# Patient Record
Sex: MALE | Race: WHITE | URBAN - METROPOLITAN AREA
[De-identification: names, ages, dates, MRNs, and addresses within clinical notes are randomized per-mention and may not be internally consistent; named-entity substitution may affect disease eponyms.]

---

## 2018-03-19 ENCOUNTER — TRANSFERRED RECORDS (OUTPATIENT)
Dept: HEALTH INFORMATION MANAGEMENT | Facility: CLINIC | Age: 73
End: 2018-03-19

## 2018-03-21 ENCOUNTER — HOSPITAL ENCOUNTER (INPATIENT)
Facility: CLINIC | Age: 73
LOS: 14 days | Discharge: ANOTHER HOSPITAL | DRG: 228 | End: 2018-04-04
Attending: THORACIC SURGERY (CARDIOTHORACIC VASCULAR SURGERY) | Admitting: THORACIC SURGERY (CARDIOTHORACIC VASCULAR SURGERY)
Payer: COMMERCIAL

## 2018-03-21 DIAGNOSIS — I48.0 PAROXYSMAL ATRIAL FIBRILLATION (H): ICD-10-CM

## 2018-03-21 DIAGNOSIS — Z79.4 TYPE 2 DIABETES MELLITUS WITH HYPEROSMOLARITY WITHOUT COMA, WITH LONG-TERM CURRENT USE OF INSULIN (H): Primary | ICD-10-CM

## 2018-03-21 DIAGNOSIS — K64.4 EXTERNAL HEMORRHOIDS: ICD-10-CM

## 2018-03-21 DIAGNOSIS — Z95.1 S/P CABG (CORONARY ARTERY BYPASS GRAFT): ICD-10-CM

## 2018-03-21 DIAGNOSIS — Z98.890 H/O FASCIOTOMY: ICD-10-CM

## 2018-03-21 DIAGNOSIS — E11.00 TYPE 2 DIABETES MELLITUS WITH HYPEROSMOLARITY WITHOUT COMA, WITH LONG-TERM CURRENT USE OF INSULIN (H): Primary | ICD-10-CM

## 2018-03-21 PROBLEM — I50.9 HEART FAILURE (H): Status: ACTIVE | Noted: 2018-03-21

## 2018-03-21 LAB — GLUCOSE BLDC GLUCOMTR-MCNC: 211 MG/DL (ref 70–99)

## 2018-03-21 PROCEDURE — 87640 STAPH A DNA AMP PROBE: CPT | Performed by: THORACIC SURGERY (CARDIOTHORACIC VASCULAR SURGERY)

## 2018-03-21 PROCEDURE — 87641 MR-STAPH DNA AMP PROBE: CPT | Performed by: THORACIC SURGERY (CARDIOTHORACIC VASCULAR SURGERY)

## 2018-03-21 PROCEDURE — 93010 ELECTROCARDIOGRAM REPORT: CPT | Performed by: INTERNAL MEDICINE

## 2018-03-21 PROCEDURE — 20000004 ZZH R&B ICU UMMC

## 2018-03-21 PROCEDURE — 25000128 H RX IP 250 OP 636: Performed by: SURGERY

## 2018-03-21 PROCEDURE — 93005 ELECTROCARDIOGRAM TRACING: CPT

## 2018-03-21 PROCEDURE — 83605 ASSAY OF LACTIC ACID: CPT | Performed by: INTERNAL MEDICINE

## 2018-03-21 PROCEDURE — 00000146 ZZHCL STATISTIC GLUCOSE BY METER IP

## 2018-03-21 RX ORDER — ASPIRIN 81 MG/1
81 TABLET, CHEWABLE ORAL DAILY
Status: DISCONTINUED | OUTPATIENT
Start: 2018-03-22 | End: 2018-03-24

## 2018-03-21 RX ORDER — POTASSIUM CHLORIDE 750 MG/1
20-40 TABLET, EXTENDED RELEASE ORAL
Status: DISCONTINUED | OUTPATIENT
Start: 2018-03-21 | End: 2018-03-24

## 2018-03-21 RX ORDER — POTASSIUM CHLORIDE 1.5 G/1.58G
20-40 POWDER, FOR SOLUTION ORAL
Status: DISCONTINUED | OUTPATIENT
Start: 2018-03-21 | End: 2018-03-24

## 2018-03-21 RX ORDER — ALBUTEROL SULFATE 90 UG/1
2 AEROSOL, METERED RESPIRATORY (INHALATION) EVERY 6 HOURS PRN
COMMUNITY

## 2018-03-21 RX ORDER — LISINOPRIL 10 MG/1
30 TABLET ORAL DAILY
Status: DISCONTINUED | OUTPATIENT
Start: 2018-03-22 | End: 2018-03-22

## 2018-03-21 RX ORDER — NICOTINE POLACRILEX 4 MG
15-30 LOZENGE BUCCAL
Status: DISCONTINUED | OUTPATIENT
Start: 2018-03-21 | End: 2018-03-24

## 2018-03-21 RX ORDER — CHLORAL HYDRATE 500 MG
1000 CAPSULE ORAL DAILY
Status: DISCONTINUED | OUTPATIENT
Start: 2018-03-22 | End: 2018-03-28

## 2018-03-21 RX ORDER — HEPARIN SODIUM 10000 [USP'U]/100ML
0-3500 INJECTION, SOLUTION INTRAVENOUS CONTINUOUS
Status: DISPENSED | OUTPATIENT
Start: 2018-03-21 | End: 2018-03-23

## 2018-03-21 RX ORDER — GLIPIZIDE 10 MG/1
20 TABLET ORAL
Status: DISCONTINUED | OUTPATIENT
Start: 2018-03-22 | End: 2018-03-22

## 2018-03-21 RX ORDER — ACETAMINOPHEN 325 MG/1
650 TABLET ORAL EVERY 6 HOURS PRN
Status: DISCONTINUED | OUTPATIENT
Start: 2018-03-21 | End: 2018-03-24

## 2018-03-21 RX ORDER — MULTIPLE VITAMINS W/ MINERALS TAB 9MG-400MCG
1 TAB ORAL DAILY
Status: DISCONTINUED | OUTPATIENT
Start: 2018-03-22 | End: 2018-04-04 | Stop reason: HOSPADM

## 2018-03-21 RX ORDER — NITROGLYCERIN 20 MG/100ML
.07-1.75 INJECTION INTRAVENOUS CONTINUOUS
Status: DISCONTINUED | OUTPATIENT
Start: 2018-03-21 | End: 2018-03-22

## 2018-03-21 RX ORDER — MAGNESIUM SULFATE HEPTAHYDRATE 40 MG/ML
4 INJECTION, SOLUTION INTRAVENOUS EVERY 4 HOURS PRN
Status: DISCONTINUED | OUTPATIENT
Start: 2018-03-21 | End: 2018-03-24

## 2018-03-21 RX ORDER — DEXTROSE MONOHYDRATE 25 G/50ML
25-50 INJECTION, SOLUTION INTRAVENOUS
Status: DISCONTINUED | OUTPATIENT
Start: 2018-03-21 | End: 2018-03-24

## 2018-03-21 RX ORDER — POTASSIUM CL/LIDO/0.9 % NACL 10MEQ/0.1L
10 INTRAVENOUS SOLUTION, PIGGYBACK (ML) INTRAVENOUS
Status: DISCONTINUED | OUTPATIENT
Start: 2018-03-21 | End: 2018-03-24

## 2018-03-21 RX ORDER — ALBUTEROL SULFATE 90 UG/1
2 AEROSOL, METERED RESPIRATORY (INHALATION) EVERY 6 HOURS PRN
Status: DISCONTINUED | OUTPATIENT
Start: 2018-03-21 | End: 2018-04-04 | Stop reason: HOSPADM

## 2018-03-21 RX ORDER — POTASSIUM CHLORIDE 7.45 MG/ML
10 INJECTION INTRAVENOUS
Status: DISCONTINUED | OUTPATIENT
Start: 2018-03-21 | End: 2018-03-24

## 2018-03-21 RX ORDER — FINASTERIDE 5 MG/1
5 TABLET, FILM COATED ORAL DAILY
Status: DISCONTINUED | OUTPATIENT
Start: 2018-03-22 | End: 2018-04-04 | Stop reason: HOSPADM

## 2018-03-21 RX ORDER — INSULIN GLARGINE 100 [IU]/ML
60 INJECTION, SOLUTION SUBCUTANEOUS AT BEDTIME
Status: ON HOLD | COMMUNITY
End: 2018-04-04

## 2018-03-21 RX ORDER — OMEGA-3/DHA/EPA/FISH OIL 60 MG-90MG
500 CAPSULE ORAL DAILY
COMMUNITY

## 2018-03-21 RX ORDER — METOPROLOL TARTRATE 50 MG
50 TABLET ORAL 2 TIMES DAILY
Status: DISCONTINUED | OUTPATIENT
Start: 2018-03-21 | End: 2018-03-22

## 2018-03-21 RX ORDER — POTASSIUM CHLORIDE 29.8 MG/ML
20 INJECTION INTRAVENOUS
Status: DISCONTINUED | OUTPATIENT
Start: 2018-03-21 | End: 2018-03-24

## 2018-03-21 RX ADMIN — FUROSEMIDE 20 MG/HR: 10 INJECTION, SOLUTION INTRAVENOUS at 22:30

## 2018-03-21 RX ADMIN — HEPARIN SODIUM 1900 UNITS/HR: 10000 INJECTION, SOLUTION INTRAVENOUS at 23:00

## 2018-03-21 NOTE — LETTER
[facility]               Recipient:  Acute Rehab Admissions team      Sender:  Bobbi MONROE Donovan, EDWINW  [address]   Phone: [phone]  Pager: [phone]  Unit: [phone]          Date: March 29, 2018  Patient Name:  Izaiah Alvarez  Routing Message:  Please review for acute rehab placement.  Pt has both Medicare and VA insurance coverage for services.  Thank you, Bobbi          The documents accompanying this notice contain confidential information belonging to the sender.  This information is intended only for the use of the individual or entity named above.  The authorized recipient of this information is prohibited from disclosing this information to any other party and is required to destroy the information after its stated need has been fulfilled, unless otherwise required by state law.      If you are not the intended recipient, you are hereby notified that any disclosure, copying, distribution or action taken in reliance on the contents of these documents is strictly prohibited. If you have received this document in error, please notify [facility] immediately at [phone].  You may return the document via fax ([phone]) or return mail  ([facility], , [address] [place] [address]).

## 2018-03-21 NOTE — IP AVS SNAPSHOT
MRN:[id number]                      After Visit Summary   3/21/2018    Izaiah Alvarez    MRN: [id number]           Thank you!     Thank you for choosing [facility] for your care. Our goal is always to provide you with excellent care. Hearing back from our patients is one way we can continue to improve our services. Please take a few minutes to complete the written survey that you may receive in the mail after you visit with us. Thank you!        Patient Information     Date Of Birth          1945        Designated Caregiver       Most Recent Value    Caregiver    Will someone help with your care after discharge? yes    Name of designated caregiver Ely    Phone number of caregiver [phone]    Caregiver address [address] [place] [address] [address] [place] [address]      About your hospital stay     You were admitted on:  March 21, 2018 You last received care in the:  [address] [facility]    You were discharged on:  April 4, 2018        Reason for your hospital stay       You had a coronary artery bypass on 3/23/18 with Dr Lopez.                  Who to Call     For medical emergencies, please call 911.  For non-urgent questions about your medical care, please call your primary care provider or clinic, None  For questions related to your surgery, please call your surgery clinic        Attending Provider     Provider Specialty    William Blank MD Thoracic Surgery (Cardiothoracic Vascular Surgery)    Eric Marks MD Cardiology       Primary Care Provider Fax #    Provider Not In System [phone]      After Care Instructions     Activity - Up with nursing assistance           Advance Diet as Tolerated       Follow this diet upon discharge: Orders Placed This Encounter      Fluid restriction 2000 ML FLUID      Snacks/Supplements Adult: With Meals      Moderate Consistent CHO Diet            Cardiac sternal precautions           Daily weights       Call Provider for weight gain of more than 2  pounds per day or 5 pounds per week.            General info for SNF       Length of Stay Estimate: Short Term Care: Estimated # of Days <30  Condition at Discharge: Improving  Level of care:skilled   Rehabilitation Potential: Good  Admission H&P remains valid and up-to-date: Yes  Recent Chemotherapy: N/A  Use Nursing Home Standing Orders: Yes            Glucose monitor nursing POCT       Before meals and at bedtime            Intake and output       Every shift            Mantoux instructions       Give two-step Mantoux (PPD) Per Facility Policy Yes            Oxygen - Nasal cannula       Titrate Lpm PRN by nasal cannula to keep O2 sats 92% or greater.            Wound care       Site:   Right Leg groin and lower leg  Instructions:    Staples in groin should be removed 4/10/18  Sutures in R leg should be removed around 5/5/18            Wound care (specify)       Site:   Sternal incision and chest tubes  Instructions:    You have dissolvable sutures under your skin that do not need to be removed.  Pat wound dressing dry after showers.  Skin glue will eventually fall off in 1-2 weeks.     Keep wound clean and dry, showers are okay after discharge, but don't let spray hit directly on incision. No baths or swimming for 1 month.  Clean wounds twice a day for 2 weeks with microklenz spray if available. Cover chest tube sites with gauze until they stop draining, then leave open.            Wound care and dressings       Please remove right groin staples in one week.  Remove right leg sutures in 3 weeks.                  Follow-up Appointments     Follow Up and recommended labs and tests       You will now return to the care of your primary physician and your cardiologist.   - Restart metformin at 500 mg BID and follow up with Diabetic Provider at home  - Follow up with Vascular surgery team in 3-4 weeks in [place] (remove right groin staples on 4/10 and fasciotomy site sutures on 3-4 weeks)  - Follow up opthalmology as  outpatient PRN                  Additional Services     Occupational Therapy Adult Consult       Evaluate and treat as clinically indicated.    Reason:  Sternotomy, right leg fasciotomy            Physical Therapy Adult Consult       Evaluate and treat as clinically indicated.    Reason:  Sternotomy, right leg fasciotomy                  Further instructions from your care team       [facility]      AFTER YOU GO HOME FROM YOUR HEART SURGERY    You had a full sternotomy, so avoid lifting anything greater than ten pounds for 6 weeks after surgery and then less than 20 pounds for an additional 6 weeks.  No driving for 4 weeks after surgery or while on pain medication.     Avoid strenuous activities such as bowling, vacuuming, raking, shoveling, golf or tennis for 12 weeks after your surgery. It is okay to resume sex if you feel comfortable in doing so. You may have to try different positions with your partner.     Splint your chest incision by hugging a pillow or bringing your arms across your chest when coughing or sneezing. Avoid pushing off with your arms when getting up for the first month if you have had your sternum opened.    Shower or wash your incisions daily with soap and water (or as instructed), pat dry. Keep wound clean and dry, showers are okay after discharge, but don't let spray hit directly on incision. No baths or swimming for 1 month.  Clean wounds twice a day for 2 weeks with microklenz spray if available. Cover chest tube sites with gauze until they stop draining, then leave open. It is not abnormal for chest tube sites to drain yellowish/clear fluid for up to 2-3 weeks after surgery.   Watch for signs of infection: increased redness, tenderness, warmth or any drainage that appears infected (pus like) or is persistent.  Also a temperature > 100.5 F or chills. Call your surgeon or primary care provider's office immediately. Remove any skin glue left on incisions after  10-14 days. This will not affect your incision and can speed up healing.    Exercise is very important in your recovery. Please follow the guidelines set up for you in your cardiac rehab classes at the hospital. If outpatient cardiac rehab was ordered for you, we highly recommend you participate. If you have problems arranging your cardiac rehab, please call [phone].     Avoid sitting for prolonged periods of time, try to walk every hour during the day. If you have a leg incision, elevate your leg often when you are not walking.    Check your weight when you get home from the hospital and continue to check it daily through your recovery for at least a month. If you notice a weight gain of 2-3 pounds in a week, notify your primary care physician, cardiologist or surgeon.    Bowel activity may be slow after surgery. If necessary, you may take an over the counter laxative such as Milk of Magnesia or Miralax. You may have stool softeners prescribed (docusate sodium, Senokot). We recommend using stool softeners while using narcotics for pain (oxycodone/percocet, hydrocodone/vicodin).      DO NOT SMOKE.  IF YOU NEED HELP QUITTING, PLEASE TALK WITH YOUR CARDIOLOGIST OR PRIMARY DOCTOR.    You are on a blood thinner, follow the instructions you were given in the hospital and DO NOT SKIP this medication. Try and take it the same time everyday. Your primary care physician or coumadin clinic will manage the dosing.     REGARDING PRESCRIPTION REFILLS.  If you need a refill on your pain medication contact us.  All other medications will be adjusted, discontinued and re-filled by your primary care physician and/or your cardiologist as they were prior to your surgery. We have given you enough for one to three month with possibly one refill.    POST-OPERATIVE CLINIC VISITS  You will now return to the care of your primary physician and your cardiologist.   - Restart metformin at 500 mg BID and follow up with Diabetic Provider at  home  - Follow up with Vascular surgery team in 3-4 weeks in [place] (remove right groin staples on 4/10 and fasciotomy site sutures on 3-4 weeks)  - Follow up opthalmology as outpatient PRN   If there is a need to return to see CT Surgery please call our  at [phone].    SURGICAL QUESTIONS  Please call Sherry Sky with any surgical recovery and medication questions, her phone number is listed below.  She can assist you with your needs and contact other surgery care team members as indicated.    On weekends or after hours, please call [phone] and ask the  to   page the Cardiothoracic Surgery fellow on call.      Thank you,    Your Cardiothoracic Surgery Team  Sherry Sky RN Care Coordinator-  [phone]   Julee Vail PA-C    Warfarin Instruction     You have started taking a medicine called warfarin. This is a blood-thinning medicine (anticoagulant). It helps prevent and treat blood clots.      Before leaving the hospital, make sure you know how much to take and how long to take it.      You will need regular blood tests to make sure your blood is clotting safely. It is very important to see your doctor for regular blood tests.    Talk to your doctor before taking any new medicine (this includes over-the-counter drugs and herbal products). Many medicines can interact with warfarin. This may cause more bleeding or too much clotting.     Eating a lot of vitamin K--found in green, leafy vegetables--can change the way warfarin works in your body. Do NOT avoid these foods. Instead, try to eat the same amount each day.     Bleeding is the most common side-effect of warfarin. You may notice bleeding gums, a bloody nose, bruises and bleeding longer when you cut yourself. See a doctor at once if:   o You cough up blood  o You find blood in your stool (poop)  o You have a deep cut, or a cut that bleeds longer than 10 minutes  "  o You have a bad cut, hard fall, accident or hit your head (go to urgent care or the emergency room).    For women who can get pregnant: This medicine can harm an unborn baby. Be very careful not to get pregnant while taking this medicine. If you think you might be pregnant, call your doctor right away.    For more information, read \"Guide to Warfarin Therapy,  the booklet you received in the hospital.        Pending Results     Date and Time Order Name Status Description    3/27/2018 0601 Heparin 10a Level In process     3/23/2018 1426 Plasma prepare order unit In process             Statement of Approval     Ordered          18 0815  I have reviewed and agree with all the recommendations and orders detailed in this document.  EFFECTIVE NOW     Approved and electronically signed by:  Alfonzo Mckeon PA             Admission Information     Date & Time Provider Department Dept. Phone    3/21/2018 Eric Marks MD [address] [facility] [phone]      Your Vitals Were     Blood Pressure Pulse Temperature Respirations Height Weight    113/63 (BP Location: Left arm) 84 97.5  F (36.4  C) (Oral) 20 1.803 m (5' 11\") 113.1 kg (249 lb 6.4 oz)    Pulse Oximetry BMI (Body Mass Index)                97% 34.78 kg/m2          [employer] Information     [employer] lets you send messages to your doctor, view your test results, renew your prescriptions, schedule appointments and more. To sign up, go to [email or web address] . Click on \"Log in\" on the left side of the screen, which will take you to the Welcome page. Then click on \"Sign up Now\" on the right side of the page.     You will be asked to enter the access code listed below, as well as some personal information. Please follow the directions to create your username and password.     Your access code is: [email or web address]  Expires: 2018  6:31 AM     Your access code will  in 90 days. If you need help or a new code, please call your [facility] " clinic or [phone].        Care EveryWhere ID     This is your Care EveryWhere ID. This could be used by other organizations to access your [facility] medical records  [id number]        Equal Access to Services     ROBERTA WOLF : Hadii aad ku hadedouardbutch Luis, waaxda luqadaha, qaybta kaalmada nico, gray doesada martingeno granadoalon salvador. So [facility] [phone].    ATENCIÓN: Si habla español, tiene a vasquez disposición servicios gratuitos de asistencia lingüística. Llame al [phone].    We comply with applicable federal civil rights laws and [place] laws. We do not discriminate on the basis of race, color, national origin, age, disability, sex, sexual orientation, or gender identity.               Review of your medicines      START taking        Dose / Directions    cephALEXin 500 MG capsule   Commonly known as:  KEFLEX   Indication:  Preventative Medication Therapy Used Around Surgery   Used for:  H/O fasciotomy        Dose:  500 mg   Take 1 capsule (500 mg) by mouth every 8 hours for 10 days   Refills:  0       furosemide 20 MG tablet   Commonly known as:  LASIX   Used for:  S/P CABG (coronary artery bypass graft)        Dose:  20 mg   Take 1 tablet (20 mg) by mouth 2 times daily   Quantity:  60 tablet   Refills:  0       * insulin aspart 100 UNIT/ML injection   Commonly known as:  NovoLOG PEN   Used for:  S/P CABG (coronary artery bypass graft)        Dose:  1-7 Units   Inject 1-7 Units Subcutaneous 3 times daily (before meals) Correction Scale - MEDIUM RESISTANCE DOSING    No Correction Insulin if Pre-Meal BG <140.  For Pre-Meal  - 189 give 1 unit.   - 239 give 2 units.   - 289 give 3 units.   - 339 give 4 units.  - 399 give 5 units.  -449 give 6 units For Pre-Meal BG > 449 give 7 units.  To be given based on pre-meal blood glucose   Refills:  0       * insulin aspart 100 UNIT/ML injection   Commonly known as:  NovoLOG PEN   Used for:  S/P CABG (coronary artery  bypass graft)        Dose:  1-5 Units   Inject 1-5 Units Subcutaneous At Bedtime MEDIUM INSULIN RESISTANCE DOSING   Do Not give Bedtime Correction Insulin if BG less than  200.  For  - 249 give 1 units.  For  - 299 give 2 units.  For  - 349 give 3 units.  For  -399 give 4 units.  For BG greater than or equal to 400 give 5 units. Notify provider if glucose greater than or equal to 350 mg/dL after administration of correction dose.   Refills:  0       insulin glargine 100 UNIT/ML injection   Commonly known as:  LANTUS   Used for:  Type 2 diabetes mellitus with hyperosmolarity without coma, with long-term current use of insulin (H)   Replaces:  insulin glargine 100 UNIT/ML injection        Dose:  45 Units   Inject 45 Units Subcutaneous every morning   Refills:  0       pantoprazole 40 MG EC tablet   Commonly known as:  PROTONIX   Used for:  S/P CABG (coronary artery bypass graft)        Dose:  40 mg   Take 1 tablet (40 mg) by mouth every morning for 30 days   Quantity:  30 tablet   Refills:  0       phenylephrine-shark liver oil-mineral oil-petrolatum 0.25-14-74.9 % rectal ointment   Commonly known as:  PREPARATION H   Used for:  External hemorrhoids        Place rectally 3 times daily as needed for hemorrhoids   Refills:  0       polyethylene glycol Packet   Commonly known as:  MIRALAX/GLYCOLAX   Used for:  S/P CABG (coronary artery bypass graft)        Dose:  17 g   17 g by Oral or Feeding Tube route daily as needed for constipation   Quantity:  7 packet   Refills:  0       senna-docusate 8.6-50 MG per tablet   Commonly known as:  SENOKOT-S;PERICOLACE   Used for:  S/P CABG (coronary artery bypass graft)        Dose:  2 tablet   2 tablets by Oral or Feeding Tube route 2 times daily as needed for constipation   Quantity:  100 tablet   Refills:  0       * Notice:  This list has 2 medication(s) that are the same as other medications prescribed for you. Read the directions carefully, and ask your  doctor or other care provider to review them with you.      CONTINUE these medicines which may have CHANGED, or have new prescriptions. If we are uncertain of the size of tablets/capsules you have at home, strength may be listed as something that might have changed.        Dose / Directions    atorvastatin 20 MG tablet   Commonly known as:  LIPITOR   This may have changed:    - medication strength  - how much to take   Used for:  S/P CABG (coronary artery bypass graft)        Dose:  20 mg   Take 1 tablet (20 mg) by mouth daily   Quantity:  30 tablet   Refills:  0       glipiZIDE 10 MG tablet   Commonly known as:  GLUCOTROL   This may have changed:    - medication strength  - how much to take  - when to take this   Used for:  Type 2 diabetes mellitus with hyperosmolarity without coma, with long-term current use of insulin (H)        Dose:  10 mg   Take 1 tablet (10 mg) by mouth every morning (before breakfast)   Quantity:  60 tablet   Refills:  0       metFORMIN 500 MG tablet   Commonly known as:  GLUCOPHAGE   This may have changed:  how much to take   Used for:  Type 2 diabetes mellitus with hyperosmolarity without coma, with long-term current use of insulin (H)        Dose:  500 mg   Take 1 tablet (500 mg) by mouth 2 times daily (with meals)   Quantity:  60 tablet   Refills:  0       warfarin 2 MG tablet   Commonly known as:  COUMADIN   This may have changed:    - medication strength  - how much to take  - how to take this  - additional instructions   Used for:  Paroxysmal atrial fibrillation (H)        Take 5 mg PO daily until next INR check, then dose per INR goal 2-3 for 6 months.   Refills:  0         CONTINUE these medicines which have NOT CHANGED        Dose / Directions    albuterol 108 (90 BASE) MCG/ACT Inhaler   Commonly known as:  PROAIR HFA/PROVENTIL HFA/VENTOLIN HFA        Dose:  2 puff   Inhale 2 puffs into the lungs every 6 hours as needed for shortness of breath / dyspnea or wheezing   Refills:  0        ASPIRIN 81 PO        Dose:  1 tablet   Take 1 tablet by mouth daily   Refills:  0       FINASTERIDE PO        Dose:  5 mg   Take 5 mg by mouth daily   Refills:  0       Fish Oil 500 MG Caps        Dose:  500 mg   Take 500 mg by mouth daily   Refills:  0       fluticasone 50 MCG/BLIST Aepb   Commonly known as:  FLOVENT DISKUS        Dose:  2 puff   Inhale 2 puffs into the lungs daily as needed Both nostrils for rhinitis   Refills:  0       METOPROLOL TARTRATE PO        Dose:  50 mg   Take 50 mg by mouth 2 times daily   Refills:  0       MULTIVITAMIN & MINERAL PO        Dose:  1 tablet   Take 1 tablet by mouth daily   Refills:  0       TYLENOL PO        Dose:  650 mg   Take 650 mg by mouth every 6 hours as needed for mild pain   Refills:  0         STOP taking     CHLORTHALIDONE PO           insulin glargine 100 UNIT/ML injection   Commonly known as:  LANTUS   Replaced by:  insulin glargine 100 UNIT/ML injection           LISINOPRIL PO                Where to get your medicines      Some of these will need a paper prescription and others can be bought over the counter. Ask your nurse if you have questions.     You don't need a prescription for these medications     atorvastatin 20 MG tablet    cephALEXin 500 MG capsule    furosemide 20 MG tablet    glipiZIDE 10 MG tablet    insulin aspart 100 UNIT/ML injection    insulin aspart 100 UNIT/ML injection    insulin glargine 100 UNIT/ML injection    metFORMIN 500 MG tablet    pantoprazole 40 MG EC tablet    phenylephrine-shark liver oil-mineral oil-petrolatum 0.25-14-74.9 % rectal ointment    polyethylene glycol Packet    senna-docusate 8.6-50 MG per tablet    warfarin 2 MG tablet                Protect others around you: Learn how to safely use, store and throw away your medicines at www.disposemymeds.org.        ANTIBIOTIC INSTRUCTION     You've Been Prescribed an Antibiotic - Now What?  Your healthcare team thinks that you or your loved one might have an infection.  Some infections can be treated with antibiotics, which are powerful, life-saving drugs. Like all medications, antibiotics have side effects and should only be used when necessary. There are some important things you should know about your antibiotic treatment.      Your healthcare team may run tests before you start taking an antibiotic.    Your team may take samples (e.g., from your blood, urine or other areas) to run tests to look for bacteria. These test can be important to determine if you need an antibiotic at all and, if you do, which antibiotic will work best.      Within a few days, your healthcare team might change or even stop your antibiotic.    Your team may start you on an antibiotic while they are working to find out what is making you sick.    Your team might change your antibiotic because test results show that a different antibiotic would be better to treat your infection.    In some cases, once your team has more information, they learn that you do not need an antibiotic at all. They may find out that you don't have an infection, or that the antibiotic you're taking won't work against your infection. For example, an infection caused by a virus can't be treated with antibiotics. Staying on an antibiotic when you don't need it is more likely to be harmful than helpful.      You may experience side effects from your antibiotic.    Like all medications, antibiotics have side effects. Some of these can be serious.    Let you healthcare team know if you have any known allergies when you are admitted to the hospital.    One significant side effect of nearly all antibiotics is the risk of severe and sometimes deadly diarrhea caused by Clostridium difficile (C. Difficile). This occurs when a person takes antibiotics because some good germs are destroyed. Antibiotic use allows C. diificile to take over, putting patients at high risk for this serious infection.    As a patient or caregiver, it is important to  understand your or your loved one's antibiotic treatment. It is especially important for caregivers to speak up when patients can't speak for themselves. Here are some important questions to ask your healthcare team.    What infection is this antibiotic treating and how do you know I have that infection?    What side effects might occur from this antibiotic?    How long will I need to take this antibiotic?    Is it safe to take this antibiotic with other medications or supplements (e.g., vitamins) that I am taking?     Are there any special directions I need to know about taking this antibiotic? For example, should I take it with food?    How will I be monitored to know whether my infection is responding to the antibiotic?    What tests may help to make sure the right antibiotic is prescribed for me?      Information provided by:  www.cdc.gov/getsmart  U.S. Department of Health and Human Services  Centers for disease Control and Prevention  National Center for Emerging and Zoonotic Infectious Diseases  Division of Healthcare Quality Promotion             Medication List: This is a list of all your medications and when to take them. Check marks below indicate your daily home schedule. Keep this list as a reference.      Medications           Morning Afternoon Evening Bedtime As Needed    albuterol 108 (90 BASE) MCG/ACT Inhaler   Commonly known as:  PROAIR HFA/PROVENTIL HFA/VENTOLIN HFA   Inhale 2 puffs into the lungs every 6 hours as needed for shortness of breath / dyspnea or wheezing                                   ASPIRIN 81 PO   Take 1 tablet by mouth daily   Last time this was given:  81 mg on 3/22/2018  7:45 AM                                   atorvastatin 20 MG tablet   Commonly known as:  LIPITOR   Take 1 tablet (20 mg) by mouth daily   Last time this was given:  20 mg on 4/3/2018  8:47 PM                                   cephALEXin 500 MG capsule   Commonly known as:  KEFLEX   Take 1 capsule (500 mg) by  mouth every 8 hours for 10 days   Last time this was given:  500 mg on 4/4/2018  6:12 AM                                         FINASTERIDE PO   Take 5 mg by mouth daily   Last time this was given:  5 mg on 4/4/2018  6:37 AM                                   Fish Oil 500 MG Caps   Take 500 mg by mouth daily   Last time this was given:  1,000 mg on 3/28/2018  8:00 AM                                   fluticasone 50 MCG/BLIST Aepb   Commonly known as:  FLOVENT DISKUS   Inhale 2 puffs into the lungs daily as needed Both nostrils for rhinitis                                   furosemide 20 MG tablet   Commonly known as:  LASIX   Take 1 tablet (20 mg) by mouth 2 times daily   Last time this was given:  20 mg on 4/4/2018  6:37 AM                                      glipiZIDE 10 MG tablet   Commonly known as:  GLUCOTROL   Take 1 tablet (10 mg) by mouth every morning (before breakfast)   Last time this was given:  10 mg on 4/4/2018  6:36 AM                                   * insulin aspart 100 UNIT/ML injection   Commonly known as:  NovoLOG PEN   Inject 1-7 Units Subcutaneous 3 times daily (before meals) Correction Scale - MEDIUM RESISTANCE DOSING    No Correction Insulin if Pre-Meal BG <140.  For Pre-Meal  - 189 give 1 unit.   - 239 give 2 units.   - 289 give 3 units.   - 339 give 4 units.  - 399 give 5 units.  -449 give 6 units For Pre-Meal BG > 449 give 7 units.  To be given based on pre-meal blood glucose   Last time this was given:  1 Units on 4/3/2018 12:10 PM                                         * insulin aspart 100 UNIT/ML injection   Commonly known as:  NovoLOG PEN   Inject 1-5 Units Subcutaneous At Bedtime MEDIUM INSULIN RESISTANCE DOSING   Do Not give Bedtime Correction Insulin if BG less than  200.  For  - 249 give 1 units.  For  - 299 give 2 units.  For  - 349 give 3 units.  For  -399 give 4 units.  For BG greater than or equal to 400 give 5  units. Notify provider if glucose greater than or equal to 350 mg/dL after administration of correction dose.   Last time this was given:  1 Units on 4/3/2018 12:10 PM                                   insulin glargine 100 UNIT/ML injection   Commonly known as:  LANTUS   Inject 45 Units Subcutaneous every morning   Last time this was given:  45 Units on 4/3/2018  1:01 PM                                   metFORMIN 500 MG tablet   Commonly known as:  GLUCOPHAGE   Take 1 tablet (500 mg) by mouth 2 times daily (with meals)   Last time this was given:  500 mg on 3/31/2018  9:45 AM                                      METOPROLOL TARTRATE PO   Take 50 mg by mouth 2 times daily   Last time this was given:  50 mg on 4/4/2018  6:37 AM                                      MULTIVITAMIN & MINERAL PO   Take 1 tablet by mouth daily                                   pantoprazole 40 MG EC tablet   Commonly known as:  PROTONIX   Take 1 tablet (40 mg) by mouth every morning for 30 days   Last time this was given:  40 mg on 4/4/2018  6:37 AM                                   phenylephrine-shark liver oil-mineral oil-petrolatum 0.25-14-74.9 % rectal ointment   Commonly known as:  PREPARATION H   Place rectally 3 times daily as needed for hemorrhoids                                   polyethylene glycol Packet   Commonly known as:  MIRALAX/GLYCOLAX   17 g by Oral or Feeding Tube route daily as needed for constipation   Last time this was given:  17 g on 4/4/2018  6:37 AM                                   senna-docusate 8.6-50 MG per tablet   Commonly known as:  SENOKOT-S;PERICOLACE   2 tablets by Oral or Feeding Tube route 2 times daily as needed for constipation   Last time this was given:  2 tablets on 4/2/2018 10:20 PM                                   TYLENOL PO   Take 650 mg by mouth every 6 hours as needed for mild pain   Last time this was given:  650 mg on 4/1/2018 11:59 PM                                   warfarin 2 MG tablet    Commonly known as:  COUMADIN   Take 5 mg PO daily until next INR check, then dose per INR goal 2-3 for 6 months.   Last time this was given:  4 mg on 4/3/2018  5:09 PM                                   * Notice:  This list has 2 medication(s) that are the same as other medications prescribed for you. Read the directions carefully, and ask your doctor or other care provider to review them with you.

## 2018-03-21 NOTE — IP AVS SNAPSHOT
` `     UNIT [facility] [facility]: [phone]                 INTERAGENCY TRANSFER FORM - NOTES (H&P, Discharge Summary, Consults, Procedures, Therapies)   3/21/2018                    Hospital Admission Date: 3/21/2018  IZAIAH FAROOQ   : 1945  Sex: Male        Patient PCP Information     Provider PCP Type    Provider Not In System General         History & Physicals      H&P by Olayinka Orellana MD at 3/21/2018 11:14 PM     Author:  Olayinka Orellana MD Service:  Cardiothoracic Surgery Author Type:  Resident    Filed:  3/21/2018 11:46 PM Date of Service:  3/21/2018 11:14 PM Creation Time:  3/21/2018 11:12 PM    Status:  Attested :  Olayinka Orellana MD (Resident)    Cosigner:  William Blank MD at 3/22/2018  1:48 PM        Attestation signed by William Blank MD at 3/22/2018  1:48 PM        Agree                               CVTS[KG1.1] H&P[KG1.2]    HPI:   Izaiah Farooq is a 72 year old male with PMHx of obesity, Afib on coumadin, DM2, HTN, and TIA who was transferred from [facility] following MI with new onset left HF (20-25% compared to 46% previously). Patient started having chest pain about 6 months ago. Acutely worsened on 3/19 with increased SOB patient presented to hospital. He was found to have chronic LAD disease with 95% stenosis and collaterals with multi-vessel disease. Patient was then found to have acute hypoxic respiratory after developing pulmonary edema and had impella placed on 3/20 with EF of 20-25% with global L hypokinesis reduced RV systolic function. He was also started on lasix gtt for fluid, nitroglycerin gtt to decrease afterload and diltiazem gtt for afib with RVR . States chest pain has resolved and shortness of breath has improved with decreased oxygen requirements. Denies fevers, chills, nausea, vomiting, weight loss, change in appetite, numbness, tingling, nor weakness.    ROS:  10 point ROS negative except if noted above    PMH:  Obesity, afib on coumadin,  DM, hypercholesterolemia, HTN, asthma, TIA    PSHx:  Orchiopexy    Medications:  Current Facility-Administered Medications   Medication     furosemide (LASIX) 500 mg/50mL infusion ADULT MAX CONC     diltiazem (CARDIZEM) 125 mg in sodium chloride 0.9 % 125 mL infusion     nitroPRUsside (NIPRIDE) 50 mg, sodium thiosulfate 500 mg in D5W 125 mL IV infusion (conc: 0.4mg/mL)     HOLD nitroGLYcerin IF     Patient is already receiving anticoagulation with heparin, enoxaparin (LOVENOX), warfarin (COUMADIN)  or other anticoagulant medication     Reason ACE/ARB/ARNI order not selected     heparin  drip 25,000 units in 0.45% NaCl 250 mL (see additional administration details for dose)     heparin bolus from infusion pump     potassium chloride SA (K-DUR/KLOR-CON M) CR tablet 20-40 mEq     potassium chloride (KLOR-CON) Packet 20-40 mEq     potassium chloride 10 mEq in 100 mL sterile water intermittent infusion (premix)     potassium chloride 10 mEq in 100 mL intermittent infusion with 10 mg lidocaine     potassium chloride 20 mEq in 50 mL intermittent infusion     magnesium sulfate 2 g in NS intermittent infusion (PharMEDium or FV Cmpd)     magnesium sulfate 4 g in 100 mL sterile water (premade)     Continuing beta blocker from home medication list OR beta blocker order already placed during this visit     acetaminophen (TYLENOL) tablet 650 mg     albuterol (PROAIR HFA/PROVENTIL HFA/VENTOLIN HFA) Inhaler 2 puff     [START ON 3/22/2018] aspirin chewable tablet 81 mg     [START ON 3/22/2018] atorvastatin (LIPITOR) half-tab 5 mg     [START ON 3/22/2018] chlorthalidone (HYGROTON) half-tab 12.5 mg     [START ON 3/22/2018] finasteride (PROSCAR) tablet 5 mg     [START ON 3/22/2018] glipiZIDE (GLUCOTROL) tablet 20 mg     insulin glargine (LANTUS) injection 60 Units     [START ON 3/22/2018] lisinopril (PRINIVIL/ZESTRIL) tablet 30 mg     [START ON 3/22/2018] metFORMIN (GLUCOPHAGE) tablet 1,000 mg     metoprolol tartrate (LOPRESSOR) tablet  50 mg     [START ON 3/22/2018] MULTIVITAMIN & MINERAL LIQD 1 tablet     [START ON 3/22/2018] Fish Oil CAPS 500 mg     Allergies:   Review of patient's allergies indicates not on file.   NKDA    FamHx:  No family history on file.  Denie history of bleeding disorders    SocHx:      Quit smoking 40 years ago. Denies alcohol. Denies illicit drug use.    Review of Systems:  ROS: 10 point ROS neg other than the symptoms noted above in the HPI.    Objective:  Vitals:   Temp:  [99.1  F (37.3  C)] 99.1  F (37.3  C)  Heart Rate:  [96] 96  Resp:  [20] 20  BP: (138)/(101) 138/101  SpO2:  [95 %] 95 %    Resp: 20    Intake/Output:        Physical exam:  General: Laying comfortably  Neuro: A&Ox3, NAD  Resp: Breathing non-labored oximask  CV: RRR  Abdomen: Soft, Non-distended, Non-tender  Extremities: warm and well perfused. Dopplerable DP bilateral. Sensation intact. Impella R groin.    Labs:  No lab results found in last 7 days.    Imaging / Intervention results:  3/19 Cardiac Cath:  50% left ostial. Distal left main 95% stenosis - affecting all 3 vessesl of the coronaary. Diffuse disease in the branch vessels. Moderate LV impairment. Advanced stenoses in distal aorta and proximal iliacs    Echo: unable to locate results. Reported LVEF 20-25%    Assessment and Plan: Izaiah Alvarez is a 72 year old male with PMHx of obesity, Afib on coumadin, DM2, HTN, and TIA who was transferred from [facility] following MI with new onset left HF (reported 20-25% compared to 46% previously).    Neuro:  Tylenol PRN for pain  Psych: None  CV:  MI with diffuse vessel disease and new onset LVHF (20-25%). Consult HF service. Cut dilt in half then dc in 4 hours. Start amio at 1 straight rate. Continue nitro gtt maintain SBP < 110   PTA: ASA 81mg qday, atrovastatin 5mg qday, metoprolol 50mg BID, linopril 30mg qday  Resp:  Wean O2 as tolerated. Albuterol neb PRN  GI: None  FEN:  Cardiac diet. Limit 2L intake. Electrolyte replacement   :  Felton strict I/O.  Lasix gtt   PTA: chlorthalidone 12.5mg qday   Heme:  Heparin gtt  ID:  None  Endo:  PTA: insulin lantus 60 units qhs, glipizide 20mg BID, metformin 1000mg BID  PPx:  None  Lines: Peripheral lines  Dispo:  Stable on CVICU    Olayinka Orellana MD  [facility][KG1.1]       Revision History        User Key Date/Time User Provider Type Action    > KG1.2 3/21/2018 11:46 PM Olayinka Orellana MD Resident Sign     KG1.1 3/21/2018 11:45 PM Olayinka Orellana MD Resident Sign            H&P by Olayinka Orellana MD at 3/21/2018 11:46 PM     Author:  Olayinka Orellana MD Service:  Cardiac ICU Author Type:  Resident    Filed:  3/22/2018  2:10 AM Date of Service:  3/21/2018 11:46 PM Creation Time:  3/21/2018 11:46 PM    Status:  Attested :  Olayinka Orellana MD (Resident)    Cosigner:  William Blank MD at 3/22/2018  9:01 AM        Attestation signed by William Blank MD at 3/22/2018  9:01 AM        I discussed the patient's care with Dr Pearl, CVTS Fellow.    Stable on arrival. Will review outside studies and determine appropriate treatment options. Appreciate input from heart failure cardiology.      William Blank MD                               Cardiac ICU Consult    HPI:   Izaiah Alvarez is a 72 year old male with PMHx of obesity, Afib on coumadin, DM2, HTN, and TIA who was transferred from [facility] following MI with new onset left HF (20-25% compared to 46% previously). Patient started having chest pain about 6 months ago. Acutely worsened on 3/19 with increased SOB patient presented to hospital. He was found to have chronic LAD disease with 95% stenosis and collaterals with multi-vessel disease. Patient was then found to have acute hypoxic respiratory after developing pulmonary edema and had impella placed on 3/20 with EF of 20-25% with global L hypokinesis reduced RV systolic function. He was also started on lasix gtt for fluid, nitroglycerin gtt to decrease afterload and diltiazem gtt for afib with RVR .  States chest pain has resolved and shortness of breath has improved with decreased oxygen requirements. Denies fevers, chills, nausea, vomiting, weight loss, change in appetite, numbness, tingling, nor weakness.    ROS:  10 point ROS negative except if noted above    PMH:  Obesity, afib on coumadin, DM, hypercholesterolemia, HTN, asthma, TIA    PSHx:  Orchiopexy    Medications:[KG1.1]  Current Facility-Administered Medications   Medication     furosemide (LASIX) 500 mg/50mL infusion ADULT MAX CONC     HOLD nitroGLYcerin IF     Patient is already receiving anticoagulation with heparin, enoxaparin (LOVENOX), warfarin (COUMADIN)  or other anticoagulant medication     heparin  drip 25,000 units in 0.45% NaCl 250 mL (see additional administration details for dose)     heparin bolus from infusion pump     potassium chloride SA (K-DUR/KLOR-CON M) CR tablet 20-40 mEq     potassium chloride (KLOR-CON) Packet 20-40 mEq     potassium chloride 10 mEq in 100 mL sterile water intermittent infusion (premix)     potassium chloride 10 mEq in 100 mL intermittent infusion with 10 mg lidocaine     potassium chloride 20 mEq in 50 mL intermittent infusion     magnesium sulfate 2 g in NS intermittent infusion (PharMEDium or FV Cmpd)     magnesium sulfate 4 g in 100 mL sterile water (premade)     Continuing beta blocker from home medication list OR beta blocker order already placed during this visit     acetaminophen (TYLENOL) tablet 650 mg     albuterol (PROAIR HFA/PROVENTIL HFA/VENTOLIN HFA) Inhaler 2 puff     [START ON 3/22/2018] aspirin chewable tablet 81 mg     [START ON 3/22/2018] atorvastatin (LIPITOR) half-tab 5 mg     [START ON 3/22/2018] chlorthalidone (HYGROTON) half-tab 12.5 mg     [START ON 3/22/2018] finasteride (PROSCAR) tablet 5 mg     [START ON 3/22/2018] glipiZIDE (GLUCOTROL) tablet 20 mg     insulin glargine (LANTUS) injection 60 Units     [START ON 3/22/2018] lisinopril (PRINIVIL/ZESTRIL) tablet 30 mg     [START ON  3/22/2018] metFORMIN (GLUCOPHAGE) tablet 1,000 mg     metoprolol tartrate (LOPRESSOR) tablet 50 mg     [START ON 3/22/2018] MULTIVITAMIN & MINERAL LIQD 1 tablet     [START ON 3/22/2018] Fish Oil CAPS 500 mg     nitroGLYcerin 50 mg in D5W 250 mL (adult std) infusion     amiodarone (NEXTERONE) 250 mg in D5W 250 mL infusion     diltiazem (CARDIZEM) 125 mg in sodium chloride 0.9 % 125 mL infusion[KG1.2]     Allergies:[KG1.1]   Review of patient's allergies indicates not on file.[KG1.2]   NKDA    FamHx:[KG1.1]  No family history on file.[KG1.2]  Denie history of bleeding disorders    SocHx:      Quit smoking 40 years ago. Denies alcohol. Denies illicit drug use.    Review of Systems:  ROS: 10 point ROS neg other than the symptoms noted above in the HPI.    Objective:  Vitals:[KG1.1]   Temp:  [99.1  F (37.3  C)] 99.1  F (37.3  C)  Heart Rate:  [96] 96  Resp:  [20] 20  BP: (138)/(101) 138/101  SpO2:  [95 %] 95 %    Resp: 20[KG1.2]    Intake/Output:        Physical exam:  General: Laying comfortably  Neuro: A&Ox3, NAD  Resp: Breathing non-labored oximask  CV: RRR  Abdomen: Soft, Non-distended, Non-tender  Extremities: warm and well perfused. Dopplerable DP bilateral. Sensation intact. Impella R groin.    Labs:[KG1.1]  No lab results found in last 7 days.[KG1.2]    Imaging / Intervention results:  3/19 Cardiac Cath:  50% left ostial. Distal left main 95% stenosis - affecting all 3 vessesl of the coronaary. Diffuse disease in the branch vessels. Moderate LV impairment. Advanced stenoses in distal aorta and proximal iliacs    Echo: unable to locate results. Reported LVEF 20-25%    Assessment and Plan: Izaiah Alvarez is a 72 year old male with PMHx of obesity, Afib on coumadin, DM2, HTN, and TIA who was transferred from [facility] following MI with new onset left HF (reported 20-25% compared to 46% previously).    Neuro:  Tylenol PRN for pain  Psych: None  CV:  MI with diffuse vessel disease and new onset LVHF (20-25%). Consult HF  service. Cut dilt in half then dc in 4 hours. Start amio at 1 straight rate. Continue nitro gtt maintain SBP < 110   PTA: ASA 81mg qday, atrovastatin 5mg qday, metoprolol 50mg BID, linopril 30mg qday  Resp:  Wean O2 as tolerated. Albuterol neb PRN  GI: None  FEN:  Cardiac diet. Limit 2L intake. Electrolyte replacement   :  Felton strict I/O. Lasix gtt   PTA: chlorthalidone 12.5mg qday   Heme:  Heparin gtt  ID:  None  Endo:  PTA: insulin lantus 60 units qhs, glipizide 20mg BID, metformin 1000mg BID  PPx:  None  Lines: Peripheral lines  Dispo:  Stable on CVICU    Olayinka Orellana MD  [facility][KG1.1]       Revision History        User Key Date/Time User Provider Type Action    > [N/A] 3/22/2018  2:10 AM Olayinka Orellana MD Resident Sign     KG1.2 3/21/2018 11:46 PM Olayinka Orellana MD Resident Sign     KG1.1 3/21/2018 11:45 PM Olayinka Orellana MD Resident                      Discharge Summaries      Discharge Summaries by Alfonzo Mckeon PA at 4/3/2018  1:47 PM     Author:  Alfonzo Mckeon PA Service:  Cardiothoracic Surgery Author Type:  Physician Assistant    Filed:  4/4/2018  8:15 AM Date of Service:  4/3/2018  1:47 PM Creation Time:  4/3/2018  1:47 PM    Status:  Signed :  Alfonzo Mckeon PA (Physician Assistant)         [facility], [place]   Cardiothoracic Surgery [facility] Discharge Summary     Izaiah Alvarez MRN# [id number]   Age: 72 year old YOB: 1945     Admitting Physician:  Eric Marks MD  Discharge Physician:  REGINO Odonnell  Primary Care Physician:        System, Provider Not In     DATE OF ADMISSION: 3/21/2018     DATE OF DISCHARGE: April 4, 2018          Admission Diagnoses:   1) Coronary artery disease  2) Congestive heart failure  3) Paroxysmal atrial fibrillation  4) Diabetes mellitus  5) Hypertension  6) History of TIA  7) Obesity  8) Cardiogenic shock           Discharge Diagnoses:   1) Coronary  artery disease, s/p CAB x 3 vessels 3/23/18   2) Congestive heart failure  3) Paroxysmal atrial fibrillation  4) Diabetes mellitus  5) Hypertension  6) History of TIA  7) Obesity  8) s/p R common femoral and iliac artery thrombectomy   9) s/p R lower leg 4 compartment fasciotomies   10) s/p right common iliac artery stent placement   11) Occluded distal left SFA[CE1.1]   12) Anticoagulation for A-fib and Stent: warfarin for at least 6 months, if stopping should start Plavix for additional 6 months (R common iliac stent)[CE1.2]     PROCEDURES PERFORMED:   Date: 3/23/2018.  Surgeon: Dr. Eric Marks and Dr Jose Lopez   1) Coronary artery bypass grafting x 3.              - Left internal mammary artery to left anterior descending artery              - Reversed saphenous vein graft to right posterior descending artery              - Reversed saphenous vein graft to obtuse marginal artery  2) Endoscopic vein harvest left lower extremity  3) Transesophageal echocardiogram  4) Bilateral pulmonary vein isolation MAZE procedure  5) Left atrial appendage ligation with 45mm AtriClip  6) Right femoral arterial cutdown  7) Removal of Impella 5.0 temporary left ventricular assist device (explant of extracorporeal left ventricular device)  8) Repair of femoral vessels    Date: 3/24/2018.  Surgeon: Margarette Tang   1. Right groin re-exploration, right common femoral and iliac artery thrombectomy  2. Right leg angiogram with stent placement right common iliac artery   3. 4 compartment right lower leg fasciotomies.     Date: 3/27/2018.  Surgeon: Margarette Tang   Closure right lower leg 4 compartment fasciotomies.    OPERATIVE FINDINGS:  1) Ejection fraction: 25% pre-op; 45% post-bypass  2) Left internal mammary artery 3mm and excellent flow.  3) Left greater saphenous vein 5-6mm and suitable for bypass.  4) Left anterior descending artery 2.5mm and heavy posterior disease at anastomosis.  5) Right posterior descending artery 2mm and  "moderate disease at anastomosis.  6) Obtuse marginal artery 2mm and free of disease at anastomosis.    INTRAOPERATIVE COMPLICATIONS:  none    PATHOLOGY RESULTS:    1. Surgical Pathology 3/24/18:  THROMBUS, RIGHT COMMON ILIAC ARTERY AND RIGHT COMMON FEMORAL ARTERY, THROMBECTOMY: Organized thrombus     CULTURE RESULTS:  none    DRAINS/TUBES PRESENT AT DISCHARGE:  None    CONSULTS:    1.  PT/OT  2.  Vascular Surgery   3.  Opthalmology  4.  Endocrinology   5.  Urology   6.  Cardiology    7.  Neurology     Patient discharged on aspirin:  Yes 81 mg  Patient discharged on beta blocker: yes    Patient discharged on ACE Inhibitor/ARB: no  Softer BPs            Discharge Disposition:   Discharged to short-term care facility            Condition on Discharge:   Discharge condition: Stable   Discharge vitals:[CE1.1] Blood pressure 92/45, pulse 84, temperature 98.2  F (36.8  C), temperature source Oral, resp. rate 20, height 1.803 m (5' 11\"), weight 113.4 kg (250 lb 1.6 oz), SpO2 95 %.[CE1.3]     Code status on discharge: Full Code       DAY OF DISCHARGE PHYSICAL EXAM:  Vitals:    04/03/18 0600 04/03/18 0733 04/03/18 0800 04/03/18 1142   BP: 108/73 95/63  92/45   BP Location: Left arm Left arm  Right arm   Pulse:   84    Resp: 20 20  20   Temp: 97.3  F (36.3  C) 98  F (36.7  C)  98.2  F (36.8  C)   TempSrc: Oral Oral  Oral   SpO2: 97% 95%     Weight:       Height:         Vitals:    03/31/18 0230 04/02/18 0408 04/03/18 0424   Weight: 116.3 kg (256 lb 6.4 oz) 115.6 kg (254 lb 13.6 oz) 113.4 kg (250 lb 1.6 oz)     MAPs:[CE1.1] 73 - 89[CE1.2]  Gen:  NAD, conversational  CV: RRR, S1S2 normal, no murmurs, rubs, or gallops  Pulm:  CTA, no rhonchi or wheezes  Abd:[CE1.1] obese, s[CE1.2]oft, nondistended, NTTP  Ext:[CE1.1] +[CE1.2] dependent edema, +[CE1.1] 1[CE1.2] pitting  Incision: Clean, dry, intact, no erythema  Chest Tube sites: Dressings clean and dry    BMP[CE1.1]    Recent Labs  Lab 04/03/18  1006 04/02/18  0755 04/01/18  0639 " 03/31/18  0635    136 137 137   POTASSIUM 4.1 4.1 3.8 3.8   CHLORIDE 103 105 105 105   STEFFANY 8.6 8.4* 8.3* 8.2*   CO2 23 24 25 25   BUN 14 16 17 20   CR 1.03 1.00 0.98 1.04   * 133* 93 121*[CE1.3]     CBC[CE1.1]    Recent Labs  Lab 04/03/18  1006 04/02/18  0755 04/01/18  0639 03/31/18  0635   WBC 6.8 7.9 8.9 10.0   RBC 3.30* 3.02* 2.90* 3.06*   HGB 9.8* 8.9* 8.7* 9.1*   HCT 32.1* 29.6* 27.7* 30.5*   MCV 97 98 96 100   MCH 29.7 29.5 30.0 29.7   MCHC 30.5* 30.1* 31.4* 29.8*   RDW 16.5* 16.4* 16.3* 16.5*   * 430 420 402[CE1.3]     INR[CE1.1]    Recent Labs  Lab 04/03/18  1006 04/02/18  0755 04/01/18  0639 03/31/18  0635   INR 2.17* 2.27* 2.30* 2.29*[CE1.3]      Hepatic Panel   Lab Results   Component Value Date     03/22/2018     Lab Results   Component Value Date    ALT 69 03/22/2018     Lab Results   Component Value Date    ALBUMIN 2.8 03/22/2018         Recent Labs  Lab 04/03/18  1006 04/03/18  0819 04/03/18  0238 04/02/18  2140 04/02/18  1512 04/02/18  1157 04/02/18  0755 04/02/18  0143  04/01/18  0639  03/31/18  0635  03/30/18  0656  03/29/18  0744   *  --   --   --   --   --  133*  --   --  93  --  121*  --  159*  --  160*   BGM  --  125* 118* 135* 98 147*  --  130*  < >  --   < >  --   < >  --   < >  --    < > = values in this interval not displayed.    BRIEF HISTORY OF ILLNESS:  Izaiah Alvarez is a 72 year old male with PMHx of obesity, Afib on coumadin, DM2, HTN, and TIA who was transferred from [facility] on 3/22/18 following MI with new onset left HF (20-25% compared to 46% previously). Acutely worsened on 3/19 with increased SOB patient presented to hospital. Now s/p Impella, started on lasix gtt for fluid, nitroglycerin gtt to decrease afterload and diltiazem gtt for afib with RVR. Arrived on dilt gtt, lasix gtt, heparin gtt, nitro gtt. BPs, vitals stable.     HOSPITAL COURSE: Izaiah Alvarez is a 72 year old male who on 3/23/2018 underwent the above-named procedures.  He tolerated the  operation well and postoperatively was admitted to the CVICU.  He was extubated on POD # 2 with neuro status intact.  His ICU stay was complicated by[CE1.1] discovery of[CE1.2] R common femoral and iliac throm[CE1.1]bus on POD #1[CE1.2], treatment included thrombectomy, stent placement, and fasciotomies[CE1.1] and closure on POD #3[CE1.2].        He was[CE1.1] stable and[CE1.2] transferred to the post-surgical telemetry unit on POD # 3. Coumadin started for atrial fibrillation. Hep gtt was used for bridging to therapeutic INR.  He was diuresed with IV lasix and transitioned to PO lasix before discharge. Chest tubes and pacing wires were removed without incident as indicated. Endocrine team followed for good SG control.   On 3/31 a stroke code was called for blurred vision and left sided weakness, a head CT showed no intracranial hemorrhage, Neuro team felt this was likely a TIA and that a[CE1.1] follow up[CE1.2] head MRI was not necessary.    - Vascular surgery followed throughout his stay with recs to discharge on Keflex, warfarin (6 months) and ASA 81 mg, right groin staple removal on 4/10/18, and removal of fasciotomy sutures around 5/5/18.      Prior to discharge, his pain was controlled well, he was able to perform most ADLs and ambulate without difficulty, and had full return of bowel and bladder function.  On April[CE1.1] 4[CE1.2], 2018, he was discharged to a [facility] in stable condition.    ECHOCARDIOGRAM,[CE1.1] 4/3[CE1.2]/2018-[CE1.1]   Borderline reduced left ventricular function is present with traced LVEF 51%.  Hypokinesis of the mid-distal anteroseptal, apical segments including true  apex present. There is no thrombus.  Global right ventricular function and size are normal.  No significant valvular abnormalities noted.  Dilation of the inferior vena cava is present with abnormal respiratory  variation in diameter. Estimated mean right atrial pressure is 15 mmHg.  No pericardial effusion is  present.    Compared to study dated 3/22/18, LVEF is improved. Previously noted Impella device is not present.[CE1.2]    US LOWER EXTREMITY ARTERIAL DUPLEX LEFT, 3/31/2018-   Occluded distal left SFA extending through the popliteal artery with reconstitution of flow in the  PTA and ALVARO at the ankle secondary to collateral vessel flow arising from the distal left SFA  proximal to the occlusion.    Head CT scan 3/31/18-   1. Head CTA demonstrates no aneurysm or stenosis of the major intracranial arteries.   2. Neck CTA demonstrates atherosclerotic calcifications of the carotid bulbs with mild stenosis of   the right internal carotid artery origin.   3. No intracranial hemorrhage on the noncontrast head CT.      CXR[CE1.1] 3/28[CE1.2]/18-[CE1.1]   Left base chest tube has been removed. No pneumothorax or  large pleural effusion. Increasing ill-defined opacities about the  lung bases with new nonvisualization of left hemidiaphragm and  increased hazy opacity right base. The cardiac silhouette and  pulmonary vasculature are unchanged and within normal limits. No acute  bony abnormality is about the thorax in this single frontal image.   IMPRESSION:  1. No left-sided pneumothorax following left-sided chest tube removal.  2. Increasing bibasilar opacities compatible with atelectasis and/or  aspiration/pneumonia. Small underlying effusions cannot be excluded.[CE1.2]    DISCHARGE INSTRUCTIONS:[CE1.1]  You had a full sternotomy, so avoid lifting anything greater than ten pounds for 6 weeks after surgery and then less than 20 pounds for an additional 6 weeks.  No driving for 4 weeks after surgery or while on pain medication.     Avoid strenuous activities such as bowling, vacuuming, raking, shoveling, golf or tennis for 12 weeks after your surgery. It is okay to resume sex if you feel comfortable in doing so. You may have to try different positions with your partner.     Splint your chest incision by hugging a pillow or  bringing your arms across your chest when coughing or sneezing. Avoid pushing off with your arms when getting up for the first month if you have had your sternum opened.    Shower or wash your incisions daily with soap and water (or as instructed), pat dry. Keep wound clean and dry, showers are okay after discharge, but don't let spray hit directly on incision. No baths or swimming for 1 month.  Clean wounds twice a day for 2 weeks with microklenz spray if available. Cover chest tube sites with gauze until they stop draining, then leave open. It is not abnormal for chest tube sites to drain yellowish/clear fluid for up to 2-3 weeks after surgery.   Watch for signs of infection: increased redness, tenderness, warmth or any drainage that appears infected (pus like) or is persistent.  Also a temperature > 100.5 F or chills. Call your surgeon or primary care provider's office immediately. Remove any skin glue left on incisions after 10-14 days. This will not affect your incision and can speed up healing.    Exercise is very important in your recovery. Please follow the guidelines set up for you in your cardiac rehab classes at the hospital. If outpatient cardiac rehab was ordered for you, we highly recommend you participate. If you have problems arranging your cardiac rehab, please call [phone].     Avoid sitting for prolonged periods of time, try to walk every hour during the day. If you have a leg incision, elevate your leg often when you are not walking.    Check your weight when you get home from the hospital and continue to check it daily through your recovery for at least a month. If you notice a weight gain of 2-3 pounds in a week, notify your primary care physician, cardiologist or surgeon.    Bowel activity may be slow after surgery. If necessary, you may take an over the counter laxative such as Milk of Magnesia or Miralax. You may have stool softeners prescribed (docusate sodium, Senokot). We recommend  using stool softeners while using narcotics for pain (oxycodone/percocet, hydrocodone/vicodin).      DO NOT SMOKE.  IF YOU NEED HELP QUITTING, PLEASE TALK WITH YOUR CARDIOLOGIST OR PRIMARY DOCTOR.    You are on a blood thinner, follow the instructions you were given in the hospital and DO NOT SKIP this medication. Try and take it the same time everyday. Your primary care physician or coumadin clinic will manage the dosing.     REGARDING PRESCRIPTION REFILLS.  If you need a refill on your pain medication contact us.  All other medications will be adjusted, discontinued and re-filled by your primary care physician and/or your cardiologist as they were prior to your surgery. We have given you enough for one to three month with possibly one refill.    POST-OPERATIVE CLINIC VISITS  You will now return to the care of your primary physician and your cardiologist.   - Restart metformin at 500 mg BID and follow up with Diabetic Provider at home  - Follow up with Vascular surgery team in 3-4 weeks in [place] (remove right groin staples on 4/10 and fasciotomy site sutures on 3-4 weeks)  - Follow up opthalmology as outpatient PRN[CE1.2]     PRE-ADMISSION MEDICATIONS:    No current facility-administered medications on file prior to encounter.   No current outpatient prescriptions on file prior to encounter.     DISCHARGE MEDICATIONS:[CE1.1]    Izaiah Alvarez   Home Medication Instructions MARIS:[id number]    Printed on:04/04/18 0813   Medication Information                      Acetaminophen (TYLENOL PO)  Take 650 mg by mouth every 6 hours as needed for mild pain             albuterol (PROAIR HFA/PROVENTIL HFA/VENTOLIN HFA) 108 (90 BASE) MCG/ACT Inhaler  Inhale 2 puffs into the lungs every 6 hours as needed for shortness of breath / dyspnea or wheezing             ASPIRIN 81 PO  Take 1 tablet by mouth daily             atorvastatin (LIPITOR) 20 MG tablet  Take 1 tablet (20 mg) by mouth daily             cephalexin (KEFLEX) 500 MG  capsule  Take 1 capsule (500 mg) by mouth every 8 hours for 10 days             FINASTERIDE PO  Take 5 mg by mouth daily             fluticasone (FLOVENT DISKUS) 50 MCG/BLIST AEPB  Inhale 2 puffs into the lungs daily as needed Both nostrils for rhinitis             furosemide (LASIX) 20 MG tablet  Take 1 tablet (20 mg) by mouth 2 times daily             glipiZIDE (GLUCOTROL) 10 MG tablet  Take 1 tablet (10 mg) by mouth every morning (before breakfast)             insulin aspart (NOVOLOG PEN) 100 UNIT/ML injection  Inject 1-7 Units Subcutaneous 3 times daily (before meals) Correction Scale - MEDIUM RESISTANCE DOSING     No Correction Insulin if Pre-Meal BG <140.   For Pre-Meal  - 189 give 1 unit.    - 239 give 2 units.    - 289 give 3 units.    - 339 give 4 units.   - 399 give 5 units.   -449 give 6 units  For Pre-Meal BG > 449 give 7 units.   To be given based on pre-meal blood glucose             insulin aspart (NOVOLOG PEN) 100 UNIT/ML injection  Inject 1-5 Units Subcutaneous At Bedtime MEDIUM INSULIN RESISTANCE DOSING    Do Not give Bedtime Correction Insulin if BG less than  200.   For  - 249 give 1 units.   For  - 299 give 2 units.   For  - 349 give 3 units.   For  -399 give 4 units.   For BG greater than or equal to 400 give 5 units.  Notify provider if glucose greater than or equal to 350 mg/dL after administration of correction dose.             insulin glargine (LANTUS) 100 UNIT/ML injection  Inject 45 Units Subcutaneous every morning             metFORMIN (GLUCOPHAGE) 500 MG tablet  Take 1 tablet (500 mg) by mouth 2 times daily (with meals)             METOPROLOL TARTRATE PO  Take 50 mg by mouth 2 times daily             Multiple Vitamins-Minerals (MULTIVITAMIN & MINERAL PO)  Take 1 tablet by mouth daily             Omega-3 Fatty Acids (FISH OIL) 500 MG CAPS  Take 500 mg by mouth daily             pantoprazole (PROTONIX) 40 MG EC tablet  Take 1  tablet (40 mg) by mouth every morning for 30 days             phenylephrine-shark liver oil-mineral oil-petrolatum (PREPARATION H) 0.25-14-74.9 % rectal ointment  Place rectally 3 times daily as needed for hemorrhoids             polyethylene glycol (MIRALAX/GLYCOLAX) Packet  17 g by Oral or Feeding Tube route daily as needed for constipation             senna-docusate (SENOKOT-S;PERICOLACE) 8.6-50 MG per tablet  2 tablets by Oral or Feeding Tube route 2 times daily as needed for constipation             warfarin (COUMADIN) 2 MG tablet  Take 5 mg PO daily until next INR check, then dose per INR goal 2-3 for 6 months.[CE1.4]               CC:s  System, Provider Not In      [facility]   Cardiothoracic Surgery  Office phone: [phone][CE1.1]       Revision History        User Key Date/Time User Provider Type Action    > CE1.4 4/4/2018  8:15 AM Alfonzo Mckeon PA Physician Assistant Sign     CE1.2 4/4/2018  7:31 AM Alfonzo Mckeon PA Physician Assistant      CE1.3 4/3/2018  2:23 PM Alfonzo Mckeon PA Physician Assistant Share     CE1.1 4/3/2018  1:47 PM Alfonzo Mckeon PA Physician Assistant                      Consult Notes      Consults by Maggi Farley MD at 3/31/2018  8:56 AM     Author:  Maggi Farley MD Service:  Neuro ICU Author Type:  Fellow    Filed:  4/1/2018  4:35 PM Date of Service:  3/31/2018  8:56 AM Creation Time:  3/31/2018  8:56 AM    Status:  Attested :  Maggi Farley MD (Fellow)    Cosigner:  Mahnaz Brand MD at 4/3/2018  6:56 AM        Attestation signed by Mahnaz Brand MD at 4/3/2018  6:56 AM        Attending Attestation:                                                   Date Patient seen: 3/31/2018    Total time spent in direct patient care at the bedside was 45. Over 50% of the time was spend in coordination of the care.     Patient was discussed during our bedside rounds.   I reviewed patients labs, Xrays, Scans.   I  examined the patient with the team and plan of care, as documented above, was developed on our Bedside rounds.  Patients family was updated.   Collaborating teams were updated.    Celso Brand MD  Neuro Critical Care  [phone]                                  [facility], [place]      Neurology Stroke Code    Patient Name: Izaiah Alvarez  : 1945 MRN#: [id number]    STROKE DATA     Stroke Code:[MH1.1]  Time called:[MH1.2]  2018[MH1.3] 0814  Time patient seen:[MH1.2]  2018[MH1.3] 0817  Onset of symptoms:[MH1.2]  2018[MH1.4] 0754  Last known normal (pt's baseline):[MH1.2]  2018[MH1.5] 0754  Head CT read by Dr. Farley at:[MH1.2]  2018[MH1.5] 0855  Stroke Code de-escalated at[MH1.2] 2018[MH1.6] 0857 after discussion with Dr. Farley due to presence of contraindications for both intravenous and intra-arterial stroke treatments.[MH1.2]      TPA treatment:  Not given due to[MH1.1] minor / isolated / quickly resolving stroke symptoms[MH1.2].    National Institutes of Health Stroke Scale (at presentation)  NIHSS done at:  time patient seen      Score    Level of consciousness:[MH1.1]  (0)   Alert, keenly responsive[MH1.2]    LOC questions:[MH1.1]  (0)   Answers both questions correctly[MH1.2]    LOC commands:[MH1.1]  (0)   Performs both tasks correctly[MH1.2]    Best gaze:[MH1.1]  (0)   Normal[MH1.2]    Visual:[MH1.1]  (0)   No visual loss[MH1.2]    Facial palsy:[MH1.1]  (0)   Normal symmetrical movements[MH1.2]    Motor arm (left):[MH1.1]  (0)   No drift[MH1.2]    Motor arm (right):[MH1.1]  (0)   No drift[MH1.2]    Motor leg (left):[MH1.1]  (0)   No drift[MH1.2]    Motor leg (right):[MH1.1]  (0)   No drift[MH1.2]    Limb ataxia:[MH1.1]  (0)   Absent[MH1.2]    Sensory:[MH1.1]  (0)   Normal- no sensory loss[MH1.2]    Best language:[MH1.1]  (0)   Normal- no aphasia[MH1.2]    Dysarthria:[MH1.1]  (0)   Normal[MH1.2]    Extinction and inattention:[MH1.1]  (0)   No  abnormality[MH1.2]        NIHSS Total Score:[MH1.1]  0[MH1.2]           ASSESSMENT & RECOMMENDATONS     Stroke code activated due to[MH1.1] acute onset blurry vision and weakness[MH1.2].[MH1.1]  He has had weakness for some time but felt that his left sided weakness was worse.  His blurry vision was diffuse and he had no visual field cut.  His symptoms were resolving during stroke code.  While left sided weakness could represent stroke, blurry vision is difficult to localize a vascular distribution.[MH1.2]  He has many risk factors for stroke, including a-fib, DMII, CAD, and HTN.[MH1.7]  Being that he is on heparin and warfarin[MH1.2] tPA is contraindicated.  MRI should be obtained when possible to assess for stroke and LDL and A1C to assess stroke risk.  Echo not necessary given recent echo.[MH1.7]    Recommendations:[MH1.1]   -LDL  -A1C  -MRI(non urgent)  -Continue anticoagulation[MH1.7]      The patient was discussed with the Fellow, [MH1.1] Boss[1.7].  The staff is [MH1.1] Sunday[1.7].    Lamont Billingsley MD   Pager:[MH1.1] [phone][1.7]      Addendum:  4/1 the patient is completely stable back to his base line  NIHSS 0, with intact brain stem reflexes, Pupils Re 2-3 mm Round regular reactive, Corneal reflex is intact, Intact cough & gag reflex, Strength in both UL & LL is 5/5   Lipid profile were not collected  At this point the recommendations will be :  1- No need for MRI   2- Most likely a TIA episode  3- Continue ANticoagulation   4- continue Atorvastatin 20 mg daily   Will sign off    Plan discussed with Dr. Brand    Thanks for involving the Stroke team in the care of the patient, please if you have any questions feel free to contact the Stroke team.     The patient, The primary team & the nursing staff are updated with the plan.    Maggi Farley  Neurocritical care Fellow  Pager [phone][SB1.1]         Revision History        User Key Date/Time User Provider Type Action    > SB1.1 4/1/2018  4:35 PM  Maggi Farley MD Fellow Sign     MH1.7 3/31/2018  5:34 PM Lamont Billingsley MD Resident Sign     MH1.6 3/31/2018  3:12 PM Lamont Billingsley MD Resident      MH1.5 3/31/2018  3:11 PM Lamont Billingsley MD Resident      MH1.4 3/31/2018  3:10 PM Lamont Billingsley MD Resident      MH1.3 3/31/2018  3:09 PM Lamont Billingsley MD Resident      MH1.2 3/31/2018  3:08 PM Lamont Billingsley MD Resident      MH1.1 3/31/2018  8:56 AM Lamont Billingsley MD Resident             Consults by Ankur Coleman Chi, OD at 4/2/2018 12:08 PM     Author:  Ankur Coleman Chi, OD Service:  Ophthalmology Author Type:  Optometrist    Filed:  4/2/2018  3:23 PM Date of Service:  4/2/2018 12:08 PM Creation Time:  4/2/2018 11:54 AM    Status:  Addendum :  Ankur Coleman Chi, OD (Optometrist)     Consult Orders:    1. Ophthalmology IP Consult: floaters; Consultant may enter orders: Yes; Patient to be seen: Routine - within 24 hours; Call back #: Scott [[phone]] ordered by Scott Sanon PA-C at 04/01/18 1537                  OPHTHALMOLOGY CONSULT NOTE  04/02/18    Patient: Izaiah Alvarez      HISTORY OF PRESENTING ILLNESS:[DT1.1]     Patient complains of central blur since two days ago, OU. There are two episodes of blur lasting 2-4 minutes per episode (stroke code at that juncture). Patient noticed neon lights along with blur lasting several minutes. There are no headaches after.[DT1.2]       Pertinent History:    Type 2 diabetes - insulin dependent.     Transferred from [facility] due to new onset of left hear failure. HA1C: 7.8    TIA and left sided weakness (neuro consulted)[DT1.1]    Bypass graft artery coronary (03/23/18)[DT1.3]        Review of systems were otherwise negative except for that which has been stated above.      OCULAR/MEDICAL/SURGICAL HISTORIES:     Past Ocular History:[DT1.1]  Cataract, OU[DT1.2]    History reviewed. No pertinent past medical history.    Past Surgical History:   Procedure  Laterality Date     BYPASS GRAFT ARTERY CORONARY N/A 3/23/2018    Procedure: BYPASS GRAFT ARTERY CORONARY;  Coronary Artery Bypass Graft x 3, MAZE Procedure and Left Atrial Appendage Ligation, Median Sternotomy, on Pump Oxygenation, Left Leg Endoscopic Saphaneous Vein [place], and Right Femoral Artery Cut Down Removal of Impella Device;  Surgeon: Eric Marks MD;  Location: [facility] OR     BYPASS GRAFT FEMOROPOPLITEAL Right 3/24/2018    Procedure: BYPASS GRAFT FEMOROPOPLITEAL;;  Surgeon: Margarette Tang MD;  Location: [facility] OR     FASCIOTOMY LOWER EXTREMITY Right 3/27/2018    Procedure: FASCIOTOMY LOWER EXTREMITY;  Right Leg Fasciotomy Closure x2;  Surgeon: Margarette Tang MD;  Location: [facility] OR     MAZE PROCEDURE N/A 3/23/2018    Procedure: MAZE PROCEDURE;;  Surgeon: Eric Marks MD;  Location: [facility] OR     THROMBECTOMY LOWER EXTREMITY Right 3/24/2018    Procedure: THROMBECTOMY LOWER EXTREMITY;  Right Groin RE-Exploration, Right Common Iliac and Right Common Femoral Artery  Thromboembolectomy, Right Common Iliac Stent placement, Prevena application, Right lower right extremity fasciotomy, and right leg angiogram.;  Surgeon: Margarette Tang MD;  Location: [facility] OR[DT1.4]       EXAMINATION:     Visual Acuity (assessed with near card): Right Eye 20/[DT1.1]25[DT1.2] ; Left Eye 20/[DT1.1]20[DT1.2]   Pupils: Equal and reactive to light and accomodation with no afferent pupillary defect.    Intraocular Pressure: RE[DT1.1]  11[DT1.2] ; LE[DT1.1] 12[DT1.2]  mmHg by tonopen (<5% error).   Motility: RE Full; LE Full  Confrontational Visual Field: RE Full; LE Full     External/Slit Lamp Exam   RIGHT EYE   Lids/Lashes: No Abnormality   Conj/Sclera: White and Quiet   Cornea:  Clear   Ant Chamber:  Deep and Quiet   Iris: Round and Reactive   Lens: Clear  LEFT   Lids/lashes: No Abnormality   Conj/Sclera: White and Quiet   Cornea:  Clear   Ant Chamber:  Deep and Quiet   Iris: Round and Reactive   Lens:Clear    Dilated Fundus  "Exam  Eyes Dilated?[DT1.1] yes[DT1.2]   Time:[DT1.1] 12:40 PM[DT1.2] With Phenylephrine 2.5% and Mydriacyl 1%    (Normally, dilation with the above lasts about 4-6 hours)  RIGHT:   Anterior Vitreous: Clear   Media: Clear   Optic Nerve:[DT1.1] Retinal elevation inferiorly attached to disc.[DT1.5]    Cup to Disc Ratio:[DT1.1] 0.2/0.2[DT1.5]   Macula: Flat and No Pigment Abnormality   Vessels: Normal Caliber and Distribution   Retinal Periphery: No Holes or Tears Identified  LEFT:   Anterior Vitreous: Clear   Media: Clear   Optic Nerve: Normal Contours and Size   Cup to Disc Ratio:[DT1.1] 0.2/0.2[DT1.5]   Macula: Flat and No Pigment Abnormality   Vessels: Normal Caliber and Distribution   Retinal Periphery: No Holes or Tears Identified         ASSESSMENT/PLAN:[DT1.1]     1.[DT1.6] Retinal elevation inferiorly attached to optic disc, right eye.[DT1.5]     Patient complains of central blur along with \"neon lights\"[DT1.6] lasting between 2-4 minutes; three episodes since two days ago.[DT1.7]     Perhaps a pigmentary epithelial detachment?[DT1.5]     Though the lesion looks white which is unusual for PED.     Recommends follow-up tomorrow in [place] with Dr. Booth at 2:00PM - spoke with nurse to arrange transport.[DT1.8]     Appointment with Dr. Booth at 2:00PM[DT1.9]   [facility], 9th Floor, [address] [place] [id number] [phone][DT1.6]        It is our pleasure to participate in this patient's care and treatment. Please contact us with any further questions or concerns.    Discussed with[DT1.1] Dr. Childers[DT1.9]    Ankur Coleman OD  Ophthalmology[DT1.1]     Revision History        User Key Date/Time User Provider Type Action    > [N/A] 4/2/2018  3:23 PM Ankur Coleman Chi, OD Optometrist Addend     DT1.3 4/2/2018  3:22 PM Ankur Coleman Chi, OD Optometrist Sign     DT1.9 4/2/2018  3:18 PM Ankur Coleman Chi, OD Optometrist      DT1.8 4/2/2018  3:16 PM Ankur Coleman Chi, OD Optometrist      DT1.5 " 4/2/2018  1:40 PM [place], Denh Chi, OD Optometrist      DT1.7 4/2/2018  1:12 PM [place], Denh Chi, OD Optometrist      DT1.6 4/2/2018 12:52 PM [place], Denh Chi, OD Optometrist      DT1.2 4/2/2018 12:27 PM [place], Denh Chi, OD Optometrist      DT1.4 4/2/2018 11:55 AM [place], Denh Chi, OD Optometrist      DT1.1 4/2/2018 11:54 AM [place], Denh Chi, OD Optometrist             Consults by Varinder Villarreal DO at 3/30/2018  1:56 PM     Author:  Varinder Villarreal DO Service:  Physical Medicine and Rehabilitation Author Type:  Osteopath    Filed:  3/30/2018  1:56 PM Date of Service:  3/30/2018  1:56 PM Creation Time:  3/30/2018  1:55 PM    Status:  Signed :  Varinder Villarreal DO (Osteopath)     Consult Orders:    1. Physical Medicine & Rehab Assessment for Rehab Placement Adult IP Consult: Patient to be seen: Routine within 24 hrs; Call back #: [phone]; Assess patient for discharge placement needs, required by [facility] and [facility]; Consultant may ... [[id number]] ordered by Alfonzo Mckeon PA at 03/29/18 140                Please see consultation from Physical Medicine and Rehabilitation dated 3/30/2018 with recommendations for acute inpatient rehabilitation upon hospital discharge.  Please reach out to our service with any further questions/concerns.    Varinder Villarreal DO, CAQSM    Department of Rehabilitation Medicine[JL1.1]     Revision History        User Key Date/Time User Provider Type Action    > JL1.1 3/30/2018  1:56 PM Varinder Villarreal DO Osteopath Sign            Consults by Vu Holbrook MD at 3/30/2018  8:38 AM     Author:  Vu Holbrook MD Service:  Physical Medicine and Rehabilitation Author Type:  Resident    Filed:  3/30/2018 11:20 AM Date of Service:  3/30/2018  8:38 AM Creation Time:  3/30/2018  8:38 AM    Status:  Attested :  Vu Holbrook MD (Resident)    Cosigner:  Varinder Villarreal DO at 3/30/2018  1:55 PM        Attestation signed by  Varinder Villarreal, DO at 3/30/2018  1:55 PM          Physician Attestation   I, Varinder Villarreal, personally examined and evaluated this patient.  I discussed the patient with the resident and care team, and agree with the assessment and plan of care as documented in the resident s note of 3/30/2018.    I personally reviewed vital signs and labs.    Key findings: Patient is a 72-year-old right-handed male who presents S/P CABG and right lower extremity fasciotomy with resulting impairments in mobility, activities of daily living/independent activities of daily living, transferring, and function of the lower extremities along with generalized weakness and fatigue.   Previously was living independently in [place], [place] in an apartment on the third level with approximately 27 stairs to get to his apartment.  Noted with physical therapy on 3/29/2018 to be transfering supine->sit with Mod A x 2. Patient transferred sit<>stand up to FWW 3 x at Min A - Mod A of 2. Patient ambulated ~4 steps forward/backward x 6 bouts with FWW at CGA-Min A x 2 with cuing needed to  feet and bring walker along.  With occupational therapy on 3/30/2018, the patient was noted to ambulate 20 ft x 2 with CGA and FWW. Tolerated nustep exercise for approximately 5 minutes with VSS. Mod. A x 2 supine to EOB and Min A x 2 sit to stand x 4 during session.    Medically is noted to have discontinued his Felton catheter in 3/30/2018 with 1 stool noted since hospitalization, likely related to decreased dietary intake per the patient.    Recommend acute inpatient rehabilitation at this time with needs of physical therapy, occupational therapy, and nursing/medical management.  Recommend optimizing bowel program at this time with use of medications as deemed appropriate (Senna-Docusate PRN).  Recommend to continue with discontinuation of Felton catheter with bladder scans as needed along with straight catheterizations with return to use of Felton  catheterization only as needed at this time.    I spent a total of 55 minutes bedside and on the inpatient unit today managing the care of the above patient.  Over 50% of my time on the unit was spent counseling the patient and/or coordinating care regarding previous medical treatment, further medical treatment/rehabilitation needs, etc.  See note for details.    Varinder Julio Cesar Phil  Date of Service (when I saw the patient): 03/30/18                               [facility]   PM&R CONSULT    Consulting Provider:[LM1.1] Alfonzo Mckeon[LM1.2]  Reason for Consult: Assessment of rehabilitation   Location of Patient: [unfilled]  Date of Encounter: 3/30/2018   Date of Admission: 3/21/2018        ASSESSMENT/PLAN:    Mr. Izaiah Alvarez is a Right handed 72 year old yo male who presents with[LM1.1] s/p CABG and RLE fasciotomy with resultant impairments in mobility, ADL/IADLs, impaired transferring, impaired functional cognition of BLE, generalized weakness, and fatigue.  Medical management: He will need ongoing medical management to further treat his hypertension, diabetes mellitus type 2, atrial fibrillation and warfarin dosing adjustments.    He will require PT 90 minutes daily for 7 days per week to improve his mobility, transfers, decreased strength in BUE/BLE, fatigue, activity tolerance and functional cognition of BLE with the goal of becoming modified independent with a FWW.    He will require OT 90 minutes daily for 7 days per week to improve his ADL/IADLs, functional transfers, decreased strength in BUE, fatigue, activity tolerance and functional cognition of BLE with the goal of becoming modified independent for all ADL/IADLs,    This patient is an appropriate candidate for acute inpatient rehabilitation from a multidisciplinary approach.    We discussed adjustment to disability, we also discussed post-CABG depression and fatigue. All questions at this time were answered.    These  recommendations are not final until co-signed by attending, Dr. Varinder Villarreal.[LM1.2]     HPI:[LM1.1]    Izaiah A Kim 72 year old male[LM1.3] with PMH obesity, afib on coumadin, dmt2, htn, and TIA who was transferred from [facility] following MI with new onset left HF (20-25% compared to 46% previously) began having chest pain about 6 months ago. Acutely worsened on 3/19 with increased shortness of breath which was his initial presentation to the outside hospital. He was found to have chronic LAD disease with 95% stenosis and collateral with multi-vessel disease. He developed acute hypoxic respiratory distress after developing plumonary edema and had impella placed on 3/20 with EF of 20/25% with global left hypokinesis reduced RV systolic function. He was started on lasix for fluid, nitroglycerin to decrease afterload and diltiazem for afib with RVR. His chest pain resolved and shortness of breath improved with decreased oxygen requirements. He underwent a CABG with right CFA cutdown on 3/23 and removal of impella percutaneous ventricular assist device. He developed numbness and coolness in his RLE. There were not any dopplerable signals in that extremity and vascular was consulted for a cold leg. He received a right groin reexploration on 3/24 and right common femoral and ilac artery thrombectomy with right leg angiogram and stent placement in the right common iliac artery and then 4 compartment right lower leg fasciotomies. This surgery was complicated by a delayed primary closure. He underwent right leg fasciotomy x2 with Dr. Ruiz.      PREVIOUS LEVEL OF FUNCTION   Modified independence requiring a cane for community distances. 6 months ago he was able to walk 2 blocks twice this physical activity declined has his cardiopulmonary disease worsened.  He was previously independent for transferring, toileting, bathing, dressing, eating, communication, swallowing, and cognition.  Patient was independent with driving and  full time work at a desk job.      CURRENT FUNCTION   PT - moderate assistance of two for supine to sit. He requires FWW and mod assistance of 2 for sit to stand. He is able to ambulate about 4 steps with FWW and min assist of .   OT - He is requiring mod assist of 2 for supine to edge of jd then min assist to maintain balance at edge of bed. He was able to perform functional transfers with moderate assist of 2. He is requiring[LM1.1]  SLP - Regular diet and thin liquids.[LM1.2]     LIVING SITUATION/SUPPORT  Lives in an apartment on the 3rd floor. There are 3 steps to eneter and 3 flights of stairs up to his unit. His spouse is retired but cannot provide any physical assistance and his spouse requires a walker for mobility. Pt does have family (nephews and nieces) that are local and can provide some support.    SOCIAL HISTORY  Social History     Social History     Marital status:      Spouse name: N/A     Number of children: N/A     Years of education: N/A     Social History Main Topics     Smoking status: Former Smoker     Quit date: 3/27/1977     Smokeless tobacco: Former User     Alcohol use None     Drug use: None     Sexual activity: Not Asked     Other Topics Concern     None     Social History Narrative     Past Medical History:  History reviewed. No pertinent past medical history.    Current Medications:  Current Facility-Administered Medications   Medication     potassium chloride SA (K-DUR/KLOR-CON M) CR tablet 20-40 mEq     metoprolol tartrate (LOPRESSOR) tablet 25 mg     senna-docusate (SENOKOT-S;PERICOLACE) 8.6-50 MG per tablet 2 tablet     heparin  drip 25,000 units in 0.45% NaCl 250 mL (see additional administration details for dose)     heparin bolus from infusion pump     furosemide (LASIX) injection 20 mg     insulin glargine (LANTUS) injection 45 Units     insulin aspart (NovoLOG) inj (RAPID ACTING)     insulin aspart (NovoLOG) inj (RAPID ACTING)     insulin aspart (NovoLOG) inj (RAPID  ACTING)     insulin aspart (NovoLOG) inj (RAPID ACTING)     benzocaine-menthol (CEPACOL) 15-3.6 MG lozenge 1 lozenge     Warfarin Therapy Reminder (Check START DATE - warfarin may be starting in the FUTURE)     polyethylene glycol (MIRALAX/GLYCOLAX) Packet 17 g     pantoprazole (PROTONIX) EC tablet 40 mg     lidocaine (XYLOCAINE) 5 % ointment     naloxone (NARCAN) injection 0.1-0.4 mg     thiamine tablet 100 mg     ipratropium - albuterol 0.5 mg/2.5 mg/3 mL (DUONEB) neb solution 3 mL     HYDROmorphone (DILAUDID) injection 0.3-0.5 mg     dextrose 10 % 1,000 mL infusion     glucose 40 % gel 15-30 g    Or     dextrose 50 % injection 25-50 mL    Or     glucagon injection 1 mg     hydrALAZINE (APRESOLINE) injection 10 mg     aspirin EC EC tablet 81 mg     potassium chloride (KLOR-CON) Packet 20-40 mEq     potassium chloride 10 mEq in 100 mL intermittent infusion with 10 mg lidocaine     potassium chloride 20 mEq in 50 mL intermittent infusion     magnesium sulfate 2 g in NS intermittent infusion (PharMEDium or FV Cmpd)     magnesium sulfate 4 g in 100 mL sterile water (premade)     potassium phosphate 10 mmol in D5W 250 mL intermittent infusion     potassium phosphate 15 mmol in D5W 250 mL intermittent infusion     potassium phosphate 20 mmol in D5W 500 mL intermittent infusion     potassium phosphate 20 mmol in D5W 250 mL intermittent infusion     potassium phosphate 25 mmol in D5W 500 mL intermittent infusion     acetaminophen (TYLENOL) tablet 650 mg     oxyCODONE IR (ROXICODONE) tablet 5-10 mg     ondansetron (ZOFRAN-ODT) ODT tab 4 mg    Or     ondansetron (ZOFRAN) injection 4 mg     sodium chloride 0.9% infusion     sodium chloride 0.9% infusion     atorvastatin (LIPITOR) tablet 20 mg     albuterol (PROAIR HFA/PROVENTIL HFA/VENTOLIN HFA) Inhaler 2 puff     finasteride (PROSCAR) tablet 5 mg     multivitamin, therapeutic with minerals (THERA-VIT-M) tablet 1 tablet         Review of Systems:  Total of ten systems  "reviewed, pertinent positives and negatives as follows  Instability with standing and walking.    Feels generally fatigued  Reports weakness in[LM1.1] BUE/BLE[LM1.2]  Denies any tingling or numbness  No problems with bladder.   LBM[LM1.1] today[LM1.2]  No chest pain. No cough or SOB.  No visual changes.   No headache or photophobia.   No nausea, or abdominal pain.  Slept poorly last night[LM1.1]  Sternal pain from incision is present.[LM1.2]   Mood is good, denies any symptoms of depression.   Remainder of the review of the systems was negative.    Labs   Lab Results   Component Value Date    WBC 13.2 (H) 03/30/2018    HGB 9.1 (L) 03/30/2018    HCT 29.4 (L) 03/30/2018    MCV 98 03/30/2018     03/30/2018     Lab Results   Component Value Date     03/30/2018    POTASSIUM 3.5 03/30/2018    CHLORIDE 104 03/30/2018    CO2 24 03/30/2018     (H) 03/30/2018     Lab Results   Component Value Date    GFRESTIMATED 77 03/30/2018    GFRESTBLACK >90 03/30/2018     Lab Results   Component Value Date     (H) 03/22/2018    ALT 69 03/22/2018    ALKPHOS 55 03/22/2018    BILITOTAL 3.6 (H) 03/22/2018     Lab Results   Component Value Date    INR 1.87 (H) 03/30/2018     Lab Results   Component Value Date    BUN 21 03/30/2018    CR 0.97 03/30/2018         ON EXAMINATION:  Vitals:    03/30/18 0300 03/30/18 0400 03/30/18 0600 03/30/18 0750   BP: 119/84   96/77   BP Location: Left arm   Left arm   Pulse:       Resp: 20   18   Temp: 97.8  F (36.6  C)   97.5  F (36.4  C)   TempSrc: Oral   Oral   SpO2: 98% 94% 95% 93%   Weight: 116.6 kg (257 lb)      Height:           Physical Exam:  Blood pressure 96/77, pulse 101, temperature 97.5  F (36.4  C), temperature source Oral, resp. rate 18, height 1.803 m (5' 11\"), weight 116.6 kg (257 lb), SpO2 93 %.    GEN: NAD, seated/lying comfortably in chair  She/He is alert, appropriate, cooperative  Speech is[LM1.1] intact, linear logical[LM1.2]  Comprehension is[LM1.1] intact, " alert and oriented to person place and time[LM1.2]  HEENT: NCAT  RESPIRATORY:[LM1.1] nonlabored breathing on room air[LM1.2]  CARDIAC:[LM1.1] perfusing extremities[LM1.2]  MSK: full active and passive ROM at all major joints of the bilaterally upper and lower extremities  No muscle atrophy noted  ABD: soft, non tender, pos BS  NEURO:   CRANIAL NERVES: Discs flat. Pupils equal, round and reactive to light.  Extraocular movements full. Visual fields full. Face moves symmetrically.  Tongue midline. Hearing intact to finger rubbing.    Sensation: sensation to[LM1.1] light touch[LM1.2] intact throughout   Strength:[LM1.1] 4/5 BUE/BLE[LM1.2]    Gait: normal reciprocal gait[LM1.1] requiring min assist and a FWW[LM1.2]   Cognition: fund of knowledge and train of thought appropriate  SKIN: no rashes or lesions noted.  Patient has tubes or lines.   EXT:[LM1.1] moderate[LM1.2] edema bilaterally, chronic stasis changes, no ulcers.   PSYCH: normal affect.  Mood is baseline with[LM1.1]out[LM1.2] signs of depression      Thank you for the consult. Please call for any questions. Pager number [phone]     Vu Holbrook      Total of 70 min spent in this encounter, more than 50% in counseling and coordination.[LM1.1]        Revision History        User Key Date/Time User Provider Type Action    > LM1.2 3/30/2018 11:20 AM Vu Holbrook MD Resident Sign     LM1.3 3/30/2018  8:39 AM Vu Holbrook MD Resident      LM1.1 3/30/2018  8:38 AM Vu Holbrook MD Resident             Consults by Candido Piedra APRN CNP at 3/28/2018 10:54 AM     Author:  Candido Piedra APRN CNP Service:  Endocrinology Author Type:  Nurse Practitioner    Filed:  3/28/2018  4:05 PM Date of Service:  3/28/2018 10:54 AM Creation Time:  3/28/2018 10:46 AM    Status:  Signed :  Candido Piedra APRN CNP (Nurse Practitioner)     Consult Orders:    1. Endocrine Diabetes Adult IP Consult: Patient to be seen: Routine  within 24 hrs; Call back #: [phone]; transition off insulin gtt; Consultant may enter orders: Yes [[id number]] ordered by Carlos Mckeon PA at 03/28/18 0807                Diabetes/Hyperglycemia Management Consult    Chief Complaint transition off IV insulin  Consult requested by: Carlos Mckeon PA-C  History of Present Illness Izaiah Alvarez is a 72 year old male with history of type 2 diabetes, hypertension, hyperlipidemia, atrial fibrillation ( on warfarin), TIA, obesity transferred from [facility] following STEMI with new onset of left heart failure ( EF 20-25%). Impella device was placed via right femoral artery at the OSH. he underwent coronary artery bypass grafting x 3 and removal of Impella on  (3/23/2018) by .  Found to have critical limb ischemia with loss of motor function s/p Right groin reexploration, right common femoral and iliac artery  thrombectomy, right leg angiogram, stent placement right common iliac artery and then 4 compartment right lower leg fasciotomies 3/24/18 with Dr. Tang.  On 3/27/2018, he underwent closure of right leg fasciotomy X 2 with Dr. Tang.      Inpatient Diabetes team consulted for transition off IV insulin[JM1.1]    Mr. Alvarez was dx with type 2 diabetes 6 years ago. PTA as listed below. Tests his glucose twice daily ( at 0630 and 1730). Glucose in am 180, pm 140 range. Reports feeling like he has been hypoglycemic two times ( dizzy, fuzzy) did not test. Ate some hard candy and felt better.  Has been on IV insulin with glucose in good control, < 200 and no hypoglycemia. IV insulin range from 2-7 units. Has been NPO for procedures followed by clear liquid diet. Has  not had solid food until today.    Currently, denies fever, chills, chest pain, shortness of breath, abdominal pain, nausea and or vomiting, bowel or bladder issues. Appetite is improving.[JM1.2]      Recent Labs  Lab 03/28/18  0907 03/28/18  0844 03/28/18  0831 03/28/18  0731 03/28/18  0609  "03/28/18  0454 03/28/18  0348  03/27/18  1011  03/26/18  0420  03/25/18  0350  03/24/18  1708  03/24/18  1558   GLC  --  105*  --   --   --   --   --   --  151*  --  126*  --  124*  --  135*  --  151*   *  --  114* 129* 122* 113* 114*  < >  --   < >  --   < >  --   < >  --   < >  --    < > = values in this interval not displayed.      Diabetes Type:   Type 2 Diabetes  Diabetes Duration:[JM1.1] dx 6 years ago[JM1.2]  Usual Diabetes Regimen:   lantus 60 units  Glipizide 20 mg bid  Metformin 1000 mg bid  Ability to [place] Prescribed Regimen:[JM1.1] yes[JM1.2]  Diabetes Control:   Lab Results   Component Value Date    A1C 7.8 03/22/2018     Diabetes Complications:[JM1.1] possible peripheral neuropathy[JM1.2]  Able to Detect Hypoglycemia:[JM1.1] yes[JM1.2]  Usual Diabetes Care Provider:[JM1.1] [facility], Diabetic NP[JM1.2]  Factors Impacting Glucose Control:[JM1.1] surgery, diet[JM1.2]      Review of Systems  10 point ROS completed with pertinent positives and negatives noted in the HPI    Past medical, family and social histories are reviewed and updated.    Past Medical History[JM1.1]  Type 2 diabetes  Hyperlipidemia  Hypertension  Atrial fibrillation on warfarin  Obesity  TIA[JM1.2]    Family History[JM1.1]  Family history specific for diabetes, dad and a cousin on mothers side with type 1 daibetes[JM1.2]    Social History  Social History     Social History     Marital status: N/A     Spouse name: N/A     Number of children: N/A     Years of education: N/A     Social History Main Topics     Smoking status: Former Smoker     Quit date: 3/27/1977     Smokeless tobacco: Former User     Alcohol use None     Drug use: None     Sexual activity: Not Asked     Other Topics Concern     None     Social History Narrative         Physical Exam[JM1.1]  BP (!) 86/63 (BP Location: Left arm)  Pulse 96  Temp 99.1  F (37.3  C) (Oral)  Resp 16  Ht 1.803 m (5' 11\")  Wt 121.2 kg (267 lb 1.6 oz)  SpO2 94%  BMI 37.25 " kg/m2[JM1.3]    General:  pleasant  resting in bed, in no distress.   HEENT:  PER and anicteric, non-injected, oral mucous membranes moist.   Lungs:[JM1.1] unlabored[JM1.2]  ABD:[JM1.1] obese, soft[JM1.2]  Skin: warm and dry,   MSK:[JM1.1] moving upper extremities[JM1.2]  Mental status:  alert, oriented x3, communicating clearly  Psych:  calm, even mood    Laboratory  Recent Labs   Lab Test  03/28/18   0844  03/27/18   1011   NA  138  140   POTASSIUM  3.5  4.3   CHLORIDE  106  107   CO2  26  22   ANIONGAP  7  11   GLC  105*  151*   BUN  27  29   CR  0.93  0.91   STEFFANY  8.1*  8.3*     CBC RESULTS:   Recent Labs   Lab Test  03/28/18   0844   WBC  11.1*   RBC  2.97*   HGB  8.8*   HCT  29.0*   MCV  98   MCH  29.6   MCHC  30.3*   RDW  16.1*   PLT  257       Liver Function Studies -   Recent Labs   Lab Test  03/22/18   0645   PROTTOTAL  7.0   ALBUMIN  2.8*   BILITOTAL  3.6*   ALKPHOS  55   AST  264*   ALT  69       Active Medications  Current Facility-Administered Medications   Medication     metoprolol tartrate (LOPRESSOR) half-tab 12.5 mg     heparin  drip 25,000 units in 0.45% NaCl 250 mL (see additional administration details for dose)     heparin bolus from infusion pump     furosemide (LASIX) injection 20 mg     Warfarin Therapy Reminder (Check START DATE - warfarin may be starting in the FUTURE)     oxybutynin (DITROPAN) tablet 5 mg     senna-docusate (SENOKOT-S;PERICOLACE) 8.6-50 MG per tablet 1 tablet     polyethylene glycol (MIRALAX/GLYCOLAX) Packet 17 g     pantoprazole (PROTONIX) EC tablet 40 mg     lidocaine (XYLOCAINE) 5 % ointment     naloxone (NARCAN) injection 0.1-0.4 mg     thiamine tablet 100 mg     ipratropium - albuterol 0.5 mg/2.5 mg/3 mL (DUONEB) neb solution 3 mL     HYDROmorphone (DILAUDID) injection 0.3-0.5 mg     dextrose 10 % 1,000 mL infusion     insulin 1 unit/mL in saline (NovoLIN, HumuLIN Regular) drip - ADULT IV Infusion     glucose 40 % gel 15-30 g    Or     dextrose 50 % injection 25-50 mL     Or     glucagon injection 1 mg     dextrose 5% and 0.45% NaCl + KCl 20 mEq/L infusion     hydrALAZINE (APRESOLINE) injection 10 mg     insulin aspart (NovoLOG) inj (RAPID ACTING)     insulin aspart (NovoLOG) inj (RAPID ACTING)     aspirin EC EC tablet 81 mg     potassium chloride SA (K-DUR/KLOR-CON M) CR tablet 20-40 mEq     potassium chloride (KLOR-CON) Packet 20-40 mEq     potassium chloride 10 mEq in 100 mL intermittent infusion with 10 mg lidocaine     potassium chloride 20 mEq in 50 mL intermittent infusion     magnesium sulfate 2 g in NS intermittent infusion (PharMEDium or FV Cmpd)     magnesium sulfate 4 g in 100 mL sterile water (premade)     potassium phosphate 10 mmol in D5W 250 mL intermittent infusion     potassium phosphate 15 mmol in D5W 250 mL intermittent infusion     potassium phosphate 20 mmol in D5W 500 mL intermittent infusion     potassium phosphate 20 mmol in D5W 250 mL intermittent infusion     potassium phosphate 25 mmol in D5W 500 mL intermittent infusion     acetaminophen (TYLENOL) tablet 650 mg     oxyCODONE IR (ROXICODONE) tablet 5-10 mg     ondansetron (ZOFRAN-ODT) ODT tab 4 mg    Or     ondansetron (ZOFRAN) injection 4 mg     sodium chloride 0.9% infusion     sodium chloride 0.9% infusion     atorvastatin (LIPITOR) tablet 20 mg     albuterol (PROAIR HFA/PROVENTIL HFA/VENTOLIN HFA) Inhaler 2 puff     finasteride (PROSCAR) tablet 5 mg     multivitamin, therapeutic with minerals (THERA-VIT-M) tablet 1 tablet     No current outpatient prescriptions on file.       Current Diet    Active Diet Order      Moderate Consistent CHO Diet      Assessment[JM1.1]:[JM1.2] roverto Alvarez is a 72 year old male with history of type 2 diabetes, hypertension, hyperlipidemia, atrial fibrillation ( on warfarin), TIA, obesity transferred from [facility] following STEMI with new onset of left heart failure ( EF 20-25%). Impella device was placed via right femoral artery at the OSH. he underwent coronary artery  bypass grafting x 3 and removal of Impella on  (3/23/2018) by .  Found to have critical limb ischemia with loss of motor function s/p Right groin reexploration, right common femoral and iliac artery  thrombectomy, right leg angiogram, stent placement right common iliac artery and then 4 compartment right lower leg fasciotomies 3/24/18 with Dr. Tang.  On 3/27/2018, he underwent closure of right leg fasciotomy X 2 with Dr. Tang.[JM1.1]        Type 2 diabetic:[JM1.4] A1C 7.8% ( 3/22/2018)[JM1.2]  Will transition off  IV insulin, calculated ~ 2 units per hour x 24 = 48 units,[JM1.4]   Plan[JM1.1]  - lantus 45 units  D/c IV insulin 2 hours after sub-q insulin  -Novolog 1 unit per 5 grams of CHO with meals and snacks  -Novolog high intensity sliding scale before meals and HS  -monitor glucose before meals, HS and 0200[JM1.4]  Will continue to follow[JM1.2]    Candido Piedra -[phone]    Diabetes Management Team job code: [id number][JM1.1]       Revision History        User Key Date/Time User Provider Type Action    > JM1.3 3/28/2018  4:05 PM Candido Piedra APRN CNP Nurse Practitioner Sign     JM1.2 3/28/2018  3:52 PM Candido Piedra APRN CNP Nurse Practitioner      JM1.4 3/28/2018 11:51 AM Candido Piedra APRN CNP Nurse Practitioner      JM1.1 3/28/2018 10:46 AM Candido Piedra APRN CNP Nurse Practitioner             Consults by Ralph Biggs MD at 3/24/2018 10:49 AM     Author:  Ralph Biggs MD Service:  Urology Author Type:  Resident    Filed:  3/24/2018 10:50 AM Date of Service:  3/24/2018 10:49 AM Creation Time:  3/24/2018 10:25 AM    Status:  Attested :  Ralph Biggs MD (Resident)    Cosigner:  Mariluz Carter MD at 3/25/2018 11:44 AM         Consult Orders:    1. Urology IP Consult: paraphimosis; Consultant may enter orders: Yes; Patient to be seen: Routine - within 24 hours [[id number]] ordered by Juan M Irvin MD at 03/24/18 0805           Attestation signed by  Mariluz Carter MD at 3/25/2018 11:44 AM        Attestation:  Physician Attestation   I, Mariluz Carter, personally examined and evaluated this patient.  I discussed the patient with the resident and care team, and agree with the assessment and plan of care as documented in the resident s note of March 2, 2018.      I personally reviewed vital signs and medications.    Key findings: Circumcised patient with recent traumatic gallardo placement with some penile edema and balanitis.  Recommend keeping area clean and continue topical cares.  No indication for urologic intervention  Mariluz Carter  Date of Service (when I saw the patient): 03/25/18                               Urology Consult History and Physical    Name: Izaiah Alvarez    MRN: [id number]   YOB: 1945       We were asked to see Izaiah Alvarez at the request of Dr. Pearl for evaluation and treatment of the following chief complaint.        Chief Complaint:   Paraphimosis  History is obtained from the patient          History of Present Illness:   Izaiah Alvarez is a 72 year old male with history of obesity, Afib on coumadin, DM2, HTN, and recently underwent CABG yesterday.  He was a transfer from [facility] following MI and new onset heart failture.  He underwent what was evidently traumatic catheter placement at [facility], and this catheter has been kept in place.      At baseline his reports being circumcised in infancy, but has had short penis with associated contracture secondary to obesity and diabetes.  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         Past Medical History:   No past medical history on file.         Past Surgical History:   No past surgical history on file.  Obesity  Afib  DM2  HTN  CAD  TIA         Social History:     Social History   Substance Use Topics     Smoking status: Not on file     Smokeless tobacco: Not on file     Alcohol use Not on file            Family History:   No family history on file.          Allergies:   No Known Allergies         Medications:     Current Facility-Administered Medications   Medication     albumin human 5 % injection     lidocaine (XYLOCAINE) 5 % ointment     heparin drip 25,000 units in 0.45% NaCl 250 mL     thiamine tablet 100 mg     naloxone (NARCAN) injection 0.1-0.4 mg     ipratropium - albuterol 0.5 mg/2.5 mg/3 mL (DUONEB) neb solution 3 mL     HYDROmorphone (DILAUDID) injection 0.3-0.5 mg     propofol (DIPRIVAN) infusion    And     propofol (DIPRIVAN) injection 10 mg/mL vial     dextrose 10 % 1,000 mL infusion     insulin 1 unit/mL in saline (NovoLIN, HumuLIN Regular) drip - ADULT IV Infusion     glucose 40 % gel 15-30 g    Or     dextrose 50 % injection 25-50 mL    Or     glucagon injection 1 mg     dextrose 5% and 0.45% NaCl + KCl 20 mEq/L infusion     hydrALAZINE (APRESOLINE) injection 10 mg     insulin aspart (NovoLOG) inj (RAPID ACTING)     insulin aspart (NovoLOG) inj (RAPID ACTING)     meperidine (DEMEROL) injection 12.5-25 mg     pantoprazole (PROTONIX) 40 mg IV push injection     Warfarin Therapy Reminder (Check START DATE - warfarin may be starting in the FUTURE)     ceFAZolin (ANCEF) intermittent infusion 2 g in 100 mL dextrose PRE-MIX     mupirocin (BACTROBAN) 2 % ointment 0.5 g     EPINEPHrine (ADRENALIN) 5 mg in sodium chloride 0.9 % 250 mL infusion     aspirin EC EC tablet 81 mg     potassium chloride SA (K-DUR/KLOR-CON M) CR tablet 20-40 mEq     potassium chloride (KLOR-CON) Packet 20-40 mEq     potassium chloride 10 mEq in 100 mL intermittent infusion with 10 mg lidocaine     potassium chloride 20 mEq in 50 mL intermittent infusion     magnesium sulfate 2 g in NS intermittent infusion (PharMEDium or FV Cmpd)     magnesium sulfate 4 g in 100 mL sterile water (premade)     potassium phosphate 10 mmol in D5W 250 mL intermittent infusion     potassium phosphate 15 mmol in D5W 250 mL intermittent infusion     potassium phosphate 20 mmol in D5W 500 mL intermittent  infusion     potassium phosphate 20 mmol in D5W 250 mL intermittent infusion     potassium phosphate 25 mmol in D5W 500 mL intermittent infusion     acetaminophen (TYLENOL) tablet 975 mg     [START ON 3/26/2018] acetaminophen (TYLENOL) tablet 650 mg     oxyCODONE IR (ROXICODONE) tablet 5-10 mg     ondansetron (ZOFRAN-ODT) ODT tab 4 mg    Or     ondansetron (ZOFRAN) injection 4 mg     docusate sodium (COLACE) capsule 100 mg     amiodarone (NEXTERONE) 1,500 mg in D5W 250 mL infusion     norepinephrine (LEVOPHED) 16 mg in D5W 250 mL infusion     sodium chloride 0.9% infusion     sodium chloride 0.9% infusion     amiodarone (NEXTERONE) 1,500 mg in D5W 250 mL infusion     atorvastatin (LIPITOR) tablet 20 mg     HOLD nitroGLYcerin IF     Patient is already receiving anticoagulation with heparin, enoxaparin (LOVENOX), warfarin (COUMADIN)  or other anticoagulant medication     magnesium sulfate 2 g in NS intermittent infusion (PharMEDium or FV Cmpd)     Continuing beta blocker from home medication list OR beta blocker order already placed during this visit     albuterol (PROAIR HFA/PROVENTIL HFA/VENTOLIN HFA) Inhaler 2 puff     finasteride (PROSCAR) tablet 5 mg     multivitamin, therapeutic with minerals (THERA-VIT-M) tablet 1 tablet     fish oil-omega-3 fatty acids capsule 1,000 mg             Review of Systems:    ROS: 10 point ROS neg other than the symptoms noted above in the HPI           Physical Exam:   VS:  T: 100.2    HR: Data Unavailable    BP: 87/60    RR: 18   GEN:  AOx3.  NAD.    CV:  RRR  LUNGS: Non-labored breathing.   BACK:  No midline or CVA tenderness.  ABD:  Soft.  NT.  ND.  No rebound or guarding.  No masses.  :  circumcised.  No edematous circular ring proximal to the glans to indicate paraphimosis.  Edema around penile skin, some mild abrasions on glans secondary to [place] trauma          Data:   All laboratory data reviewed:      Recent Labs  Lab 03/24/18  0352 03/23/18  1788 03/23/18  8194  03/23/18 2136 03/23/18  0338   WBC 16.3* 18.7*  --  16.7*  --  10.5   HGB 9.3* 10.3* 8.6* 8.9*  < > 12.0*   * 141*  --  111*  --  136*   < > = values in this interval not displayed.    Recent Labs  Lab 03/24/18  0352 03/23/18  2345 03/23/18 2139 03/23/18  2101 03/23/18  0338 03/22/18  0645   * 144 140 140  < > 139 136   POTASSIUM 4.3 4.2 4.4 5.0  < > 3.0* 3.6   CHLORIDE 110* 108  --   --   --  100 98   CO2 23 23  --   --   --  31 30   BUN 35* 33*  --   --   --  39* 41*   CR 1.17 1.17  --   --   --  1.08 1.13   * 263* 224* 189*  < > 120* 268*   STEFFANY 8.0* 7.5*  --   --   --  8.4* 8.0*   MAG 2.8* 2.8*  --   --   --  2.4* 2.1   PHOS 2.5 5.1*  --   --   --  4.0 3.8   < > = values in this interval not displayed.  No lab results found in last 7 days.    Invalid input(s): URINEBLOOD         Impression and Plan:   Impression:   Izaiah Alvarez is a 72 year old male with history of traumatic Felton catheter placement.  The patient is circumcised and on exam there is no edematous annular ring of tissue proximal to the glans to suggest paraphimosis.       Plan:    No evidence of paraphimosis on exam  Continue topical cares with vaseline/bacitracin ointment at tip of penis, monitor for increased pain          This patient's exam findings, labs, and imaging discussed with urology chief resident and urology staff surgeon Dr. Carter, who developed the treatment plan.    ROSA Recinos  Urology Resident[SJ1.1]        Revision History        User Key Date/Time User Provider Type Action    > SJ1.1 3/24/2018 10:50 AM Ralph Biggs MD Resident Sign            Consults by Aure Boyer MD at 3/24/2018 10:37 AM     Author:  Aure Boyer MD Service:  Vascular Surgery Author Type:  Resident    Filed:  3/24/2018 11:11 AM Date of Service:  3/24/2018 10:37 AM Creation Time:  3/24/2018 10:37 AM    Status:  Attested :  Aure Boyer MD (Resident)    Cosigner:  Margarette Tang MD at 3/24/2018 12:14  PM        Attestation signed by Margarette Tang MD at 3/24/2018 12:14 PM        Attestation:  I have seen and examined this patient with the resident and agree with the attached note.  Patient has diminished sensation and motor function consistent with grade 3 acute limb ischemia.  Limb prognosis guarded.  Tentative plan for angiogram, possible iliac stent, possible thromboembolectomy, possible fem-fem or other bypass.  Likely 4 compartment fasciotomies.  Patient alert and understanding.                                                          History and Physical     Izaiah Alvarez MRN# [id number]   YOB: 1945 Age: 72 year old      Date of Admission:  3/21/2018        Chief Complaint:   CC: cold RLE         History of Present Illnes:     72M with PMH of atrial fibrillation on coumadin, obesity, DM2, HTN who underwent a CABG with R CFA cutdown yesterday for removal of Impella percutaneous ventricular assist device. At the end of the case he had a good femoral pulse after cut down but no distal pulse was assessed. He was transferred to the floor and today reported numbness and coolness in his RLE. He did not have dopplerable signals in that extremity and vascular is consulted for a cold leg. He reports calf pain on ambulation at baseline after a couple blocks. He is currently on 0.02 of epi and is getting a heparin gtt started at 500 for his hx of afib.      Past Medical History:  Obesity, afib on coumadin, DM, hypercholesterolemia, HTN, asthma, TIA    Past Surgical History:  Orchiopexy  CABG 3/23/18    Allergies:   No Known Allergies    Medications:[SS1.1]     Current Facility-Administered Medications:      albumin human 5 % injection, , , ,      lidocaine (XYLOCAINE) 5 % ointment, , Topical, Q4H PRN, Eric Marks MD     heparin drip 25,000 units in 0.45% NaCl 250 mL, 500 Units/hr, Intravenous, Continuous, Juan M Irvin MD     thiamine tablet 100 mg, 100 mg, Oral, Daily, Olayinka Orellana  MD VERO     naloxone (NARCAN) injection 0.1-0.4 mg, 0.1-0.4 mg, Intravenous, Q2 Min PRN, Juan M Irvin MD     ipratropium - albuterol 0.5 mg/2.5 mg/3 mL (DUONEB) neb solution 3 mL, 3 mL, Nebulization, Q4H PRN, Juan M Irvin MD     HYDROmorphone (DILAUDID) injection 0.3-0.5 mg, 0.3-0.5 mg, Intravenous, Q1H PRN, Juan M Irvin MD, 0.5 mg at 03/24/18 0111     propofol (DIPRIVAN) infusion, 5-75 mcg/kg/min (Dosing Weight), Intravenous, Continuous, Stopped at 03/24/18 0500 **AND** propofol (DIPRIVAN) injection 10 mg/mL vial, 10-20 mg, Intravenous, Q30 Min PRN **AND** CK total, , , CONDITIONAL (SPECIFY) **AND** Triglycerides, , , CONDITIONAL (SPECIFY), Juan M Irvin MD     dextrose 10 % 1,000 mL infusion, , Intravenous, Continuous PRN, Juan M Irvin MD     insulin 1 unit/mL in saline (NovoLIN, HumuLIN Regular) drip - ADULT IV Infusion, 0-24 Units/hr, Intravenous, Continuous, Juan M Irvin MD, Last Rate: 4 mL/hr at 03/24/18 1000, 4 Units/hr at 03/24/18 1000     glucose 40 % gel 15-30 g, 15-30 g, Oral, Q15 Min PRN **OR** dextrose 50 % injection 25-50 mL, 25-50 mL, Intravenous, Q15 Min PRN **OR** glucagon injection 1 mg, 1 mg, Subcutaneous, Q15 Min PRN, Juan M Irvin MD     dextrose 5% and 0.45% NaCl + KCl 20 mEq/L infusion, , Intravenous, Continuous, Juan M Irvin MD, Last Rate: 10 mL/hr at 03/24/18 1000     hydrALAZINE (APRESOLINE) injection 10 mg, 10 mg, Intravenous, Q30 Min PRN, Juan M Irvin MD     insulin aspart (NovoLOG) inj (RAPID ACTING), , Subcutaneous, TID w/meals, Juan M Irvin MD     insulin aspart (NovoLOG) inj (RAPID ACTING), , Subcutaneous, With Snacks or Supplements, Juan M Irvin MD     meperidine (DEMEROL) injection 12.5-25 mg, 12.5-25 mg, Intravenous, Q15 Min PRN, Juan M Irvin MD     pantoprazole (PROTONIX) 40 mg IV push injection, 40 mg, Intravenous, Daily, Juan M Irvin MD, 40  mg at 03/24/18 0746     Warfarin Therapy Reminder (Check START DATE - warfarin may be starting in the FUTURE), 1 each, Does not apply, Continuous PRN, Juan M Irvin MD     ceFAZolin (ANCEF) intermittent infusion 2 g in 100 mL dextrose PRE-MIX, 2 g, Intravenous, Q8H, Juan M Irvin MD, 2 g at 03/24/18 0539     mupirocin (BACTROBAN) 2 % ointment 0.5 g, 0.5 g, Both Nostrils, BID, Juan M Irvin MD, 0.5 g at 03/24/18 0034     EPINEPHrine (ADRENALIN) 5 mg in sodium chloride 0.9 % 250 mL infusion, 0.01-0.1 mcg/kg/min (Dosing Weight), Intravenous, Continuous, Juan M Irvin MD, Last Rate: 6.8 mL/hr at 03/24/18 1000, 0.02 mcg/kg/min at 03/24/18 1000     aspirin EC EC tablet 81 mg, 81 mg, Oral, Daily, Juan M Irvin MD, 81 mg at 03/24/18 0746     potassium chloride SA (K-DUR/KLOR-CON M) CR tablet 20-40 mEq, 20-40 mEq, Oral, Q2H PRN, Juan M Irvin MD     potassium chloride (KLOR-CON) Packet 20-40 mEq, 20-40 mEq, Oral or Feeding Tube, Q2H PRN, Juan M Irivn MD     potassium chloride 10 mEq in 100 mL intermittent infusion with 10 mg lidocaine, 10 mEq, Intravenous, Q1H PRN, Juan M Irvin MD     potassium chloride 20 mEq in 50 mL intermittent infusion, 20 mEq, Intravenous, Q1H PRN, Juan M Irvin MD     magnesium sulfate 2 g in NS intermittent infusion (PharMEDium or FV Cmpd), 2 g, Intravenous, Daily PRN, Juan M Irvin MD     magnesium sulfate 4 g in 100 mL sterile water (premade), 4 g, Intravenous, Q4H PRN, Juan M Irvin MD     potassium phosphate 10 mmol in D5W 250 mL intermittent infusion, 10 mmol, Intravenous, Daily PRN, Juan M Irvin MD     potassium phosphate 15 mmol in D5W 250 mL intermittent infusion, 15 mmol, Intravenous, Daily PRN, Juan M Irvin MD     potassium phosphate 20 mmol in D5W 500 mL intermittent infusion, 20 mmol, Intravenous, Q6H PRN, Juan M Irvin MD     potassium phosphate  20 mmol in D5W 250 mL intermittent infusion, 20 mmol, Intravenous, Q6H PRN, Juan M Irvin MD     potassium phosphate 25 mmol in D5W 500 mL intermittent infusion, 25 mmol, Intravenous, Q8H PRN, Juan M Irvin MD     acetaminophen (TYLENOL) tablet 975 mg, 975 mg, Oral, Q8H, Juan M Irvin MD, 975 mg at 03/24/18 0830     [START ON 3/26/2018] acetaminophen (TYLENOL) tablet 650 mg, 650 mg, Oral, Q4H PRN, Juan M Irvin MD     oxyCODONE IR (ROXICODONE) tablet 5-10 mg, 5-10 mg, Oral, Q4H PRN, Juan M Irvin MD     ondansetron (ZOFRAN-ODT) ODT tab 4 mg, 4 mg, Oral, Q6H PRN **OR** ondansetron (ZOFRAN) injection 4 mg, 4 mg, Intravenous, Q6H PRN, Juan M Irvin MD     docusate sodium (COLACE) capsule 100 mg, 100 mg, Oral, BID, Juan M Irvin MD     amiodarone (NEXTERONE) 1,500 mg in D5W 250 mL infusion, 0.5 mg/min, Intravenous, Continuous, Juan M Irvin MD, Last Rate: 5 mL/hr at 03/24/18 1000, 0.5 mg/min at 03/24/18 1000     norepinephrine (LEVOPHED) 16 mg in D5W 250 mL infusion, 0.03-0.4 mcg/kg/min (Dosing Weight), Intravenous, Continuous, Juan M Irvin MD, Stopped at 03/24/18 0625     sodium chloride 0.9% infusion, , Other, Continuous, Olayinka Orellana MD, Last Rate: 3 mL/hr at 03/22/18 0633     sodium chloride 0.9% infusion, , Other, Continuous, Olayinka Orellana MD     amiodarone (NEXTERONE) 1,500 mg in D5W 250 mL infusion, 0.5 mg/min, Intravenous, Continuous, Becca Guy MD, Last Rate: 5 mL/hr at 03/24/18 0000, 0.5 mg/min at 03/24/18 0000     atorvastatin (LIPITOR) tablet 20 mg, 20 mg, Oral, Daily, Kristen Jerome MD, 20 mg at 03/24/18 0750     HOLD nitroGLYcerin IF, , Does not apply, HOLD, Olayinka Orellana MD     Patient is already receiving anticoagulation with heparin, enoxaparin (LOVENOX), warfarin (COUMADIN)  or other anticoagulant medication, , Does not apply, Continuous PRN, Olayinka Orellana MD     magnesium sulfate 2 g in NS  "intermittent infusion (PharMEDium or FV Cmpd), 2 g, Intravenous, Daily PRN, Olayinka Orellana MD     Continuing beta blocker from home medication list OR beta blocker order already placed during this visit, , Does not apply, DOES NOT GO TO MAREsteban Kevin J, MD     albuterol (PROAIR HFA/PROVENTIL HFA/VENTOLIN HFA) Inhaler 2 puff, 2 puff, Inhalation, Q6H PRN, Olayinka Orellana MD     finasteride (PROSCAR) tablet 5 mg, 5 mg, Oral, Daily, Olayinka Orellana MD, 5 mg at 03/24/18 0752     multivitamin, therapeutic with minerals (THERA-VIT-M) tablet 1 tablet, 1 tablet, Oral, Daily, Olayinka Orellana MD, Stopped at 03/23/18 0800     fish oil-omega-3 fatty acids capsule 1,000 mg, 1,000 mg, Oral, Daily, Olayinka Orellana MD, 1,000 mg at 03/22/18 0759[SS1.2]    Social History:  Social History     Social History     Marital status: N/A     Spouse name: N/A     Number of children: N/A     Years of education: N/A     Occupational History     Not on file.     Social History Main Topics     Smoking status: Not on file     Smokeless tobacco: Not on file     Alcohol use Not on file     Drug use: Not on file     Sexual activity: Not on file     Other Topics Concern     Not on file     Social History Narrative       Family History:  No family hx of bleeding or clotting dsorders    ROS:  The remainder of the complete ROS was negative unless noted in the HPI.    Exam:  BP (!) 87/60  Temp 100.2  F (37.9  C) (Axillary)  Resp 18  Ht 1.803 m (5' 11\")  Wt 115.7 kg (255 lb 1.2 oz)  SpO2 97%  BMI 35.58 kg/m2  General: Alert, interactive, NAD  HEENT: AT/NC, sclera anicteric, PERRL, EOMI   Neck: Supple, no JVD or cervical LAD  Resp: clear to auscultation bilaterally, no crackles or wheezes  Cardiac: regular rate and rhythm   Abdomen: Soft, nontender, nondistended.   Extremities:[SS1.1]  Patient unable to wiggle toes and insensate to knee on RLE[SS1.3]  RLE unable to doppler PT or DP. R groin dressing taken down, sutures in place, no " hematoma, no thrill, unable to palpate or doppler common femoral artery, large pannus, LLE wrapped from graft site harvest, dopplerable biphasic PT[SS1.1].[SS1.3]Skin: Warm and dry, no jaundice or rash  Neuro: Alert & oriented x 3, Cns 2-12 intact, moves all extremities equally    Labs:  Hg 9.3   WBC 16  Plt 149    Imaging:  None    Assessment/ Plan:  72M with critical limb ischemia with loss of motor function. Likely embolism to distal RLE with what sounds like baseline claudication that has not been worked up before. POD1 from CABG and R CFA cutdown.     -[SS1.1]please increase[SS1.4] hepar[SS1.1]in gtt to 800   - MEERS on RLE  -arterial duplex stat of RLE   -possible OR today[SS1.4]     Discussed with Dr. Clyde Topete MD PGY2  General surgery[SS1.1]                    Revision History        User Key Date/Time User Provider Type Action    > [N/A] 3/24/2018 11:11 AM Aure Boyer MD Resident Sign     SS1.3 3/24/2018 11:11 AM Aure Boyer MD Resident Sign     SS1.4 3/24/2018 11:10 AM Aure Boyer MD Resident      SS1.2 3/24/2018 11:08 AM Aure Boyer MD Resident Sign     SS1.1 3/24/2018 10:37 AM Aure Boyer MD Resident             Consults by Delia Hernandez MSW at 3/23/2018 11:42 AM     Author:  Delia Hernandez MSW Service:  Social Work Author Type:      Filed:  3/23/2018 11:43 AM Date of Service:  3/23/2018 11:42 AM Creation Time:  3/23/2018 11:42 AM    Status:  Signed :  Delia Hernandez MSW ()     Consult Orders:    1. Social Work IP Consult [[id number]] ordered by Olayinka Orellana MD at 03/21/18 6879                SW received consult for discharge planning and community resources. Writer met with Pt's wife yesterday. Please see full SW assessment for details.      Delia Hernandez [facility]  ICU   Pager: [phone][KB1.1]     Revision History        User Key Date/Time User Provider Type  Action    > KB1.1 3/23/2018 11:43 AM Delia Hernandez, MSW  Sign            Consults by Nita Hairston MD at 3/22/2018 12:07 AM     Author:  Nita Hairston MD Service:  Cardiology Author Type:  Physician    Filed:  3/22/2018  7:21 PM Date of Service:  3/22/2018 12:07 AM Creation Time:  3/22/2018 12:07 AM    Status:  Signed :  Nita Hairston MD (Physician)         Cardiology Consult         Date of Service (when I saw the patient):[AK1.1] 03/22/18[AK1.2]    ASSESSMENT:   Izaiah Alvarez is a 72 year old male with PMHx of obesity, Afib on coumadin, DM2, HTN, and TIA who was transferred from [facility] following MI with new onset left HF (20-25% compared to 46% previously). Acutely worsened on 3/19 with increased SOB patient presented to hospital. Now s/p Impella, started on lasix gtt for fluid, nitroglycerin gtt to decrease afterload and diltiazem gtt for afib with RVR. Arrived on dilt gtt, lasix gtt, heparin gtt, nitro gtt. BPs, vitals stable.       RECOMMEND:  #Cardiogenic shock likely 2/2 to severe ischemic CAD, along with 95% LM lesion[AK1.1]. Some component of negative inotropy is likely contributing given his high dose of dilt gtt.[AK1.3]   -currently very stable. Would continue aspirin, atorvastatin, lisinopril 40 mg daily, metoprolol 50 mg BID.  -would also continue lasix gtt at 20 mg with diuresis goal net neg 1-1.5; creatinine normal. LVEDP was 26 in cath lab on 3/19, so would benefit from diuresis.  -continue nitroglycerin gtt at current rate to keep MAPs around 80 BPM. Currently at 0.3 mcg/min.  -formal echo tomorrow. Bedside echo demonstrates EF of 20-25%.  -chest xray, lactate, CBC, CMP, INR.  -consider swan placement tomorrow to better assess hemodynamics; can likely go to the cath lab for this.[AK1.1] Consider weaning of impellla as tolerated.  -will need to get revascularized with CABG this admission, likely impella pulled during this time.[AK1.3]  -trend  troponins.  -given significant LM disease, would hold off on inotropes at this time.    #Afib  -will d/c dilt gtt and start amiodarone gtt at 1 mg/hr. If HRs become more difficult to control, please do slow bolus of 75 mg over 20 minutes for better HR control.      Asklausnoe Fountain[AK1.1]       I have reviewed vital signs, notes, medications, labs and imaging. I have also seen and examined the patient.  Please see the consult note dated 3/22/17 for updated findings and plan.    Nita Hairston MD  Section Head - Advanced Heart Failure, Transplantation and Mechanical Circulatory Support  Co-Director - Adult [facility]ovascular [facility]  Associate Professor of Medicine, [facility][CM1.1]        REASON FOR CONSULT: Cardiogenic shock    History of Present Illness     Izaiah Alvarez is a 72 year old male with PMHx of obesity, Afib on coumadin, DM2, HTN, and TIA who was transferred from [facility] following MI with new onset left HF (20-25% compared to 46% previously). Patient started having chest pain about 6 months ago - reported SOB, diaphoresis, shoulder pain. Acutely worsened on 3/19 with increased SOB patient presented to hospital. ST depressions in multiple leads, ST elevation in aVR and lead III - taken to cath lab. Hazy 95% plus stenosis of left main. Left circumflex nondominant and had 90% blockage in OM. LAD had 75% stenosis. Large diagonal branches. RCA occluded at ostium, well collateralized. Did not put in IABP because moderate disease in distal aorta. Echo showed EF 20-25%. Troponin increased to 92, despite the initiation of medical therapy. Cv surgery recommended medical management initially with consideration for bypass in the coming days; however,  patient was then found to have acute hypoxic respiratory after developing pulmonary edema and had impella placed on 3/20 with EF of 20-25% with global L hypokinesis reduced RV systolic function.     He was also started on lasix gtt for  fluid, nitroglycerin gtt to decrease afterload and diltiazem gtt for afib with RVR. Was diuresed 4 L in the ICU. Most recent creatinine was 1.  Transferred to tertiary care center for better potential backup. Had carotid u/s showing 40-69% stenosis.      States chest pain has resolved and shortness of breath has improved with decreased oxygen requirements. Denies fevers, chills, nausea, vomiting, weight loss, change in appetite, numbness, tingling, nor weakness.     Arrived on dilt gtt, lasix gtt, heparin gtt, nitro gtt. BPs, vitals stable.     Past Medical History    I have reviewed this patient's medical history and updated it with pertinent information if needed.     Obesity, afib on coumadin, DM, hypercholesterolemia, HTN, asthma, TIA    Past Surgical History   I have reviewed this patient's surgical history and updated it with pertinent information if needed.    Orchiopexy    Imaging / Intervention results:  3/19 Cardiac Cath:  50% left ostial. Distal left main 95% stenosis - affecting all 3 vessesl of the coronaary. Diffuse disease in the branch vessels. Moderate LV impairment. Advanced stenoses in distal aorta and proximal iliacs     Echo: unable to locate results. Reported LVEF 20-25%    Prior to Admission Medications   Prior to Admission Medications   Prescriptions Last Dose Informant Patient Reported? Taking?   ASPIRIN 81 PO   Yes Yes   Sig: Take 1 tablet by mouth daily   ATORVASTATIN CALCIUM PO   Yes Yes   Sig: Take 5 mg by mouth daily   Acetaminophen (TYLENOL PO) Unknown at Unknown time  Yes No   Sig: Take 650 mg by mouth every 6 hours as needed for mild pain   CHLORTHALIDONE PO 3/21/2018 at Unknown time  Yes Yes   Sig: Take 12.5 mg by mouth daily   FINASTERIDE PO 3/21/2018 at Unknown time  Yes Yes   Sig: Take 5 mg by mouth daily   GLIPIZIDE PO   Yes Yes   Sig: Take 20 mg by mouth 2 times daily (before meals)   LISINOPRIL PO 3/21/2018 at Unknown time  Yes Yes   Sig: Take 30 mg by mouth daily   METFORMIN  HCL PO   Yes Yes   Sig: Take 1,000 mg by mouth 2 times daily (with meals)   METOPROLOL TARTRATE PO   Yes Yes   Sig: Take 50 mg by mouth 2 times daily   Multiple Vitamins-Minerals (MULTIVITAMIN & MINERAL PO) 3/21/2018 at Unknown time  Yes Yes   Sig: Take 1 tablet by mouth daily   Omega-3 Fatty Acids (FISH OIL) 500 MG CAPS 3/21/2018 at Unknown time  Yes Yes   Sig: Take 500 mg by mouth daily   WARFARIN SODIUM PO Unknown at Unknown time  Yes No   Sig: Take 2 mg by mouth   albuterol (PROAIR HFA/PROVENTIL HFA/VENTOLIN HFA) 108 (90 BASE) MCG/ACT Inhaler   Yes Yes   Sig: Inhale 2 puffs into the lungs every 6 hours as needed for shortness of breath / dyspnea or wheezing   fluticasone (FLOVENT DISKUS) 50 MCG/BLIST AEPB Unknown at Unknown time  Yes No   Sig: Inhale 2 puffs into the lungs daily as needed Both nostrils for rhinitis   insulin glargine (LANTUS) 100 UNIT/ML injection   Yes Yes   Sig: Inject 60 Units Subcutaneous At Bedtime      Facility-Administered Medications: None     Allergies   No Known Allergies    Social History   I have reviewed this patient's social history and updated it with pertinent information if needed. Izaiah Alvarez       Quit smoking 40 years ago. Denies alcohol. Denies illicit drug use.    Family History   I have reviewed this patient's family history and updated it with pertinent information if needed.[AK1.1]   No family history on file.[AK1.4]    Review of Systems   The 10 point Review of Systems is negative other than noted in the HPI or here.     Physical Exam   Temp: 99.1  F (37.3  C) Temp src: Oral BP: (!) 138/101   Heart Rate: 96 Resp: 20 SpO2: 95 % O2 Device: Oxymask Oxygen Delivery: 5 LPM  Vital Signs with Ranges  Temp:  [99.1  F (37.3  C)] 99.1  F (37.3  C)  Heart Rate:  [96] 96  Resp:  [20] 20  BP: (138)/(101) 138/101  SpO2:  [95 %] 95 %  251 lbs 12.25 oz    GEN: NAD, pleasant  HEENT: no icterus  CV: RRR, normal s1/s2, no murmurs/rubs/s3/s4, no heave. JVP mid neck.   CHEST: CTAB  ABD:  soft, NT/ND, NABS  : no flank/suprapubic tenderness  NEURO: AA&Ox3, fluent/appropriate, motor grossly nonfocal  Extremites: Dopplerable DP bilateral. Sensation intact. Impella R groin. Trace edema.  PSYCH: cooperative, affect appropriate    Data   Data reviewed today:  I personally reviewed the EKG tracing showing afib with HR of 109.  No lab results found in last 7 days.    No results found for this or any previous visit (from the past 24 hour(s)).    Thank you for allowing me to care for this patient. This has been discussed with the attending physician.    Adalberto Fountain MD  Cardiology Fellow, PGY-4[AK1.1]     Revision History        User Key Date/Time User Provider Type Action    > CM1.1 3/22/2018  7:21 PM Nita Hairston MD Physician Sign     AK1.3 3/22/2018  6:28 PM Adalberto Fountain MD Physician Sign     AK1.2 3/22/2018 12:57 AM Adalberto Fountain MD Physician Sign     AK1.4 3/22/2018 12:35 AM Adalberto Fountain MD Physician      AK1.1 3/22/2018 12:07 AM Adalberto Fountain MD Physician                      Progress Notes - Physician (Notes from 04/01/18 through 04/04/18)      Progress Notes by Rosey Collier PA-C at 4/4/2018  8:49 AM     Author:  Rosey Collier PA-C Service:  Vascular Surgery Author Type:  Physician Assistant - C    Filed:  4/4/2018  8:52 AM Date of Service:  4/4/2018  8:49 AM Creation Time:  4/4/2018  8:49 AM    Status:  Signed :  Rosey Collier PA-C (Physician Assistant - C)         VASCULAR SURGERY PROGRESS NOTE    Subjective:  Pt found sitting in chair. Pt reports RLE is about the same: no pain, moving ok, no numbness or lack of sensation.     Objective:  Intake/Output Summary (Last 24 hours) at 04/04/18 0849  Last data filed at 04/04/18 0600   Gross per 24 hour   Intake              990 ml   Output             1475 ml   Net             -485 ml     Labs:  ROUTINE IP LABS (Last four results)  BMP  Recent Labs  Lab 04/04/18  0724 04/03/18  1006 04/02/18  0755 04/01/18  0639  "   137 136 137   POTASSIUM 4.0 4.1 4.1 3.8   CHLORIDE 104 103 105 105   STEFFANY 8.4* 8.6 8.4* 8.3*   CO2 24 23 24 25   BUN 14 14 16 17   CR 1.06 1.03 1.00 0.98   * 141* 133* 93     CBC  Recent Labs  Lab 04/04/18  0724 04/03/18  1006 04/02/18  0755 04/01/18  0639   WBC 6.4 6.8 7.9 8.9   RBC 3.27* 3.30* 3.02* 2.90*   HGB 9.4* 9.8* 8.9* 8.7*   HCT 32.2* 32.1* 29.6* 27.7*   MCV 99 97 98 96   MCH 28.7 29.7 29.5 30.0   MCHC 29.2* 30.5* 30.1* 31.4*   RDW 16.8* 16.5* 16.4* 16.3*    472* 430 420     INR  Recent Labs  Lab 04/04/18  0724 04/03/18  1006 04/02/18  0755 04/01/18  0639   INR 2.36* 2.17* 2.27* 2.30*     PHYSICAL EXAM:  /63 (BP Location: Left arm)  Pulse 84  Temp 97.5  F (36.4  C) (Oral)  Resp 20  Ht 1.803 m (5' 11\")  Wt 113.1 kg (249 lb 6.4 oz)  SpO2 97%  BMI 34.78 kg/m2  NEURO/PSYCH: The patient is alert and oriented.  Appropriate.  Moves all extremities.    SKIN:  Warm and dry.  PULMONARY: The patient does not require supplemental oxygen.  Non-labored breathing     EXTREMITIES:  Right medial fasciotomy closure site slightly increased erythema and edema from yesterday, no active drainage noted though some on the dressing, sutures intact, right lateral fasciotomy closure site with slight erythema and mild edema at site- stable from yesterday.  Palpable bilateral PT and DP pulses       ASSESSMENT:  72 year old male status post right groin reexploration, right common femoral and iliac artery thrombectomy, right leg angiogram, stent placement in right common iliac artery and then 4 compartment right lower leg fasciotomies on 3/24/2018 with Dr. Tang.  Status post fasciotomy closure on 3/27/2018 with Dr. Tang      PLAN:  - Continue Keflex  - elevate right lower extremity to help reduce edema  - keep right lower extremity fasciotomy closure sites covered with dressings and change daily and/or if drainage present  -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 extremity fasciotomy closure site sutures in 3-4 weeks (okay to have this done in [place], patient prefers to have his follow up in [place], does not want to return to [facility] area)    Please do not hesitate to contact vascular surgery if any questions arise.       Jenny FOSTER, CNS  Division of Vascular Surgery  [facility]  Pager [phone][LK1.1]                   Revision History        User Key Date/Time User Provider Type Action    > LK1.2 4/3/2018  8:23 AM Jenny Andino APRN CNS Clinical Nurse Specialist Sign     LK1.1 4/3/2018  8:10 AM Jenny Andino APRN CNS Clinical Nurse Specialist             Progress Notes by Bobbi Donovan MSW at 4/2/2018 12:03 PM     Author:  Bobbi Donovan MSW Service:  Social Work Author Type:      Filed:  4/3/2018  7:56 AM  Date of Service:  4/2/2018 12:03 PM Creation Time:  4/2/2018 12:03 PM    Status:  Signed :  Bobbi Donovan MSW ()         Social Work Services Progress Note     Hospital Day: 12  Date of Initial Social Work Evaluation:  3/22/18  Collaborated with:  [facility] Ventura Collins (?) coverage worker for [place]     Data: Pt is a 72 year old male being followed by SW for placement to TCU at the [facility] in [facility].     Intervention:  SW called to follow up with the [facility][LH1.1] case management team to assist with CLC placement.  Per  she is unclear of the status of the referral to the CLC.  Per her notes the case is in screening at this time.  Referral continues to be pended with [place].[LH1.2]     - Referrals in Process:    [facility] ([place]) - reviewing over the weekend and will check in on Monday.   PH: [phone]  F: [phone]    [facility] in [place], [place] - referral sent  PH: [phone]  F: [phone]     Assessment:[LH1.1] Did not meet with pt today .[LH1.2]     Plan:    Anticipated Disposition: Facility:  [facility] with the [facility] as able    Barriers to d/c plan: Medical stability, bed availability, [facility] admissions[LH1.1] review[LH1.2]    Follow Up: SW to follow up[LH1.1] with  tomorrow for progress on referral.[LH1.2]    MONROE Zheng, APSW  6C Unit   Phone: [phone]  Pager: [phone]  Unit: [phone]    ___________________________________________________________________________________________________________________________________________________    Referrals Discontinued:  [facility] - declined for program - medical review team felt pt was too complex for cares at their facility.    Community Case Management/Community Services in place:   Pt is [facility] connected for [facility] at the [facility].    PCP: Dr. Aishwarya Mayer  : [place] PH:  [phone][LH1.1]             Revision History        User Key Date/Time User Provider  Type Action    > LH1.2 4/3/2018  7:56 AM Bobbi Donovan MSW  Sign     LH1.1 4/2/2018 12:03 PM Bobbi Donovan MSW              Progress Notes by Alfonzo Mckeon PA at 4/2/2018  1:37 PM     Author:  Alfonzo Mckeon PA Service:  Cardiothoracic Surgery Author Type:  Physician Assistant    Filed:  4/2/2018  2:29 PM Date of Service:  4/2/2018  1:37 PM Creation Time:  4/2/2018  1:37 PM    Status:  Attested :  Alfonzo Mckeon PA (Physician Assistant)    Cosigner:  Eric Marks MD at 4/2/2018  3:32 PM        Attestation signed by Eric Marks MD at 4/2/2018  3:32 PM        Attestation:  Patient seen and examined.  I agree with the findings outlined in the APPs note above.    Plan:  - Coumadin anticoagulation  - Diet as tolerated  - Beta blockade, ASA  - PT/OT  - Appreciate vascular assistance  - Dispo planning    I spent 15 minutes in direct care of this patient reviewing labs, imagining, and face-to-face time today.    Eric Marks MD  [phone]  CV Surgery                                   CVTS Daily Note  4/2/2018  Attending: Eric Marks, *    S:   No overnight events.[CE1.1] Opthalmology saw pt this AM. No MRI per Neuro (likely TIA).[CE1.2]     Pt seen at bedside resting comfortably.    Does complain of[CE1.1] needing a bowel movement[CE1.2], otherwise no acute complaints.      Denies F/C/Sweats.  No CP, SOB, or calf pain.    Tolerating diet.[CE1.1]  +[CE1.2] BM[CE1.1] but not lately[CE1.2].[CE1.1]  +[CE1.2] Flatus.[CE1.1] No nausea or bloating.[CE1.2]   Ambulated well without assistance.    Pain level tolerable. Plan as per Neuro section below.     O:   Vitals:    04/01/18 2008 04/02/18 0147 04/02/18 0408 04/02/18 0800   BP:  101/63  107/61   BP Location: Left arm   Left arm   Pulse: 86   64   Resp: 18   18   Temp: 98.1  F (36.7  C) 97.8  F (36.6  C)  97.9  F (36.6  C)   TempSrc: Oral Oral  Oral   SpO2: 98% 96%   94%   Weight:   115.6 kg (254 lb 13.6 oz)    Height:         Vitals:    03/30/18 0300 03/31/18 0230 04/02/18 0408   Weight: 116.6 kg (257 lb) 116.3 kg (256 lb 6.4 oz) 115.6 kg (254 lb 13.6 oz)       Intake/Output Summary (Last 24 hours) at 04/02/18 1337  Last data filed at 04/02/18 0645   Gross per 24 hour   Intake              240 ml   Output             1200 ml   Net             -960 ml       MAPs: 67 - 89  Gen: AAO x 3, pleasant, NAD  CV: RRR, S1S2 normal, no murmurs, rubs, or gallops.   Pulm: CTA, no rhonchi or wheezes  Abd: soft, non-tender, no guarding  Ext:[CE1.1] trace[CE1.2] peripheral edema,[CE1.1] 1[CE1.2] + pitting  Incision: clean, dry, intact, no erythema  Chest Tube sites: dressings clean and dry    Labs:  BMP[CE1.1]    Recent Labs  Lab 04/02/18  0755 04/01/18  0639 03/31/18  0635 03/30/18  0656    137 137 138   POTASSIUM 4.1 3.8 3.8 3.5   CHLORIDE 105 105 105 104   STEFFANY 8.4* 8.3* 8.2* 8.6   CO2 24 25 25 24   BUN 16 17 20 21   CR 1.00 0.98 1.04 0.97   * 93 121* 159*[CE1.3]     CBC    Recent Labs  Lab 04/02/18  0755 04/01/18  0639 03/31/18  0635 03/30/18  0656   WBC 7.9 8.9 10.0 13.2*   RBC 3.02* 2.90* 3.06* 3.01*   HGB 8.9* 8.7* 9.1* 9.1*   HCT 29.6* 27.7* 30.5* 29.4*   MCV 98 96 100 98   MCH 29.5 30.0 29.7 30.2   MCHC 30.1* 31.4* 29.8* 31.0*   RDW 16.4* 16.3* 16.5* 16.3*    420 402 356     INR    Recent Labs  Lab 04/02/18  0755 04/01/18  0639 03/31/18  0635 03/30/18  0656   INR 2.27* 2.30* 2.29* 1.87*      Hepatic Panel   Lab Results   Component Value Date     03/22/2018     Lab Results   Component Value Date    ALT 69 03/22/2018     Lab Results   Component Value Date    ALBUMIN 2.8 03/22/2018     GLUCOSE:     Recent Labs  Lab 04/02/18  1157 04/02/18  0755 04/02/18  0143 04/01/18  2157 04/01/18  1639 04/01/18  1323 04/01/18  0937 04/01/18  0639  03/31/18  0635  03/30/18  0656  03/29/18  0744  03/28/18  0844   GLC  --  133*  --   --   --   --   --  93  --  121*  --  159*  --   160*  --  105*   *  --  130* 163* 105* 89 112*  --   < >  --   < >  --   < >  --   < >  --    < > = values in this interval not displayed.      Imaging:  reviewed recent imaging      1.  A/P:   Izaiah Alvarez is a 72 year old male who is status post CABG x 3/bilateral pulmonary vein isolation MAZE/L atrial appendage ligation with 45 mm AtriClip/removal of impella.     2. R leg acute limb ischemia s/p R groin re-exploration, R common femoral and iliac artery thrombectomy, stent placement R common iliac artery and 4 compartment R lower leg fasciotomies POD #6. Discussed with Vascular Surgery and pt will need to be on plavix for 1 year duration after iliac stent placement. Will need to be started if/when coumadin is stopped. Will need follow up with vascular as outpatient when back at home in [place], [place].   3. Closure of R lower leg 4 compartment fasciotomies POD #3  4. DM type II - recommendations per Endo, hold metformin for 48 hours (till 4/2) given received IV contrast today.  5. PAF - had pre-op, was on coumadin prior, +coumadin, lopressor to 50 mg PO bid  6. Traumatic gallardo placement - +flomax, removed gallardo at 0600, should urinate between 2089-4864, if unable to void and bladder scanned > or = 350 ml please place perm gallardo  7. Hx of TIA, stroke code called for blurred vision and left sided weakness, resolved shortly after, CTA head unremarkable. Neuro consulted, appreciate recs,[CE1.1] no[CE1.2] MRI[CE1.1] needed (likely TIA)[CE1.2]. Ophtho consult for floaters.   8. STEMI  9. Obesity  10. Asthma    11. HTN - increase lopressor to 50 mg bid, holding ace inh due to low BP  12. Hyperlipidemia - statin  13. R groin incision - prevena will be discontinue on 4/2 per vascular  14. Chronic left SFA-pop arterial occlusion - found on US 3/31 denies pain, no evidence of ischemia or compartment syndrome, d/w vascular surgery 3/31, likely chronic and will need outpatient peripheral angiogram    Encouraged IS/TCDB/amb.  Sternal precautions. Looking at possible discharge to [facility] re[facility] on[CE1.1] Wed[CE1.2] pending MRI, strength, and bed placement. Continue to monitor.       Discussed with CVTS Fellow   Staff surgeons have been informed of changes through both  verbal and written communication.      Alfonzo Mckeon PA-C  Cardiothoracic Surgery  Pager [phone]    1:37 PM   April 2, 2018[CE1.1]                   Revision History        User Key Date/Time User Provider Type Action    > CE1.3 4/2/2018  2:29 PM Alfonzo Mckeon PA Physician Assistant Sign     CE1.2 4/2/2018  2:24 PM Alfonzo Mckeon PA Physician Assistant      CE1.1 4/2/2018  1:37 PM Alfonzo Mckeon PA Physician Assistant             Progress Notes by Candido Piedra APRN CNP at 4/2/2018  8:14 AM     Author:  Candido Piedra APRN CNP Service:  Endocrinology Author Type:  Nurse Practitioner    Filed:  4/2/2018  2:57 PM Date of Service:  4/2/2018  8:14 AM Creation Time:  4/2/2018  8:14 AM    Status:  Signed :  Candido Piedra APRN CNP (Nurse Practitioner)                              Diabetes Consult Daily  Progress Note          Assessment/Plan:                           Izaiah Alvarez is a 72 year old male with history of type 2 diabetes, hypertension, hyperlipidemia, atrial fibrillation ( on warfarin), TIA, obesity transferred from [facility] following STEMI with new onset of left heart failure ( EF 20-25%). Impella device was placed via right femoral artery at the OS. he underwent coronary artery bypass grafting x 3 and removal of Impella on  (3/23/2018) by .  Found to have critical limb ischemia with loss of motor function s/p Right groin reexploration, right common femoral and iliac artery  thrombectomy, right leg angiogram, stent placement right common iliac artery and then 4 compartment right lower leg fasciotomies 3/24/18 with Dr. Tang.  On 3/27/2018, he underwent closure of right leg fasciotomy X 2 with   "[place].         Type 2 diabetic: A1C 7.8% ( 3/22/2018), PTA on lantus 60 units, Glipizide 20 mg bid, Metformin 1000 mg bid  Plan  - lantus 45 units daily ( given at 1330)  - glipizide 10 mg daily with breakfast  - d/c Novolog high intensity sliding scale before meals and HS[JM1.1]  -added Medium intensity sliding scale before meals and HS ( starting with dinner)[JM1.2]  -monitor glucose before meals, HS and 0200  -may resume Metformin 500 mg twice daily at discharge --> increase to 1000 mg twice daily at rehab  Will continue to follow      Plan discussed with patient,      Possible discharge to TCU in [place]                           Interval History:   The last 24 hours progress and nursing notes reviewed.[JM1.1]    Blood sugars are in good control, but tightly controlled.  Metformin was placed on hold 2/2 imaging studies and supper glipizide was stopped.  glucose at ~ 0200, 130 and fasting 133  Pre-meal 147 after taking glipizide 10 mg with breakfast.[JM1.2]        Recent Labs  Lab 04/02/18  0143 04/01/18  2157 04/01/18  1639 04/01/18  1323 04/01/18  0937 04/01/18  0639 04/01/18  0205  03/31/18  0635  03/30/18  0656  03/29/18  0744  03/28/18  0844  03/27/18  1011   GLC  --   --   --   --   --  93  --   --  121*  --  159*  --  160*  --  105*  --  151*   * 163* 105* 89 112*  --  83  < >  --   < >  --   < >  --   < >  --   < >  --    < > = values in this interval not displayed.            Review of Systems:   See interval hx          Medications:       Active Diet Order      Moderate Consistent CHO Diet     Physical Exam:  Gen: Alert, resting in bed, in NAD   HEENT: , mucous membranes are moist  Resp: Unlabored  Ext:[JM1.1] +[JM1.2] lower extremity edema[JM1.1], moving all extremities[JM1.2]   Neuro:oriented x3, communicating clearly  /61 (BP Location: Left arm)  Pulse 64  Temp 97.9  F (36.6  C) (Oral)  Resp 18  Ht 1.803 m (5' 11\")  Wt 115.6 kg (254 lb 13.6 oz)  SpO2 94%  BMI 35.54 kg/m2           " Data:     Lab Results   Component Value Date    A1C 7.8 03/22/2018              CBC RESULTS:   Recent Labs   Lab Test  04/01/18   0639   WBC  8.9   RBC  2.90*   HGB  8.7*   HCT  27.7*   MCV  96   MCH  30.0   MCHC  31.4*   RDW  16.3*   PLT  420     Recent Labs   Lab Test  04/01/18   0639  03/31/18   0635   NA  137  137   POTASSIUM  3.8  3.8   CHLORIDE  105  105   CO2  25  25   ANIONGAP  6  7   GLC  93  121*   BUN  17  20   CR  0.98  1.04   STEFFANY  8.3*  8.2*     Liver Function Studies -   Recent Labs   Lab Test  03/22/18   0645   PROTTOTAL  7.0   ALBUMIN  2.8*   BILITOTAL  3.6*   ALKPHOS  55   AST  264*   ALT  69     Lab Results   Component Value Date    INR 2.30 04/01/2018    INR 2.29 03/31/2018    INR 1.87 03/30/2018    INR 1.66 03/29/2018    INR 1.51 03/28/2018    INR 1.34 03/27/2018    INR 1.44 03/26/2018    INR 1.47 03/25/2018    INR 1.53 03/24/2018    INR 1.31 03/24/2018    INR 1.28 03/23/2018    INR 1.51 03/23/2018         Candido Piedra -[phone]  Diabetes Management job code 0243[JM1.1]               Revision History        User Key Date/Time User Provider Type Action    > JM1.2 4/2/2018  2:57 PM Candido Piedra APRN CNP Nurse Practitioner Sign     JM1.1 4/2/2018  8:14 AM Candido Piedra APRN CNP Nurse Practitioner             Progress Notes by Rosey Collier PA-C at 4/2/2018  8:14 AM     Author:  Rosey Collier PA-C Service:  Vascular Surgery Author Type:  Physician Assistant - C    Filed:  4/2/2018  9:24 AM Date of Service:  4/2/2018  8:14 AM Creation Time:  4/2/2018  8:14 AM    Status:  Signed :  Rosey Collier PA-C (Physician Assistant - C)         VASCULAR SURGERY PROGRESS NOTE    Subjective:  Pt found sitting in chair at bedside. Pt denies pain in his RLE. Pt able to walk, though not able to walk for long periods yet. Pt eating a regular diet.     Objective:  Intake/Output Summary (Last 24 hours) at 04/02/18 0814  Last data filed at 04/02/18 0645   Gross per 24  "hour   Intake              600 ml   Output             1400 ml   Net             -800 ml     Labs:  ROUTINE IP LABS (Last four results)  BMP  Recent Labs  Lab 04/01/18  0639 03/31/18  0635 03/30/18  0656 03/29/18  0744    137 138 139   POTASSIUM 3.8 3.8 3.5 3.6   CHLORIDE 105 105 104 106   STEFFANY 8.3* 8.2* 8.6 8.3*   CO2 25 25 24 24   BUN 17 20 21 24   CR 0.98 1.04 0.97 0.90   GLC 93 121* 159* 160*     CBC  Recent Labs  Lab 04/01/18  0639 03/31/18  0635 03/30/18  0656 03/29/18  0744   WBC 8.9 10.0 13.2* 11.6*   RBC 2.90* 3.06* 3.01* 3.05*   HGB 8.7* 9.1* 9.1* 9.1*   HCT 27.7* 30.5* 29.4* 29.3*   MCV 96 100 98 96   MCH 30.0 29.7 30.2 29.8   MCHC 31.4* 29.8* 31.0* 31.1*   RDW 16.3* 16.5* 16.3* 15.9*    402 356 318     INR  Recent Labs  Lab 04/01/18  0639 03/31/18  0635 03/30/18  0656 03/29/18  0744   INR 2.30* 2.29* 1.87* 1.66*     PHYSICAL EXAM:  /61 (BP Location: Left arm)  Pulse 64  Temp 97.9  F (36.6  C) (Oral)  Resp 18  Ht 1.803 m (5' 11\")  Wt 115.6 kg (254 lb 13.6 oz)  SpO2 94%  BMI 35.54 kg/m2  General: The patient is alert and oriented. Appropriate. No acute disctress  Psych: pleasant affect, answers questions appropriately  Skin: Color appropriate for race, warm, dry.  Respiratory: The patient does not require supplemental oxygen. Breathing unlabored  Extremities: R groin wound vac in place to suction.[MS1.1] Wound vac removed and incision healing well. Incision CDI, closed with staples without erythema, edema, or drainage.[MS1.2] RLE DP and PT pulses palpable,  +2 edema noted. Medial fasciotomy closure site has more edema, more erythema along the incision. Lateral incision site has some minor edema and slight erythema at incision site.         ASSESSMENT:  S/P Right groin reexploration, right common femoral and iliac artery  thrombectomy, right leg angiogram, stent placement right common iliac artery and then 4 compartment right lower leg fasciotomies 3/24/18 with Dr. Tang. S/P " Fasciotomy closure 3/27/18      PLAN:  Dry dressings to RLE as needed, please replace if saturated.[MS1.1]  Keflex started for erythema of medial fasciotomy incision[MS1.2]  RLE elevation, PT/OT  Dispo to rehab when placement found[MS1.1]  Warfarin for 6 months + life-long baby Aspirin recommended from vascular for anticoagulation   Wound check in ~1 week[MS1.3] with groin staple removal (pt wants to F/U in [place], does NOT want to have to return to the [place] area for follow-up)[MS1.2]  Suture removal ~1 month  Right groin Prevena wound va[MS1.1]c removed today[MS1.3]      Rosey Collier PA-C   Division of Vascular Surgery   [facility]   Pager: [phone][MS1.1]                     Revision History        User Key Date/Time User Provider Type Action    > MS1.2 4/2/2018  9:24 AM Rosey Collier PA-C Physician Assistant - EMIR Sign     MS1.3 4/2/2018  8:37 AM Rosey Collier PA-C Physician Assistant - C      MS1.1 4/2/2018  8:14 AM Rosey Collier PA-C Physician Assistant - C             Progress Notes by Cheryl Simon MD at 4/1/2018 11:12 AM     Author:  Cheryl Simon MD Service:  Endocrinology Author Type:  Physician    Filed:  4/2/2018  8:23 AM Date of Service:  4/1/2018 11:12 AM Creation Time:  4/1/2018  8:50 AM    Status:  Addendum :  Cheryl Simon MD (Physician)         Diabetes Consult Follow Up   Date of Service: 4/1/2018    Summary:      Izaiah Alvarez is a 72 year old male with history of type 2 diabetes, hypertension, hyperlipidemia, atrial fibrillation ( on warfarin), TIA, obesity transferred from [facility] following STEMI with new onset of left heart failure ( EF 20-25%). Impella device was placed via right femoral artery at the OSH. he underwent coronary artery bypass grafting x 3 and removal of Impella on  (3/23/2018) by . Found to have critical limb ischemia with loss of motor function s/p Right groin reexploration, right  "common femoral and iliac artery  thrombectomy, right leg angiogram, stent placement right common iliac artery and then 4 compartment right lower leg fasciotomies 3/24/18 with Dr. Tang.  On 3/27/2018, he underwent closure of right leg fasciotomy X 2 with Dr. Tang.      Recs:     Type 2 diabetic: A1C 7.8% ( 3/22/2018), PTA on lantus 60 units, Glipizide 20 mg BID, Metformin 1000 mg bid. Patient was started back on metformin and glipizide (at half doses). Metformin was held yesterday due to stroke code --> CT head with contrast. It was negative.   Plan  -Lantus 45 units daily   -Resume Metformin 500 mg BID at discharge --> increase to 1000 mg BID at rehab  -Glipizide 10 mg daily in morning  -Hold evening glipizide 10 mg due to very tight glucoses overnight --> resume prn at rehab  -Novolog high intensity sliding scale before meals and HS  -monitor glucose before meals, HS and 0200  -Will continue to follow      Subjective (24 hr update):  Glucoses tightly controlled overnight.    Diabetes Medications:  -Lantus 45 units daily   -Glipizide 10 mg BID  -Novolog high intensity sliding scale before meals and HS      Active Diet Order      Moderate Consistent CHO Diet  Add tube feeding or TPN       Recent Labs  Lab 04/01/18  0639 04/01/18  0205 03/31/18  2141 03/31/18  2128 03/31/18  1705 03/31/18  1134 03/31/18  1003  03/31/18  0635  03/30/18  0656  03/29/18  0744  03/28/18  0844  03/27/18  1011   GLC 93  --   --   --   --   --   --   --  121*  --  159*  --  160*  --  105*  --  151*   BGM  --  83 133* 144* 74 118* 142*  < >  --   < >  --   < >  --   < >  --   < >  --    < > = values in this interval not displayed.    Exam:  /71 (BP Location: Left arm)  Pulse 101  Temp 97.8  F (36.6  C) (Oral)  Resp 18  Ht 1.803 m (5' 11\")  Wt 116.3 kg (256 lb 6.4 oz)  SpO2 95%  BMI 35.76 kg/m2  Gen: No distress  HEENT: EOMI  Pulm: Unlabored  Neuro: Non-focal    Gavin Valenzuela MD   Endocrinology Fellow  Pager: " "[phone]    Patient was discussed with attending physician, Dr. iSmon.[RL1.1]  Attending addendum:  Pt was evaluated with endocrinology fellow & plan is as outlined in this note.  CHERYL SIMON MD, MPH  Endocrinologist[TB1.1]       Revision History        User Key Date/Time User Provider Type Action    > TB1.1 4/2/2018  8:23 AM Cheryl Simon MD Physician Addend     [N/A] 4/1/2018 11:15 AM Gavin Valenzuela MD Fellow Addend     [N/A] 4/1/2018 11:12 AM Gavin Valenzuela MD Fellow Sign     RL1.1 4/1/2018  8:57 AM Gavin Valenzuela MD Fellow Share            Progress Notes by Scott Sanon PA-C at 4/1/2018  8:30 AM     Author:  Scott Sanon PA-C Service:  Cardiothoracic Surgery Author Type:  Physician Assistant    Filed:  4/1/2018 10:07 PM Date of Service:  4/1/2018  8:30 AM Creation Time:  4/1/2018  8:30 AM    Status:  Signed :  Scott Sanon PA-C (Physician Assistant)         CT Surgery Progress Note    Coronary artery disease, CHF, and PAF s/p CABG x 3/bilateral pulmonary vein isolation MAZE/L atrial appendage ligation with 45 mm AtriClip/removal of impella POD #9    R leg acute limb ischemia s/p R groin reexploration, R common femoral and iliac artery thrombectomy, stent placement R common iliac artery and 4 compartment R lower leg fasciotomies POD #8    Closure of R lower leg 4 compartment fasciotomies POD #5    Subjective:[AD1.1]  Another episode of blurred vision, self resolved after 30 minutes, no other neurological symptoms. Describes having this in the past with \"neon floaters\". Denies any vision loss or curtain like vision loss over visual fields. Will consult ophtho[AD1.2]  States that pain is being controlled. Denies any hallucinations. Breathing is good. Working with IS. Appetite is good. Denies any nausea. +BM, denies any popping/clicking in his breast bone, working with rehab.     Objective:  Up in chair, comfortable,  /71 (BP Location: Left arm)  Pulse 101  Temp " "97.8  F (36.6  C) (Oral)  Resp 18  Ht 1.803 m (5' 11\")  Wt 116.3 kg (256 lb 6.4 oz)  SpO2 95%  BMI 35.76 kg/m2  CV - IRRR, telemetry afib 90-100s  Pulm - CTA, IS 1381-0397 ml  Abd - BS+, NT/ND  Derm - sternal incision D/I   L LE incisions D/I (EVH leg)   R LE incisions D/I, +dressings   R groin incision +prevena[AD1.1]  Neuro - CN II-XII intact, 5/5 strength in all extremities, normal sensation.[AD1.2]     BMP    Recent Labs  Lab 04/01/18  0639 03/31/18  0635 03/30/18  0656 03/29/18  0744    137 138 139   POTASSIUM 3.8 3.8 3.5 3.6   CHLORIDE 105 105 104 106   STEFFANY 8.3* 8.2* 8.6 8.3*   CO2 25 25 24 24   BUN 17 20 21 24   CR 0.98 1.04 0.97 0.90   GLC 93 121* 159* 160*     CBC    Recent Labs  Lab 04/01/18  0639 03/31/18  0635 03/30/18  0656 03/29/18  0744   WBC 8.9 10.0 13.2* 11.6*   RBC 2.90* 3.06* 3.01* 3.05*   HGB 8.7* 9.1* 9.1* 9.1*   HCT 27.7* 30.5* 29.4* 29.3*   MCV 96 100 98 96   MCH 30.0 29.7 30.2 29.8   MCHC 31.4* 29.8* 31.0* 31.1*   RDW 16.3* 16.5* 16.3* 15.9*    402 356 318     INR    Recent Labs  Lab 04/01/18  0639 03/31/18  0635 03/30/18  0656 03/29/18  0744   INR 2.30* 2.29* 1.87* 1.66*      Hepatic Panel No lab results found in last 7 days.  Glucose  Recent Labs  Lab 04/01/18  0639 04/01/18  0205 03/31/18  2141 03/31/18  2128 03/31/18  1705 03/31/18  1134 03/31/18  1003  03/31/18  0635  03/30/18  0656  03/29/18  0744  03/28/18  0844  03/27/18  1011   GLC 93  --   --   --   --   --   --   --  121*  --  159*  --  160*  --  105*  --  151*   BGM  --  83 133* 144* 74 118* 142*  < >  --   < >  --   < >  --   < >  --   < >  --    < > = values in this interval not displayed.      A/P:  1. S/p CABG x 3/bilateral pulmonary vein isolation MAZE/L atrial appendage ligation with 45 mm AtriClip/removal of impella POD #[AD1.1]9[AD1.2]  2. R leg acute limb ischemia s/p R groin reexploration, R common femoral and iliac artery thrombectomy, stent placement R common iliac artery and 4 compartment R lower " leg fasciotomies POD #6. Discussed with Vascular Surgery and pt will need to be on plavix for 1 year duration after iliac stent placement. Will need to be started if/when coumadin is stopped. Will need follow up with vascular as outpatient when back at home in [place], [place].   3. Closure of R lower leg 4 compartment fasciotomies POD #3  4. DM type II - recommendations per Endo, hold metformin for 48 hours (till 4/2) given received IV contrast today.  5. PAF - had pre-op, was on coumadin prior, +coumadin, lopressor to 50 mg PO bid  6. Traumatic gallardo placement - +flomax, removed gallardo at 0600, should urinate between [id number], if unable to void and bladder scanned > or = 350 ml please place perm gallardo  7. Hx of TIA, stroke code called for blurred vision and left sided weakness, resolved shortly after, CTA head unremarkable. Neuro consulted, appreciate recs, recommended MRI (non-urgent)[AD1.1], will obtain on Monday 4/2. Ophtho consult for floaters.[AD1.2]   8. STEMI  9. Obesity  10. Asthma    11. HTN - increase lopressor to 50 mg bid, holding ace inh due to low BP  12. Hyperlipidemia - statin  13. R groin incision - prevena will be discontinue on 4/[AD1.1]2 per vascular[AD1.2]  14. Chronic left SFA-pop arterial occlusion - found on US 3/31 denies pain, no evidence of ischemia or compartment syndrome, d/w vascular surgery 3/31, likely chronic and will need outpatient peripheral angiogram  Encouraged IS/TCDB/amb. Sternal precautions. Looking at possible discharge to [facility] on Monday pending MRI, strength, and bed placement. Continue to monitor.     Scott Sanon PA-C  Cardiothoracic Surgery  April 1, 2018 8:31 AM   p: [phone][AD1.1]         Revision History        User Key Date/Time User Provider Type Action    > AD1.2 4/1/2018 10:07 PM Scott Sanon PA-C Physician Assistant Sign     AD1.1 4/1/2018  8:30 AM Scott Sanon PA-C Physician Assistant             Progress Notes by William Cancino MD at  3/26/2018  5:27 AM     Author:  William Cancino MD Service:  Cardiac ICU Author Type:  Resident    Filed:  3/26/2018 11:47 AM Date of Service:  3/26/2018  5:27 AM Creation Time:  3/26/2018  5:27 AM    Status:  Attested :  William Cancino MD (Resident)    Cosigner:  Varinder Guo MD at 4/1/2018  9:06 PM        Attestation signed by Varinder Guo MD at 4/1/2018  9:06 PM (Updated)        Mr. Izaiah Alvarez is a 72 year old male w/ h/o PAF, obesity, HTN, HLD, DM, asthma, CHF, and STEMI at OSH with angio demonstrating 3v dz.  Impella placed for EF 20-25% via R femoral artery.  Transferred to [facility] 3/21/2018 with impella in place.  CABG and MAZE performed 3/23 and impella removed.  RLE pain developed 3/24 requiring OR for thrombectomy and fasciotomy.  Extubated 3/25.       I personally examined and evaluated the patient today. The care plan was developed with the house staff team or resident(s) and I agree with the findings and plan in this note aside from any exceptions noted below.  I have reviewed and evaluated the vital signs, medications, laboratory values, imaging studies, and consults from the past 24 hours.     Active problems and key treatments for today include:  1. Fasciotomy: Plan for OR tomorrow for closure - no further pain  2. Afib: Transition to PO amio   3. CAD: ASA, statin, BB     Code status: Full Code  Dispo: Transfer to      Family updated by me today: Yes     I spent a total of 35 minutes providing critical care services.     Varinder Guo MD, PhD  Interventional/Critical Care Cardiology  [phone]    March 26, 2018                                     CV ICU PROGRESS NOTE  March 26, 2018      CO-MORBIDITIES:[SC1.1]   Obesity  Afib  HLD  HTN  DM  Asthma  CHF  STEMI s/p imeplla  CAD[MB1.1]    ASSESSMENT: Izaiah Alvarez is a 72M with PMH of paroxysmal atrial fibrillation on coumadin, CHF, obesity, DM type II, HTN who who presented to [facility] with STEMI with coronary angiogram showing triple  vessel disease (95% LM, 75% LAD, 90% OM,  RCA). ECHO showed EF around 20-25% and Impella device was placed via R femoral artery at the  OSH. The patient underwent a CABG for with R CFA cutdown yesterday for removal of Impella percutaneous ventricular assist device on 3/23. Extubated on POD#1. Underwent take back after pain of RLE and found to have thrombus that was removed. Left intubated after procedure. Extubated 3/25 and now will need fasciotomy sites closed by vascular surgery.[SC1.1]     Today's progress:  - Plan to return to OR tomorrow for fasciotomy closure  - Remove arterial and central lines  - Transition to PO amiodarone  - Bowel regimen  - ADAT to cardiac diet  - Remove mediastinal chest tube - hold heparin for 4 hours  - Goal net negative 1L - give 40 mg lasix  - Transition to PO meds[MB1.1]    PLAN:  NEURO:  -Acute pain: Tylenol, dilauded, Oxycodone PRN.    CV:[SC1.1]                      #[MB1.1]Acute on chronic RLE ischemia[SC1.1] s/p thrombectomy and fasciotomy  #[MB1.1]Multivessel CAD (LM and  of RCA) with low EF (25%)[SC1.1] s/p CABG x3  #[MB1.1]Paroxysmal A fib: s/p modified MAZE and LAAC placement[SC1.1]  #HTN  -[MB1.1]Heparin gtt started at fixed rate.[SC1.1] Appreciate[MB1.1] Vascular surgery consult.[SC1.1] Plan for return to OR 3/27 for closure of fasciotomies  -[MB1.1]No need for mechanical support after OR, completion MACARENA showed improved EF-45%. Statin[SC1.1] today[MB1.1]  -NSR currently. Continue Amiodarone for 3 weeks[SC1.1] and transition to PO today[MB1.1]  -Keep chest tubes and wires for now.     PULM:[SC1.1]                #[MB1.1]Post op atelectasis[SC1.1]  -Doing well on 2L NC[MB1.1]  -IS q 10 min.     FEN/GI:[SC1.1]  #[MB1.1]Hypophosphatemia  -[SC1.1]Goal net negative 1L. 40 mg lasix[MB1.1]  -[SC1.1]ICU[MB1.1] electrolyte replacement protocol.    -[SC1.1]ADAT to cardiac diet. NPO at midnight for OR[MB1.1]  -[SC1.1]B[MB1.1]owel regimen.     :[SC1.1]                       #[MB1.1]Paraphymosis  -[SC1.1]Difficult gallardo insertion, urology following. Will keep for today due to OR tomorrow[MB1.1]    HEME/ID:[SC1.1]  #[MB1.1]Acute blood loss anemia[SC1.1]  -[MB1.1]H[SC1.1]g[MB1.1]b stable.    -[SC1.1]Hep gtt[MB1.1] for both acute limb ischemia and history of A fib. (fixed rate due to recent sternotomy).      ENDO:[SC1.1]                #[MB1.1]DM[SC1.1]  - I[MB1.1]nsulin gtt[SC1.1] - will transition to SSI likely tomorrow when tolerating more of a diet[MB1.1]     LINES:  -PIVs, Mediastinal x 2 and left pleural, Gallardo.     PPx:  -SCDs and[SC1.1] hep gtt[MB1.1].   -PPI for GI prophylaxis.     DISPO:  -[SC1.1]Xfer to 6c today[MB1.1]    Patient seen, findings and plan discussed with surgical ICU staff, [SC1.1] Brant[MB1.1].[SC1.1]    William Cancino MD  General Surgery PGY-3[MB1.1]      ====================================    TODAY'S PROGRESS:   SUBJECTIVE:   - No acute events overnight.[SC1.1]   -[MB1.1] Denying any pain or SOB, F/C, N/V.     OBJECTIVE:   1. VITAL SIGNS:[SC1.1]   Temp:  [98.1  F (36.7  C)-99  F (37.2  C)] 99  F (37.2  C)  Heart Rate:  [] 87  Resp:  [14-20] 18  MAP:  [64 mmHg-109 mmHg] 87 mmHg  Arterial Line BP: ()/(48-88) 122/72  SpO2:  [94 %-99 %] 96 %  Ventilation Mode: Other (see comments) (pt extubated)  FiO2 (%): 50 %  Rate Set (breaths/minute): 14 breaths/min  Tidal Volume Set (mL): 550 mL (changed per MD order)  PEEP (cm H2O): 5 cmH2O  Pressure Support (cm H2O): 7 cmH2O  Oxygen Concentration (%): 50 %  Resp: 18[MB1.1]    2. INTAKE/ OUTPUT:[SC1.1]   I/O last 3 completed shifts:  In: 1632.18 [P.O.:740; I.V.:892.18]  Out: 1020 [Urine:830; Chest Tube:190][MB1.1]    3. PHYSICAL EXAMINATION:   General: laying in bed following commands.   Neuro: A&Ox3, NAD  Resp: Breathing non-labored  CV: RRR  Abdomen: Soft, Non-distended, Non-tender  Incisions: CDI  Extremities: warm and well perfused    4. INVESTIGATIONS:   Arterial Blood Gases[SC1.1]     Recent Labs  Lab  03/25/18  0957 03/25/18  0717 03/25/18  0350 03/24/18  2346   PH 7.44 7.46* 7.48* 7.50*   PCO2 40 35 35 34*   PO2 88 124* 68* 116*   HCO3 27 25 27 27[MB1.1]     Complete Blood Count[SC1.1]     Recent Labs  Lab 03/26/18  0420 03/25/18  1323 03/25/18  0350 03/24/18  1708   WBC 13.8* 12.5* 12.4* 14.3*   HGB 8.6* 8.4* 9.1* 9.7*   * 107* 112* 109*[MB1.1]     Basic Metabolic Panel[SC1.1]    Recent Labs  Lab 03/26/18 0420 03/25/18  0957 03/25/18  0350 03/24/18  1708 03/24/18  1558  03/24/18  0352     --  144 142 140  < > 146*   POTASSIUM 4.0 4.1 3.9 4.2 4.2  < > 4.3   CHLORIDE 108  --  110* 109  --   --  110*   CO2 24  --  23 28  --   --  23   BUN 23  --  23 26  --   --  35*   CR 0.86  --  1.06 0.99  --   --  1.17   *  --  124* 135* 151*  < > 220*   < > = values in this interval not displayed.[MB1.1]  Liver Function Tests[SC1.1]    Recent Labs  Lab 03/26/18 0420 03/25/18  0350 03/24/18  1558 03/24/18  0352  03/22/18  0645  03/22/18  0016   AST  --   --   --   --   --  264*  --  310*   ALT  --   --   --   --   --  69  --  82*   ALKPHOS  --   --   --   --   --  55  --  57   BILITOTAL  --   --   --   --   --  3.6*  --  4.5*   ALBUMIN  --   --   --   --   --  2.8*  --  2.9*   INR 1.44* 1.47* 1.53* 1.31*  < >  --   < > 1.20*   < > = values in this interval not displayed.[MB1.1]  Coagulation Profile[SC1.1]    Recent Labs  Lab 03/26/18  0420 03/25/18  0350 03/24/18  1558 03/24/18  0352 03/23/18  2345 03/23/18  2136   INR 1.44* 1.47* 1.53* 1.31* 1.28* 1.51*   PTT  --  37 109*  --  28 29[MB1.1]     =========================================[SC1.1]     Revision History        User Key Date/Time User Provider Type Action    > MB1.1 3/26/2018 11:47 AM William Cancino MD Resident Sign     SC1.1 3/26/2018  5:28 AM Imtiaz Cunha MD Resident Share            Progress Notes by Lejeune, Stacey, MD at 4/1/2018  7:46 AM     Author:  Lejeune, Stacey, MD Service:  Vascular Surgery Author Type:  Fellow    Filed:   4/1/2018  7:49 AM Date of Service:  4/1/2018  7:46 AM Creation Time:  4/1/2018  7:46 AM    Status:  Signed :  Lejeune, Stacey, MD (Fellow)         Stroke code called  Yesterday for blurry vision and mild left sided weakness.  Symptoms resolved, studies otherwise negative.  Patient denies symptoms at present.    B/P: 110/70, T: 98, P: 101, R: 16  Alert oriented no acute distress  Right femoral prevena incisional wound vac intact with suction  Right leg fasciotomies with serous drainage, decreased erythema  + DP/PT signals bilaterally    WBC   Date Value Ref Range Status   04/01/2018 8.9 4.0 - 11.0 10e9/L Final   ]  Hemoglobin   Date Value Ref Range Status   04/01/2018 8.7 (L) 13.3 - 17.7 g/dL Final   ]  INR/Prothrombin Time  Creatinine   Date Value Ref Range Status   04/01/2018 0.98 0.66 - 1.25 mg/dL Final   ]      Intake/Output Summary (Last 24 hours) at 04/01/18 0746  Last data filed at 04/01/18 0005   Gross per 24 hour   Intake              250 ml   Output             1525 ml   Net            -1275 ml       Assessment/Plan:  71 yo male CAD, CHF, PAF s/p CAB X3 PVI, MAZE and JUN atri clip with removal of impella in POD#6 course complicated by right leg acute limb ischemia. S/P Right groin reexploration, right common femoral and iliac artery  thrombectomy, right leg angiogram, stent placement right common iliac artery and then 4 compartment right lower leg fasciotomies 3/24/18 with Dr. Tang.  On 3/27/2018, he underwent closure of right leg fasciotomy X 2 with Dr. Tang.     Dry dressings to RLE as needed, please replace if saturated.  RLE elevation, PT  Dispo to rehab  Suture removal ~1 month  Right prevena will plan to remove tomorrow, if loses suction today ok to remove by nursing.  Symptoms of blurry vision and left sided weakness resolved, CTA neck reviewed no significant carotid stenosis seen.    Niru Robin MD  Vascular Surgery Fellow  Pager [phone][SL1.1]     Revision History        User  Key Date/Time User Provider Type Action    > SL1.1 4/1/2018  7:49 AM Lejeune, Stacey, MD Fellow Sign                     Procedure Notes      Procedures by Olayinka Orellana MD at 3/22/2018  2:11 AM     Author:  Olayinka Orellana MD Service:  Cardiac ICU Author Type:  Resident    Filed:  3/22/2018  2:11 AM Date of Service:  3/22/2018  2:11 AM Creation Time:  3/22/2018  2:10 AM    Status:  Signed :  Olayinka Orellana MD (Resident)     Procedures:    1. CENTRAL LINE [PRO85 (Custom)]               Date: 3/22/2018  Procedure: Right IJ central line  Indication: Hemodynamic monitoring/Intravenous access  Resident: Olayinka Orellana MD  Attending: Dr. Coleman  A time-out was completed verifying correct patient, procedure, site, positioning, and special equipment if applicable. The patient was placed in a dependent position appropriate for central line placement based on the vein to be cannulated. The patient s left  neck was prepped and draped in sterile fashion. 1% Lidocaine was used to anesthetize the surrounding skin area. A triple lumen 9-Bengali Cordis catheter was introduced into the the right internal jugular vein using the Seldinger technique and under ultrasound guidance. The catheter was threaded smoothly over the guide wire and appropriate blood return was obtained. Each lumen of the catheter was evacuated of air and flushed with sterile saline. The catheter was then sutured in place to the skin at 17cm and a sterile dressing applied. Perfusion to the extremity distal to the point of catheter insertion was checked and found to be adequate. CXR to follow to confirm placement.  Estimated Blood Loss: 5ml  The patient tolerated the procedure well and there were no complications.[KG1.1]       Revision History        User Key Date/Time User Provider Type Action    > KG1.1 3/22/2018  2:11 AM Olayinka Orellana MD Resident Sign            Procedures by Olayinka Orellana MD at 3/22/2018  2:08 AM     Author:  Esteban  Olayinka PHAM MD Service:  Cardiac ICU Author Type:  Resident    Filed:  3/22/2018  2:10 AM Date of Service:  3/22/2018  2:08 AM Creation Time:  3/22/2018  2:08 AM    Status:  Signed :  Olayinka Orellana MD (Resident)     Procedures:    1. INSERT ARTERIAL LINE [IVT1 (Custom)]               Date: 3/22/2018  Procedure: Left radial arterial line placement  Indication: Hemodynamic monitoring  Resident: Olayinka Orellana MD  Attending: Dr. Coleman  A time-out was completed verifying correct patient, procedure, site, positioning, and special equipment if applicable. The patient was placed in a dependent position appropriate for right radial arterial line placement based. An Julio Cesar's test was performed to confirm adequate collateral flow. The patient s  forearm was prepped and draped in sterile fashion. 1% Lidocaine was used to anesthetize the surrounding skin area. Was unable to get pulsatile blood return even with ultrasound guidance so turned to left radial artery. Repositioned, prepped, draped and anesthetized surrounding skin area. Placed a single lumen catheter was introduced into the the left radial artery using the Seldinger technique and under ultrasound guidance. The catheter was threaded smoothly over the guide wire and appropriate blood return was obtained. Arterial waveform was confirmed and a wide pulse pressure was noted. The catheter was then sutured in place to the skin and a sterile dressing applied.  Perfusion to the extremity distal to the point of catheter insertion was checked and found to be adequate.   Estimated Blood Loss: 10 ml  The patient tolerated the procedure well and there were no complications.[KG1.1]     Revision History        User Key Date/Time User Provider Type Action    > KG1.1 3/22/2018  2:10 AM Olayinka Orellana MD Resident Sign                  Progress Notes - Therapies (Notes from 04/01/18 through 04/04/18)     No notes of this type exist for this encounter.

## 2018-03-21 NOTE — IP AVS SNAPSHOT
"` `     [address] [facility]: [phone]                                              INTERAGENCY TRANSFER FORM - NURSING   3/21/2018                    Hospital Admission Date: 3/21/2018  MONSE FAROOQ   : 1945  Sex: Male        Attending Provider: Eric Marks MD     Allergies:  No Known Allergies    Infection:  None   Service:  CARDIOTHORAC    Ht:  1.803 m (5' 11\")   Wt:  113.1 kg (249 lb 6.4 oz)   Admission Wt:  114.2 kg (251 lb 12.3 oz)    BMI:  34.78 kg/m 2   BSA:  2.38 m 2            Patient PCP Information     Provider PCP Type    Provider Not In System General      Current Code Status     Date Active Code Status Order ID Comments User Context       Prior      Code Status History     Date Active Date Inactive Code Status Order ID Comments User Context    4/3/2018  5:28 PM  Full Code [id number]  Alfonzo Mckeon PA Outpatient    3/24/2018  5:06 PM 4/3/2018  5:28 PM Full Code [id number]  Norris Valdez MD Inpatient    3/23/2018 11:35 PM 3/24/2018  5:06 PM Full Code [id number]  Juan M Irvin MD Inpatient    3/22/2018  4:07 AM 3/23/2018 11:35 PM Full Code [id number]  Olayinka Orellana MD Inpatient      Advance Directives        Scanned docmt in ACP Activity?           No scanned doc        Hospital Problems as of 2018              Priority Class Noted POA    Heart failure (H) Medium  3/21/2018 Yes    Coronary artery disease Medium  3/23/2018 Yes    STEMI (ST elevation myocardial infarction) (H) Medium  3/24/2018 Yes      Non-Hospital Problems as of 2018     None      Immunizations     None         END      ASSESSMENT     Discharge Profile Flowsheet     DISCHARGE NEEDS ASSESSMENT     Inspection of bony prominences  Full 18 2232    Equipment Currently Used at Home  cane, straight 18 1637   Full except areas not inspected   Spine;Hip, left;Hip, right;Buttock, left;Buttock, right;Sacrum;Coccyx 18 1041    Transportation Available  car;family or friend will " "provide (wife does not drive) 03/25/18 1637   Inspection under devices  Full 04/04/18 0543    GASTROINTESTINAL (ADULT,PEDIATRIC,OB)     Not Inspected under devices  -- (Wound VAC) 04/01/18 0502    GI WDL  ex 04/03/18 2232   Skin WDL  ex 04/03/18 2232    Abdominal Appearance  rounded 04/03/18 2232   Skin Color/Characteristics  pale;bruised (ecchymotic) 04/03/18 2232    All Quadrants Bowel Sounds  hypoactive 04/03/18 1856   Skin Temperature  warm 04/03/18 2232    Epigastric Bowel Sounds  hypoactive 04/03/18 1856   Skin Moisture  -- 04/04/18 0543    All Quadrants Palpation  soft/nontender 03/29/18 1750   Skin Elasticity  quick return to original state 04/02/18 1141    Last Bowel Movement  04/03/18 04/03/18 2232   Skin Integrity  incision(s) 04/04/18 0543    GI Signs/Symptoms  constipation 04/03/18 2232   SAFETY      Passing flatus  yes 04/03/18 2232   Safety WDL  WDL 04/03/18 2232    Additional Documentation  Bowel Program (Group) 04/02/18 2134   Safety Factors  bed in low position;wheels locked;call light in reach;ID band on 04/03/18 2232    COMMUNICATION ASSESSMENT     Safety Equipment  -- 04/04/18 0543    Patient's communication style  spoken language (English or Bilingual) 03/21/18 2332   All Alarms  -- 04/04/18 0543    SKIN                        Assessment WDL (Within Defined Limits) Definitions           Safety WDL     Effective: 09/28/15    Row Information: <b>WDL Definition:</b> Bed in low position, wheels locked; call light in reach; upper side rails up x 2; ID band on<br> <font color=\"gray\"><i>Item=AS safety wdl>>List=AS safety wdl>>Version=F14</i></font>      Skin WDL     Effective: 09/28/15    Row Information: <b>WDL Definition:</b> Warm; dry; intact; elastic; without discoloration; pressure points without redness<br> <font color=\"gray\"><i>Item=AS skin wdl>>List=AS skin wdl>>Version=F14</i></font>      Vitals     Vital Signs Flowsheet     QUICK ADDS     Height Method  Stated 03/21/18 1352    Quick Adds  -- " 03/28/18 1720   Weight  113.1 kg (249 lb 6.4 oz) 04/04/18 0038    COMMENTS     Weight Method  Standing scale 04/04/18 0038    Comments  in CT 03/31/18 0915   BSA (Calculated - sq m)  2.39 03/21/18 2348    VITAL SIGNS     BMI (Calculated)  35.19 03/21/18 2348    Temp  97.5  F (36.4  C) 04/04/18 0807   [REMOVED] RIGHT GROIN INTERVENTIONAL PROCEDURE ACCESS      Temp src  Oral 04/04/18 0807   Site Assessment  WDL 03/24/18 1221    Resp  20 04/04/18 0807   Hemostasis management  Sheath in/secure 03/23/18 1437    Pulse  84 04/03/18 0824   Femoral Bruit  not present 03/24/18 1221    Heart Rate  63 04/04/18 0807   CMS Right Extremity  WDL Except (+ numbness/pain) 03/24/18 1221    Pulse/Heart Rate Source  Monitor 04/03/18 2003   Dorsalis Pulse - Right Leg  Absent 03/24/18 1221    BP  113/63 04/04/18 0807   Posterior Tibial Pulse - Right Leg  Absent 03/24/18 1221    BP Location  Left arm 04/04/18 0807   POSITIONING      OXYGEN THERAPY     Body Position  independently positioning 04/03/18 2217    SpO2  97 % 04/04/18 0807   Head of Bed (HOB)  HOB at 20-30 degrees 04/03/18 2217    O2 Device  None (Room air) 04/04/18 0807   Positioning/Transfer Devices  pillows 04/03/18 2217    FiO2 (%)  50 % 03/25/18 0420   Chair  Upright in chair 04/03/18 2217    Oxygen Delivery  2 LPM 03/28/18 1123   DAILY CARE      Suction Occurrance  1 03/25/18 0420   Activity Management  activity adjusted per tolerance 04/03/18 2217    PAIN/COMFORT     Activity Assistance Provided  assistance, stand-by 04/04/18 0543    Patient Currently in Pain  denies 04/04/18 0636   Assistive Device Utilized  walker 04/03/18 2217    Preferred Pain Scale  CAPA (Clinically Aligned Pain Assessment) ([facility], [facility] and [facility] Adults Only) 04/03/18 2217   ECG      Patient's Stated Pain Goal  No pain 04/03/18 2217   ECG Rhythm  Sinus rhythm 04/04/18 0643    Pain Location  Leg 04/03/18 0824   Ectopy  None 04/04/18 0643    Pain Orientation  Right 04/03/18 0824   Lead Monitored   Lead II 04/03/18 0824    Pain Descriptors  Sore 04/03/18 0824   Equipment  electrodes changed 04/03/18 0824    Pain Management Interventions  pillow support provided 04/03/18 0824   Ectopy Frequency  Rare 04/03/18 0824    Pain Intervention(s)  Repositioned 04/03/18 0824   DRUG CALCULATION WEIGHT      Response to Interventions  Absence of nonverbal indicators of pain 04/04/18 0329   Drug Calculation Weight  114.2 kg (251 lb 12.3 oz) 03/22/18 1016    CLINICALLY ALIGNED PAIN ASSESSMENT (CAPA) ([facility], [facility] AND [facility] ADULTS ONLY)     PACEMAKER      Comfort  comfortably manageable 04/03/18 2217   Pacemaker  -- (wires pulled by provider) 03/27/18 1124    Change in Pain  about the same 04/03/18 2217   Pacemaker Type  Epicardial (capped) 03/27/18 1124    Pain Control  partially effective 04/03/18 2217   Atrial Output (milliamps)  15 milliamps 03/26/18 0955    Functioning  can do most things, but pain gets in the way of some 04/03/18 2217   Atrial Sensitivity (mV)  .5 03/26/18 0955    Sleep  normal sleep 04/03/18 2217   AV Interval (mSec)  230 03/26/18 0955    CRITICAL-CARE PAIN OBSERVATION TOOL (CPOT)     Ventricular Output (milliamps)  15 milliamps 03/26/18 0955    Facial Expression  0 03/25/18 0420   Ventricular Sensitivity (mV)  2 03/26/18 0955    Body Movements  0 03/25/18 0420   Pacemaker Set Rate (beats/min)  50 BPM 03/26/18 0955    Compliance w/ventilator (intubated patients)  0 03/25/18 0420   Pacer Mode  DDD 03/26/18 0955    Vocalization (extubated patients)  Intubated 03/25/18 0420   Function  Sensing 03/26/18 0955    Muscle Tension  0 03/25/18 0420   Paced Amount  Rarely paced (<10%) 03/26/18 0955    Total  0 03/25/18 0420   Standby Status  Capped 03/27/18 0729    ANALGESIA SIDE EFFECTS MONITORING     Battery Check  Done 03/26/18 1543    Side Effects Monitoring: Respiratory Quality  R 04/04/18 0636   Battery Changed  Done 03/26/18 1349    Side Effects Monitoring: Respiratory Depth  N 04/04/18 0636   Site  "Assessment  UTV 03/27/18 0027    Side Effects Monitoring: Sedation Level  1 04/04/18 0636   Dressing Status  Normal: Clean, Dry & Intact 03/27/18 0027    HEIGHT AND WEIGHT     Dressing Change Due  03/27/18 03/26/18 0955    Height  1.803 m (5' 11\") 03/21/18 2348                 Patient Lines/Drains/Airways Status    Active LINES/DRAINS/AIRWAYS     Name: Placement date: Placement time: Site: Days: Last dressing change:    Peripheral IV 03/31/18 Left;Anterior Upper forearm 03/31/18   0848   Upper forearm   4     Wound 03/24/18 Penis Other (comment) blister at tip of penis 03/24/18   0000   Penis   11     Wound 03/30/18 Anterior Abdomen Old Chest Tube sites 03/30/18   1600   Abdomen   4     Incision/Surgical Site 03/23/18 Left;Lower Leg 03/23/18   2014    11     Incision/Surgical Site 03/23/18 Chest 03/23/18   2233    11     Incision/Surgical Site 03/24/18 Anterior;Lateral;Right Groin 03/24/18   1535    10     Incision/Surgical Site 03/27/18 Right Calf 03/27/18   0842    8     Incision/Surgical Site 03/27/18 Right Calf 03/27/18   0842    8             Patient Lines/Drains/Airways Status    Active PICC/CVC     None            Intake/Output Detail Report     Date Intake               Output         Net    Shift P.O. I.V. Other NG/GT IV Piggyback Colloid Plasma Blood Components Total Urine Emesis/NG output Drains Blood Chest Tube Total       Day 04/03/18 0000 - 04/03/18 0659 -- -- -- -- -- -- -- -- -- 400 -- -- -- -- 400 -400    Ariana 04/03/18 0700 - 04/03/18 1459 1050 -- -- -- -- -- -- -- 1050 1000 -- -- -- -- 1000 50    Noc 04/03/18 1500 - 04/03/18 2359 300 -- -- -- -- -- -- -- 300 275 -- -- -- -- 275 25    Day 04/04/18 0000 - 04/04/18 0659 240 -- -- -- -- -- -- -- 240 200 -- -- -- -- 200 40    Ariana 04/04/18 0700 - 04/04/18 1459 -- -- -- -- -- -- -- -- -- -- -- -- -- -- -- 0      Last Void/BM       Most Recent Value    Urine Occurrence 1 at 04/01/2018 1032    Stool Occurrence 1 at 04/01/2018 1032      Case " Management/Discharge Planning     Case Management/Discharge Planning Flowsheet     LIVING ENVIRONMENT     [facility] CAREGIVER      Lives With  spouse 03/25/18 1127   Filed Complexity Screen Score  7 03/22/18 1434    Living Arrangements  apartment (3rd floor) 03/25/18 1637   ABUSE RISK SCREEN      COPING/STRESS     QUESTION TO PATIENT:  Has a member of your family or a partner(now or in the past) intimidated, hurt, manipulated, or controlled you in any way?  no 03/21/18 2336    Major Change/Loss/Stressor  denies 03/21/18 2336   QUESTION TO PATIENT: Do you feel safe going back to the place where you are living?  yes 03/21/18 2336    DISCHARGE PLANNING     OBSERVATION: Is there reason to believe there has been maltreatment of a vulnerable adult (ie. Physical/Sexual/Emotional abuse, self neglect, lack of adequate food, shelter, medical care, or financial exploitation)?  no 03/21/18 2336    Transportation Available  car;family or friend will provide (wife does not drive) 03/25/18 1637   OTHER      FINAL RESOURCES     Are you depressed or being treated for depression?  No 03/21/18 2336    Equipment Currently Used at Home  cane, roxanne 03/25/18 1637

## 2018-03-21 NOTE — IP AVS SNAPSHOT
[address] [address]    Phone:  [phone]                                       After Visit Summary   3/21/2018    Izaiah Alvarez    MRN: [id number]           After Visit Summary Signature Page     I have received my discharge instructions, and my questions have been answered. I have discussed any challenges I see with this plan with the nurse or doctor.    ..........................................................................................................................................  Patient/Patient Representative Signature      ..........................................................................................................................................  Patient Representative Print Name and Relationship to Patient    ..................................................               ................................................  Date                                            Time    ..........................................................................................................................................  Reviewed by Signature/Title    ...................................................              ..............................................  Date                                                            Time

## 2018-03-21 NOTE — IP AVS SNAPSHOT
"    [address] [facility] BANK: [phone]                                              INTERAGENCY TRANSFER FORM - LAB / IMAGING / EKG / EMG RESULTS   3/21/2018                    Hospital Admission Date: 3/21/2018  MONSE FAROOQ   : 1945  Sex: Male        Attending Provider: Eric Marks MD     Allergies:  No Known Allergies    Infection:  None   Service:  CARDIOTHORAC    Ht:  1.803 m (5' 11\")   Wt:  113.1 kg (249 lb 6.4 oz)   Admission Wt:  114.2 kg (251 lb 12.3 oz)    BMI:  34.78 kg/m 2   BSA:  2.38 m 2            Patient PCP Information     Provider PCP Type    Provider Not In System General         Lab Results - 3 Days      INR [[id number]] (Abnormal)  Resulted: 18 0757, Result status: Final result    Ordering provider: Eric Marks MD  18 2300 Resulting lab: [facility]    Specimen Information    Type Source Collected On   Blood  18 0724          Components       Value Reference Range Flag Lab   INR 2.36 0.86 - 1.14 H 51            Basic metabolic panel [[id number]] (Abnormal)  Resulted: 18 0750, Result status: Final result    Ordering provider: Scott Sanon PA-C  18 0000 Resulting lab: [facility]    Specimen Information    Type Source Collected On   Blood  18 0724          Components       Value Reference Range Flag Lab   Sodium 137 133 - 144 mmol/L  51   Potassium 4.0 3.4 - 5.3 mmol/L  51   Chloride 104 94 - 109 mmol/L  51   Carbon Dioxide 24 20 - 32 mmol/L  51   Anion Gap 9 3 - 14 mmol/L  51   Glucose 116 70 - 99 mg/dL H 51   Urea Nitrogen 14 7 - 30 mg/dL  51   Creatinine 1.06 0.66 - 1.25 mg/dL  51   GFR Estimate 69 >60 mL/min/1.7m2  51   Comment:  Non  GFR Calc   GFR Estimate If Black 83 >60 mL/min/1.7m2  51   Comment:  African American GFR Calc   Calcium 8.4 8.5 - 10.1 mg/dL L 51            Magnesium [[id number]]  Resulted: 18 0750, Result status: Final " result    Ordering provider: Rosey Collier PA-C  04/04/18 0724 Resulting lab: [facility]    Specimen Information    Type Source Collected On     04/04/18 0724          Components       Value Reference Range Flag Lab   Magnesium 2.1 1.6 - 2.3 mg/dL  51            CBC with platelets [[id number]] (Abnormal)  Resulted: 04/04/18 0737, Result status: Final result    Ordering provider: Scott Sanon PA-C  04/04/18 0000 Resulting lab: [facility]    Specimen Information    Type Source Collected On   Blood  04/04/18 0724          Components       Value Reference Range Flag Lab   WBC 6.4 4.0 - 11.0 10e9/L  51   RBC Count 3.27 4.4 - 5.9 10e12/L L 51   Hemoglobin 9.4 13.3 - 17.7 g/dL L 51   Hematocrit 32.2 40.0 - 53.0 % L 51   MCV 99 78 - 100 fl  51   MCH 28.7 26.5 - 33.0 pg  51   MCHC 29.2 31.5 - 36.5 g/dL L 51   RDW 16.8 10.0 - 15.0 % H 51   Platelet Count 439 150 - 450 10e9/L  51            Magnesium [[id number]]  Resulted: 04/04/18 0730, Result status: In process    Ordering provider: Eric Marks MD  04/03/18 2300 Resulting lab: [facility]    Specimen Information    Type Source Collected On   Blood  04/04/18 0724            Glucose by meter [[id number]] (Abnormal)  Resulted: 04/04/18 0656, Result status: Final result    Resulting lab: POINT OF CARE TEST, GLUCOSE     Specimen Information    Type Source Collected On     04/04/18 0645          Components       Value Reference Range Flag Lab   Glucose 126 70 - 99 mg/dL H 170            Glucose by meter [[id number]] (Abnormal)  Resulted: 04/04/18 0443, Result status: Final result    Resulting lab: POINT OF CARE TEST, GLUCOSE     Specimen Information    Type Source Collected On     04/04/18 0432          Components       Value Reference Range Flag Lab   Glucose 117 70 - 99 mg/dL H 170            Glucose by meter [[id number]] (Abnormal)  Resulted: 04/03/18 2324, Result status: Final result     Resulting lab: POINT OF CARE TEST, GLUCOSE     Specimen Information    Type Source Collected On     04/03/18 2313          Components       Value Reference Range Flag Lab   Glucose 138 70 - 99 mg/dL H 170            Glucose by meter [[id number]] (Abnormal)  Resulted: 04/03/18 2155, Result status: Final result    Resulting lab: POINT OF CARE TEST, GLUCOSE     Specimen Information    Type Source Collected On     04/03/18 2143          Components       Value Reference Range Flag Lab   Glucose 143 70 - 99 mg/dL H 170            Glucose by meter [[id number]]  Resulted: 04/03/18 1829, Result status: Final result    Resulting lab: POINT OF CARE TEST, GLUCOSE     Specimen Information    Type Source Collected On     04/03/18 1818          Components       Value Reference Range Flag Lab   Glucose 94 70 - 99 mg/dL  170            Glucose by meter [[id number]]  Resulted: 04/03/18 1723, Result status: Final result    Resulting lab: POINT OF CARE TEST, GLUCOSE     Specimen Information    Type Source Collected On     04/03/18 1712          Components       Value Reference Range Flag Lab   Glucose 78 70 - 99 mg/dL  170            CBC with platelets [[id number]] (Abnormal)  Resulted: 04/03/18 1143, Result status: Final result    Ordering provider: Scott Sanon PA-C  04/03/18 0000 Resulting lab: [facility]    Specimen Information    Type Source Collected On   Blood  04/03/18 1006          Components       Value Reference Range Flag Lab   WBC 6.8 4.0 - 11.0 10e9/L  51   RBC Count 3.30 4.4 - 5.9 10e12/L L 51   Hemoglobin 9.8 13.3 - 17.7 g/dL L 51   Hematocrit 32.1 40.0 - 53.0 % L 51   MCV 97 78 - 100 fl  51   MCH 29.7 26.5 - 33.0 pg  51   MCHC 30.5 31.5 - 36.5 g/dL L 51   RDW 16.5 10.0 - 15.0 % H 51   Platelet Count 472 150 - 450 10e9/L H 51            Basic metabolic panel [[id number]] (Abnormal)  Resulted: 04/03/18 1034, Result status: Final result    Ordering provider: Scott Sanon PA-C   04/03/18 0000 Resulting lab: [facility]    Specimen Information    Type Source Collected On   Blood  04/03/18 1006          Components       Value Reference Range Flag Lab   Sodium 137 133 - 144 mmol/L  51   Potassium 4.1 3.4 - 5.3 mmol/L  51   Chloride 103 94 - 109 mmol/L  51   Carbon Dioxide 23 20 - 32 mmol/L  51   Anion Gap 11 3 - 14 mmol/L  51   Glucose 141 70 - 99 mg/dL H 51   Urea Nitrogen 14 7 - 30 mg/dL  51   Creatinine 1.03 0.66 - 1.25 mg/dL  51   GFR Estimate 71 >60 mL/min/1.7m2  51   Comment:  Non  GFR Calc   GFR Estimate If Black 86 >60 mL/min/1.7m2  51   Comment:  African American GFR Calc   Calcium 8.6 8.5 - 10.1 mg/dL  51            Magnesium [[id number]]  Resulted: 04/03/18 1034, Result status: Final result    Ordering provider: Rosey Collier PA-C  04/03/18 0600 Resulting lab: [facility]    Specimen Information    Type Source Collected On     04/03/18 1006          Components       Value Reference Range Flag Lab   Magnesium 2.2 1.6 - 2.3 mg/dL  51            INR [[id number]] (Abnormal)  Resulted: 04/03/18 1026, Result status: Final result    Ordering provider: Eric Marks MD  04/02/18 2300 Resulting lab: [facility]    Specimen Information    Type Source Collected On   Blood  04/03/18 1006          Components       Value Reference Range Flag Lab   INR 2.17 0.86 - 1.14 H 51            Glucose by meter [[id number]] (Abnormal)  Resulted: 04/03/18 0831, Result status: Final result    Ordering provider: Eric Marks MD  04/03/18 0819 Resulting lab: POINT OF CARE TEST, GLUCOSE    Specimen Information    Type Source Collected On     04/03/18 0819          Components       Value Reference Range Flag Lab   Glucose 125 70 - 99 mg/dL H 170            Glucose by meter [[id number]] (Abnormal)  Resulted: 04/03/18 0249, Result status: Final result    Ordering provider:  Eric Marks MD  04/03/18 0238 Resulting lab: POINT OF CARE TEST, GLUCOSE    Specimen Information    Type Source Collected On     04/03/18 0238          Components       Value Reference Range Flag Lab   Glucose 118 70 - 99 mg/dL H 170            Glucose by meter [[id number]] (Abnormal)  Resulted: 04/02/18 2157, Result status: Final result    Ordering provider: Eric Marks MD  04/02/18 2140 Resulting lab: [facility] CARE TEST, GLUCOSE    Specimen Information    Type Source Collected On     04/02/18 2140          Components       Value Reference Range Flag Lab   Glucose 135 70 - 99 mg/dL H 170            Glucose by meter [[id number]]  Resulted: 04/02/18 1523, Result status: Final result    Ordering provider: Eric Marks MD  04/02/18 1512 Resulting lab: POINT OF CARE TEST, GLUCOSE    Specimen Information    Type Source Collected On     04/02/18 1512          Components       Value Reference Range Flag Lab   Glucose 98 70 - 99 mg/dL  170            Glucose by meter [[id number]] (Abnormal)  Resulted: 04/02/18 1208, Result status: Final result    Ordering provider: Eric Marks MD  04/02/18 1157 Resulting lab: POINT OF CARE TEST, GLUCOSE    Specimen Information    Type Source Collected On     04/02/18 1157          Components       Value Reference Range Flag Lab   Glucose 147 70 - 99 mg/dL H 170            Basic metabolic panel [[id number]] (Abnormal)  Resulted: 04/02/18 0835, Result status: Final result    Ordering provider: Scott Sanon PA-C  04/02/18 0000 Resulting lab: [facility]    Specimen Information    Type Source Collected On   Blood  04/02/18 0755          Components       Value Reference Range Flag Lab   Sodium 136 133 - 144 mmol/L  51   Potassium 4.1 3.4 - 5.3 mmol/L  51   Chloride 105 94 - 109 mmol/L  51   Carbon Dioxide 24 20 - 32 mmol/L  51   Anion Gap 7 3 - 14 mmol/L  51   Glucose 133 70 - 99 mg/dL H 51   Urea Nitrogen 16 7 -  30 mg/dL  51   Creatinine 1.00 0.66 - 1.25 mg/dL  51   GFR Estimate 74 >60 mL/min/1.7m2  51   Comment:  Non  GFR Calc   GFR Estimate If Black 89 >60 mL/min/1.7m2  51   Comment:  African American GFR Calc   Calcium 8.4 8.5 - 10.1 mg/dL L 51            Magnesium [[id number]]  Resulted: 04/02/18 0835, Result status: Final result    Ordering provider: Rosey Collier PA-C  04/02/18 0755 Resulting lab: [facility]    Specimen Information    Type Source Collected On     04/02/18 0755          Components       Value Reference Range Flag Lab   Magnesium 2.0 1.6 - 2.3 mg/dL  51            INR [[id number]] (Abnormal)  Resulted: 04/02/18 0819, Result status: Final result    Ordering provider: Eric Marks MD  04/01/18 2300 Resulting lab: [facility]    Specimen Information    Type Source Collected On   Blood  04/02/18 0755          Components       Value Reference Range Flag Lab   INR 2.27 0.86 - 1.14 H 51            CBC with platelets [[id number]] (Abnormal)  Resulted: 04/02/18 0816, Result status: Final result    Ordering provider: Scott Sanon PA-C  04/02/18 0000 Resulting lab: [facility]    Specimen Information    Type Source Collected On   Blood  04/02/18 0755          Components       Value Reference Range Flag Lab   WBC 7.9 4.0 - 11.0 10e9/L  51   RBC Count 3.02 4.4 - 5.9 10e12/L L 51   Hemoglobin 8.9 13.3 - 17.7 g/dL L 51   Hematocrit 29.6 40.0 - 53.0 % L 51   MCV 98 78 - 100 fl  51   MCH 29.5 26.5 - 33.0 pg  51   MCHC 30.1 31.5 - 36.5 g/dL L 51   RDW 16.4 10.0 - 15.0 % H 51   Platelet Count 430 150 - 450 10e9/L  51            Magnesium [[id number]]  Resulted: 04/02/18 0800, Result status: In process    Ordering provider: Eric Marks MD  04/01/18 2300 Resulting lab: [facility]    Specimen Information    Type Source Collected On   Blood  04/02/18 6402            Glucose by meter  [[id number]] (Abnormal)  Resulted: 04/02/18 0155, Result status: Final result    Ordering provider: Eric Marks MD  04/02/18 0143 Resulting lab: POINT OF CARE TEST, GLUCOSE    Specimen Information    Type Source Collected On     04/02/18 0143          Components       Value Reference Range Flag Lab   Glucose 130 70 - 99 mg/dL H 170            Glucose by meter [[id number]] (Abnormal)  Resulted: 04/01/18 2209, Result status: Final result    Ordering provider: Eric Marks MD  04/01/18 2157 Resulting lab: [facility] CARE TEST, GLUCOSE    Specimen Information    Type Source Collected On     04/01/18 2157          Components       Value Reference Range Flag Lab   Glucose 163 70 - 99 mg/dL H 170            Glucose by meter [[id number]] (Abnormal)  Resulted: 04/01/18 1650, Result status: Final result    Ordering provider: Eric Marks MD  04/01/18 1639 Resulting lab: POINT OF CARE TEST, GLUCOSE    Specimen Information    Type Source Collected On     04/01/18 1639          Components       Value Reference Range Flag Lab   Glucose 105 70 - 99 mg/dL H 170            Glucose by meter [[id number]]  Resulted: 04/01/18 1335, Result status: Final result    Ordering provider: Eric Marks MD  04/01/18 1323 Resulting lab: POINT OF CARE TEST, GLUCOSE    Specimen Information    Type Source Collected On     04/01/18 1323          Components       Value Reference Range Flag Lab   Glucose 89 70 - 99 mg/dL  170            Glucose by meter [[id number]] (Abnormal)  Resulted: 04/01/18 0949, Result status: Final result    Ordering provider: Eric Marks MD  04/01/18 0937 Resulting lab: POINT OF CARE TEST, GLUCOSE    Specimen Information    Type Source Collected On     04/01/18 0937          Components       Value Reference Range Flag Lab   Glucose 112 70 - 99 mg/dL H 170            Basic metabolic panel [[id number]] (Abnormal)  Resulted: 04/01/18 0741, Result status: Final result    Ordering  provider: Scott Sanon PA-C  04/01/18 0000 Resulting lab: [facility]    Specimen Information    Type Source Collected On   Blood  04/01/18 0639          Components       Value Reference Range Flag Lab   Sodium 137 133 - 144 mmol/L  51   Potassium 3.8 3.4 - 5.3 mmol/L  51   Chloride 105 94 - 109 mmol/L  51   Carbon Dioxide 25 20 - 32 mmol/L  51   Anion Gap 6 3 - 14 mmol/L  51   Glucose 93 70 - 99 mg/dL  51   Urea Nitrogen 17 7 - 30 mg/dL  51   Creatinine 0.98 0.66 - 1.25 mg/dL  51   GFR Estimate 75 >60 mL/min/1.7m2  51   Comment:  Non  GFR Calc   GFR Estimate If Black >90 >60 mL/min/1.7m2  51   Comment:  African American GFR Calc   Calcium 8.3 8.5 - 10.1 mg/dL L 51            Magnesium [[id number]]  Resulted: 04/01/18 0741, Result status: Final result    Ordering provider: Rosey Collier PA-C  04/01/18 0601 Resulting lab: [facility]    Specimen Information    Type Source Collected On     04/01/18 0639          Components       Value Reference Range Flag Lab   Magnesium 2.2 1.6 - 2.3 mg/dL  51            INR [[id number]] (Abnormal)  Resulted: 04/01/18 0729, Result status: Final result    Ordering provider: Eric Marks MD  03/31/18 2300 Resulting lab: [facility]    Specimen Information    Type Source Collected On   Blood  04/01/18 0639          Components       Value Reference Range Flag Lab   INR 2.30 0.86 - 1.14 H 51            CBC with platelets [[id number]] (Abnormal)  Resulted: 04/01/18 0721, Result status: Final result    Ordering provider: Scott Sanon PA-C  04/01/18 0000 Resulting lab: [facility]    Specimen Information    Type Source Collected On   Blood  04/01/18 0639          Components       Value Reference Range Flag Lab   WBC 8.9 4.0 - 11.0 10e9/L  51   RBC Count 2.90 4.4 - 5.9 10e12/L L 51   Hemoglobin 8.7 13.3 - 17.7 g/dL L 51    Hematocrit 27.7 40.0 - 53.0 % L 51   MCV 96 78 - 100 fl  51   MCH 30.0 26.5 - 33.0 pg  51   MCHC 31.4 31.5 - 36.5 g/dL L 51   RDW 16.3 10.0 - 15.0 % H 51   Platelet Count 420 150 - 450 10e9/L  51            Glucose by meter [[id number]]  Resulted: 04/01/18 0217, Result status: Final result    Ordering provider: Eric Marks MD  04/01/18 0205 Resulting lab: POINT OF CARE TEST, GLUCOSE    Specimen Information    Type Source Collected On     04/01/18 0205          Components       Value Reference Range Flag Lab   Glucose 83 70 - 99 mg/dL  170            Glucose by meter [[id number]] (Abnormal)  Resulted: 03/31/18 2156, Result status: Final result    Ordering provider: Eric Marks MD  03/31/18 2141 Resulting lab: POINT OF CARE TEST, GLUCOSE    Specimen Information    Type Source Collected On     03/31/18 2141          Components       Value Reference Range Flag Lab   Glucose 133 70 - 99 mg/dL H 170            Glucose by meter [[id number]] (Abnormal)  Resulted: 03/31/18 2141, Result status: Final result    Ordering provider: Eric Marks MD  03/31/18 2128 Resulting lab: POINT OF CARE TEST, GLUCOSE    Specimen Information    Type Source Collected On     03/31/18 2128          Components       Value Reference Range Flag Lab   Glucose 144 70 - 99 mg/dL H 170            Testing Performed By     Lab - Abbreviation Name Director Address Valid Date Range    45 - [id number] Unknown Unknown 01/28/02 0000 - Present    51 - Unknown [facility] Unknown [address] [facility] [address] 12/31/14 1010 - Present    170 - Unknown POINT OF CARE TEST, GLUCOSE Unknown Unknown 10/31/11 1114 - Present            Unresulted Labs (24h ago through future)    Start       Ordered    03/27/18 0930  CBC with platelets  DAILY,   Routine     Comments:  Last Lab Result: Hemoglobin (g/dL)       Date                     Value                 03/26/2018               8.6 (L)         "  ----------    03/27/18 0919 03/27/18 0930  Basic metabolic panel  DAILY,   Routine      03/27/18 0919 03/27/18 0545  INR  DAILY,   Routine      03/27/18 0533    03/27/18 0545  Magnesium  DAILY,   Routine      03/27/18 0533    Unscheduled  Potassium  CONDITIONAL X 1 STAT,   STAT     Comments:  PRN for CHF    03/21/18 2255    Unscheduled  Magnesium  (Magnesium Replacement - Adult - \"High\" - Replacement for all levels less than or equal to 2 mg/dL)  CONDITIONAL (SPECIFY),   Routine     Comments:  Obtain Magnesium Level for these conditions:  *IF no magnesium result within 24 hrs before initiation of order set, draw magnesium level with next lab collect.    *2 HOURS AFTER last magnesium replacement dose when magnesium replacement given for level less than 1.6  *Next morning after magnesium dose.     Repeat Magnesium Replacement if necessary.    03/21/18 2255    Unscheduled  Glucose - Diabetes  CONDITIONAL X 1 STAT,   STAT     Comments:  for changes in mental status, diaphoresis, or unexplained tachycardia    03/22/18 0933    Unscheduled  Glucose - Diabetes  CONDITIONAL X 1 STAT,   STAT     Comments:  for changes in mental status, diaphoresis, or unexplained tachycardia    03/23/18 2335    Unscheduled  Potassium  (Potassium Replacement - \"High\" - Replacement for all levels less than 4.1 mmol/L)  CONDITIONAL (SPECIFY),   Routine     Comments:  Obtain Potassium Level for these conditions:  *IF no potassium result within 24 hrs before initiation of order set, draw potassium level with next lab collect.    *2 HOURS AFTER last IV potassium replacement dose and 4 hours after an oral replacement dose when potassium replacement given for level less than 3.4.  *Next morning after potassium dose.     Repeat Potassium Replacement if necessary.    03/23/18 2335    Unscheduled  Magnesium  (Magnesium Replacement - Adult - \"High\" - Replacement for all levels less than or equal to 2 mg/dL)  CONDITIONAL (SPECIFY),   Routine   " "  Comments:  Obtain Magnesium Level for these conditions:  *IF no magnesium result within 24 hrs before initiation of order set, draw magnesium level with next lab collect.    *2 HOURS AFTER last magnesium replacement dose when magnesium replacement given for level less than 1.6  *Next morning after magnesium dose.     Repeat Magnesium Replacement if necessary.    03/23/18 2335    Unscheduled  Phosphorus  (POTASSIUM Phosphate - \"High\" - Replacement for all levels less than 2.8 mg/dL)  CONDITIONAL (SPECIFY),   Routine     Comments:  Obtain Phosphorus Level for these conditions:  *IF no phosphorus result within 24 hrs before initiation of order set, draw phosphorus level with next lab collect.    *2 HOURS AFTER last phosphorus replacement dose when phosphorus replacement given for level less than 2.0  *Next morning after phosphorus dose.     Repeat Phosphorus Replacement if necessary.    03/23/18 2335    Unscheduled  Hemoglobin  CONDITIONAL (SPECIFY),   Routine     Comments:  Last Lab Result: Hemoglobin (g/dL)       Date                     Value                 03/26/2018               8.6 (L)          ----------    03/27/18 0533    Unscheduled  Hemoglobin plasma  CONDITIONAL X 1 STAT,   STAT     Comments:  IF UA is positive for RBC lysis/breakdown to evaluate for possible hemolysis.    03/27/18 0533         ECG/EMG Results      Echocardiogram Complete [[id number]]  Resulted: 03/22/18 0949, Result status: In process    Ordering provider: Adalberto Fountain MD  03/22/18 0037 Performed: 03/22/18 0949 - 03/22/18 0949    Resulting lab: [facility]             ECHO COMPLETE WITH OPTISON [[id number]]  Resulted: 03/22/18 1007, Result status: Edited Result - FINAL    Ordering provider: Adalberto Fountain MD  03/22/18 0037 Resulted by: Alyse iWlder MD    Performed: 03/22/18 0949 - 03/22/18 1044 Resulting lab: RADIOLOGY RESULTS    Narrative:       [id number]  [id number]  [id number]  [id number]^STEFANO^ADALBERTO^           [facility]" [facility]  Echocardiography Laboratory  [address] [place] [id number]     Name: MONSE FAROOQ  MRN: [id number]  : 1945  Study Date: 2018 10:07 AM  Age: 72 yrs  Gender: Male  Patient Location: [facility]  Reason For Study: CHF  History: CHF  Ordering Physician: ROQUE AGARWAL  Referring Physician: SELF, REFERRED  Performed By: Abeba Hair RDCS     BSA: 2.3 m2  Height: 71 in  Weight: 251 lb  BP: 114/85 mmHg  _____________________________________________________________________________  __        Procedure  Complete Portable Echo Adult. Contrast Optison. Poor acoustic windows.  Technically difficult study. Optison (NDC #9078-0281-43) given intravenously.  Patient was given 6 ml mixture of 3 ml Optison and 6 ml saline. 3 ml wasted.  _____________________________________________________________________________  __        Interpretation Summary  Technically difficult study.  Mildly (EF 45-50%) reduced left ventricular function is present. Accuracy of  LVEF and RWM assessment affected by atrial fibrillation. Anterior and septal  wall hypokinesis is noted.  An Impella device is present. The inlet area is in appropriate position at 5.5  cm below the aortic valve in the LV mid cavity.  Global right ventricular function and size are normal.  No significant valvular abnormalities noted.  The inferior vena cava is normal. Estimated mean right atrial pressure is 3  mmHg.  No pericardial effusion is present.     Compared to report from [facility] (no images) dated 3/19/18, the  LVEF has improved from 20-25%.     _____________________________________________________________________________  __        Left Ventricle  Left ventricular wall thickness is normal. Left ventricular size is normal.  Diastolic function not assessed due to atrial fibrillation. Mildly (EF 45-50%)  reduced left ventricular function is present. Accuracy of LVEF assessment  affected by atrial fibrillation. Anterior and septal  wall hypokinesis is  noted. An Impella device is present. The inlet area is in appropriate position  at 5.5 cm below the aortic valve in the LV mid cavity.     Right Ventricle  The right ventricle is normal size. Global right ventricular function is  normal.     Atria  Both atria appear normal.     Mitral Valve  The mitral valve is normal. Trace mitral insufficiency is present.        Aortic Valve  AV is not seen well. No aortic regurgitation is present. No stenosis.     Tricuspid Valve  The tricuspid valve is normal. The peak velocity of the tricuspid regurgitant  jet is not obtainable. Pulmonary artery systolic pressure cannot be assessed.     Pulmonic Valve  The pulmonic valve cannot be assessed.     Vessels  The aorta root is normal. The inferior vena cava is normal. Estimated mean  right atrial pressure is 3 mmHg.     Pericardium  No pericardial effusion is present.        Compared to Previous Study  Previous study not available for comparison.     Attestation  I have personally viewed the imaging and agree with the interpretation and  report as documented by the fellow, Kalina Yanez, and/or edited by me.  _____________________________________________________________________________  __     MMode/2D Measurements & Calculations  asc Aorta Diam: 3.3 cm  LA Volume (BP): 62.3 ml  LA Volume Index (BP): 26.9 ml/m2        Doppler Measurements & Calculations  MV E max red: 76.8 cm/sec  MV A max red: 58.1 cm/sec  MV E/A: 1.3  MV dec time: 0.19 sec     Ao V2 max: 77.3 cm/sec  Ao max P.0 mmHg  Ao V2 mean: 60.5 cm/sec  Ao mean P.0 mmHg  Ao V2 VTI: 12.1 cm  LV V1 max PG: 3.0 mmHg  LV V1 max: 86.5 cm/sec  LV V1 VTI: 11.8 cm  AV Red Ratio (DI): 1.1  E/E' av.9  Lateral E/e': 7.1  Medial E/e': 6.7     _____________________________________________________________________________  __           Report approved by: Padma SORIANO 2018 02:42 PM       1    Type Source Collected On     18 [address] [place] [address]  Image (below)        ECHO LIMITED WITH OPTISON [[id number]]  Resulted: 18 1615, Result status: Edited Result - FINAL    Ordering provider: Alfonzo Mckeon PA  18 1408 Resulted by: Megan Soliz MD    Performed: 18 1638 - 18 1643 Resulting lab: RADIOLOGY RESULTS    Narrative:       [id number]  [id number]  [id number]  [id number]^JULEE^ALFONZO^GAMALIEL           [facility]  Echocardiography Laboratory  [address] [place] [id number]     Name: MONSE FAROOQ  MRN: [id number]  : 1945  Study Date: 2018 04:15 PM  Age: 72 yrs  Gender: Male  Patient [place] [facility] [place]  Reason For Study: Heart Failure  Ordering Physician: ALFONZO MCKEON  Referring Physician: SELF, REFERRED  Performed By: Len Ybarra RDCS     BSA: 2.3 m2  Height: 71 in  Weight: 250 lb  _____________________________________________________________________________  __        Procedure  Limited Portable Echo Adult. Contrast Optison. Optison (NDC #1173-0379-59)  given intravenously. Patient was given 8 ml mixture of 3 ml Optison and 6 ml  saline. 1 ml wasted.  _____________________________________________________________________________  __        Interpretation Summary  Borderline reduced left ventricular function is present with traced LVEF 51%.  Hypokinesis of the mid-distal anteroseptal, apical segments including true  apex present. There is no thrombus.  Global right ventricular function and size are normal.  No significant valvular abnormalities noted.  Dilation of the inferior vena cava is present with abnormal respiratory  variation in diameter. Estimated mean right atrial pressure is 15 mmHg.  No pericardial effusion is present.     Compared to study dated 3/22/18, LVEF is improved. Previously noted Impella  device is not present.     _____________________________________________________________________________  __        Left Ventricle  Left ventricular wall thickness is  normal. Mild left ventricular dilation is  present. Borderline (EF 50-55%) reduced left ventricular function is present.  Traced LVEF 51%. Hypokinesis of the mid-distal anteroseptal, apical segments  including true apex. There is no thrombus.     Right Ventricle  The right ventricle is normal size. Global right ventricular function is  normal.     Atria  Both atria appear normal.        Mitral Valve  The mitral valve is normal.     Aortic Valve  The aortic valve cannot be assessed.     Tricuspid Valve  The tricuspid valve is normal.     Pulmonic Valve  The pulmonic valve cannot be assessed.     Vessels  Dilation of the inferior vena cava is present with abnormal respiratory  variation in diameter. Estimated mean right atrial pressure is 15 mmHg.     Pericardium  No pericardial effusion is present.     _____________________________________________________________________________  __                       Report approved by: Sukhjinder Flanagan 04/03/2018 05:06 PM                 _____________________________________________________________________________  __       1    Type Source Collected On     04/03/18 1615          View Image (below)        Echo limited (Adults Only) [[id number]]  Resulted: 04/03/18 1638, Result status: In process    Ordering provider: Alfonzo Mckeon PA  04/03/18 1408 Performed: 04/03/18 1638 - 04/03/18 1638    Resulting lab: [facility]                   Encounter-Level Documents:     There are no encounter-level documents.      Order-Level Documents:     There are no order-level documents.

## 2018-03-21 NOTE — LETTER
Transition Communication Hand-off for Care Transitions to Next Level of Care Provider    Name: Izaiah Alvarez  : 1945  MRN #: [id number]  Primary Care Provider: Provider Not In System     Primary Clinic:       Reason for Hospitalization:  STEMI  Heart failure  Heart failure (H)  Coronary Artery Disease   Coronary artery disease  Right leg eschemia  STEMI (ST elevation myocardial infarction) (H)  Right Iliac Thrombosis   Admit Date/Time: 3/21/2018 10:21 PM  Discharge Date: 18  Payor Source: Payor: COMMERCIAL / Plan: [facility] [place] [facility] [place] / Product Type: Indemnity /     Readmission Assessment Measure (ANA LUISA) Risk Score/category: Elevated    Reason for Communication Hand-off Referral: Other discharge out of state    Discharge Plan:  [facility]     Concern for non-adherence with plan of care:   Y/N N  Discharge Needs Assessment:  Needs       Most Recent Value    Equipment Currently Used at Home cane, straight    Transportation Available car, family or friend will provide [wife does not drive]          Already enrolled in Tele-monitoring program and name of program:  Unknown  Follow-up specialty is recommended: No    Follow-up plan:  No future appointments.    Any outstanding tests or procedures:    Procedures     Future Labs/Procedures    Oxygen - Nasal cannula     Comments:    Titrate Lpm PRN by nasal cannula to keep O2 sats 92% or greater.          Referrals     Future Labs/Procedures    Occupational Therapy Adult Consult     Comments:    Evaluate and treat as clinically indicated.    Reason:  Sternotomy, right leg fasciotomy    Physical Therapy Adult Consult     Comments:    Evaluate and treat as clinically indicated.    Reason:  Sternotomy, right leg fasciotomy            Key Recommendations:  None for today.  Thanks, MONROE Craig, APSW  [address]   Phone: [phone]  Pager: [phone]  Unit: [phone]

## 2018-03-21 NOTE — IP AVS SNAPSHOT
` `     [address] [facility] BANK: [phone]            Medication Administration Report for Izaiah Alvarez as of 04/04/18 0856   Legend:    Given Hold Not Given Due Canceled Entry Other Actions    Time Time (Time) Time  Time-Action       Inactive    Active    Linked        Medications 03/29/18 03/30/18 03/31/18 04/01/18 04/02/18 04/03/18 04/04/18    acetaminophen (TYLENOL) tablet 650 mg  Dose: 650 mg  Freq: EVERY 4 HOURS PRN Route: PO  PRN Reason: other  PRN Comment: multimodal surgical pain management along with NSAIDS and opioid medication as indicated based on pain control and physical function.  Start: 03/26/18 0000   Admin Instructions: May give first dose 4 hours after last scheduled dose of acetaminophen.  Maximum acetaminophen dose from all sources = 75 mg/kg/day not to exceed 4 grams/day.      2056 (650 mg)-Given         2359 (650 mg)-Given              albuterol (PROAIR HFA/PROVENTIL HFA/VENTOLIN HFA) Inhaler 2 puff  Dose: 2 puff  Freq: EVERY 6 HOURS PRN Route: IN  PRN Reasons: shortness of breath / dyspnea,wheezing  Start: 03/21/18 2304              aspirin EC EC tablet 81 mg  Dose: 81 mg  Freq: DAILY Route: PO  Start: 03/24/18 0800   Admin Instructions: Chest tube output less than 300 mL/8 hours     0859 (81 mg)-Given        0819 (81 mg)-Given        0946 (81 mg)-Given        0822 (81 mg)-Given        0806 (81 mg)-Given        0933 (81 mg)-Given        0638 (81 mg)-Given                  atorvastatin (LIPITOR) tablet 20 mg  Dose: 20 mg  Freq: DAILY Route: PO  Start: 03/23/18 0800    2011 (20 mg)-Given        2056 (20 mg)-Given        2031 (20 mg)-Given        2010 (20 mg)-Given        2030 (20 mg)-Given        2047 (20 mg)-Given        [ ] 2000           benzocaine-menthol (CEPACOL) 15-3.6 MG lozenge 1 lozenge  Dose: 1 lozenge  Freq: EVERY 1 HOUR PRN Route: BU  PRN Reason: sore throat  Start: 03/28/18 2226              bisacodyl (DULCOLAX) Suppository 10 mg  Dose: 10 mg  Freq: DAILY PRN Route: RE  PRN  Reason: constipation  Start: 04/03/18 1343         2055 (10 mg)-Given            cephalexin (KEFLEX) capsule 500 mg  Dose: 500 mg  Freq: EVERY 8 HOURS SCHEDULED Route: PO  Indications of Use: PERIOPERATIVE PHARMACOPROPHYLAXIS  Start: 04/02/18 0900        1149 (500 mg)-Given       1541 (500 mg)-Given       2220 (500 mg)-Given        0601 (500 mg)-Given       1708 (500 mg)-Given       2310 (500 mg)-Given        0612 (500 mg)-Given       [ ] 1400       [ ] 2200           dextrose 10 % 1,000 mL infusion  Freq: CONTINUOUS PRN Route: IV  PRN Comment: Give if on IV Insulin Infusion, and Parenteral or Enteral nutrition held or cycled off.   Start: 03/23/18 2334   Admin Instructions: Infuse IV D10W at TPN/TF rate whenever nutrition is held or cycled off.               finasteride (PROSCAR) tablet 5 mg  Dose: 5 mg  Freq: DAILY Route: PO  Start: 03/22/18 0800   Admin Instructions: *Do not handle tablets if you are pregnant*     0859 (5 mg)-Given        0820 (5 mg)-Given        0946 (5 mg)-Given        0822 (5 mg)-Given        0807 (5 mg)-Given        0933 (5 mg)-Given        0637 (5 mg)-Given                  furosemide (LASIX) tablet 20 mg  Dose: 20 mg  Freq: 2 TIMES DAILY. Route: PO  Start: 04/03/18 1600         1708 (20 mg)-Given        0637 (20 mg)-Given              [ ] 1600           glipiZIDE (GLUCOTROL) tablet 10 mg  Dose: 10 mg  Freq: EVERY MORNING BEFORE BREAKFAST Route: PO  Start: 04/02/18 0730   Admin Instructions: Take before or with meals.         0807 (10 mg)-Given        0933 (10 mg)-Given        0636 (10 mg)-Given           glucose 40 % gel 15-30 g  Dose: 15-30 g  Freq: EVERY 15 MIN PRN Route: PO  PRN Reason: low blood sugar  Start: 03/23/18 2334   Admin Instructions: Give 15 g for BG 51 to 69 mg/dL IF patient is conscious and able to swallow. Give 30 g for BG less than or equal to 50 mg/dL IF patient is conscious and able to swallow. Do NOT give glucose gel via enteral tube.  IF patient has enteral tube: give  apple juice 120 mL (4 oz or 15 g of CHO) via enteral tube for BG 51 to 69 mg/dL.  Give apple juice 240 mL (8 oz or 30 g of CHO) via enteral tube for BG less than or equal to 50 mg/dL.    ~Oral gel is preferable for conscious and able to swallow patient.   ~IF gel unavailable or patient refuses may provide apple juice 120 mL (4 oz or 15 g of CHO). Document juice on I and O flowsheet.              Or  dextrose 50 % injection 25-50 mL  Dose: 25-50 mL  Freq: EVERY 15 MIN PRN Route: IV  PRN Reason: low blood sugar  Start: 03/23/18 2334   Admin Instructions: Use if have IV access, BG less than 70 mg/dL and meet dose criteria below:  Dose if conscious and alert (or disorientated) and NPO = 25 mL  Dose if unconscious / not alert = 50 mL  Vesicant. For ordered doses up to 25 g, give IV Push undiluted. Give each 5g over 1 minute.              Or  glucagon injection 1 mg  Dose: 1 mg  Freq: EVERY 15 MIN PRN Route: SC  PRN Reason: low blood sugar  PRN Comment: May repeat x 1 only  Start: 03/23/18 2334   Admin Instructions: May give SQ or IM. ONLY use glucagon IF patient has NO IV access AND is UNABLE to swallow AND blood glucose is LESS than or EQUAL to 50 mg/dL.  Give IV Push over 1 minute. Reconstitute with 1mL sterile water.               hydrALAZINE (APRESOLINE) injection 10 mg  Dose: 10 mg  Freq: EVERY 30 MIN PRN Route: IV  PRN Reason: high blood pressure  PRN Comment: Systolic Blood Pressure greater than 140 mmHg or Diastolic Blood Pressure greater than 90 mmHg  Start: 03/23/18 2334   Admin Instructions: For ordered doses up to 40 mg, give IV Push undiluted over 1 minute.               HYDROmorphone (DILAUDID) injection 0.3-0.5 mg  Dose: 0.3-0.5 mg  Freq: EVERY 1 HOUR PRN Route: IV  PRN Reason: other  PRN Comment: pain control or improvement in physical function.  Start: 03/23/18 2334   Admin Instructions: Start at the lowest dose.  May adjust dose by 0.1 mg every 1 hour as needed for pain control or improvement in  physical function.  Hold dose for analgesic side effects.  Notify provider to assesss for uncontrolled pain or analgesic side effects.  Hold while on IV PCA.               insulin aspart (NovoLOG) inj (RAPID ACTING)  Dose: 1-5 Units  Freq: AT BEDTIME Route: SC  Start: 04/02/18 2200   Admin Instructions: MEDIUM INSULIN RESISTANCE DOSING    Do Not give Bedtime Correction Insulin if BG less than  200.   For  - 249 give 1 units.   For  - 299 give 2 units.   For  - 349 give 3 units.   For  -399 give 4 units.   For BG greater than or equal to 400 give 5 units.  Notify provider if glucose greater than or equal to 350 mg/dL after administration of correction dose.  If given at mealtime, must be administered 5 min before meal or immediately after.         (2200)-Not Given        (2314)-Not Given [C]        [ ] 2200           insulin aspart (NovoLOG) inj (RAPID ACTING)  Dose: 1-7 Units  Freq: 3 TIMES DAILY BEFORE MEALS Route: SC  Start: 04/02/18 1700   Admin Instructions: Correction Scale - MEDIUM INSULIN RESISTANCE DOSING     Do Not give Correction Insulin if Pre-Meal BG less than 140.   For Pre-Meal  - 189 give 1 unit.   For Pre-Meal  - 239 give 2 units.   For Pre-Meal  - 289 give 3 units.   For Pre-Meal  - 339 give 4 units.   For Pre-Meal - 399 give 5 units.   For Pre-Meal -449 give 6 units  For Pre-Meal BG greater than or equal to 450 give 7 units.   To be given with prandial insulin, and based on pre-meal blood glucose.    Notify provider if glucose greater than or equal to 350 mg/dL after administration of correction dose.  If given at mealtime, must be administered 5 min before meal or immediately after.         (1658)-Not Given [C]        (0820)-Not Given [C]       1210 (1 Units)-Given [C]       (1715)-Not Given [C]        (0645)-Not Given       [ ] 1200       [ ] 1700           insulin glargine (LANTUS) injection 45 Units  Dose: 45 Units  Freq: EVERY 24  HOURS Route: SC  Start: 03/28/18 1200    1332 (45 Units)-Given        1444 (45 Units)-Given        1234 (45 Units)-Given        1324 (45 Units)-Given        1542 (45 Units)-Given        1301 (45 Units)-Given        [ ] 1200           ipratropium - albuterol 0.5 mg/2.5 mg/3 mL (DUONEB) neb solution 3 mL  Dose: 3 mL  Freq: EVERY 4 HOURS PRN Route: NEBULIZATION  PRN Reason: shortness of breath / dyspnea  Start: 03/23/18 2334              lidocaine (XYLOCAINE) 5 % ointment  Freq: EVERY 4 HOURS PRN Route: Top  PRN Reason: moderate pain  Start: 03/24/18 0806   Admin Instructions: Apply to penis               magnesium sulfate 2 g in NS intermittent infusion (PharMEDium or FV Cmpd)  Dose: 2 g  Freq: DAILY PRN Route: IV  PRN Reason: magnesium supplementation  Last Dose: 2 g (03/30/18 1011)  Start: 03/23/18 2334   Admin Instructions: For Serum Mg++ 1.6 - 2 mg/dL  Give 2 g and recheck magnesium level next AM.     1657 (2 g)-New Bag        1011 (2 g)-New Bag        1556 (2 g)-New Bag         1802 (2 g)-New Bag [C]             magnesium sulfate 4 g in 100 mL sterile water (premade)  Dose: 4 g  Freq: EVERY 4 HOURS PRN Route: IV  PRN Reason: magnesium supplementation  Start: 03/23/18 2334   Admin Instructions: For serum Mg++ less than 1.6 mg/dL  Give 4 g and recheck magnesium level 2 hours after dose, and next AM.               metoprolol tartrate (LOPRESSOR) tablet 50 mg  Dose: 50 mg  Freq: 2 TIMES DAILY Route: PO  Start: 03/30/18 2000 2055 (50 mg)-Given        0945 (50 mg)-Given       2031 (50 mg)-Given        0822 (50 mg)-Given       2010 (50 mg)-Given        0807 (50 mg)-Given       2030 (50 mg)-Given        0933 (50 mg)-Given       2046 (50 mg)-Given        0637 (50 mg)-Given              [ ] 2000           multivitamin, therapeutic with minerals (THERA-VIT-M) tablet 1 tablet  Dose: 1 tablet  Freq: DAILY Route: PO  Start: 03/22/18 0800    0858 (1 tablet)-Given        0818 (1 tablet)-Given        0946 (1 tablet)-Given         0822 (1 tablet)-Given        0806 (1 tablet)-Given        0933 (1 tablet)-Given        0637 (1 tablet)-Given                  naloxone (NARCAN) injection 0.1-0.4 mg  Dose: 0.1-0.4 mg  Freq: EVERY 2 MIN PRN Route: IV  PRN Reason: opioid reversal  Start: 03/24/18 5566   Admin Instructions: For respiratory rate LESS than or EQUAL to 8.  Partial reversal dose:  0.1 mg titrated q 2 minutes for Analgesia Side Effects Monitoring Sedation Level of 3 (frequently drowsy, arousable, drifts to sleep during conversation).Full reversal dose:  0.4 mg bolus for Analgesia Side Effects Monitoring Sedation Level of 4 (somnolent, minimal or no response to stimulation).  For ordered doses up to 2mg give IVP. Give each 0.4mg over 15 seconds in emergency situations. For non-emergent situations further dilute in 9mL of NS to facilitate titration of response.               ondansetron (ZOFRAN-ODT) ODT tab 4 mg  Dose: 4 mg  Freq: EVERY 6 HOURS PRN Route: PO  PRN Reasons: nausea,vomiting  Start: 03/23/18 2334   Admin Instructions: This is Step 1 of nausea and vomiting management.  If nausea not resolved in 15 minutes, go to Step 2 prochlorperazine (COMPAZINE). Do not push through foil backing. Peel back foil and gently remove. Place on tongue immediately. Administration with liquid unnecessary  With dry hands, peel back foil backing and gently remove tablet; do not push oral disintegrating tablet through foil backing; administer immediately on tongue and oral disintegrating tablet dissolves in seconds; then swallow with saliva; liquid not required.              Or  ondansetron (ZOFRAN) injection 4 mg  Dose: 4 mg  Freq: EVERY 6 HOURS PRN Route: IV  PRN Reasons: nausea,vomiting  Start: 03/23/18 2334   Admin Instructions: This is Step 1 of nausea and vomiting management.  If nausea not resolved in 15 minutes, go to Step 2 prochlorperazine (COMPAZINE).  Irritant. For ordered doses up to 4 mg, give IV Push undiluted over 2-5 minutes.                oxyCODONE IR (ROXICODONE) tablet 5-10 mg  Dose: 5-10 mg  Freq: EVERY 4 HOURS PRN Route: PO  PRN Reason: other  PRN Comment: pain control or improvement in physical function. Hold dose for analgesic side effects.  Start: 03/23/18 2334   Admin Instructions: Start with the lowest dose. May adjust dose by 5 mg every 4 hours as needed. Notify provider to assess for uncontrolled pain or analgesic side effects. Hold while on PCA or with regular IV opioid dosing. Maximum total is 60 mg in 24 hours.               pantoprazole (PROTONIX) EC tablet 40 mg  Dose: 40 mg  Freq: EVERY MORNING Route: PO  Start: 03/27/18 0800   Admin Instructions: DO NOT CRUSH.     0859 (40 mg)-Given        0819 (40 mg)-Given        0946 (40 mg)-Given        0822 (40 mg)-Given        0807 (40 mg)-Given        0933 (40 mg)-Given        0637 (40 mg)-Given                  phenylephrine-shark liver oil-mineral oil-petrolatum (PREPARATION H) 0.25-14-74.9 % rectal ointment  Freq: 3 TIMES DAILY PRN Route: RE  PRN Reason: hemorrhoids  Start: 04/04/18 0655              polyethylene glycol (MIRALAX/GLYCOLAX) Packet 17 g  Dose: 17 g  Freq: DAILY Route: ORAL OR FEED  Start: 03/26/18 1030   Admin Instructions: Hold for loose stools.  1 Packet = 17 grams. Mixed prescribed dose in 8 ounces of water. Follow with 8 oz. of water.     0857 (17 g)-Given        0819 (17 g)-Given        (0954)-Not Given        0800-Hold [C]        0824 (17 g)-Given        0934 (17 g)-Given        0637 (17 g)-Given                  potassium chloride (KLOR-CON) Packet 20-40 mEq  Dose: 20-40 mEq  Freq: EVERY 2 HOURS PRN Route: ORAL OR FEED  PRN Reason: potassium supplementation  Start: 03/23/18 2334   Admin Instructions: Use if unable to tolerate tablets.    If Serum K+ 3.4-4.0, dose = 20 mEq x1. Recheck K+ level the next AM.  If Serum K+ 3.0-3.3, dose = 60 mEq po total dose (40 mEq x 1 followed in 2 hours by 20 mEq X1). Recheck K+ level 4 hours after dose and the next AM.  If Serum K+  2.5-2.9, dose = 80 mEq po total dose (40 mEq Q2H x2). Recheck K+ level 4 hours after dose and the next AM.  If Serum K+ less than 2.5, See IV order.  Dissolve packet contents in 4-8 ounces of cold water or juice.     2011 (20 mEq)-Given                 potassium chloride 10 mEq in 100 mL intermittent infusion with 10 mg lidocaine  Dose: 10 mEq  Freq: EVERY 1 HOUR PRN Route: IV  PRN Reason: potassium supplementation  Start: 03/23/18 2334   Admin Instructions: Infuse via PERIPHERAL LINE. Use potassium with lidocaine for pain with peripheral administration.  If Serum K+ 3.4-4.0, dose = 10 mEq/hr x2 doses. Recheck K+ level the next AM.  If Serum K+ 3.0-3.3, dose = 10 mEq/hr x4 doses (40 mEq IV total dose). Recheck K+ level 2 hours after dose and the next AM.  If Serum K+ less than 3.0, dose = 10 mEq/hr x6 doses (60 mEq IV total dose). Recheck K+ level 2 hours after dose and the next AM.               potassium chloride 20 mEq in 50 mL intermittent infusion  Dose: 20 mEq  Freq: EVERY 1 HOUR PRN Route: IV  PRN Reason: potassium supplementation  Start: 03/23/18 2334   Admin Instructions: Infuse via CENTRAL LINE Only.  May need EKG if less than 65 kg or on TPN - Max rate is 0.3 mEq/kg/hr for patients not on EKG monitoring.    If Serum K+ 3.4-4.0, dose = 20 mEq/hr x1 doses. Recheck K+ level the next AM.  If Serum K+ 3.0-3.3, dose = 20 mEq/hr x2 doses (40 mEq IV total dose).  Recheck K+ level 2 hours after dose and the next AM.  If Serum K+ less than 3.0, dose = 20 mEq/hr x3 doses (60 mEq IV total dose). Recheck K+ level 2 hours after dose and the next AM.               potassium chloride SA (K-DUR/KLOR-CON M) CR tablet 20-40 mEq  Dose: 20-40 mEq  Freq: EVERY 2 HOURS PRN Route: PO  PRN Reason: potassium supplementation  Start: 03/30/18 0826   Admin Instructions: Use if able to take PO.   If Serum K+ 3.4-4.0, dose = 20 mEq x1. Recheck K+ level the next AM.  If Serum K+ 3.0-3.3, dose = 60 mEq po total dose (40 mEq x1 followed  in 2 hours by 20 mEq x1). Recheck K+ level 4 hours after dose and the next AM.  If Serum K+ 2.5-2.9, dose = 80 mEq po total dose (40 mEq Q2H x2). Recheck K+ level 4 hours after dose and the next AM.  If Serum K+ less than 2.5, See IV order.  DO NOT CRUSH.      0830 (20 mEq)-Given        1552 (20 mEq)-Given        1034 (20 mEq)-Given              potassium phosphate 10 mmol in D5W 250 mL intermittent infusion  Dose: 10 mmol  Freq: DAILY PRN Route: IV  PRN Reason: phosphorous supplementation  Start: 03/23/18 2334   Admin Instructions: For serum phosphorus level 2.5-2.7  Do not infuse Phosphorus in the same line as TPN.   Give 10 mmol and recheck phosphorus level the next AM.  Each mmol of phosphate provides 1.47 mEq of Potassium. Multiply the patient's phosphate dose by 1.47 to determine the amount of potassium in this dose.               potassium phosphate 15 mmol in D5W 250 mL intermittent infusion  Dose: 15 mmol  Freq: DAILY PRN Route: IV  PRN Reason: phosphorous supplementation  Start: 03/23/18 2334   Admin Instructions: For serum phosphorus level 2.0-2.4  Do not infuse Phosphorus in the same line as TPN.   Give 15 mmol and recheck phosphorus level next AM.  Each mmol of phosphate provides 1.47 mEq of Potassium. Multiply the patient's phosphate dose by 1.47 to determine the amount of potassium in this dose.               potassium phosphate 20 mmol in D5W 250 mL intermittent infusion  Dose: 20 mmol  Freq: EVERY 6 HOURS PRN Route: IV  PRN Reason: phosphorous supplementation  Start: 03/23/18 2334   Admin Instructions: For serum phosphorus level 1.1-1.9  For CENTRAL Line ONLY  Do not infuse Phosphorus in the same line as TPN.   Give 20 mmol and recheck phosphorus level 2 hours after dose and next AM.  Each mmol of phosphate provides 1.47 mEq of Potassium. Multiply the patient's phosphate dose by 1.47 to determine the amount of potassium in this dose.               potassium phosphate 20 mmol in D5W 500 mL  intermittent infusion  Dose: 20 mmol  Freq: EVERY 6 HOURS PRN Route: IV  PRN Reason: phosphorous supplementation  Start: 03/23/18 2334   Admin Instructions: For serum phosphorus level 1.1-1.9  For peripheral line  Do not infuse Phosphorus in the same line as TPN.   Give 20 mmol and recheck phosphorus level 2 hours after dose and next AM. Repeat if necessary.  Each mmol of phosphate provides 1.47 mEq of Potassium. Multiply the patient's phosphate dose by 1.47 to determine the amount of potassium in this dose.               potassium phosphate 25 mmol in D5W 500 mL intermittent infusion  Dose: 25 mmol  Freq: EVERY 8 HOURS PRN Route: IV  PRN Reason: phosphorous supplementation  Start: 03/23/18 2334   Admin Instructions: For serum phosphorus level less than 1.1  Do not infuse Phosphorus in the same line as TPN.   Give 25 mmol and recheck phosphorus level 2 hours after dose and next AM.  Each mmol of phosphate provides 1.47 mEq of Potassium. Multiply the patient's phosphate dose by 1.47 to determine the amount of potassium in this dose.               senna-docusate (SENOKOT-S;PERICOLACE) 8.6-50 MG per tablet 2 tablet  Dose: 2 tablet  Freq: AT BEDTIME Route: ORAL OR FEED  Start: 03/29/18 2200   Admin Instructions: Hold for loose stools.     2151 (2 tablet)-Given        2056 (2 tablet)-Given               2200-Hold [C]        (2201)-Not Given        2220 (2 tablet)-Given        (2314)-Not Given        [ ] 2200           sodium chloride 0.9% infusion  Rate: 3 mL/hr   Freq: CONTINUOUS Route: OTHER  Last Dose: Stopped (03/28/18 1535)  Start: 03/22/18 0415   Admin Instructions: Infuse through Introducer Side Arm Impella Catheter Port through a pressure bag.  For all sheaths left in place.               sodium chloride 0.9% infusion  Rate: 3 mL/hr   Freq: CONTINUOUS Route: OTHER  Start: 03/22/18 0415   Admin Instructions: Infuse through Red pressure side arm Impella Catheter port through pressure bag               thiamine  tablet 100 mg  Dose: 100 mg  Freq: DAILY Route: PO  Start: 03/23/18 1045    0858 (100 mg)-Given        0819 (100 mg)-Given        0945 (100 mg)-Given        0822 (100 mg)-Given        0807 (100 mg)-Given        0933 (100 mg)-Given        0636 (100 mg)-Given                  Warfarin Therapy Reminder (Check START DATE - warfarin may be starting in the FUTURE)  Dose: 1 each  Freq: CONTINUOUS PRN Route: XX  Start: 03/27/18 1158   Admin Instructions: *Note to reorder warfarin daily*  Pharmacy Warfarin Dosing Service  Patient is on Warfarin Therapy - check for daily order              Completed Medications  Medications 03/29/18 03/30/18 03/31/18 04/01/18 04/02/18 04/03/18 04/04/18         Dose: 8 mL  Freq: ONCE Route: IV  Start: 04/03/18 1645   End: 04/03/18 1645         1645 (8 mL)-Given              Dose: 4 mg  Freq: ONCE AT 6PM Route: PO  Start: 04/03/18 1800   End: 04/03/18 1709         1709 (4 mg)-Given              Dose: 4 mg  Freq: ONCE AT 6PM Route: PO  Start: 04/02/18 1800   End: 04/02/18 1807        1807 (4 mg)-Given               Dose: 4 mg  Freq: ONCE AT 6PM Route: PO  Start: 04/01/18 1800   End: 04/01/18 1745       1745 (4 mg)-Given             Discontinued Medications  Medications 03/29/18 03/30/18 03/31/18 04/01/18 04/02/18 04/03/18 04/04/18         Dose: 10 mg  Freq: DAILY PRN Route: RE  PRN Reason: constipation  Start: 04/01/18 1753   End: 04/01/18 1836       1836-Med Discontinued            Dose: 20 mg  Freq: 2 TIMES DAILY. Route: IV  Start: 03/28/18 1600   End: 04/03/18 1324   Admin Instructions: For ordered doses up to 40 mg, give IV Push undiluted over 1-2 minutes.     0858 (20 mg)-Given       1645 (20 mg)-Given        0825 (20 mg)-Given       1651 (20 mg)-Given        0947 (20 mg)-Given       1551 (20 mg)-Given        0825 (20 mg)-Given       1640 (20 mg)-Given        0808 (20 mg)-Given       1541 (20 mg)-Given        0932 (20 mg)-Given       1324-Med Discontinued          Dose: 10 mg  Freq: 2 TIMES  DAILY BEFORE MEALS Route: PO  Start: 03/30/18 1630   End: 04/01/18 1112   Admin Instructions: Take before or with meals.      1807 (10 mg)-Given        0947 (10 mg)-Given       1552 (10 mg)-Given        0822 (10 mg)-Given       1112-Med Discontinued            Dose: 1-7 Units  Freq: AT BEDTIME Route: SC  Start: 03/28/18 2200   End: 04/02/18 0814   Admin Instructions: HIGH INSULIN RESISTANCE DOSING    Do Not give Bedtime Correction Insulin if BG less than 200.   For  - 224 give 1 units.   For  - 249 give 2 units.   For  - 274 give 3 units.   For  - 299 give 4 units.   For  - 324 give 5 units.   For  - 349 give 6 units.   For BG greater than or equal to 350 give 7 units.   Notify provider if glucose greater than or equal to 350 mg/dL after administration of correction dose.  If given at mealtime, must be administered 5 min before meal or immediately after.     (2145)-Not Given        (2237)-Not Given        (2127)-Not Given [C]        (2201)-Not Given        0814-Med Discontinued           Dose: 1-10 Units  Freq: 3 TIMES DAILY BEFORE MEALS Route: SC  Start: 03/28/18 1200   End: 04/02/18 0814   Admin Instructions: Correction Scale - HIGH INSULIN RESISTANCE DOSING     Do Not give Correction Insulin if Pre-Meal BG less than 140.   For Pre-Meal  - 164 give 1 unit.   For Pre-Meal  - 189 give 2 units.   For Pre-Meal  - 214 give 3 units.   For Pre-Meal  - 239 give 4 units.   For Pre-Meal  - 264 give 5 units.   For Pre-Meal  - 289 give 6 units.   For Pre-Meal  - 314 give 7 units.   For Pre-Meal  - 339 give 8 units.   For Pre-Meal  - 364 give 9 units.   For Pre-Meal BG greater than or equal to 365 give 10 units  To be given with prandial insulin, and based on pre-meal blood glucose.   Notify provider if glucose greater than or equal to 350 mg/dL after administration of correction dose.  If given at mealtime, must be administered 5 min  before meal or immediately after.     1001 (1 Units)-Given       (1224)-Not Given [C]       1802 (1 Units)-Given        0832 (1 Units)-Given [C]       1444 (1 Units)-Given [C]       1807 (2 Units)-Given [C]        1003 (1 Units)-Given [C]       (1142)-Not Given [C]       (1712)-Not Given [C]        (0942)-Not Given [C]       (1323)-Not Given [C]       (1640)-Not Given        0814-Med Discontinued  (0930)-Not Given               Freq: 3 TIMES DAILY PRN Route: RE  PRN Reason: hemorrhoids  Start: 04/04/18 0528   End: 04/04/18 0656          0656-Med Discontinued    Medications 03/29/18 03/30/18 03/31/18 04/01/18 04/02/18 04/03/18 04/04/18

## 2018-03-22 ENCOUNTER — APPOINTMENT (OUTPATIENT)
Dept: CARDIOLOGY | Facility: CLINIC | Age: 73
DRG: 228 | End: 2018-03-22
Attending: INTERNAL MEDICINE
Payer: COMMERCIAL

## 2018-03-22 ENCOUNTER — APPOINTMENT (OUTPATIENT)
Dept: ULTRASOUND IMAGING | Facility: CLINIC | Age: 73
DRG: 228 | End: 2018-03-22
Attending: PHYSICIAN ASSISTANT
Payer: COMMERCIAL

## 2018-03-22 ENCOUNTER — APPOINTMENT (OUTPATIENT)
Dept: GENERAL RADIOLOGY | Facility: CLINIC | Age: 73
DRG: 228 | End: 2018-03-22
Attending: INTERNAL MEDICINE
Payer: COMMERCIAL

## 2018-03-22 LAB
ALBUMIN SERPL-MCNC: 2.8 G/DL (ref 3.4–5)
ALBUMIN SERPL-MCNC: 2.9 G/DL (ref 3.4–5)
ALP SERPL-CCNC: 55 U/L (ref 40–150)
ALP SERPL-CCNC: 57 U/L (ref 40–150)
ALT SERPL W P-5'-P-CCNC: 69 U/L (ref 0–70)
ALT SERPL W P-5'-P-CCNC: 82 U/L (ref 0–70)
ANION GAP SERPL CALCULATED.3IONS-SCNC: 11 MMOL/L (ref 3–14)
ANION GAP SERPL CALCULATED.3IONS-SCNC: 12 MMOL/L (ref 3–14)
ANION GAP SERPL CALCULATED.3IONS-SCNC: 9 MMOL/L (ref 3–14)
AST SERPL W P-5'-P-CCNC: 264 U/L (ref 0–45)
AST SERPL W P-5'-P-CCNC: 310 U/L (ref 0–45)
BASE EXCESS BLDV CALC-SCNC: 6.8 MMOL/L
BASE EXCESS BLDV CALC-SCNC: 6.9 MMOL/L
BASE EXCESS BLDV CALC-SCNC: 7.4 MMOL/L
BASE EXCESS BLDV CALC-SCNC: 8.5 MMOL/L
BASOPHILS # BLD AUTO: 0 10E9/L (ref 0–0.2)
BASOPHILS NFR BLD AUTO: 0.2 %
BILIRUB SERPL-MCNC: 3.6 MG/DL (ref 0.2–1.3)
BILIRUB SERPL-MCNC: 4.5 MG/DL (ref 0.2–1.3)
BUN SERPL-MCNC: 38 MG/DL (ref 7–30)
BUN SERPL-MCNC: 40 MG/DL (ref 7–30)
BUN SERPL-MCNC: 41 MG/DL (ref 7–30)
CALCIUM SERPL-MCNC: 8 MG/DL (ref 8.5–10.1)
CALCIUM SERPL-MCNC: 8 MG/DL (ref 8.5–10.1)
CALCIUM SERPL-MCNC: 8.2 MG/DL (ref 8.5–10.1)
CHLORIDE SERPL-SCNC: 98 MMOL/L (ref 94–109)
CHLORIDE SERPL-SCNC: 98 MMOL/L (ref 94–109)
CHLORIDE SERPL-SCNC: 99 MMOL/L (ref 94–109)
CHOLEST SERPL-MCNC: 144 MG/DL
CHOLEST SERPL-MCNC: 145 MG/DL
CO2 SERPL-SCNC: 27 MMOL/L (ref 20–32)
CO2 SERPL-SCNC: 28 MMOL/L (ref 20–32)
CO2 SERPL-SCNC: 30 MMOL/L (ref 20–32)
CREAT SERPL-MCNC: 1.07 MG/DL (ref 0.66–1.25)
CREAT SERPL-MCNC: 1.13 MG/DL (ref 0.66–1.25)
CREAT SERPL-MCNC: 1.13 MG/DL (ref 0.66–1.25)
DIFFERENTIAL METHOD BLD: ABNORMAL
EOSINOPHIL # BLD AUTO: 0 10E9/L (ref 0–0.7)
EOSINOPHIL NFR BLD AUTO: 0.3 %
ERYTHROCYTE [DISTWIDTH] IN BLOOD BY AUTOMATED COUNT: 15.3 % (ref 10–15)
ERYTHROCYTE [DISTWIDTH] IN BLOOD BY AUTOMATED COUNT: 15.5 % (ref 10–15)
GFR SERPL CREATININE-BSD FRML MDRD: 64 ML/MIN/1.7M2
GFR SERPL CREATININE-BSD FRML MDRD: 64 ML/MIN/1.7M2
GFR SERPL CREATININE-BSD FRML MDRD: 68 ML/MIN/1.7M2
GLUCOSE BLDC GLUCOMTR-MCNC: 143 MG/DL (ref 70–99)
GLUCOSE BLDC GLUCOMTR-MCNC: 155 MG/DL (ref 70–99)
GLUCOSE BLDC GLUCOMTR-MCNC: 163 MG/DL (ref 70–99)
GLUCOSE BLDC GLUCOMTR-MCNC: 174 MG/DL (ref 70–99)
GLUCOSE BLDC GLUCOMTR-MCNC: 188 MG/DL (ref 70–99)
GLUCOSE BLDC GLUCOMTR-MCNC: 189 MG/DL (ref 70–99)
GLUCOSE BLDC GLUCOMTR-MCNC: 240 MG/DL (ref 70–99)
GLUCOSE BLDC GLUCOMTR-MCNC: 266 MG/DL (ref 70–99)
GLUCOSE BLDC GLUCOMTR-MCNC: 279 MG/DL (ref 70–99)
GLUCOSE BLDC GLUCOMTR-MCNC: 284 MG/DL (ref 70–99)
GLUCOSE BLDC GLUCOMTR-MCNC: 304 MG/DL (ref 70–99)
GLUCOSE SERPL-MCNC: 268 MG/DL (ref 70–99)
GLUCOSE SERPL-MCNC: 287 MG/DL (ref 70–99)
GLUCOSE SERPL-MCNC: 302 MG/DL (ref 70–99)
HBA1C MFR BLD: 7.8 % (ref 4.3–6)
HCO3 BLDV-SCNC: 33 MMOL/L (ref 21–28)
HCO3 BLDV-SCNC: 33 MMOL/L (ref 21–28)
HCO3 BLDV-SCNC: 34 MMOL/L (ref 21–28)
HCO3 BLDV-SCNC: 35 MMOL/L (ref 21–28)
HCT VFR BLD AUTO: 40.4 % (ref 40–53)
HCT VFR BLD AUTO: 42.3 % (ref 40–53)
HDLC SERPL-MCNC: 32 MG/DL
HDLC SERPL-MCNC: 34 MG/DL
HGB BLD-MCNC: 13.1 G/DL (ref 13.3–17.7)
HGB BLD-MCNC: 13.6 G/DL (ref 13.3–17.7)
IMM GRANULOCYTES # BLD: 0 10E9/L (ref 0–0.4)
IMM GRANULOCYTES NFR BLD: 0.4 %
INR PPP: 1.18 (ref 0.86–1.14)
INR PPP: 1.2 (ref 0.86–1.14)
INTERPRETATION ECG - MUSE: NORMAL
KCT BLD-ACNC: 156 SEC (ref 75–150)
KCT BLD-ACNC: 164 SEC (ref 75–150)
LACTATE BLD-SCNC: 1.3 MMOL/L (ref 0.7–2)
LACTATE BLD-SCNC: 2.4 MMOL/L (ref 0.7–2)
LDH SERPL L TO P-CCNC: 1746 U/L (ref 85–227)
LDH SERPL L TO P-CCNC: 1834 U/L (ref 85–227)
LDH SERPL L TO P-CCNC: 1836 U/L (ref 85–227)
LDLC SERPL CALC-MCNC: 45 MG/DL
LDLC SERPL CALC-MCNC: 45 MG/DL
LMWH PPP CHRO-ACNC: 0.32 IU/ML
LMWH PPP CHRO-ACNC: 0.34 IU/ML
LMWH PPP CHRO-ACNC: 0.51 IU/ML
LMWH PPP CHRO-ACNC: 0.62 IU/ML
LYMPHOCYTES # BLD AUTO: 2.2 10E9/L (ref 0.8–5.3)
LYMPHOCYTES NFR BLD AUTO: 19.2 %
MAGNESIUM SERPL-MCNC: 2 MG/DL (ref 1.6–2.3)
MAGNESIUM SERPL-MCNC: 2.1 MG/DL (ref 1.6–2.3)
MAGNESIUM SERPL-MCNC: 2.1 MG/DL (ref 1.6–2.3)
MCH RBC QN AUTO: 30.4 PG (ref 26.5–33)
MCH RBC QN AUTO: 30.7 PG (ref 26.5–33)
MCHC RBC AUTO-ENTMCNC: 32.2 G/DL (ref 31.5–36.5)
MCHC RBC AUTO-ENTMCNC: 32.4 G/DL (ref 31.5–36.5)
MCV RBC AUTO: 94 FL (ref 78–100)
MCV RBC AUTO: 96 FL (ref 78–100)
MONOCYTES # BLD AUTO: 1.1 10E9/L (ref 0–1.3)
MONOCYTES NFR BLD AUTO: 9.5 %
MRSA DNA SPEC QL NAA+PROBE: NEGATIVE
NEUTROPHILS # BLD AUTO: 7.9 10E9/L (ref 1.6–8.3)
NEUTROPHILS NFR BLD AUTO: 70.4 %
NONHDLC SERPL-MCNC: 110 MG/DL
NONHDLC SERPL-MCNC: 113 MG/DL
NRBC # BLD AUTO: 0 10*3/UL
NRBC BLD AUTO-RTO: 0 /100
NT-PROBNP SERPL-MCNC: 6123 PG/ML (ref 0–900)
NT-PROBNP SERPL-MCNC: 6757 PG/ML (ref 0–900)
O2/TOTAL GAS SETTING VFR VENT: ABNORMAL %
OXYHGB MFR BLDV: 47 %
OXYHGB MFR BLDV: 51 %
OXYHGB MFR BLDV: 53 %
OXYHGB MFR BLDV: 57 %
PCO2 BLDV: 52 MM HG (ref 40–50)
PCO2 BLDV: 54 MM HG (ref 40–50)
PH BLDV: 7.4 PH (ref 7.32–7.43)
PH BLDV: 7.4 PH (ref 7.32–7.43)
PH BLDV: 7.41 PH (ref 7.32–7.43)
PH BLDV: 7.41 PH (ref 7.32–7.43)
PHOSPHATE SERPL-MCNC: 3.8 MG/DL (ref 2.5–4.5)
PLATELET # BLD AUTO: 157 10E9/L (ref 150–450)
PLATELET # BLD AUTO: 173 10E9/L (ref 150–450)
PO2 BLDV: 30 MM HG (ref 25–47)
PO2 BLDV: 31 MM HG (ref 25–47)
PO2 BLDV: 31 MM HG (ref 25–47)
PO2 BLDV: 33 MM HG (ref 25–47)
POTASSIUM SERPL-SCNC: 3.4 MMOL/L (ref 3.4–5.3)
POTASSIUM SERPL-SCNC: 3.4 MMOL/L (ref 3.4–5.3)
POTASSIUM SERPL-SCNC: 3.6 MMOL/L (ref 3.4–5.3)
PROT SERPL-MCNC: 7 G/DL (ref 6.8–8.8)
PROT SERPL-MCNC: 7.6 G/DL (ref 6.8–8.8)
RBC # BLD AUTO: 4.31 10E12/L (ref 4.4–5.9)
RBC # BLD AUTO: 4.43 10E12/L (ref 4.4–5.9)
SODIUM SERPL-SCNC: 136 MMOL/L (ref 133–144)
SODIUM SERPL-SCNC: 137 MMOL/L (ref 133–144)
SODIUM SERPL-SCNC: 140 MMOL/L (ref 133–144)
SPECIMEN SOURCE: NORMAL
TRIGL SERPL-MCNC: 327 MG/DL
TRIGL SERPL-MCNC: 342 MG/DL
TROPONIN I SERPL-MCNC: 31.7 UG/L (ref 0–0.04)
TROPONIN I SERPL-MCNC: 32.3 UG/L (ref 0–0.04)
URATE SERPL-MCNC: 10.1 MG/DL (ref 3.5–7.2)
WBC # BLD AUTO: 10.1 10E9/L (ref 4–11)
WBC # BLD AUTO: 11.2 10E9/L (ref 4–11)

## 2018-03-22 PROCEDURE — 93970 EXTREMITY STUDY: CPT

## 2018-03-22 PROCEDURE — 80048 BASIC METABOLIC PNL TOTAL CA: CPT | Performed by: THORACIC SURGERY (CARDIOTHORACIC VASCULAR SURGERY)

## 2018-03-22 PROCEDURE — 85520 HEPARIN ASSAY: CPT | Performed by: SURGERY

## 2018-03-22 PROCEDURE — 85347 COAGULATION TIME ACTIVATED: CPT

## 2018-03-22 PROCEDURE — 82805 BLOOD GASES W/O2 SATURATION: CPT | Performed by: INTERNAL MEDICINE

## 2018-03-22 PROCEDURE — 84484 ASSAY OF TROPONIN QUANT: CPT | Performed by: SURGERY

## 2018-03-22 PROCEDURE — 84484 ASSAY OF TROPONIN QUANT: CPT | Performed by: INTERNAL MEDICINE

## 2018-03-22 PROCEDURE — 25000131 ZZH RX MED GY IP 250 OP 636 PS 637: Performed by: SURGERY

## 2018-03-22 PROCEDURE — 25000128 H RX IP 250 OP 636: Performed by: INTERNAL MEDICINE

## 2018-03-22 PROCEDURE — 40000986 XR CHEST PORT 1 VW

## 2018-03-22 PROCEDURE — 83605 ASSAY OF LACTIC ACID: CPT | Performed by: INTERNAL MEDICINE

## 2018-03-22 PROCEDURE — 25000132 ZZH RX MED GY IP 250 OP 250 PS 637: Performed by: SURGERY

## 2018-03-22 PROCEDURE — 85610 PROTHROMBIN TIME: CPT | Performed by: SURGERY

## 2018-03-22 PROCEDURE — 80053 COMPREHEN METABOLIC PANEL: CPT | Performed by: INTERNAL MEDICINE

## 2018-03-22 PROCEDURE — 86901 BLOOD TYPING SEROLOGIC RH(D): CPT | Performed by: PHYSICIAN ASSISTANT

## 2018-03-22 PROCEDURE — 80053 COMPREHEN METABOLIC PANEL: CPT | Performed by: SURGERY

## 2018-03-22 PROCEDURE — 83880 ASSAY OF NATRIURETIC PEPTIDE: CPT | Performed by: SURGERY

## 2018-03-22 PROCEDURE — 40000275 ZZH STATISTIC RCP TIME EA 10 MIN

## 2018-03-22 PROCEDURE — 25000125 ZZHC RX 250: Performed by: SURGERY

## 2018-03-22 PROCEDURE — 85025 COMPLETE CBC W/AUTO DIFF WBC: CPT | Performed by: SURGERY

## 2018-03-22 PROCEDURE — 93882 EXTRACRANIAL UNI/LTD STUDY: CPT | Mod: LT

## 2018-03-22 PROCEDURE — 84100 ASSAY OF PHOSPHORUS: CPT | Performed by: INTERNAL MEDICINE

## 2018-03-22 PROCEDURE — 85610 PROTHROMBIN TIME: CPT | Performed by: THORACIC SURGERY (CARDIOTHORACIC VASCULAR SURGERY)

## 2018-03-22 PROCEDURE — 83036 HEMOGLOBIN GLYCOSYLATED A1C: CPT | Performed by: THORACIC SURGERY (CARDIOTHORACIC VASCULAR SURGERY)

## 2018-03-22 PROCEDURE — 83615 LACTATE (LD) (LDH) ENZYME: CPT | Performed by: INTERNAL MEDICINE

## 2018-03-22 PROCEDURE — 80061 LIPID PANEL: CPT | Performed by: THORACIC SURGERY (CARDIOTHORACIC VASCULAR SURGERY)

## 2018-03-22 PROCEDURE — 83615 LACTATE (LD) (LDH) ENZYME: CPT | Performed by: SURGERY

## 2018-03-22 PROCEDURE — 20000004 ZZH R&B ICU UMMC

## 2018-03-22 PROCEDURE — 83735 ASSAY OF MAGNESIUM: CPT | Performed by: THORACIC SURGERY (CARDIOTHORACIC VASCULAR SURGERY)

## 2018-03-22 PROCEDURE — 25000132 ZZH RX MED GY IP 250 OP 250 PS 637: Performed by: INTERNAL MEDICINE

## 2018-03-22 PROCEDURE — 00000146 ZZHCL STATISTIC GLUCOSE BY METER IP

## 2018-03-22 PROCEDURE — 85520 HEPARIN ASSAY: CPT | Performed by: INTERNAL MEDICINE

## 2018-03-22 PROCEDURE — 85027 COMPLETE CBC AUTOMATED: CPT | Performed by: THORACIC SURGERY (CARDIOTHORACIC VASCULAR SURGERY)

## 2018-03-22 PROCEDURE — 83735 ASSAY OF MAGNESIUM: CPT | Performed by: INTERNAL MEDICINE

## 2018-03-22 PROCEDURE — 83615 LACTATE (LD) (LDH) ENZYME: CPT | Performed by: THORACIC SURGERY (CARDIOTHORACIC VASCULAR SURGERY)

## 2018-03-22 PROCEDURE — 40000264 ECHO COMPLETE WITH OPTISON

## 2018-03-22 PROCEDURE — 99233 SBSQ HOSP IP/OBS HIGH 50: CPT | Mod: GC | Performed by: ANESTHESIOLOGY

## 2018-03-22 PROCEDURE — 36415 COLL VENOUS BLD VENIPUNCTURE: CPT | Performed by: SURGERY

## 2018-03-22 PROCEDURE — 93306 TTE W/DOPPLER COMPLETE: CPT | Mod: 26 | Performed by: INTERNAL MEDICINE

## 2018-03-22 PROCEDURE — 86923 COMPATIBILITY TEST ELECTRIC: CPT | Performed by: PHYSICIAN ASSISTANT

## 2018-03-22 PROCEDURE — 86850 RBC ANTIBODY SCREEN: CPT | Performed by: PHYSICIAN ASSISTANT

## 2018-03-22 PROCEDURE — 84550 ASSAY OF BLOOD/URIC ACID: CPT | Performed by: SURGERY

## 2018-03-22 PROCEDURE — 86900 BLOOD TYPING SEROLOGIC ABO: CPT | Performed by: PHYSICIAN ASSISTANT

## 2018-03-22 PROCEDURE — 83735 ASSAY OF MAGNESIUM: CPT | Performed by: SURGERY

## 2018-03-22 PROCEDURE — 25500064 ZZH RX 255 OP 636: Performed by: THORACIC SURGERY (CARDIOTHORACIC VASCULAR SURGERY)

## 2018-03-22 PROCEDURE — 40000014 ZZH STATISTIC ARTERIAL MONITORING DAILY

## 2018-03-22 PROCEDURE — 83880 ASSAY OF NATRIURETIC PEPTIDE: CPT | Performed by: THORACIC SURGERY (CARDIOTHORACIC VASCULAR SURGERY)

## 2018-03-22 PROCEDURE — 80061 LIPID PANEL: CPT | Performed by: SURGERY

## 2018-03-22 PROCEDURE — 27210995 ZZH RX 272: Performed by: THORACIC SURGERY (CARDIOTHORACIC VASCULAR SURGERY)

## 2018-03-22 PROCEDURE — 25000128 H RX IP 250 OP 636: Performed by: SURGERY

## 2018-03-22 PROCEDURE — 25000125 ZZHC RX 250: Performed by: INTERNAL MEDICINE

## 2018-03-22 PROCEDURE — 25000128 H RX IP 250 OP 636: Performed by: THORACIC SURGERY (CARDIOTHORACIC VASCULAR SURGERY)

## 2018-03-22 PROCEDURE — 25000125 ZZHC RX 250: Performed by: PHYSICIAN ASSISTANT

## 2018-03-22 PROCEDURE — 85520 HEPARIN ASSAY: CPT | Performed by: THORACIC SURGERY (CARDIOTHORACIC VASCULAR SURGERY)

## 2018-03-22 PROCEDURE — 99233 SBSQ HOSP IP/OBS HIGH 50: CPT | Mod: 25 | Performed by: INTERNAL MEDICINE

## 2018-03-22 RX ORDER — DEXTROSE MONOHYDRATE 25 G/50ML
25-50 INJECTION, SOLUTION INTRAVENOUS
Status: DISCONTINUED | OUTPATIENT
Start: 2018-03-22 | End: 2018-03-24

## 2018-03-22 RX ORDER — ATORVASTATIN CALCIUM 20 MG/1
20 TABLET, FILM COATED ORAL DAILY
Status: DISCONTINUED | OUTPATIENT
Start: 2018-03-23 | End: 2018-04-04 | Stop reason: HOSPADM

## 2018-03-22 RX ORDER — MUPIROCIN 20 MG/G
1 OINTMENT TOPICAL 2 TIMES DAILY
Status: COMPLETED | OUTPATIENT
Start: 2018-03-22 | End: 2018-03-23

## 2018-03-22 RX ORDER — SODIUM CHLORIDE 9 MG/ML
INJECTION, SOLUTION INTRAVENOUS CONTINUOUS
Status: DISCONTINUED | OUTPATIENT
Start: 2018-03-22 | End: 2018-04-04 | Stop reason: HOSPADM

## 2018-03-22 RX ORDER — CEFAZOLIN SODIUM 2 G/100ML
2 INJECTION, SOLUTION INTRAVENOUS
Status: COMPLETED | OUTPATIENT
Start: 2018-03-23 | End: 2018-03-23

## 2018-03-22 RX ORDER — ACYCLOVIR 200 MG/1
30 CAPSULE ORAL ONCE
Status: COMPLETED | OUTPATIENT
Start: 2018-03-22 | End: 2018-03-22

## 2018-03-22 RX ORDER — FUROSEMIDE 10 MG/ML
60 INJECTION INTRAMUSCULAR; INTRAVENOUS ONCE
Status: COMPLETED | OUTPATIENT
Start: 2018-03-22 | End: 2018-03-22

## 2018-03-22 RX ORDER — CEFAZOLIN SODIUM 1 G
1 VIAL (EA) INJECTION SEE ADMIN INSTRUCTIONS
Status: DISCONTINUED | OUTPATIENT
Start: 2018-03-23 | End: 2018-03-23 | Stop reason: HOSPADM

## 2018-03-22 RX ORDER — NICOTINE POLACRILEX 4 MG
15-30 LOZENGE BUCCAL
Status: DISCONTINUED | OUTPATIENT
Start: 2018-03-22 | End: 2018-03-24

## 2018-03-22 RX ORDER — LISINOPRIL 10 MG/1
40 TABLET ORAL DAILY
Status: DISCONTINUED | OUTPATIENT
Start: 2018-03-22 | End: 2018-03-22

## 2018-03-22 RX ADMIN — Medication 1000 MG: at 07:59

## 2018-03-22 RX ADMIN — METOPROLOL TARTRATE 50 MG: 50 TABLET, FILM COATED ORAL at 07:45

## 2018-03-22 RX ADMIN — DILTIAZEM HYDROCHLORIDE 3.5 MG/HR: 5 INJECTION INTRAVENOUS at 00:22

## 2018-03-22 RX ADMIN — POTASSIUM CHLORIDE 20 MEQ: 750 TABLET, EXTENDED RELEASE ORAL at 02:35

## 2018-03-22 RX ADMIN — METOPROLOL TARTRATE 50 MG: 50 TABLET, FILM COATED ORAL at 02:24

## 2018-03-22 RX ADMIN — HUMAN INSULIN 4 UNITS/HR: 100 INJECTION, SOLUTION SUBCUTANEOUS at 17:07

## 2018-03-22 RX ADMIN — SODIUM CHLORIDE: 9 INJECTION, SOLUTION INTRAVENOUS at 06:33

## 2018-03-22 RX ADMIN — Medication 5 MG: at 08:00

## 2018-03-22 RX ADMIN — HUMAN ALBUMIN MICROSPHERES AND PERFLUTREN 6 ML: 10; .22 INJECTION, SOLUTION INTRAVENOUS at 10:45

## 2018-03-22 RX ADMIN — NITROGLYCERIN 0.3 MCG/KG/MIN: 20 INJECTION INTRAVENOUS at 00:23

## 2018-03-22 RX ADMIN — ASPIRIN 81 MG CHEWABLE TABLET 81 MG: 81 TABLET CHEWABLE at 07:45

## 2018-03-22 RX ADMIN — Medication 20 MG/HR: at 08:46

## 2018-03-22 RX ADMIN — HUMAN INSULIN 3 UNITS/HR: 100 INJECTION, SOLUTION SUBCUTANEOUS at 10:22

## 2018-03-22 RX ADMIN — AMIODARONE HYDROCHLORIDE 1 MG/MIN: 50 INJECTION, SOLUTION INTRAVENOUS at 03:45

## 2018-03-22 RX ADMIN — INSULIN GLARGINE 60 UNITS: 100 INJECTION, SOLUTION SUBCUTANEOUS at 00:39

## 2018-03-22 RX ADMIN — FUROSEMIDE 60 MG: 10 INJECTION, SOLUTION INTRAVENOUS at 17:08

## 2018-03-22 RX ADMIN — SODIUM CHLORIDE 30 ML: 9 INJECTION, SOLUTION INTRAMUSCULAR; INTRAVENOUS; SUBCUTANEOUS at 14:20

## 2018-03-22 RX ADMIN — HEPARIN SODIUM 12.3 ML/HR: 10000 INJECTION, SOLUTION INTRAVENOUS; SUBCUTANEOUS at 06:31

## 2018-03-22 RX ADMIN — AMIODARONE HYDROCHLORIDE 1 MG/MIN: 50 INJECTION, SOLUTION INTRAVENOUS at 08:44

## 2018-03-22 RX ADMIN — NITROGLYCERIN 0.8 MCG/KG/MIN: 20 INJECTION INTRAVENOUS at 09:19

## 2018-03-22 RX ADMIN — FINASTERIDE 5 MG: 5 TABLET, FILM COATED ORAL at 08:00

## 2018-03-22 RX ADMIN — FUROSEMIDE 20 MG/HR: 10 INJECTION, SOLUTION INTRAVENOUS at 04:00

## 2018-03-22 RX ADMIN — LISINOPRIL 40 MG: 10 TABLET ORAL at 07:45

## 2018-03-22 RX ADMIN — MULTIPLE VITAMINS W/ MINERALS TAB 1 TABLET: TAB at 07:45

## 2018-03-22 RX ADMIN — MUPIROCIN 1 G: 20 OINTMENT TOPICAL at 22:16

## 2018-03-22 RX ADMIN — AMIODARONE HYDROCHLORIDE 1 MG/MIN: 50 INJECTION, SOLUTION INTRAVENOUS at 12:40

## 2018-03-22 NOTE — H&P
CVTS H&P    HPI:   Izaiah Alvarez is a 72 year old male with PMHx of obesity, Afib on coumadin, DM2, HTN, and TIA who was transferred from [facility] following MI with new onset left HF (20-25% compared to 46% previously). Patient started having chest pain about 6 months ago. Acutely worsened on 3/19 with increased SOB patient presented to hospital. He was found to have chronic LAD disease with 95% stenosis and collaterals with multi-vessel disease. Patient was then found to have acute hypoxic respiratory after developing pulmonary edema and had impella placed on 3/20 with EF of 20-25% with global L hypokinesis reduced RV systolic function. He was also started on lasix gtt for fluid, nitroglycerin gtt to decrease afterload and diltiazem gtt for afib with RVR . States chest pain has resolved and shortness of breath has improved with decreased oxygen requirements. Denies fevers, chills, nausea, vomiting, weight loss, change in appetite, numbness, tingling, nor weakness.    ROS:  10 point ROS negative except if noted above    PMH:  Obesity, afib on coumadin, DM, hypercholesterolemia, HTN, asthma, TIA    PSHx:  Orchiopexy    Medications:  Current Facility-Administered Medications   Medication     furosemide (LASIX) 500 mg/50mL infusion ADULT MAX CONC     diltiazem (CARDIZEM) 125 mg in sodium chloride 0.9 % 125 mL infusion     nitroPRUsside (NIPRIDE) 50 mg, sodium thiosulfate 500 mg in D5W 125 mL IV infusion (conc: 0.4mg/mL)     HOLD nitroGLYcerin IF     Patient is already receiving anticoagulation with heparin, enoxaparin (LOVENOX), warfarin (COUMADIN)  or other anticoagulant medication     Reason ACE/ARB/ARNI order not selected     heparin  drip 25,000 units in 0.45% NaCl 250 mL (see additional administration details for dose)     heparin bolus from infusion pump     potassium chloride SA (K-DUR/KLOR-CON M) CR tablet 20-40 mEq     potassium chloride (KLOR-CON) Packet 20-40 mEq     potassium chloride 10 mEq in 100 mL sterile  water intermittent infusion (premix)     potassium chloride 10 mEq in 100 mL intermittent infusion with 10 mg lidocaine     potassium chloride 20 mEq in 50 mL intermittent infusion     magnesium sulfate 2 g in NS intermittent infusion (PharMEDium or FV Cmpd)     magnesium sulfate 4 g in 100 mL sterile water (premade)     Continuing beta blocker from home medication list OR beta blocker order already placed during this visit     acetaminophen (TYLENOL) tablet 650 mg     albuterol (PROAIR HFA/PROVENTIL HFA/VENTOLIN HFA) Inhaler 2 puff     [START ON 3/22/2018] aspirin chewable tablet 81 mg     [START ON 3/22/2018] atorvastatin (LIPITOR) half-tab 5 mg     [START ON 3/22/2018] chlorthalidone (HYGROTON) half-tab 12.5 mg     [START ON 3/22/2018] finasteride (PROSCAR) tablet 5 mg     [START ON 3/22/2018] glipiZIDE (GLUCOTROL) tablet 20 mg     insulin glargine (LANTUS) injection 60 Units     [START ON 3/22/2018] lisinopril (PRINIVIL/ZESTRIL) tablet 30 mg     [START ON 3/22/2018] metFORMIN (GLUCOPHAGE) tablet 1,000 mg     metoprolol tartrate (LOPRESSOR) tablet 50 mg     [START ON 3/22/2018] MULTIVITAMIN & MINERAL LIQD 1 tablet     [START ON 3/22/2018] Fish Oil CAPS 500 mg     Allergies:   Review of patient's allergies indicates not on file.   NKDA    FamHx:  No family history on file.  Denie history of bleeding disorders    SocHx:      Quit smoking 40 years ago. Denies alcohol. Denies illicit drug use.    Review of Systems:  ROS: 10 point ROS neg other than the symptoms noted above in the HPI.    Objective:  Vitals:   Temp:  [99.1  F (37.3  C)] 99.1  F (37.3  C)  Heart Rate:  [96] 96  Resp:  [20] 20  BP: (138)/(101) 138/101  SpO2:  [95 %] 95 %    Resp: 20    Intake/Output:        Physical exam:  General: Laying comfortably  Neuro: A&Ox3, NAD  Resp: Breathing non-labored oximask  CV: RRR  Abdomen: Soft, Non-distended, Non-tender  Extremities: warm and well perfused. Dopplerable DP bilateral. Sensation intact. Impella R  groin.    Labs:  No lab results found in last 7 days.    Imaging / Intervention results:  3/19 Cardiac Cath:  50% left ostial. Distal left main 95% stenosis - affecting all 3 vessesl of the coronaary. Diffuse disease in the branch vessels. Moderate LV impairment. Advanced stenoses in distal aorta and proximal iliacs    Echo: unable to locate results. Reported LVEF 20-25%    Assessment and Plan: Izaiah Alvarez is a 72 year old male with PMHx of obesity, Afib on coumadin, DM2, HTN, and TIA [facility] with new onset left HF (reported 20-25% compared to 46% previously).    Neuro:  Tylenol PRN for pain  Psych: None  CV:  MI with diffuse vessel disease and new onset LVHF (20-25%). Consult HF service. Cut dilt in half then dc in 4 hours. Start amio at 1 straight rate. Continue nitro gtt maintain SBP < 110   PTA: ASA 81mg qday, atrovastatin 5mg qday, metoprolol 50mg BID, linopril 30mg qday  Resp:  Wean O2 as tolerated. Albuterol neb PRN  GI: None  FEN:  Cardiac diet. Limit 2L intake. Electrolyte replacement   :  Felton strict I/O. Lasix gtt   PTA: chlorthalidone 12.5mg qday   Heme:  Heparin gtt  ID:  None  Endo:  PTA: insulin lantus 60 units qhs, glipizide 20mg BID, metformin 1000mg BID  PPx:  None  Lines: Peripheral lines  Dispo:  Stable on CVICU    Olayinka Orellana MD  [facility]

## 2018-03-22 NOTE — PROGRESS NOTES
Social Work: Assessment with Discharge Plan    Patient Name:  Izaiah Alvarez  :  1945  Age:  72 year old  MRN:  [id number]  Risk/Complexity Score:  Filed Complexity Screen Score: 7  Completed assessment with:  Pt's wife    Presenting Information   Reason for Referral:  Discharge plan  Date of Intake:  2018  Referral Source:  Physician  Decision Maker:  Pt, at baseline  Alternate Decision Maker:  Per [facility] policy, Pt's wife would be his surrogate decision-maker.  Health Care Directive:  Provided education  Living Situation:  Apartment; Pt lives with his wife in a 3rd floor apartment with 3 outdoor steps into the building and approximately 24 interior steps to their unit.   Previous Functional Status:  Independent; Pt had been independent with mobility and self-care. 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  Patient and family understanding of hospitalization:  Pt's wife states that [facility] [place] and [facility] then transferred to a local hospital with a cardiac program. She states that Pt was transferred to [facility] for advanced cardiac care.   Cultural/Language/Spiritual Considerations:  Per wife, she and Pt have a strong shant. Pt's primary language is English.  Adjustment to Illness:  Pt was not alert during interview. Pt's wife appears to be coping, however she is reluctant to leave Pt's side and has chosen to stay in the hospital, between Pt's room and the family lounge.    Physical Health  Reason for Admission:  No diagnosis found.  Services Needed/Recommended:  Other:  pending further progress and workup    Support System  Significant relationship at present time:  Wife, Nieces and nephews  Family of origin is available for support:  Yes, Pt's wife is available to be nearby and supportive. Pt has two nieces in the [facility] area and several near his home in [place].  Other support available:  Additional family and friends.  Gaps in support system:   None  Patient is caregiver to:  None     Provider Information   Primary Care Physician:  No primary care provider on file.   None   Clinic:  No primary physician on file.      :  None    Financial   Income Source:  Retired  Financial Concerns:  None  Insurance:    Payor/Plan Subscriber Name Rel Member # Group #       Discharge Plan   Patient and family discharge goal:  Pt's wife is hoping that Pt will recover and return to his baseline function.  Provided education on discharge plan:  Yes, pending further workup.  Patient agreeable to discharge plan:  Pending recommendations; if rehab is required, Pt's wife feels they would prefer to return to [place].  A list of Medicare Certified Facilities was provided to the patient and/or family to encourage patient choice. Patient's choices for facility are:  No facilities discussed until level of care is determined.   Will NH provide Skilled rehabilitation or complex medical:  N/A  General information regarding anticipated insurance coverage and possible out of pocket cost was discussed. Patient and patient's family are aware patient may incur the cost of transportation to the facility, pending insurance payment: YES; discussed that [facility] may cover return transport if medical transport is required. Per wife. [facility] covered the transport to [facility].  Barriers to discharge:  Medical progress; treatment plan.    Discharge Recommendations   Anticipated Disposition:  pending further workup  Transportation Needs:  Other:  pending needs at time of discharge.  Name of Transportation Company and Phone:  N/A    Additional comments   Writer met with Pt's wife at bedside. She states that they have good support from family and friends. Pt and wife have no children, but have 9 nieces and nephews and 25 great-nieces/nephews. She states that when Pt was being transferred to [facility], a nephew right away managed booking a flight for her so she could be with him. She feels that they will have good  support when they return home. SW will remain available for ongoing support and d/c planning as needed.        Delia Hernandez, [facility]  ICU   Pager: [phone]

## 2018-03-22 NOTE — PROGRESS NOTES
CV ICU PROGRESS NOTE  March 22, 2018      CO-MORBIDITIES:   Obesity  Afib  DM2  HTN  CAD  TIA  Heart failure    ASSESSMENT: Izaiah Alvarez is a 72 year old male with PMHx of obesity, Afib on coumadin, DM2, HTN, and TIA who was transferred from [facility] following MI with new onset left HF (reported 20-25% compared to 46% previously).    TODAY'S PROGRESS:   -Get coronary angio disc  -Hold metop  -Switch to insulin gtt  -D/c nitro gtt  -Start nipride gtt    PLAN:  Neuro/ pain/ sedation:  #H/o TIA  - Monitor neurological status. Notify the MD for any acute changes in exam.  - APAP for pain.    Pulmonary care:   - Supplemental oxygen to keep saturation above 92 %.  - Albuterol PRN    Cardiovascular:    #MI with diffuse multivessel disease  #LVEF (20-25%)  #HTN  #HLD  - Monitor hemodynamic status.   - Amio at 1 straight rate.   - Continue nitro gtt maintain SBP < 110  - PTA: ASA 81mg qday, atrovastatin 5mg qday, linopril 30mg qday  - Heparin gtt  - On impella support  - Hold PTA metop    GI care:   - Cardiac diet. Limit 2L intake    Fluids/ Electrolytes/ Nutrition:   - no IVF  - On lasix gtt  - ICU electrolyte replacement protocol  - No indication for parenteral nutrition.  - PTA: chlorthalidone 12.5 mg    Renal/ Fluid Balance:    - Urine output is adequate so far.  - Will continue to monitor intake and output.    Endocrine:  #Diabetes Mellitus 2    - Switch to insulin gtt  - d/c lantus    ID/ Antibiotics:  - No indication for antibiotics.    Heme:     - Hemoglobin stable.  - Heparin gtt    Prophylaxis:    - Mechanical prophylaxis for DVT.   - hep gtt    Lines/ tubes/ drains:  - RIJ triple lumen, PIV x2, Felton    Disposition:  - CV ICU.    Patient seen, findings and plan discussed with CV ICU staff, Dr. Coleman.    William Cancino MD  General Surgery PGY-3    ====================================    SUBJECTIVE:   NAEO. No chest pain.     OBJECTIVE:   1. VITAL SIGNS:   Temp:  [98.6  F (37  C)-99.1  F (37.3  C)] 98.6  F (37   C)  Heart Rate:  [] 105  Resp:  [18-20] 18  BP: (110-138)/() 114/85  MAP:  [76 mmHg-101 mmHg] 85 mmHg  Arterial Line BP: ()/(67-89) 106/75  SpO2:  [90 %-99 %] 98 %  Resp: 18    2. INTAKE/ OUTPUT:   I/O last 3 completed shifts:  In: 1186.85 [P.O.:480; I.V.:706.85]  Out: 1850 [Urine:1850]    3. PHYSICAL EXAMINATION:   General: Laying comfortably  Neuro: A&Ox3, NAD  Resp: Breathing non-labored oximask  CV: tachycardic regular rhythm  Abdomen: Soft, Non-distended, Non-tender  Extremities: warm and well perfused. Impella R groin.    4. INVESTIGATIONS:   Arterial Blood Gases   No lab results found in last 7 days.  Complete Blood Count     Recent Labs  Lab 03/22/18  0148 03/22/18  0016   WBC 10.1 11.2*   HGB 13.1* 13.6    157     Basic Metabolic Panel    Recent Labs  Lab 03/22/18  0645 03/22/18  0148 03/22/18  0016    140 137   POTASSIUM 3.6 3.4 3.4   CHLORIDE 98 99 98   CO2 30 28 27   BUN 41* 38* 40*   CR 1.13 1.13 1.07   * 287* 302*     Liver Function Tests    Recent Labs  Lab 03/22/18  0645 03/22/18  0148 03/22/18  0016   *  --  310*   ALT 69  --  82*   ALKPHOS 55  --  57   BILITOTAL 3.6*  --  4.5*   ALBUMIN 2.8*  --  2.9*   INR  --  1.18* 1.20*     Pancreatic Enzymes  No lab results found in last 7 days.  Coagulation Profile    Recent Labs  Lab 03/22/18  0148 03/22/18  0016   INR 1.18* 1.20*         5. RADIOLOGY:   Recent Results (from the past 24 hour(s))   XR Chest Port 1 View    Narrative    EXAM: Chest x-ray one view  3/22/2018 3:41 AM      HISTORY: CHF    COMPARISON: None    FINDINGS: Supine AP views of the chest obtained. Impala device. Right  IJ central line tip projects over the mid SVC. An Impella device is  identified projected over the LV and aortic outflow track.    The trachea is midline. The cardiac silhouette is within normal  limits. Small bilateral pleural effusions, left greater than right. No  pneumothorax. Mild streaky bibasilar opacities.        Impression    IMPRESSION: Small bilateral pleural effusions with overlying streaky  airspace opacities, suggesting atelectasis and/or pulmonary edema.    I have personally reviewed the examination and initial interpretation  and I agree with the findings.    ANNEL RICCI MD       =========================================

## 2018-03-22 NOTE — PROCEDURES
Date: 3/22/2018  Procedure: Right IJ central line  Indication: Hemodynamic monitoring/Intravenous access  Resident: Olayinka Orellana MD  Attending: Dr. Coleman  A time-out was completed verifying correct patient, procedure, site, positioning, and special equipment if applicable. The patient was placed in a dependent position appropriate for central line placement based on the vein to be cannulated. The patient s left  neck was prepped and draped in sterile fashion. 1% Lidocaine was used to anesthetize the surrounding skin area. A triple lumen 9-Divehi Cordis catheter was introduced into the the right internal jugular vein using the Seldinger technique and under ultrasound guidance. The catheter was threaded smoothly over the guide wire and appropriate blood return was obtained. Each lumen of the catheter was evacuated of air and flushed with sterile saline. The catheter was then sutured in place to the skin at 17cm and a sterile dressing applied. Perfusion to the extremity distal to the point of catheter insertion was checked and found to be adequate. CXR to follow to confirm placement.  Estimated Blood Loss: 5ml  The patient tolerated the procedure well and there were no complications.

## 2018-03-22 NOTE — PLAN OF CARE
Problem: Patient Care Overview  Goal: Individualization & Mutuality    Pt arrived 3/21 @ 2230 from [place] w/ impella for high-risk CABG workup    Neuro: A/O, denies pain  CV: Afib HR 90s-110s. Transitioned from diltiazem to amio gtt. Nitro titrated to SBP<110. Impella in place at P7 level  Resp: Sats mid 90s on 5L facemask.  GI/: Felton in place w/ urine output >100 ml/hr, lasix gtt 20 mg/hr. No BM for 4 days per pt. BG running higher 200s, lantus given at bedtime. 2g Na diet  Skin: No wounds. Intact except for lines/incisions.    Events: Left radial Cherelle and RIJ triple lumen placed by Dr. Orellana. At 0130 impella placement waveform noted to be flatlined w/ some clots visible in the side port lumen. Impella support notified and attempts to aspirate clot material were not successful. Impella rep to assess in am.    Plan for CABG workup per CVTS. Continue to monitor.    Drips:  Amio 1  Nitro 0.8  Heparin 1900  Lasix 20

## 2018-03-22 NOTE — PROGRESS NOTES
Care Coordinator Progress Note     Admission Date/Time:  3/21/2018  Attending MD:  William Blank, *     Data  Chart reviewed, discussed with interdisciplinary team.   Patient transferred from [place] following MI with new onset left HF.    Concerns with insurance coverage for discharge needs: None.  Current Living Situation: Patient lives with spouse.  Support System: Supportive and Involved  Barriers to Discharge: Pt is not medically ready for d/c.    Assessment  Pt is in ICU with Impella and drips.  Visited pt and spouse to introduce self and for support.  Pt was sleeping at time of my visit and visited only with pt spouse, Ely.  Ely stated she arrived from [place] today and the team did update her about the plan of care.  Pt spouse stated pt was admitted to [facility] first and transferred to [facility] and yesterday transferred to [facility] for surgery.  Pt spouse stated she has 2 nieces that lives in the [facility] but she doesn't want to leave the hospital and she is planning to stay here in the hospital.  RNCC informed pt spouse about the local hotels, near the hospital.  Pt spouse states she has the info for the hotels and will think about it.  RNCC will cont to follow plan of care.     Plan  Anticipated Discharge Date:  TBD.  Anticipated Discharge Plan:   TBD.  RNCC will cont to follow plan of care.      Monroe Diaz RN, PHN, BSN  4A and 4E/ ICU  Care Coordinator  Phone: [phone]  Pager: [phone]

## 2018-03-22 NOTE — PROGRESS NOTES
Admitted from [facility] w/ impella in place    Drips at time of admission    Nitro 0.08 mcg/kg/min  Heparin 1900 units/hr  Lasix 20 mg/hr  Diltiazem 7.5 mg/hr    Pt stable upon arrival. Dr. Olayinka Orellana present and updated. Will continue to monitor.

## 2018-03-22 NOTE — PROCEDURES
Date: 3/22/2018  Procedure: Left radial arterial line placement  Indication: Hemodynamic monitoring  Resident: Olayinka Orellana MD  Attending: Dr. Coleman  A time-out was completed verifying correct patient, procedure, site, positioning, and special equipment if applicable. The patient was placed in a dependent position appropriate for right radial arterial line placement based. An Julio Cesar's test was performed to confirm adequate collateral flow. The patient s  forearm was prepped and draped in sterile fashion. 1% Lidocaine was used to anesthetize the surrounding skin area. Was unable to get pulsatile blood return even with ultrasound guidance so turned to left radial artery. Repositioned, prepped, draped and anesthetized surrounding skin area. Placed a single lumen catheter was introduced into the the left radial artery using the Seldinger technique and under ultrasound guidance. The catheter was threaded smoothly over the guide wire and appropriate blood return was obtained. Arterial waveform was confirmed and a wide pulse pressure was noted. The catheter was then sutured in place to the skin and a sterile dressing applied.  Perfusion to the extremity distal to the point of catheter insertion was checked and found to be adequate.   Estimated Blood Loss: 10 ml  The patient tolerated the procedure well and there were no complications.

## 2018-03-22 NOTE — PROGRESS NOTES
CVTS PROGRESS NOTE  March 22, 2018      CO-MORBIDITIES:   Obesity  Afib  DM2  HTN  CAD  TIA  Heart failure    ASSESSMENT: Izaiah Alvarez is a 72 year old male with PMHx of obesity, Afib on coumadin, DM2, HTN, and TIA who was transferred from [facility] following MI with new onset left HF (reported 20-25% compared to 46% previously).    TODAY'S PROGRESS:   -Get coronary angio disc  -Hold metop  -Switch to insulin gtt  -D/c nitro gtt  -Start nipride gtt    PLAN:  Neuro/ pain/ sedation:  #H/o TIA  - Monitor neurological status. Notify the MD for any acute changes in exam.  - APAP for pain.    Pulmonary care:   - Supplemental oxygen to keep saturation above 92 %.  - Albuterol PRN    Cardiovascular:    #MI with diffuse multivessel disease  #LVEF (20-25%)  #HTN  #HLD  - Monitor hemodynamic status.   - Amio at 1 straight rate.   - Continue nitro gtt maintain SBP < 110  - PTA: ASA 81mg qday, atrovastatin 5mg qday, linopril 30mg qday  - Heparin gtt  - On impella support  - Hold PTA metop    GI care:   - Cardiac diet. Limit 2L intake    Fluids/ Electrolytes/ Nutrition:   - no IVF  - On lasix gtt  - ICU electrolyte replacement protocol  - No indication for parenteral nutrition.  - PTA: chlorthalidone 12.5 mg    Renal/ Fluid Balance:    - Urine output is adequate so far.  - Will continue to monitor intake and output.    Endocrine:  #Diabetes Mellitus 2    - Switch to insulin gtt  - d/c lantus    ID/ Antibiotics:  - No indication for antibiotics.    Heme:     - Hemoglobin stable.  - Heparin gtt    Prophylaxis:    - Mechanical prophylaxis for DVT.   - hep gtt    Lines/ tubes/ drains:  - RIJ triple lumen, PIV x2, Felton    Disposition:  - CV ICU.    Patient seen, findings and plan discussed with CVTS fellow.    William Cancino MD  General Surgery PGY-3    ====================================    SUBJECTIVE:   NAEO. No chest pain.     OBJECTIVE:   1. VITAL SIGNS:   Temp:  [98.6  F (37  C)-99.1  F (37.3  C)] 98.6  F (37  C)  Heart Rate:   [] 105  Resp:  [18-20] 18  BP: (110-138)/() 114/85  MAP:  [76 mmHg-101 mmHg] 85 mmHg  Arterial Line BP: ()/(67-89) 106/75  SpO2:  [90 %-99 %] 98 %  Resp: 18    2. INTAKE/ OUTPUT:   I/O last 3 completed shifts:  In: 1186.85 [P.O.:480; I.V.:706.85]  Out: 1850 [Urine:1850]    3. PHYSICAL EXAMINATION:   General: Laying comfortably  Neuro: A&Ox3, NAD  Resp: Breathing non-labored oximask  CV: tachycardic regular rhythm  Abdomen: Soft, Non-distended, Non-tender  Extremities: warm and well perfused. Impella R groin.    4. INVESTIGATIONS:   Arterial Blood Gases   No lab results found in last 7 days.  Complete Blood Count     Recent Labs  Lab 03/22/18  0148 03/22/18  0016   WBC 10.1 11.2*   HGB 13.1* 13.6    157     Basic Metabolic Panel    Recent Labs  Lab 03/22/18  0645 03/22/18  0148 03/22/18  0016    140 137   POTASSIUM 3.6 3.4 3.4   CHLORIDE 98 99 98   CO2 30 28 27   BUN 41* 38* 40*   CR 1.13 1.13 1.07   * 287* 302*     Liver Function Tests    Recent Labs  Lab 03/22/18  0645 03/22/18 0148 03/22/18  0016   *  --  310*   ALT 69  --  82*   ALKPHOS 55  --  57   BILITOTAL 3.6*  --  4.5*   ALBUMIN 2.8*  --  2.9*   INR  --  1.18* 1.20*     Pancreatic Enzymes  No lab results found in last 7 days.  Coagulation Profile    Recent Labs  Lab 03/22/18  0148 03/22/18  0016   INR 1.18* 1.20*         5. RADIOLOGY:   Recent Results (from the past 24 hour(s))   XR Chest Port 1 View    Narrative    EXAM: Chest x-ray one view  3/22/2018 3:41 AM      HISTORY: CHF    COMPARISON: None    FINDINGS: Supine AP views of the chest obtained. Impala device. Right  IJ central line tip projects over the mid SVC. An Impella device is  identified projected over the LV and aortic outflow track.    The trachea is midline. The cardiac silhouette is within normal  limits. Small bilateral pleural effusions, left greater than right. No  pneumothorax. Mild streaky bibasilar opacities.       Impression    IMPRESSION:  Small bilateral pleural effusions with overlying streaky  airspace opacities, suggesting atelectasis and/or pulmonary edema.    I have personally reviewed the examination and initial interpretation  and I agree with the findings.    ANNEL RICCI MD       =========================================

## 2018-03-22 NOTE — CONSULTS
Cardiology Consult         Date of Service (when I saw the patient): 03/22/18    ASSESSMENT:   Izaiah Alvarez is a 72 year old male with PMHx of obesity, Afib on coumadin, DM2, HTN, and TIA who was transferred from [facility] following MI with new onset left HF (20-25% compared to 46% previously). Acutely worsened on 3/19 with increased SOB patient presented to hospital. Now s/p Impella, started on lasix gtt for fluid, nitroglycerin gtt to decrease afterload and diltiazem gtt for afib with RVR. Arrived on dilt gtt, lasix gtt, heparin gtt, nitro gtt. BPs, vitals stable.       RECOMMEND:  #Cardiogenic shock likely 2/2 to severe ischemic CAD, along with 95% LM lesion. Some component of negative inotropy is likely contributing given his high dose of dilt gtt.   -currently very stable. Would continue aspirin, atorvastatin, lisinopril 40 mg daily, metoprolol 50 mg BID.  -would also continue lasix gtt at 20 mg with diuresis goal net neg 1-1.5; creatinine normal. LVEDP was 26 in cath lab on 3/19, so would benefit from diuresis.  -continue nitroglycerin gtt at current rate to keep MAPs around 80 BPM. Currently at 0.3 mcg/min.  -formal echo tomorrow. Bedside echo demonstrates EF of 20-25%.  -chest xray, lactate, CBC, CMP, INR.  -consider swan placement tomorrow to better assess hemodynamics; can likely go to the cath lab for this. Consider weaning of impellla as tolerated.  -will need to get revascularized with CABG this admission, likely impella pulled during this time.  -trend troponins.  -given significant LM disease, would hold off on inotropes at this time.    #Afib  -will d/c dilt gtt and start amiodarone gtt at 1 mg/hr. If HRs become more difficult to control, please do slow bolus of 75 mg over 20 minutes for better HR control.      Adalberto Fountain       I have reviewed vital signs, notes, medications, labs and imaging. I have also seen and examined the patient.  Please see the consult note dated 3/22/17 for updated findings and  plan.    Nita Hairston MD  Section Head - Advanced Heart Failure, Transplantation and Mechanical Circulatory Support  Co-Director - Adult Congenital and Cardiovascular Genetics Center  Associate Professor of Medicine, [facility]        REASON FOR CONSULT: Cardiogenic shock    History of Present Illness     Izaiah Alvarez is a 72 year old male with PMHx of obesity, Afib on coumadin, DM2, HTN, and TIA who was transferred from [facility] following MI with new onset left HF (20-25% compared to 46% previously). Patient started having chest pain about 6 months ago - reported SOB, diaphoresis, shoulder pain. Acutely worsened on 3/19 with increased SOB patient presented to hospital. ST depressions in multiple leads, ST elevation in aVR and lead III - taken to cath lab. Hazy 95% plus stenosis of left main. Left circumflex nondominant and had 90% blockage in OM. LAD had 75% stenosis. Large diagonal branches. RCA occluded at ostium, well collateralized. Did not put in IABP because moderate disease in distal aorta. Echo showed EF 20-25%. Troponin increased to 92, despite the initiation of medical therapy. Cv surgery recommended medical management initially with consideration for bypass in the coming days; however,  patient was then found to have acute hypoxic respiratory after developing pulmonary edema and had impella placed on 3/20 with EF of 20-25% with global L hypokinesis reduced RV systolic function.     He was also started on lasix gtt for fluid, nitroglycerin gtt to decrease afterload and diltiazem gtt for afib with RVR. Was diuresed 4 L in the ICU. Most recent creatinine was 1.  Transferred to tertiary care center for better potential backup. Had carotid u/s showing 40-69% stenosis.      States chest pain has resolved and shortness of breath has improved with decreased oxygen requirements. Denies fevers, chills, nausea, vomiting, weight loss, change in appetite, numbness, tingling, nor weakness.     Arrived  on dilt gtt, lasix gtt, heparin gtt, nitro gtt. BPs, vitals stable.     Past Medical History    I have reviewed this patient's medical history and updated it with pertinent information if needed.     Obesity, afib on coumadin, DM, hypercholesterolemia, HTN, asthma, TIA    Past Surgical History   I have reviewed this patient's surgical history and updated it with pertinent information if needed.    Orchiopexy    Imaging / Intervention results:  3/19 Cardiac Cath:  50% left ostial. Distal left main 95% stenosis - affecting all 3 vessesl of the coronaary. Diffuse disease in the branch vessels. Moderate LV impairment. Advanced stenoses in distal aorta and proximal iliacs     Echo: unable to locate results. Reported LVEF 20-25%    Prior to Admission Medications   Prior to Admission Medications   Prescriptions Last Dose Informant Patient Reported? Taking?   ASPIRIN 81 PO   Yes Yes   Sig: Take 1 tablet by mouth daily   ATORVASTATIN CALCIUM PO   Yes Yes   Sig: Take 5 mg by mouth daily   Acetaminophen (TYLENOL PO) Unknown at Unknown time  Yes No   Sig: Take 650 mg by mouth every 6 hours as needed for mild pain   CHLORTHALIDONE PO 3/21/2018 at Unknown time  Yes Yes   Sig: Take 12.5 mg by mouth daily   FINASTERIDE PO 3/21/2018 at Unknown time  Yes Yes   Sig: Take 5 mg by mouth daily   GLIPIZIDE PO   Yes Yes   Sig: Take 20 mg by mouth 2 times daily (before meals)   LISINOPRIL PO 3/21/2018 at Unknown time  Yes Yes   Sig: Take 30 mg by mouth daily   METFORMIN HCL PO   Yes Yes   Sig: Take 1,000 mg by mouth 2 times daily (with meals)   METOPROLOL TARTRATE PO   Yes Yes   Sig: Take 50 mg by mouth 2 times daily   Multiple Vitamins-Minerals (MULTIVITAMIN & MINERAL PO) 3/21/2018 at Unknown time  Yes Yes   Sig: Take 1 tablet by mouth daily   Omega-3 Fatty Acids (FISH OIL) 500 MG CAPS 3/21/2018 at Unknown time  Yes Yes   Sig: Take 500 mg by mouth daily   WARFARIN SODIUM PO Unknown at Unknown time  Yes No   Sig: Take 2 mg by mouth    albuterol (PROAIR HFA/PROVENTIL HFA/VENTOLIN HFA) 108 (90 BASE) MCG/ACT Inhaler   Yes Yes   Sig: Inhale 2 puffs into the lungs every 6 hours as needed for shortness of breath / dyspnea or wheezing   fluticasone (FLOVENT DISKUS) 50 MCG/BLIST AEPB Unknown at Unknown time  Yes No   Sig: Inhale 2 puffs into the lungs daily as needed Both nostrils for rhinitis   insulin glargine (LANTUS) 100 UNIT/ML injection   Yes Yes   Sig: Inject 60 Units Subcutaneous At Bedtime      Facility-Administered Medications: None     Allergies   No Known Allergies    Social History   I have reviewed this patient's social history and updated it with pertinent information if needed. Izaiah Alvarez       Quit smoking 40 years ago. Denies alcohol. Denies illicit drug use.    Family History   I have reviewed this patient's family history and updated it with pertinent information if needed.   No family history on file.    Review of Systems   The 10 point Review of Systems is negative other than noted in the HPI or here.     Physical Exam   Temp: 99.1  F (37.3  C) Temp src: Oral BP: (!) 138/101   Heart Rate: 96 Resp: 20 SpO2: 95 % O2 Device: Oxymask Oxygen Delivery: 5 LPM  Vital Signs with Ranges  Temp:  [99.1  F (37.3  C)] 99.1  F (37.3  C)  Heart Rate:  [96] 96  Resp:  [20] 20  BP: (138)/(101) 138/101  SpO2:  [95 %] 95 %  251 lbs 12.25 oz    GEN: NAD, pleasant  HEENT: no icterus  CV: RRR, normal s1/s2, no murmurs/rubs/s3/s4, no heave. JVP mid neck.   CHEST: CTAB  ABD: soft, NT/ND, NABS  : no flank/suprapubic tenderness  NEURO: AA&Ox3, fluent/appropriate, motor grossly nonfocal  Extremites: Dopplerable DP bilateral. Sensation intact. Impella R groin. Trace edema.  PSYCH: cooperative, affect appropriate    Data   Data reviewed today:  I personally reviewed the EKG tracing showing afib with HR of 109.  No lab results found in last 7 days.    No results found for this or any previous visit (from the past 24 hour(s)).    Thank you for allowing me to  care for this patient. This has been discussed with the attending physician.    Adalberto Fountain MD  Cardiology Fellow, PGY-4

## 2018-03-22 NOTE — H&P
Cardiac ICU Consult    HPI:   Izaiah Alvarez is a 72 year old male with PMHx of obesity, Afib on coumadin, DM2, HTN, and TIA [facility] with new onset left HF (20-25% compared to 46% previously). Patient started having chest pain about 6 months ago. Acutely worsened on 3/19 with increased SOB patient presented to hospital. He was found to have chronic LAD disease with 95% stenosis and collaterals with multi-vessel disease. Patient was then found to have acute hypoxic respiratory after developing pulmonary edema and had impella placed on 3/20 with EF of 20-25% with global L hypokinesis reduced RV systolic function. He was also started on lasix gtt for fluid, nitroglycerin gtt to decrease afterload and diltiazem gtt for afib with RVR . States chest pain has resolved and shortness of breath has improved with decreased oxygen requirements. Denies fevers, chills, nausea, vomiting, weight loss, change in appetite, numbness, tingling, nor weakness.    ROS:  10 point ROS negative except if noted above    PMH:  Obesity, afib on coumadin, DM, hypercholesterolemia, HTN, asthma, TIA    PSHx:  Orchiopexy    Medications:  Current Facility-Administered Medications   Medication     furosemide (LASIX) 500 mg/50mL infusion ADULT MAX CONC     HOLD nitroGLYcerin IF     Patient is already receiving anticoagulation with heparin, enoxaparin (LOVENOX), warfarin (COUMADIN)  or other anticoagulant medication     heparin  drip 25,000 units in 0.45% NaCl 250 mL (see additional administration details for dose)     heparin bolus from infusion pump     potassium chloride SA (K-DUR/KLOR-CON M) CR tablet 20-40 mEq     potassium chloride (KLOR-CON) Packet 20-40 mEq     potassium chloride 10 mEq in 100 mL sterile water intermittent infusion (premix)     potassium chloride 10 mEq in 100 mL intermittent infusion with 10 mg lidocaine     potassium chloride 20 mEq in 50 mL intermittent infusion     magnesium sulfate 2 g in  NS intermittent infusion (PharMEDium or FV Cmpd)     magnesium sulfate 4 g in 100 mL sterile water (premade)     Continuing beta blocker from home medication list OR beta blocker order already placed during this visit     acetaminophen (TYLENOL) tablet 650 mg     albuterol (PROAIR HFA/PROVENTIL HFA/VENTOLIN HFA) Inhaler 2 puff     [START ON 3/22/2018] aspirin chewable tablet 81 mg     [START ON 3/22/2018] atorvastatin (LIPITOR) half-tab 5 mg     [START ON 3/22/2018] chlorthalidone (HYGROTON) half-tab 12.5 mg     [START ON 3/22/2018] finasteride (PROSCAR) tablet 5 mg     [START ON 3/22/2018] glipiZIDE (GLUCOTROL) tablet 20 mg     insulin glargine (LANTUS) injection 60 Units     [START ON 3/22/2018] lisinopril (PRINIVIL/ZESTRIL) tablet 30 mg     [START ON 3/22/2018] metFORMIN (GLUCOPHAGE) tablet 1,000 mg     metoprolol tartrate (LOPRESSOR) tablet 50 mg     [START ON 3/22/2018] MULTIVITAMIN & MINERAL LIQD 1 tablet     [START ON 3/22/2018] Fish Oil CAPS 500 mg     nitroGLYcerin 50 mg in D5W 250 mL (adult std) infusion     amiodarone (NEXTERONE) 250 mg in D5W 250 mL infusion     diltiazem (CARDIZEM) 125 mg in sodium chloride 0.9 % 125 mL infusion     Allergies:   Review of patient's allergies indicates not on file.   NKDA    FamHx:  No family history on file.  Denie history of bleeding disorders    SocHx:      Quit smoking 40 years ago. Denies alcohol. Denies illicit drug use.    Review of Systems:  ROS: 10 point ROS neg other than the symptoms noted above in the HPI.    Objective:  Vitals:   Temp:  [99.1  F (37.3  C)] 99.1  F (37.3  C)  Heart Rate:  [96] 96  Resp:  [20] 20  BP: (138)/(101) 138/101  SpO2:  [95 %] 95 %    Resp: 20    Intake/Output:        Physical exam:  General: Laying comfortably  Neuro: A&Ox3, NAD  Resp: Breathing non-labored oximask  CV: RRR  Abdomen: Soft, Non-distended, Non-tender  Extremities: warm and well perfused. Dopplerable DP bilateral. Sensation intact. Impella R groin.    Labs:  No lab  results found in last 7 days.    Imaging / Intervention results:  3/19 Cardiac Cath:  50% left ostial. Distal left main 95% stenosis - affecting all 3 vessesl of the coronaary. Diffuse disease in the branch vessels. Moderate LV impairment. Advanced stenoses in distal aorta and proximal iliacs    Echo: unable to locate results. Reported LVEF 20-25%    Assessment and Plan: Izaiah Alvarez is a 72 year old male with PMHx of obesity, Afib on coumadin, DM2, HTN, and TIA who was transferred from [facility] following MI with new onset left HF (reported 20-25% compared to 46% previously).    Neuro:  Tylenol PRN for pain  Psych: None  CV:  MI with diffuse vessel disease and new onset LVHF (20-25%). Consult HF service. Cut dilt in half then dc in 4 hours. Start amio at 1 straight rate. Continue nitro gtt maintain SBP < 110   PTA: ASA 81mg qday, atrovastatin 5mg qday, metoprolol 50mg BID, linopril 30mg qday  Resp:  Wean O2 as tolerated. Albuterol neb PRN  GI: None  FEN:  Cardiac diet. Limit 2L intake. Electrolyte replacement   :  Felton strict I/O. Lasix gtt   PTA: chlorthalidone 12.5mg qday   Heme:  Heparin gtt  ID:  None  Endo:  PTA: insulin lantus 60 units qhs, glipizide 20mg BID, metformin 1000mg BID  PPx:  None  Lines: Peripheral lines  Dispo:  Stable on CVICU    Olayinka Orellana MD  [facility]

## 2018-03-22 NOTE — PROGRESS NOTES
[facility] Cardiology Progress Note           Assessment and Plan:   72 year old with a PMHx of DM2, HTN, TIA, Atrial Fibrillation on Warfarin who presented to [facility] with STEMI with coronary angiogram showing triple vessel disease (95% LM, 75% LAD, 90% OM,  RCA). ECHO showed EF around 20-25%.     The plan was for CABG; however, patient decompensated and required Impella support to maintain cardiac output. Given his operative risk, he was transferred for high risk CABG with possibly hemodynamic support/VAD backup. Of note, at [facility], patient was in atrial fibrillation with RVR, he was started on Diltiazem gtt and continued on home metoprolol.      Overall, patient appeared to be in cardiogenic shock at the outside hospital. This is likely a combination of his ischemic disease in addition to dual negative ionotropes (metoprolol and diltiazem). When he arrived, his blood pressure was stable on P7 support.    #Cardiogenic Shock    -Etiology as discussed above. Diltiazem was stopped on arrival. Will stop Metoprolol as well (already received two doses). Will also stop Lisinopril 40 mg.  -Impella was weaned to P2; however, CI was 1.34 based on oxyhemoglobin on 47. Impella was increased to P5 with improvement in MAP. Will try to wean Impella again once the above medications are metabolized.   -Repeat ECHO showed EF around 45-50%. Therefore, patient might not need advanced heart failure therapies once he is revascularized.   -Will stop Lasix gtt since CVP was 4. Will give intermittent diuresis to maintain net even volume status.   -If SVR begins to increase, will start Nitroprusside gtt to reduce afterload.   -Patient will undergo CABG tomorrow. Will likely pull the Impella tomorrow in the OR.     #Three Vessel CAD   -CABG as above tomorrow.   -Will pull Impella at that time.  -Continue Heparin gtt and ASA     #Atrial Fibrillation  -Stop BB and CCB  -Continue Amio     #Congestive Heart Failure   -Plan as above. Stop  BB, CCB, and ACEI given that he is on Impella support.  -Repeat ECHO showed EF around 45-50%. Will defer advanced therapies at this time until patient is re-vascularized.     Kristen Jerome PGY-4   Cardiology Fellow   Pager: [phone]      I have reviewed today's vital signs, notes, medications, labs and imaging. I have also seen and examined the patient and agree with the findings and plan as outlined above.    Nita Hairston MD  Section Head - Advanced Heart Failure, Transplantation and Mechanical Circulatory Support  Co-Director - Adult Congenital and Cardiovascular Genetics Center  Associate Professor of Medicine, [facility]           Subjective:   Patient has no active complaints at this time. Denies chest pain or shortness of breath; however, he is on 5L Facemask.   His Impella is set at P7.           Review of Systems:   A comprehensive review of systems was performed and found to be negative except as described in this note          Medications:     Current Facility-Administered Medications   Medication     sodium chloride 0.9% infusion     sodium chloride 0.9% infusion     heparin 25,000 Units in D5W 500 mL Cardiac device purge infusion     dextrose 10 % 1,000 mL infusion     insulin 1 unit/mL in saline (NovoLIN, HumuLIN Regular) drip - ADULT IV Infusion     glucose 40 % gel 15-30 g    Or     dextrose 50 % injection 25-50 mL    Or     glucagon injection 1 mg     amiodarone (NEXTERONE) 1,500 mg in D5W 250 mL infusion     nitroPRUsside (NIPRIDE) 50 mg, sodium thiosulfate 500 mg in D5W 125 mL IV infusion (conc: 0.4mg/mL)     HOLD nitroGLYcerin IF     Patient is already receiving anticoagulation with heparin, enoxaparin (LOVENOX), warfarin (COUMADIN)  or other anticoagulant medication     heparin  drip 25,000 units in 0.45% NaCl 250 mL (see additional administration details for dose)     heparin bolus from infusion pump     potassium chloride SA (K-DUR/KLOR-CON M) CR tablet 20-40 mEq     potassium  chloride (KLOR-CON) Packet 20-40 mEq     potassium chloride 10 mEq in 100 mL sterile water intermittent infusion (premix)     potassium chloride 10 mEq in 100 mL intermittent infusion with 10 mg lidocaine     potassium chloride 20 mEq in 50 mL intermittent infusion     magnesium sulfate 2 g in NS intermittent infusion (PharMEDium or FV Cmpd)     magnesium sulfate 4 g in 100 mL sterile water (premade)     Continuing beta blocker from home medication list OR beta blocker order already placed during this visit     acetaminophen (TYLENOL) tablet 650 mg     albuterol (PROAIR HFA/PROVENTIL HFA/VENTOLIN HFA) Inhaler 2 puff     aspirin chewable tablet 81 mg     atorvastatin (LIPITOR) half-tab 5 mg     finasteride (PROSCAR) tablet 5 mg     multivitamin, therapeutic with minerals (THERA-VIT-M) tablet 1 tablet     fish oil-omega-3 fatty acids capsule 1,000 mg     glucose 40 % gel 15-30 g    Or     dextrose 50 % injection 25-50 mL    Or     glucagon injection 1 mg               Objective:   Temp: 98.4  F (36.9  C) Temp src: Oral BP: 114/85   Heart Rate: 100 Resp: 15 SpO2: 95 % O2 Device: Oxymask Oxygen Delivery: 5 LPM    Gen: No acute distress   HEENT: NC/AT   CV: RRR, unable to detect CVP   Pulm: CTAB   GI: s/nt/nd   Ext: No edema           Data:     Lab Results   Component Value Date    WBC 10.1 03/22/2018    HGB 13.1 (L) 03/22/2018    HCT 40.4 03/22/2018     03/22/2018     03/22/2018    POTASSIUM 3.6 03/22/2018    CHLORIDE 98 03/22/2018    CO2 30 03/22/2018    BUN 41 (H) 03/22/2018    CR 1.13 03/22/2018     (H) 03/22/2018    NTBNPI 6123 (H) 03/22/2018    TROPI 31.699 (HH) 03/22/2018     (H) 03/22/2018    ALT 69 03/22/2018    ALKPHOS 55 03/22/2018    BILITOTAL 3.6 (H) 03/22/2018    INR 1.18 (H) 03/22/2018     All laboratory data reviewed     EKG: ST Elevation in AVR and III. ST depression in precordial leads.      ECHO:   Interpretation Summary  Technically difficult study.  Mildly (EF 45-50%)  reduced left ventricular function is present. Accuracy of  LVEF and RWM assessment affected by atrial fibrillation. Anterior and septal  wall hypokinesis is noted.  An Impella device is present. The inlet area is in appropriate position at 5.5  cm below the aortic valve in the LV mid cavity.  Global right ventricular function and size are normal.  No significant valvular abnormalities noted.  The inferior vena cava is normal. Estimated mean right atrial pressure is 3  mmHg.  No pericardial effusion is present.     Compared to report from [facility] (no images) dated 3/19/18, the  LVEF has improved from 20-25%.     CXR:   IMPRESSION: Small bilateral pleural effusions with overlying streaky  airspace opacities, suggesting atelectasis and/or pulmonary edema.

## 2018-03-22 NOTE — PROGRESS NOTES
Brief CVTS Note:     Patient tentatively scheduled for CABG with Dr. Marks on 3/23/2018 at 1200.   Pre-op labs, carotid US and vein mapping complete. CT chest without contrast this pm. Pre-op teaching complete  NPO at midnight, Hold heparin at 0700  Discussed operation, risks and benefits, consent to be signed.   Other cares per primary team.     Scott Sanon PA-C  Cardiothoracic Surgery  March 22, 2018 12:29 PM   p: [phone]

## 2018-03-22 NOTE — PLAN OF CARE
Problem: Patient Care Overview  Goal: Plan of Care/Patient Progress Review  OT:  Per nursing, pt not appropriate for therapy today, pt with Impella.  Will reschedule.

## 2018-03-22 NOTE — PROGRESS NOTES
CLINICAL NUTRITION SERVICES - ASSESSMENT NOTE     Nutrition Prescription    RECOMMENDATIONS FOR MDs/PROVIDERS TO ORDER:  - Total daily fluids/adjustments per MD   - ADAT after surgery as soon as medically able. IF unable to promptly advance diet and prolonged inadequate PO intake is anticipated, may need to consider nutrition support.   - IF enteral nutrition indicated and post-pyloric feed tube desired, consult to RD bor bedside post-pyloric feeding placement (xray feeding tube placement to radiology if over the weekend).     Malnutrition Status:    - Unable to determine due to unable to complete all parameters of NFPA     Recommendations already ordered by Registered Dietitian (RD):  - Ordered thiamine 100 mg daily for HF/EF < 30%  -  none additional currently while pt is NPO    Future/Additional Recommendations:  - Diet advancement post-op vs FT/TF needs  - bring cardiac diet materials to pt prior to d/c (education already provided on low Na+ AND cardiac diet on 3/23).     - Post-op status/potential need for nutrition support post-op. Recommend concentrated regimen (HF/fluid restriction) TwoCal HN @ 50 mL/hr (1200 mL/day) to provide 2400 kcals (28 kcal/kg/day), 101 g PRO (1.2 g/kg/day), 840 mL H2O, 263 g CHO and 6 g Fiber daily.  - Recommend Prosource packet BID to provide 22 gm PRO and 80 kcals. Total provisions: 2480 kcals (29 kcals/kg) and 123 gm PRO (1.4 gm/kg)  - Initiate @ 10 ml/hr and advance by 10 ml q8hr as tolerated   - Do not start or advance until lytes (Mg++,K+) WNL and phos>1.9   - Recommend 30-60 ml q4hr fluid flushes for tube patency. Additional fluids and/or adjustments per MD.    - Order multivitamin/mineral (15 ml/day via FT) to help ensure micronutrient needs being met with suspected hypermetabolic demands and potential interruptions to TF infusions.         REASON FOR ASSESSMENT  Izaiah Alvarez is a/an 72 year old male assessed by the dietitian for low sodium diet ed and Nutrition Risk Monitoring  "(possible nutrition needs post-op    Medical history: PMHx of obesity, Afib on coumadin, DM2, HTN, and TIA who was transferred from [facility] following MI with new onset left HF (20-25% compared to 46% previously). Pt with Cardiogenic shock likely 2/2 to severe ischemic CAD.    NUTRITION HISTORY  Per pt and wife, they've made changes at home to follow more heart-healthy guidelines: omitted salt shaker 2+ years ago; using Mrs Dash; limiting reds meds and eating more fish ([place]) and lean meats; limiting obviously salty foods and snacks like French fries; no sugary beverages; baking and grilling vs frying. Wife does the cooking and they primary make meals at home vs dining out.     CURRENT NUTRITION ORDERS  Diet: 2 g Sodium; 2000 ml fluid restriction   Intake/Tolerance: poor appetite since admit per pt. No weight loss or poor PO PTA, however, since admit pt has not been eating and had limited PO yesterday per wife.     LABS  Labs reviewed  BUN: 41 (H)  Bili total: 3.6 (H)  AST: 264 (H)  A1C (3/22/2018) 7.8 (H)  Lactic acid trended down: 2.4--> 1.3  Lipids:  HDL: 32 (L)  T (H)    MEDICATIONS  Medications reviewed  Insulin drip  Lipitor  Fish oil: 1000 mg daily   Thera-vit    ANTHROPOMETRICS  Height: 180.3 cm (5' 11\") 71\"   Most Recent Weight: 114.2 kg (251 lb 12.3 oz)    IBW: 78 kg  BMI: Obesity Grade II BMI 35-39.9  Weight History:   Wt Readings from Last 9 Encounters:   18 114.2 kg (251 lb 12.3 oz)     Dosing Weight: 87 kg (adjusted weight using current weight of 114 kg and IBW of 78 kg)    ASSESSED NUTRITION NEEDS  Estimated Energy Needs: 6258-4249 kcals/day (25 - 30 kcals/kg)  Justification: Maintenance  Estimated Protein Needs: 104-131+ grams protein/day (1.2 - 1.5+ grams of pro/kg)  Justification: Increased needs  Estimated Fluid Needs: 4356-4644 mL/day (1 mL/kcal)   Justification: Maintenance and Per provider pending fluid status    PHYSICAL FINDINGS  See malnutrition section below.  Detailed " physical exam not fully complete; pt bleeding from access site and became busy with nursing cares. No obvious fat or muscle wasting noted; rounded abd; dry skin.      MALNUTRITION  % Intake: decreased PO does not meet criteria   % Weight Loss: None noted  Subcutaneous Fat Loss: None observed  Muscle Loss: Unable to assess  Fluid Accumulation/Edema: Unable to assess  Malnutrition Diagnosis: Unable to determine due to unable to complete all parameters of NFPA     NUTRITION DIAGNOSIS  Inadequate protein-energy intake related to decreased appetite and current diet status as evidenced by decreased/sydney PO intake since admit x 2 day and currently NPO meeting 0% kcal/PRO needs.     INTERVENTIONS  Implementation  Nutrition Education: Provided education on low sodium diet, heart healthy diet; importance of adequate intake for healing post-op  *Pt is motivated to change and is currently in the action phase of the stages of change model.    Enteral Nutrition - recommendations provided should pt require nutrition support post-op     Goals  Patient to consume % of nutritionally adequate meal trays TID, or the equivalent with supplements/snacks.     Monitoring/Evaluation  Progress toward goals will be monitored and evaluated per protocol.     Marlene Longo RD, LD, [facility]  Unit Pager: [phone]

## 2018-03-23 ENCOUNTER — APPOINTMENT (OUTPATIENT)
Dept: GENERAL RADIOLOGY | Facility: CLINIC | Age: 73
DRG: 228 | End: 2018-03-23
Attending: THORACIC SURGERY (CARDIOTHORACIC VASCULAR SURGERY)
Payer: COMMERCIAL

## 2018-03-23 ENCOUNTER — ANESTHESIA (OUTPATIENT)
Dept: SURGERY | Facility: CLINIC | Age: 73
DRG: 228 | End: 2018-03-23
Payer: COMMERCIAL

## 2018-03-23 ENCOUNTER — ANESTHESIA EVENT (OUTPATIENT)
Dept: SURGERY | Facility: CLINIC | Age: 73
DRG: 228 | End: 2018-03-23
Payer: COMMERCIAL

## 2018-03-23 ENCOUNTER — APPOINTMENT (OUTPATIENT)
Dept: CT IMAGING | Facility: CLINIC | Age: 73
DRG: 228 | End: 2018-03-23
Attending: PHYSICIAN ASSISTANT
Payer: COMMERCIAL

## 2018-03-23 PROBLEM — I25.10 CORONARY ARTERY DISEASE: Status: ACTIVE | Noted: 2018-03-23

## 2018-03-23 LAB
ANION GAP SERPL CALCULATED.3IONS-SCNC: 8 MMOL/L (ref 3–14)
APTT PPP: 29 SEC (ref 22–37)
BASE DEFICIT BLDA-SCNC: 0.1 MMOL/L
BASE DEFICIT BLDA-SCNC: 0.1 MMOL/L
BASE EXCESS BLDA CALC-SCNC: 0.4 MMOL/L
BASE EXCESS BLDA CALC-SCNC: 0.7 MMOL/L
BASE EXCESS BLDA CALC-SCNC: 1.8 MMOL/L
BASE EXCESS BLDA CALC-SCNC: 3.8 MMOL/L
BASE EXCESS BLDV CALC-SCNC: 2.8 MMOL/L
BASE EXCESS BLDV CALC-SCNC: 6.3 MMOL/L
BASE EXCESS BLDV CALC-SCNC: 7.7 MMOL/L
BLD PROD TYP BPU: NORMAL
BLD UNIT ID BPU: 0
BLOOD PRODUCT CODE: NORMAL
BPU ID: NORMAL
BUN SERPL-MCNC: 39 MG/DL (ref 7–30)
CA-I BLD-MCNC: 4 MG/DL (ref 4.4–5.2)
CA-I BLD-MCNC: 4 MG/DL (ref 4.4–5.2)
CA-I BLD-MCNC: 4.1 MG/DL (ref 4.4–5.2)
CA-I BLD-MCNC: 4.1 MG/DL (ref 4.4–5.2)
CA-I BLD-MCNC: 4.3 MG/DL (ref 4.4–5.2)
CA-I BLD-MCNC: 4.3 MG/DL (ref 4.4–5.2)
CA-I BLD-MCNC: 4.7 MG/DL (ref 4.4–5.2)
CALCIUM SERPL-MCNC: 8.4 MG/DL (ref 8.5–10.1)
CHLORIDE SERPL-SCNC: 100 MMOL/L (ref 94–109)
CO2 SERPL-SCNC: 31 MMOL/L (ref 20–32)
CREAT SERPL-MCNC: 1.08 MG/DL (ref 0.66–1.25)
ERYTHROCYTE [DISTWIDTH] IN BLOOD BY AUTOMATED COUNT: 15.3 % (ref 10–15)
ERYTHROCYTE [DISTWIDTH] IN BLOOD BY AUTOMATED COUNT: 15.5 % (ref 10–15)
FIBRINOGEN PPP-MCNC: 468 MG/DL (ref 200–420)
GFR SERPL CREATININE-BSD FRML MDRD: 67 ML/MIN/1.7M2
GLUCOSE BLD-MCNC: 146 MG/DL (ref 70–99)
GLUCOSE BLD-MCNC: 176 MG/DL (ref 70–99)
GLUCOSE BLD-MCNC: 177 MG/DL (ref 70–99)
GLUCOSE BLD-MCNC: 185 MG/DL (ref 70–99)
GLUCOSE BLD-MCNC: 187 MG/DL (ref 70–99)
GLUCOSE BLD-MCNC: 189 MG/DL (ref 70–99)
GLUCOSE BLD-MCNC: 224 MG/DL (ref 70–99)
GLUCOSE BLDC GLUCOMTR-MCNC: 122 MG/DL (ref 70–99)
GLUCOSE BLDC GLUCOMTR-MCNC: 123 MG/DL (ref 70–99)
GLUCOSE BLDC GLUCOMTR-MCNC: 128 MG/DL (ref 70–99)
GLUCOSE BLDC GLUCOMTR-MCNC: 136 MG/DL (ref 70–99)
GLUCOSE BLDC GLUCOMTR-MCNC: 147 MG/DL (ref 70–99)
GLUCOSE BLDC GLUCOMTR-MCNC: 150 MG/DL (ref 70–99)
GLUCOSE BLDC GLUCOMTR-MCNC: 153 MG/DL (ref 70–99)
GLUCOSE BLDC GLUCOMTR-MCNC: 160 MG/DL (ref 70–99)
GLUCOSE BLDC GLUCOMTR-MCNC: 174 MG/DL (ref 70–99)
GLUCOSE SERPL-MCNC: 120 MG/DL (ref 70–99)
HCO3 BLD-SCNC: 25 MMOL/L (ref 21–28)
HCO3 BLD-SCNC: 26 MMOL/L (ref 21–28)
HCO3 BLD-SCNC: 26 MMOL/L (ref 21–28)
HCO3 BLD-SCNC: 27 MMOL/L (ref 21–28)
HCO3 BLD-SCNC: 29 MMOL/L (ref 21–28)
HCO3 BLD-SCNC: 29 MMOL/L (ref 21–28)
HCO3 BLDV-SCNC: 30 MMOL/L (ref 21–28)
HCO3 BLDV-SCNC: 32 MMOL/L (ref 21–28)
HCO3 BLDV-SCNC: 34 MMOL/L (ref 21–28)
HCT VFR BLD AUTO: 28.6 % (ref 40–53)
HCT VFR BLD AUTO: 38.6 % (ref 40–53)
HGB BLD-MCNC: 11.1 G/DL (ref 13.3–17.7)
HGB BLD-MCNC: 11.3 G/DL (ref 13.3–17.7)
HGB BLD-MCNC: 12 G/DL (ref 13.3–17.7)
HGB BLD-MCNC: 8.4 G/DL (ref 13.3–17.7)
HGB BLD-MCNC: 8.6 G/DL (ref 13.3–17.7)
HGB BLD-MCNC: 8.6 G/DL (ref 13.3–17.7)
HGB BLD-MCNC: 8.9 G/DL (ref 13.3–17.7)
HGB BLD-MCNC: 9.4 G/DL (ref 13.3–17.7)
HGB BLD-MCNC: 9.4 G/DL (ref 13.3–17.7)
INR PPP: 1.12 (ref 0.86–1.14)
INR PPP: 1.51 (ref 0.86–1.14)
LACTATE BLD-SCNC: 0.7 MMOL/L (ref 0.7–2)
LACTATE BLD-SCNC: 0.9 MMOL/L (ref 0.7–2)
LACTATE BLD-SCNC: 1 MMOL/L (ref 0.7–2)
LACTATE BLD-SCNC: 2.6 MMOL/L (ref 0.7–2)
LMWH PPP CHRO-ACNC: 0.3 IU/ML
MAGNESIUM SERPL-MCNC: 2.4 MG/DL (ref 1.6–2.3)
MCH RBC QN AUTO: 29.9 PG (ref 26.5–33)
MCH RBC QN AUTO: 30.5 PG (ref 26.5–33)
MCHC RBC AUTO-ENTMCNC: 31.1 G/DL (ref 31.5–36.5)
MCHC RBC AUTO-ENTMCNC: 31.1 G/DL (ref 31.5–36.5)
MCV RBC AUTO: 96 FL (ref 78–100)
MCV RBC AUTO: 98 FL (ref 78–100)
O2/TOTAL GAS SETTING VFR VENT: 100 %
O2/TOTAL GAS SETTING VFR VENT: 40 %
O2/TOTAL GAS SETTING VFR VENT: 69 %
O2/TOTAL GAS SETTING VFR VENT: 70 %
O2/TOTAL GAS SETTING VFR VENT: 70 %
O2/TOTAL GAS SETTING VFR VENT: 80 %
O2/TOTAL GAS SETTING VFR VENT: 90 %
O2/TOTAL GAS SETTING VFR VENT: 97 %
O2/TOTAL GAS SETTING VFR VENT: ABNORMAL %
OXYHGB MFR BLDV: 62 %
OXYHGB MFR BLDV: 62 %
PCO2 BLD: 40 MM HG (ref 35–45)
PCO2 BLD: 44 MM HG (ref 35–45)
PCO2 BLD: 47 MM HG (ref 35–45)
PCO2 BLD: 47 MM HG (ref 35–45)
PCO2 BLD: 51 MM HG (ref 35–45)
PCO2 BLD: 59 MM HG (ref 35–45)
PCO2 BLDV: 51 MM HG (ref 40–50)
PCO2 BLDV: 57 MM HG (ref 40–50)
PCO2 BLDV: 61 MM HG (ref 40–50)
PH BLD: 7.3 PH (ref 7.35–7.45)
PH BLD: 7.33 PH (ref 7.35–7.45)
PH BLD: 7.35 PH (ref 7.35–7.45)
PH BLD: 7.35 PH (ref 7.35–7.45)
PH BLD: 7.41 PH (ref 7.35–7.45)
PH BLD: 7.43 PH (ref 7.35–7.45)
PH BLDV: 7.3 PH (ref 7.32–7.43)
PH BLDV: 7.39 PH (ref 7.32–7.43)
PH BLDV: 7.41 PH (ref 7.32–7.43)
PHOSPHATE SERPL-MCNC: 4 MG/DL (ref 2.5–4.5)
PLATELET # BLD AUTO: 111 10E9/L (ref 150–450)
PLATELET # BLD AUTO: 136 10E9/L (ref 150–450)
PO2 BLD: 128 MM HG (ref 80–105)
PO2 BLD: 142 MM HG (ref 80–105)
PO2 BLD: 164 MM HG (ref 80–105)
PO2 BLD: 330 MM HG (ref 80–105)
PO2 BLD: 393 MM HG (ref 80–105)
PO2 BLD: 94 MM HG (ref 80–105)
PO2 BLDV: 36 MM HG (ref 25–47)
PO2 BLDV: 37 MM HG (ref 25–47)
PO2 BLDV: 42 MM HG (ref 25–47)
POTASSIUM BLD-SCNC: 3.3 MMOL/L (ref 3.4–5.3)
POTASSIUM BLD-SCNC: 3.4 MMOL/L (ref 3.4–5.3)
POTASSIUM BLD-SCNC: 3.7 MMOL/L (ref 3.4–5.3)
POTASSIUM BLD-SCNC: 3.8 MMOL/L (ref 3.4–5.3)
POTASSIUM BLD-SCNC: 3.9 MMOL/L (ref 3.4–5.3)
POTASSIUM BLD-SCNC: 4.4 MMOL/L (ref 3.4–5.3)
POTASSIUM BLD-SCNC: 5 MMOL/L (ref 3.4–5.3)
POTASSIUM BLD-SCNC: 5.2 MMOL/L (ref 3.4–5.3)
POTASSIUM SERPL-SCNC: 3 MMOL/L (ref 3.4–5.3)
RBC # BLD AUTO: 2.92 10E12/L (ref 4.4–5.9)
RBC # BLD AUTO: 4.01 10E12/L (ref 4.4–5.9)
SODIUM BLD-SCNC: 139 MMOL/L (ref 133–144)
SODIUM BLD-SCNC: 140 MMOL/L (ref 133–144)
SODIUM BLD-SCNC: 141 MMOL/L (ref 133–144)
SODIUM BLD-SCNC: 143 MMOL/L (ref 133–144)
SODIUM BLD-SCNC: 143 MMOL/L (ref 133–144)
SODIUM SERPL-SCNC: 139 MMOL/L (ref 133–144)
TRANSFUSION STATUS PATIENT QL: NORMAL
WBC # BLD AUTO: 10.5 10E9/L (ref 4–11)
WBC # BLD AUTO: 16.7 10E9/L (ref 4–11)

## 2018-03-23 PROCEDURE — 84132 ASSAY OF SERUM POTASSIUM: CPT | Performed by: INTERNAL MEDICINE

## 2018-03-23 PROCEDURE — 25000128 H RX IP 250 OP 636: Performed by: THORACIC SURGERY (CARDIOTHORACIC VASCULAR SURGERY)

## 2018-03-23 PROCEDURE — 83735 ASSAY OF MAGNESIUM: CPT | Performed by: THORACIC SURGERY (CARDIOTHORACIC VASCULAR SURGERY)

## 2018-03-23 PROCEDURE — P9059 PLASMA, FRZ BETWEEN 8-24HOUR: HCPCS | Performed by: THORACIC SURGERY (CARDIOTHORACIC VASCULAR SURGERY)

## 2018-03-23 PROCEDURE — P9016 RBC LEUKOCYTES REDUCED: HCPCS | Performed by: PHYSICIAN ASSISTANT

## 2018-03-23 PROCEDURE — 20000004 ZZH R&B ICU UMMC

## 2018-03-23 PROCEDURE — 25000125 ZZHC RX 250: Performed by: THORACIC SURGERY (CARDIOTHORACIC VASCULAR SURGERY)

## 2018-03-23 PROCEDURE — 80048 BASIC METABOLIC PNL TOTAL CA: CPT | Performed by: THORACIC SURGERY (CARDIOTHORACIC VASCULAR SURGERY)

## 2018-03-23 PROCEDURE — 82330 ASSAY OF CALCIUM: CPT

## 2018-03-23 PROCEDURE — 84295 ASSAY OF SERUM SODIUM: CPT

## 2018-03-23 PROCEDURE — 37000008 ZZH ANESTHESIA TECHNICAL FEE, 1ST 30 MIN: Performed by: THORACIC SURGERY (CARDIOTHORACIC VASCULAR SURGERY)

## 2018-03-23 PROCEDURE — 27810169 ZZH OR IMPLANT GENERAL: Performed by: THORACIC SURGERY (CARDIOTHORACIC VASCULAR SURGERY)

## 2018-03-23 PROCEDURE — 00000146 ZZHCL STATISTIC GLUCOSE BY METER IP

## 2018-03-23 PROCEDURE — 84100 ASSAY OF PHOSPHORUS: CPT | Performed by: THORACIC SURGERY (CARDIOTHORACIC VASCULAR SURGERY)

## 2018-03-23 PROCEDURE — 85610 PROTHROMBIN TIME: CPT | Performed by: THORACIC SURGERY (CARDIOTHORACIC VASCULAR SURGERY)

## 2018-03-23 PROCEDURE — 40000014 ZZH STATISTIC ARTERIAL MONITORING DAILY

## 2018-03-23 PROCEDURE — 85520 HEPARIN ASSAY: CPT | Performed by: THORACIC SURGERY (CARDIOTHORACIC VASCULAR SURGERY)

## 2018-03-23 PROCEDURE — 40000196 ZZH STATISTIC RAPCV CVP MONITORING

## 2018-03-23 PROCEDURE — 25000128 H RX IP 250 OP 636: Performed by: STUDENT IN AN ORGANIZED HEALTH CARE EDUCATION/TRAINING PROGRAM

## 2018-03-23 PROCEDURE — P9041 ALBUMIN (HUMAN),5%, 50ML: HCPCS

## 2018-03-23 PROCEDURE — 25000565 ZZH ISOFLURANE, EA 15 MIN: Performed by: THORACIC SURGERY (CARDIOTHORACIC VASCULAR SURGERY)

## 2018-03-23 PROCEDURE — 27210460 ZZH PUMP APP ADULT PERFUSION: Performed by: THORACIC SURGERY (CARDIOTHORACIC VASCULAR SURGERY)

## 2018-03-23 PROCEDURE — 83605 ASSAY OF LACTIC ACID: CPT

## 2018-03-23 PROCEDURE — 25000125 ZZHC RX 250: Performed by: SURGERY

## 2018-03-23 PROCEDURE — 85610 PROTHROMBIN TIME: CPT

## 2018-03-23 PROCEDURE — 36000074 ZZH SURGERY LEVEL 6 1ST 30 MIN - UMMC: Performed by: THORACIC SURGERY (CARDIOTHORACIC VASCULAR SURGERY)

## 2018-03-23 PROCEDURE — 71250 CT THORAX DX C-: CPT

## 2018-03-23 PROCEDURE — 06BQ4ZZ EXCISION OF LEFT SAPHENOUS VEIN, PERCUTANEOUS ENDOSCOPIC APPROACH: ICD-10-PCS | Performed by: THORACIC SURGERY (CARDIOTHORACIC VASCULAR SURGERY)

## 2018-03-23 PROCEDURE — 25000128 H RX IP 250 OP 636: Performed by: PHYSICIAN ASSISTANT

## 2018-03-23 PROCEDURE — 25000128 H RX IP 250 OP 636

## 2018-03-23 PROCEDURE — 40000986 XR CHEST PORT 1 VW

## 2018-03-23 PROCEDURE — 85027 COMPLETE CBC AUTOMATED: CPT

## 2018-03-23 PROCEDURE — 25000125 ZZHC RX 250: Performed by: NURSE ANESTHETIST, CERTIFIED REGISTERED

## 2018-03-23 PROCEDURE — P9041 ALBUMIN (HUMAN),5%, 50ML: HCPCS | Performed by: ANESTHESIOLOGY

## 2018-03-23 PROCEDURE — 41000019 ZZH PERA-PERFUSION EACH ADDTL 15 MIN: Performed by: THORACIC SURGERY (CARDIOTHORACIC VASCULAR SURGERY)

## 2018-03-23 PROCEDURE — 25000125 ZZHC RX 250

## 2018-03-23 PROCEDURE — 25000128 H RX IP 250 OP 636: Performed by: ANESTHESIOLOGY

## 2018-03-23 PROCEDURE — 85730 THROMBOPLASTIN TIME PARTIAL: CPT

## 2018-03-23 PROCEDURE — 25000128 H RX IP 250 OP 636: Performed by: NURSE ANESTHETIST, CERTIFIED REGISTERED

## 2018-03-23 PROCEDURE — 37000009 ZZH ANESTHESIA TECHNICAL FEE, EACH ADDTL 15 MIN: Performed by: THORACIC SURGERY (CARDIOTHORACIC VASCULAR SURGERY)

## 2018-03-23 PROCEDURE — 85384 FIBRINOGEN ACTIVITY: CPT

## 2018-03-23 PROCEDURE — 82803 BLOOD GASES ANY COMBINATION: CPT

## 2018-03-23 PROCEDURE — 82947 ASSAY GLUCOSE BLOOD QUANT: CPT

## 2018-03-23 PROCEDURE — 40000275 ZZH STATISTIC RCP TIME EA 10 MIN

## 2018-03-23 PROCEDURE — 02100Z9 BYPASS CORONARY ARTERY, ONE ARTERY FROM LEFT INTERNAL MAMMARY, OPEN APPROACH: ICD-10-PCS | Performed by: THORACIC SURGERY (CARDIOTHORACIC VASCULAR SURGERY)

## 2018-03-23 PROCEDURE — 85730 THROMBOPLASTIN TIME PARTIAL: CPT | Performed by: THORACIC SURGERY (CARDIOTHORACIC VASCULAR SURGERY)

## 2018-03-23 PROCEDURE — 82330 ASSAY OF CALCIUM: CPT | Performed by: THORACIC SURGERY (CARDIOTHORACIC VASCULAR SURGERY)

## 2018-03-23 PROCEDURE — 85027 COMPLETE CBC AUTOMATED: CPT | Performed by: THORACIC SURGERY (CARDIOTHORACIC VASCULAR SURGERY)

## 2018-03-23 PROCEDURE — 84132 ASSAY OF SERUM POTASSIUM: CPT

## 2018-03-23 PROCEDURE — 27210794 ZZH OR GENERAL SUPPLY STERILE: Performed by: THORACIC SURGERY (CARDIOTHORACIC VASCULAR SURGERY)

## 2018-03-23 PROCEDURE — 021109W BYPASS CORONARY ARTERY, TWO ARTERIES FROM AORTA WITH AUTOLOGOUS VENOUS TISSUE, OPEN APPROACH: ICD-10-PCS | Performed by: THORACIC SURGERY (CARDIOTHORACIC VASCULAR SURGERY)

## 2018-03-23 PROCEDURE — 25000128 H RX IP 250 OP 636: Performed by: SURGERY

## 2018-03-23 PROCEDURE — 94660 CPAP INITIATION&MGMT: CPT

## 2018-03-23 PROCEDURE — 02PA3RZ REMOVAL OF SHORT-TERM EXTERNAL HEART ASSIST SYSTEM FROM HEART, PERCUTANEOUS APPROACH: ICD-10-PCS | Performed by: THORACIC SURGERY (CARDIOTHORACIC VASCULAR SURGERY)

## 2018-03-23 PROCEDURE — 27210447 ZZH PACK CELL SAVER CSP: Performed by: THORACIC SURGERY (CARDIOTHORACIC VASCULAR SURGERY)

## 2018-03-23 PROCEDURE — 82805 BLOOD GASES W/O2 SATURATION: CPT | Performed by: THORACIC SURGERY (CARDIOTHORACIC VASCULAR SURGERY)

## 2018-03-23 PROCEDURE — 41000018 ZZH PER-PERFUSION 1ST 30 MIN: Performed by: THORACIC SURGERY (CARDIOTHORACIC VASCULAR SURGERY)

## 2018-03-23 PROCEDURE — 02L70CK OCCLUSION OF LEFT ATRIAL APPENDAGE WITH EXTRALUMINAL DEVICE, OPEN APPROACH: ICD-10-PCS | Performed by: THORACIC SURGERY (CARDIOTHORACIC VASCULAR SURGERY)

## 2018-03-23 PROCEDURE — 27210995 ZZH RX 272: Performed by: THORACIC SURGERY (CARDIOTHORACIC VASCULAR SURGERY)

## 2018-03-23 PROCEDURE — 82805 BLOOD GASES W/O2 SATURATION: CPT | Performed by: INTERNAL MEDICINE

## 2018-03-23 PROCEDURE — 83605 ASSAY OF LACTIC ACID: CPT | Performed by: THORACIC SURGERY (CARDIOTHORACIC VASCULAR SURGERY)

## 2018-03-23 PROCEDURE — 5A1221Z PERFORMANCE OF CARDIAC OUTPUT, CONTINUOUS: ICD-10-PCS | Performed by: THORACIC SURGERY (CARDIOTHORACIC VASCULAR SURGERY)

## 2018-03-23 PROCEDURE — 36000076 ZZH SURGERY LEVEL 6 EA 15 ADDTL MIN - UMMC: Performed by: THORACIC SURGERY (CARDIOTHORACIC VASCULAR SURGERY)

## 2018-03-23 PROCEDURE — 02580ZZ DESTRUCTION OF CONDUCTION MECHANISM, OPEN APPROACH: ICD-10-PCS | Performed by: THORACIC SURGERY (CARDIOTHORACIC VASCULAR SURGERY)

## 2018-03-23 PROCEDURE — 25000125 ZZHC RX 250: Performed by: ANESTHESIOLOGY

## 2018-03-23 DEVICE — IMP ATRICLIP GILLIVNOV-COSGROVE EXCL SYS STD 45MM ACH145: Type: IMPLANTABLE DEVICE | Site: HEART | Status: FUNCTIONAL

## 2018-03-23 RX ORDER — SODIUM CHLORIDE, SODIUM LACTATE, POTASSIUM CHLORIDE, CALCIUM CHLORIDE 600; 310; 30; 20 MG/100ML; MG/100ML; MG/100ML; MG/100ML
INJECTION, SOLUTION INTRAVENOUS CONTINUOUS PRN
Status: DISCONTINUED | OUTPATIENT
Start: 2018-03-23 | End: 2018-03-23

## 2018-03-23 RX ORDER — PROTAMINE SULFATE 10 MG/ML
INJECTION, SOLUTION INTRAVENOUS PRN
Status: DISCONTINUED | OUTPATIENT
Start: 2018-03-23 | End: 2018-03-23

## 2018-03-23 RX ORDER — EPHEDRINE SULFATE 50 MG/ML
INJECTION, SOLUTION INTRAMUSCULAR; INTRAVENOUS; SUBCUTANEOUS PRN
Status: DISCONTINUED | OUTPATIENT
Start: 2018-03-23 | End: 2018-03-23

## 2018-03-23 RX ORDER — PAPAVERINE HYDROCHLORIDE 30 MG/ML
INJECTION INTRAMUSCULAR; INTRAVENOUS PRN
Status: DISCONTINUED | OUTPATIENT
Start: 2018-03-23 | End: 2018-03-23 | Stop reason: HOSPADM

## 2018-03-23 RX ORDER — ACETAMINOPHEN 325 MG/1
650 TABLET ORAL EVERY 4 HOURS PRN
Status: DISCONTINUED | OUTPATIENT
Start: 2018-03-26 | End: 2018-04-04 | Stop reason: HOSPADM

## 2018-03-23 RX ORDER — LANOLIN ALCOHOL/MO/W.PET/CERES
100 CREAM (GRAM) TOPICAL DAILY
Status: DISCONTINUED | OUTPATIENT
Start: 2018-03-23 | End: 2018-04-04 | Stop reason: HOSPADM

## 2018-03-23 RX ORDER — NALOXONE HYDROCHLORIDE 0.4 MG/ML
.1-.4 INJECTION, SOLUTION INTRAMUSCULAR; INTRAVENOUS; SUBCUTANEOUS
Status: DISCONTINUED | OUTPATIENT
Start: 2018-03-23 | End: 2018-03-24

## 2018-03-23 RX ORDER — DEXTROSE MONOHYDRATE, SODIUM CHLORIDE, AND POTASSIUM CHLORIDE 50; 1.49; 4.5 G/1000ML; G/1000ML; G/1000ML
INJECTION, SOLUTION INTRAVENOUS CONTINUOUS
Status: DISCONTINUED | OUTPATIENT
Start: 2018-03-23 | End: 2018-03-28

## 2018-03-23 RX ORDER — ONDANSETRON 4 MG/1
4 TABLET, ORALLY DISINTEGRATING ORAL EVERY 6 HOURS PRN
Status: DISCONTINUED | OUTPATIENT
Start: 2018-03-23 | End: 2018-04-04 | Stop reason: HOSPADM

## 2018-03-23 RX ORDER — MAGNESIUM HYDROXIDE 1200 MG/15ML
LIQUID ORAL PRN
Status: DISCONTINUED | OUTPATIENT
Start: 2018-03-23 | End: 2018-03-23 | Stop reason: HOSPADM

## 2018-03-23 RX ORDER — ACETAMINOPHEN 325 MG/1
975 TABLET ORAL EVERY 8 HOURS
Status: DISPENSED | OUTPATIENT
Start: 2018-03-23 | End: 2018-03-26

## 2018-03-23 RX ORDER — MAGNESIUM SULFATE HEPTAHYDRATE 40 MG/ML
4 INJECTION, SOLUTION INTRAVENOUS EVERY 4 HOURS PRN
Status: DISCONTINUED | OUTPATIENT
Start: 2018-03-23 | End: 2018-04-04 | Stop reason: HOSPADM

## 2018-03-23 RX ORDER — MUPIROCIN 20 MG/G
0.5 OINTMENT TOPICAL 2 TIMES DAILY
Status: DISCONTINUED | OUTPATIENT
Start: 2018-03-23 | End: 2018-03-24

## 2018-03-23 RX ORDER — ALBUMIN, HUMAN INJ 5% 5 %
500-1000 SOLUTION INTRAVENOUS
Status: COMPLETED | OUTPATIENT
Start: 2018-03-23 | End: 2018-03-24

## 2018-03-23 RX ORDER — POTASSIUM CHLORIDE 1500 MG/1
20-40 TABLET, EXTENDED RELEASE ORAL
Status: DISCONTINUED | OUTPATIENT
Start: 2018-03-23 | End: 2018-03-30

## 2018-03-23 RX ORDER — POTASSIUM CL/LIDO/0.9 % NACL 10MEQ/0.1L
10 INTRAVENOUS SOLUTION, PIGGYBACK (ML) INTRAVENOUS
Status: DISCONTINUED | OUTPATIENT
Start: 2018-03-23 | End: 2018-04-04 | Stop reason: HOSPADM

## 2018-03-23 RX ORDER — SODIUM CHLORIDE 9 MG/ML
INJECTION, SOLUTION INTRAVENOUS CONTINUOUS PRN
Status: DISCONTINUED | OUTPATIENT
Start: 2018-03-23 | End: 2018-03-23

## 2018-03-23 RX ORDER — ALBUMIN, HUMAN INJ 5% 5 %
12.5 SOLUTION INTRAVENOUS ONCE
Status: COMPLETED | OUTPATIENT
Start: 2018-03-23 | End: 2018-03-23

## 2018-03-23 RX ORDER — FENTANYL CITRATE 50 UG/ML
INJECTION, SOLUTION INTRAMUSCULAR; INTRAVENOUS PRN
Status: DISCONTINUED | OUTPATIENT
Start: 2018-03-23 | End: 2018-03-23

## 2018-03-23 RX ORDER — POTASSIUM CHLORIDE 29.8 MG/ML
20 INJECTION INTRAVENOUS
Status: DISCONTINUED | OUTPATIENT
Start: 2018-03-23 | End: 2018-04-04 | Stop reason: HOSPADM

## 2018-03-23 RX ORDER — DEXTROSE MONOHYDRATE 25 G/50ML
25-50 INJECTION, SOLUTION INTRAVENOUS
Status: DISCONTINUED | OUTPATIENT
Start: 2018-03-23 | End: 2018-04-04 | Stop reason: HOSPADM

## 2018-03-23 RX ORDER — NICOTINE POLACRILEX 4 MG
15-30 LOZENGE BUCCAL
Status: DISCONTINUED | OUTPATIENT
Start: 2018-03-23 | End: 2018-04-04 | Stop reason: HOSPADM

## 2018-03-23 RX ORDER — IPRATROPIUM BROMIDE AND ALBUTEROL SULFATE 2.5; .5 MG/3ML; MG/3ML
3 SOLUTION RESPIRATORY (INHALATION) EVERY 4 HOURS PRN
Status: DISCONTINUED | OUTPATIENT
Start: 2018-03-23 | End: 2018-04-04 | Stop reason: HOSPADM

## 2018-03-23 RX ORDER — POTASSIUM CHLORIDE 1.5 G/1.58G
20-40 POWDER, FOR SOLUTION ORAL
Status: DISCONTINUED | OUTPATIENT
Start: 2018-03-23 | End: 2018-04-04 | Stop reason: HOSPADM

## 2018-03-23 RX ORDER — MEPERIDINE HYDROCHLORIDE 25 MG/ML
12.5-25 INJECTION INTRAMUSCULAR; INTRAVENOUS; SUBCUTANEOUS
Status: DISCONTINUED | OUTPATIENT
Start: 2018-03-23 | End: 2018-03-28

## 2018-03-23 RX ORDER — ONDANSETRON 2 MG/ML
4 INJECTION INTRAMUSCULAR; INTRAVENOUS EVERY 6 HOURS PRN
Status: DISCONTINUED | OUTPATIENT
Start: 2018-03-23 | End: 2018-04-04 | Stop reason: HOSPADM

## 2018-03-23 RX ORDER — LIDOCAINE HYDROCHLORIDE 20 MG/ML
INJECTION, SOLUTION INFILTRATION; PERINEURAL PRN
Status: DISCONTINUED | OUTPATIENT
Start: 2018-03-23 | End: 2018-03-23

## 2018-03-23 RX ORDER — DOCUSATE SODIUM 100 MG/1
100 CAPSULE, LIQUID FILLED ORAL 2 TIMES DAILY
Status: DISCONTINUED | OUTPATIENT
Start: 2018-03-23 | End: 2018-03-26

## 2018-03-23 RX ORDER — PROPOFOL 10 MG/ML
10-20 INJECTION, EMULSION INTRAVENOUS EVERY 30 MIN PRN
Status: DISCONTINUED | OUTPATIENT
Start: 2018-03-23 | End: 2018-03-26

## 2018-03-23 RX ORDER — POTASSIUM CHLORIDE 7.45 MG/ML
10 INJECTION INTRAVENOUS
Status: DISCONTINUED | OUTPATIENT
Start: 2018-03-23 | End: 2018-03-24 | Stop reason: RX

## 2018-03-23 RX ORDER — ALBUMIN, HUMAN INJ 5% 5 %
SOLUTION INTRAVENOUS CONTINUOUS PRN
Status: DISCONTINUED | OUTPATIENT
Start: 2018-03-23 | End: 2018-03-23

## 2018-03-23 RX ORDER — HEPARIN SODIUM 1000 [USP'U]/ML
INJECTION, SOLUTION INTRAVENOUS; SUBCUTANEOUS PRN
Status: DISCONTINUED | OUTPATIENT
Start: 2018-03-23 | End: 2018-03-23

## 2018-03-23 RX ORDER — HYDRALAZINE HYDROCHLORIDE 20 MG/ML
10 INJECTION INTRAMUSCULAR; INTRAVENOUS EVERY 30 MIN PRN
Status: DISCONTINUED | OUTPATIENT
Start: 2018-03-23 | End: 2018-04-04 | Stop reason: HOSPADM

## 2018-03-23 RX ORDER — PROPOFOL 10 MG/ML
5-75 INJECTION, EMULSION INTRAVENOUS CONTINUOUS
Status: DISCONTINUED | OUTPATIENT
Start: 2018-03-23 | End: 2018-03-26

## 2018-03-23 RX ORDER — PROPOFOL 10 MG/ML
INJECTION, EMULSION INTRAVENOUS CONTINUOUS PRN
Status: DISCONTINUED | OUTPATIENT
Start: 2018-03-23 | End: 2018-03-23

## 2018-03-23 RX ORDER — PROPOFOL 10 MG/ML
INJECTION, EMULSION INTRAVENOUS PRN
Status: DISCONTINUED | OUTPATIENT
Start: 2018-03-23 | End: 2018-03-23

## 2018-03-23 RX ORDER — ASPIRIN 81 MG/1
81 TABLET ORAL DAILY
Status: DISCONTINUED | OUTPATIENT
Start: 2018-03-24 | End: 2018-04-04 | Stop reason: HOSPADM

## 2018-03-23 RX ORDER — OXYCODONE HYDROCHLORIDE 5 MG/1
5-10 TABLET ORAL EVERY 4 HOURS PRN
Status: DISCONTINUED | OUTPATIENT
Start: 2018-03-23 | End: 2018-04-04 | Stop reason: HOSPADM

## 2018-03-23 RX ORDER — NITROGLYCERIN 10 MG/100ML
INJECTION INTRAVENOUS PRN
Status: DISCONTINUED | OUTPATIENT
Start: 2018-03-23 | End: 2018-03-23

## 2018-03-23 RX ORDER — CEFAZOLIN SODIUM 2 G/100ML
2 INJECTION, SOLUTION INTRAVENOUS EVERY 8 HOURS
Status: COMPLETED | OUTPATIENT
Start: 2018-03-24 | End: 2018-03-24

## 2018-03-23 RX ORDER — ALBUMIN, HUMAN INJ 5% 5 %
SOLUTION INTRAVENOUS
Status: COMPLETED
Start: 2018-03-23 | End: 2018-03-23

## 2018-03-23 RX ADMIN — PHENYLEPHRINE HYDROCHLORIDE 200 MCG: 10 INJECTION, SOLUTION INTRAMUSCULAR; INTRAVENOUS; SUBCUTANEOUS at 15:44

## 2018-03-23 RX ADMIN — NOREPINEPHRINE BITARTRATE 12.8 MCG: 1 INJECTION INTRAVENOUS at 16:08

## 2018-03-23 RX ADMIN — NOREPINEPHRINE BITARTRATE 6.4 MCG: 1 INJECTION INTRAVENOUS at 16:15

## 2018-03-23 RX ADMIN — SODIUM CHLORIDE: 9 INJECTION, SOLUTION INTRAVENOUS at 15:50

## 2018-03-23 RX ADMIN — ROCURONIUM BROMIDE 50 MG: 10 INJECTION INTRAVENOUS at 15:44

## 2018-03-23 RX ADMIN — FENTANYL CITRATE 250 MCG: 50 INJECTION, SOLUTION INTRAMUSCULAR; INTRAVENOUS at 15:44

## 2018-03-23 RX ADMIN — EPINEPHRINE 0.03 MCG/KG/MIN: 1 INJECTION PARENTERAL at 21:05

## 2018-03-23 RX ADMIN — EPINEPHRINE 10 MCG: 1 INJECTION PARENTERAL at 18:24

## 2018-03-23 RX ADMIN — CEFAZOLIN SODIUM 2 G: 2 INJECTION, SOLUTION INTRAVENOUS at 16:33

## 2018-03-23 RX ADMIN — FENTANYL CITRATE 100 MCG: 50 INJECTION, SOLUTION INTRAMUSCULAR; INTRAVENOUS at 21:18

## 2018-03-23 RX ADMIN — FENTANYL CITRATE 100 MCG: 50 INJECTION, SOLUTION INTRAMUSCULAR; INTRAVENOUS at 17:40

## 2018-03-23 RX ADMIN — FENTANYL CITRATE 50 MCG: 50 INJECTION, SOLUTION INTRAMUSCULAR; INTRAVENOUS at 21:42

## 2018-03-23 RX ADMIN — ROCURONIUM BROMIDE 20 MG: 10 INJECTION INTRAVENOUS at 18:15

## 2018-03-23 RX ADMIN — ALBUMIN HUMAN 12.5 G: 50 SOLUTION INTRAVENOUS at 01:38

## 2018-03-23 RX ADMIN — NOREPINEPHRINE BITARTRATE 12.8 MCG: 1 INJECTION INTRAVENOUS at 16:14

## 2018-03-23 RX ADMIN — NOREPINEPHRINE BITARTRATE 0.03 MCG/KG/MIN: 1 INJECTION INTRAVENOUS at 16:28

## 2018-03-23 RX ADMIN — NOREPINEPHRINE BITARTRATE 6.4 MCG: 1 INJECTION INTRAVENOUS at 19:13

## 2018-03-23 RX ADMIN — HUMAN INSULIN 3 UNITS/HR: 100 INJECTION, SOLUTION SUBCUTANEOUS at 02:06

## 2018-03-23 RX ADMIN — HEPARIN SODIUM 46000 UNITS: 1000 INJECTION, SOLUTION INTRAVENOUS; SUBCUTANEOUS at 18:52

## 2018-03-23 RX ADMIN — AMIODARONE HYDROCHLORIDE 1 MG/MIN: 50 INJECTION, SOLUTION INTRAVENOUS at 13:40

## 2018-03-23 RX ADMIN — CEFAZOLIN SODIUM 1 G: 2 INJECTION, SOLUTION INTRAVENOUS at 20:32

## 2018-03-23 RX ADMIN — FENTANYL CITRATE 100 MCG: 50 INJECTION, SOLUTION INTRAMUSCULAR; INTRAVENOUS at 23:20

## 2018-03-23 RX ADMIN — CEFAZOLIN SODIUM 1 G: 2 INJECTION, SOLUTION INTRAVENOUS at 18:32

## 2018-03-23 RX ADMIN — EPINEPHRINE 10 MCG: 1 INJECTION PARENTERAL at 21:59

## 2018-03-23 RX ADMIN — AMIODARONE HYDROCHLORIDE 0.5 MG/MIN: 50 INJECTION, SOLUTION INTRAVENOUS at 23:50

## 2018-03-23 RX ADMIN — ALBUMIN HUMAN: 0.05 INJECTION, SOLUTION INTRAVENOUS at 22:03

## 2018-03-23 RX ADMIN — VANCOMYCIN HYDROCHLORIDE 1500 G: 10 INJECTION, POWDER, LYOPHILIZED, FOR SOLUTION INTRAVENOUS at 16:38

## 2018-03-23 RX ADMIN — NITROGLYCERIN 25 MCG: 10 INJECTION INTRAVENOUS at 21:23

## 2018-03-23 RX ADMIN — NOREPINEPHRINE BITARTRATE 6.4 MCG: 1 INJECTION INTRAVENOUS at 18:54

## 2018-03-23 RX ADMIN — HUMAN INSULIN 3 UNITS/HR: 100 INJECTION, SOLUTION SUBCUTANEOUS at 11:25

## 2018-03-23 RX ADMIN — POTASSIUM CHLORIDE 20 MEQ: 29.8 INJECTION, SOLUTION INTRAVENOUS at 05:32

## 2018-03-23 RX ADMIN — MIDAZOLAM 2 MG: 1 INJECTION INTRAMUSCULAR; INTRAVENOUS at 21:18

## 2018-03-23 RX ADMIN — EPINEPHRINE 10 MCG: 1 INJECTION PARENTERAL at 19:15

## 2018-03-23 RX ADMIN — NOREPINEPHRINE BITARTRATE 12.8 MCG: 1 INJECTION INTRAVENOUS at 16:02

## 2018-03-23 RX ADMIN — NOREPINEPHRINE BITARTRATE 6.4 MCG: 1 INJECTION INTRAVENOUS at 21:34

## 2018-03-23 RX ADMIN — NOREPINEPHRINE BITARTRATE 6.4 MCG: 1 INJECTION INTRAVENOUS at 22:00

## 2018-03-23 RX ADMIN — NOREPINEPHRINE BITARTRATE 6.4 MCG: 1 INJECTION INTRAVENOUS at 19:01

## 2018-03-23 RX ADMIN — POTASSIUM CHLORIDE 20 MEQ: 29.8 INJECTION, SOLUTION INTRAVENOUS at 04:45

## 2018-03-23 RX ADMIN — LIDOCAINE HYDROCHLORIDE 100 MG: 20 INJECTION, SOLUTION INFILTRATION; PERINEURAL at 15:44

## 2018-03-23 RX ADMIN — SODIUM CHLORIDE, POTASSIUM CHLORIDE, SODIUM LACTATE AND CALCIUM CHLORIDE: 600; 310; 30; 20 INJECTION, SOLUTION INTRAVENOUS at 15:24

## 2018-03-23 RX ADMIN — EPINEPHRINE 10 MCG: 1 INJECTION PARENTERAL at 17:59

## 2018-03-23 RX ADMIN — ROCURONIUM BROMIDE 50 MG: 10 INJECTION INTRAVENOUS at 19:17

## 2018-03-23 RX ADMIN — ROCURONIUM BROMIDE 20 MG: 10 INJECTION INTRAVENOUS at 20:19

## 2018-03-23 RX ADMIN — Medication 5 MG: at 21:11

## 2018-03-23 RX ADMIN — EPINEPHRINE 10 MCG: 1 INJECTION PARENTERAL at 16:10

## 2018-03-23 RX ADMIN — PHENYLEPHRINE HYDROCHLORIDE 200 MCG: 10 INJECTION, SOLUTION INTRAMUSCULAR; INTRAVENOUS; SUBCUTANEOUS at 16:20

## 2018-03-23 RX ADMIN — POTASSIUM CHLORIDE 20 MEQ: 29.8 INJECTION, SOLUTION INTRAVENOUS at 11:49

## 2018-03-23 RX ADMIN — PROPOFOL 30 MCG/KG/MIN: 10 INJECTION, EMULSION INTRAVENOUS at 22:34

## 2018-03-23 RX ADMIN — SODIUM CHLORIDE, POTASSIUM CHLORIDE, SODIUM LACTATE AND CALCIUM CHLORIDE: 600; 310; 30; 20 INJECTION, SOLUTION INTRAVENOUS at 18:04

## 2018-03-23 RX ADMIN — POTASSIUM CHLORIDE 20 MEQ: 29.8 INJECTION, SOLUTION INTRAVENOUS at 06:48

## 2018-03-23 RX ADMIN — PHENYLEPHRINE HYDROCHLORIDE 200 MCG: 10 INJECTION, SOLUTION INTRAMUSCULAR; INTRAVENOUS; SUBCUTANEOUS at 16:48

## 2018-03-23 RX ADMIN — NOREPINEPHRINE BITARTRATE 12.8 MCG: 1 INJECTION INTRAVENOUS at 16:20

## 2018-03-23 RX ADMIN — NOREPINEPHRINE BITARTRATE 6.4 MCG: 1 INJECTION INTRAVENOUS at 21:08

## 2018-03-23 RX ADMIN — HEPARIN SODIUM 1500 UNITS/HR: 10000 INJECTION, SOLUTION INTRAVENOUS at 03:34

## 2018-03-23 RX ADMIN — PROPOFOL 100 MG: 10 INJECTION, EMULSION INTRAVENOUS at 15:44

## 2018-03-23 RX ADMIN — CEFAZOLIN SODIUM 1 G: 2 INJECTION, SOLUTION INTRAVENOUS at 22:30

## 2018-03-23 RX ADMIN — ALBUMIN HUMAN 12.5 G: 0.05 INJECTION, SOLUTION INTRAVENOUS at 01:38

## 2018-03-23 RX ADMIN — AMINOCAPROIC ACID 5 G/HR: 250 INJECTION, SOLUTION INTRAVENOUS at 16:32

## 2018-03-23 RX ADMIN — PHENYLEPHRINE HYDROCHLORIDE 100 MCG: 10 INJECTION, SOLUTION INTRAMUSCULAR; INTRAVENOUS; SUBCUTANEOUS at 21:57

## 2018-03-23 RX ADMIN — MUPIROCIN 1 G: 20 OINTMENT TOPICAL at 08:57

## 2018-03-23 RX ADMIN — EPINEPHRINE 10 MCG: 1 INJECTION PARENTERAL at 21:54

## 2018-03-23 RX ADMIN — PHENYLEPHRINE HYDROCHLORIDE 200 MCG: 10 INJECTION, SOLUTION INTRAMUSCULAR; INTRAVENOUS; SUBCUTANEOUS at 16:04

## 2018-03-23 RX ADMIN — NOREPINEPHRINE BITARTRATE 6.4 MCG: 1 INJECTION INTRAVENOUS at 17:18

## 2018-03-23 RX ADMIN — PHENYLEPHRINE HYDROCHLORIDE 200 MCG: 10 INJECTION, SOLUTION INTRAMUSCULAR; INTRAVENOUS; SUBCUTANEOUS at 16:11

## 2018-03-23 RX ADMIN — NOREPINEPHRINE BITARTRATE 6.4 MCG: 1 INJECTION INTRAVENOUS at 17:54

## 2018-03-23 RX ADMIN — EPINEPHRINE 10 MCG: 1 INJECTION PARENTERAL at 21:52

## 2018-03-23 RX ADMIN — ROCURONIUM BROMIDE 20 MG: 10 INJECTION INTRAVENOUS at 21:49

## 2018-03-23 RX ADMIN — MIDAZOLAM 2 MG: 1 INJECTION INTRAMUSCULAR; INTRAVENOUS at 15:24

## 2018-03-23 RX ADMIN — NOREPINEPHRINE BITARTRATE 6.4 MCG: 1 INJECTION INTRAVENOUS at 15:57

## 2018-03-23 RX ADMIN — PROTAMINE SULFATE 250 MG: 10 INJECTION, SOLUTION INTRAVENOUS at 21:25

## 2018-03-23 RX ADMIN — EPINEPHRINE 10 MCG: 1 INJECTION PARENTERAL at 19:17

## 2018-03-23 ASSESSMENT — ENCOUNTER SYMPTOMS: DYSRHYTHMIAS: 1

## 2018-03-23 NOTE — PLAN OF CARE
Problem: Patient Care Overview  Goal: Plan of Care/Patient Progress Review  Outcome: No Change  A&O, no c/o chest pain. 5L Oxymask. Lungs diminished. MAPs between 65-75 per order with no drips. Impella in R groin. Controlled Afib. Pulses dopplerable. 2G sodium diet. 2L fluid restriction. Heparin @ 1700, Insulin drip & Amiodarone.     Plan to start nipride and CABG tomorrow. Continue to monitor and follow plan of care. Notify MD with any concerns.

## 2018-03-23 NOTE — ANESTHESIA PROCEDURE NOTES
MACARENA Probe Insertion Note  Probe Inserted by: INDER MESA   Insertion method: MACARENA probe easily inserted and MACARENA probe insertion required a jaw lift   Bite block used:   Yes      Probe type:  Adult 3D   Insertion complications: No complications.      Billing for MACARENA report is being done by Anesthesiology

## 2018-03-23 NOTE — ANESTHESIA PROCEDURE NOTES
Arterial Line Procedure Note  Staff:     Anesthesiologist:  INDER MESA  Location: In OR After Induction  Procedure Start/Stop Times:     patient identified, IV checked, site marked, risks and benefits discussed, informed consent, monitors and equipment checked, pre-op evaluation and at physician/surgeon's request      Correct Patient: Yes      Correct Position: Yes      Correct Site: Yes      Correct Procedure: Yes      Correct Laterality:  Yes    Site Marked:  Yes  Line Placement:     Procedure:  Arterial Line    Insertion Site:  Brachial    Insertion laterality:  Left    Skin Prep: Chloraprep      Patient Prep: patient draped, mask, sterile gloves, hat and hand hygiene      Local skin infiltration:  None    Ultrasound Guided?: Yes      Artery evaluated via ultrasound confirming patency.   Using realtime imaging, the artery was punctured and the needle was observed entering the artery.      A permanent image is entered into patient's chart.      Catheter size:  20 gauge, 12 cm    Cath secured with: suture      Dressing:  Tegaderm    Complications:  None obvious    Arterial waveform: Yes      IBP within 10% of NIBP: Yes

## 2018-03-23 NOTE — ANESTHESIA PROCEDURE NOTES
Central Line Procedure Note  Staff:     Anesthesiologist:  INDER MESA  Location: In OR after induction  Procedure Start/Stop Times:     patient identified, IV checked, site marked, risks and benefits discussed, informed consent, monitors and equipment checked, pre-op evaluation and at physician/surgeon's request      Correct Patient: Yes      Correct Position: Yes      Correct Site: Yes      Correct Procedure: Yes      Correct Laterality:  Yes    Site Marked:  Yes  Line Placement:     Procedure:  Central Line    Insertion laterality:  Left    Insertion site:  Internal Jugular    Position:  Trendelenburg      Maximal Sterile Barriers: All elements of maximal sterile barrier technique followed      (Maximal sterile barriers include:   Sterile gown, Sterile Gloves, Mask, Cap, Whole body draped, hand hygiene and acceptable skin prep).Skin Prep: Chloraprep         Injection Technique:  Ultrasound guided    Sterile Ultrasound Technique:  Sterile probe cover and Sterile gel    Vein evaluated via U/S for patency/adequacy of catheter insertion and is adequate.  Using realtime U/S imaging the vein was punctured, and needle was observed entering vein on U/S      Permanent Image entered into patient's record      Local skin infiltration:  None    Catheter size:  9 Fr, 2 lumen 11.5 cm (MAC)    Cath secured with: suture      Dressing:  Tegaderm and Biopatch    Complications:  None obvious    Blood aspirated all lumens: Yes      All Lumens Flushed: Yes      Verification method:  Placement to be verified post-op

## 2018-03-23 NOTE — PLAN OF CARE
Problem: Patient Care Overview  Goal: Plan of Care/Patient Progress Review  OT:  Pt not appropriate for therapy today due to planned OR.  Will reschedule.

## 2018-03-23 NOTE — CONSULTS
SW received consult for discharge planning and community resources. Writer met with Pt's wife yesterday. Please see full SW assessment for details.      Delia Hernandez, [facility]  ICU   Pager: [phone]

## 2018-03-23 NOTE — PROGRESS NOTES
CVTS PROGRESS NOTE  March 22, 2018      CO-MORBIDITIES:   Obesity  Afib  DM2  HTN  CAD  TIA  Heart failure    ASSESSMENT: Izaiah Alvarez is a 72 year old male with PMHx of obesity, Afib on coumadin, DM2, HTN, and TIA who was transferred from [facility] following MI with new onset left HF (reported 20-25% compared to 46% previously).    TODAY'S PROGRESS:   -OR today.    PLAN:  Neuro/ pain/ sedation:  #H/o TIA  - Monitor neurological status. Notify the MD for any acute changes in exam.  - APAP for pain.    Pulmonary care:   - Supplemental oxygen to keep saturation above 92 %.  - Albuterol PRN    Cardiovascular:    #MI with diffuse multivessel disease  #LVEF (20-25%)  #HTN  #HLD  - Monitor hemodynamic status.   - Amio at 1 straight rate.   - PTA: ASA 81mg qday, atrovastatin 5mg qday, linopril 30mg qday  - Heparin gtt  - On impella support  - Hold PTA metop    GI care:   - NPO for OR    Fluids/ Electrolytes/ Nutrition:   - no IVF  - On lasix gtt  - ICU electrolyte replacement protocol  - No indication for parenteral nutrition.    Renal/ Fluid Balance:    - Urine output is adequate so far.  - Will continue to monitor intake and output.    Endocrine:  #Diabetes Mellitus 2    - insulin gtt    ID/ Antibiotics:  - No indication for antibiotics.    Heme:     - Hemoglobin stable.  - Heparin gtt    Prophylaxis:    - Mechanical prophylaxis for DVT.   - hep gtt on hold for OR    Lines/ tubes/ drains:  - RIJ triple lumen, PIV x2, Fel[place]    Disposition:  - CV ICU.    Patient seen, findings and plan discussed with CVTS fellow.    William Cancino MD  General Surgery PGY-3    ====================================    SUBJECTIVE:   NAEO. No chest pain.     OBJECTIVE:   1. VITAL SIGNS:   Temp:  [96.9  F (36.1  C)-98.8  F (37.1  C)] 96.9  F (36.1  C)  Heart Rate:  [] 102  Resp:  [15-24] 18  BP: (88)/(72) 88/72  MAP:  [62 mmHg-81 mmHg] 81 mmHg  Arterial Line BP: ()/(55-71) 103/71  FiO2 (%):  [40 %] 40 %  SpO2:  [91 %-99 %] 93 %  FiO2  (%): 40 %  Resp: 18    2. INTAKE/ OUTPUT:   I/O last 3 completed shifts:  In: 2252.43 [P.O.:360; I.V.:1642.43]  Out: 2340 [Urine:2340]    3. PHYSICAL EXAMINATION:   General: Laying comfortably  Neuro: A&Ox3, NAD  Resp: Breathing non-labored oximask  CV: tachycardic regular rhythm  Abdomen: Soft, Non-distended, Non-tender  Extremities: warm and well perfused. Impella R groin.    4. INVESTIGATIONS:   Complete Blood Count     Recent Labs  Lab 03/23/18 0338 03/22/18 0148 03/22/18  0016   WBC 10.5 10.1 11.2*   HGB 12.0* 13.1* 13.6   * 173 157     Basic Metabolic Panel    Recent Labs  Lab 03/23/18 0338 03/22/18 0645 03/22/18 0148 03/22/18  0016    136 140 137   POTASSIUM 3.0* 3.6 3.4 3.4   CHLORIDE 100 98 99 98   CO2 31 30 28 27   BUN 39* 41* 38* 40*   CR 1.08 1.13 1.13 1.07   * 268* 287* 302*     Liver Function Tests    Recent Labs  Lab 03/23/18 0338 03/22/18 0645 03/22/18 0148 03/22/18  0016   AST  --  264*  --  310*   ALT  --  69  --  82*   ALKPHOS  --  55  --  57   BILITOTAL  --  3.6*  --  4.5*   ALBUMIN  --  2.8*  --  2.9*   INR 1.12  --  1.18* 1.20*     Coagulation Profile    Recent Labs  Lab 03/23/18 0338 03/22/18 0148 03/22/18  0016   INR 1.12 1.18* 1.20*     =========================================

## 2018-03-23 NOTE — PLAN OF CARE
Problem: Patient Care Overview  Goal: Plan of Care/Patient Progress Review  Outcome: Improving  Pre-op CABG. 250mL albumin given for low BP and low preload. Impella briefly increased to P8 while giving bolus. BP stable afterwards, back to P5. Pre-op scrub done x1. Needs one more scrub this AM. Right IJ dressing continues to bleed, dressing changed x3 since 2000. Hgb dropped 1g/dl since yesterday. Moonlighter paged with PATRICK. AF with HR 70-90s. Impella at 91cm, right groin, P5. Placement signal restored. CVP 6-8. CPAP at HS. ~75mL/hr UOP.     Chemo Gamboa RN 03/23/18 5:47 AM    Hours of cares [id number]

## 2018-03-23 NOTE — ANESTHESIA PROCEDURE NOTES
PA Catheter Insertion Note  Anesthesiologist: INDER MESA      Introducer: Introducer placed as part of procedure (SEE separate note)   Skin prep:  Chloraprep Cap, Full body drape, hand hygiene, Mask, Sterile gloves and Sterile gown    PA Catheter type:  CCO    Distance catheter advanced:  15 cm.        Dressing:  Biopatch and Tegaderm    Complications:  None apparent        PA catheter notes:  Unable to float the [place] Jos, likely to due existing TLC in RIJ, surgeon informed. [place] Jos locked at 15 cm, will be advanced in ICU if needed (RIJ TLC may need to be removed).

## 2018-03-24 ENCOUNTER — APPOINTMENT (OUTPATIENT)
Dept: INTERVENTIONAL RADIOLOGY/VASCULAR | Facility: CLINIC | Age: 73
DRG: 228 | End: 2018-03-24
Attending: SURGERY
Payer: COMMERCIAL

## 2018-03-24 ENCOUNTER — APPOINTMENT (OUTPATIENT)
Dept: ULTRASOUND IMAGING | Facility: CLINIC | Age: 73
DRG: 228 | End: 2018-03-24
Attending: SURGERY
Payer: COMMERCIAL

## 2018-03-24 ENCOUNTER — ANESTHESIA (OUTPATIENT)
Dept: SURGERY | Facility: CLINIC | Age: 73
DRG: 228 | End: 2018-03-24
Payer: COMMERCIAL

## 2018-03-24 ENCOUNTER — APPOINTMENT (OUTPATIENT)
Dept: GENERAL RADIOLOGY | Facility: CLINIC | Age: 73
DRG: 228 | End: 2018-03-24
Attending: THORACIC SURGERY (CARDIOTHORACIC VASCULAR SURGERY)
Payer: COMMERCIAL

## 2018-03-24 ENCOUNTER — APPOINTMENT (OUTPATIENT)
Dept: OCCUPATIONAL THERAPY | Facility: CLINIC | Age: 73
DRG: 228 | End: 2018-03-24
Attending: THORACIC SURGERY (CARDIOTHORACIC VASCULAR SURGERY)
Payer: COMMERCIAL

## 2018-03-24 ENCOUNTER — ANESTHESIA EVENT (OUTPATIENT)
Dept: SURGERY | Facility: CLINIC | Age: 73
DRG: 228 | End: 2018-03-24
Payer: COMMERCIAL

## 2018-03-24 PROBLEM — I21.3 STEMI (ST ELEVATION MYOCARDIAL INFARCTION) (H): Status: ACTIVE | Noted: 2018-03-24

## 2018-03-24 LAB
ABO + RH BLD: NORMAL
ABO + RH BLD: NORMAL
ANION GAP SERPL CALCULATED.3IONS-SCNC: 13 MMOL/L (ref 3–14)
ANION GAP SERPL CALCULATED.3IONS-SCNC: 14 MMOL/L (ref 3–14)
ANION GAP SERPL CALCULATED.3IONS-SCNC: 5 MMOL/L (ref 3–14)
APTT PPP: 109 SEC (ref 22–37)
APTT PPP: 28 SEC (ref 22–37)
BASE DEFICIT BLDA-SCNC: 0.6 MMOL/L
BASE DEFICIT BLDA-SCNC: 0.9 MMOL/L
BASE DEFICIT BLDA-SCNC: 2.1 MMOL/L
BASE DEFICIT BLDA-SCNC: 3.7 MMOL/L
BASE EXCESS BLDA CALC-SCNC: 0 MMOL/L
BASE EXCESS BLDA CALC-SCNC: 0.2 MMOL/L
BASE EXCESS BLDA CALC-SCNC: 0.9 MMOL/L
BASE EXCESS BLDA CALC-SCNC: 1.4 MMOL/L
BASE EXCESS BLDA CALC-SCNC: 2.1 MMOL/L
BASE EXCESS BLDA CALC-SCNC: 2.2 MMOL/L
BASE EXCESS BLDA CALC-SCNC: 2.3 MMOL/L
BLD GP AB SCN SERPL QL: NORMAL
BLD PROD TYP BPU: NORMAL
BLD UNIT ID BPU: 0
BLOOD BANK CMNT PATIENT-IMP: NORMAL
BLOOD PRODUCT CODE: NORMAL
BPU ID: NORMAL
BUN SERPL-MCNC: 26 MG/DL (ref 7–30)
BUN SERPL-MCNC: 33 MG/DL (ref 7–30)
BUN SERPL-MCNC: 35 MG/DL (ref 7–30)
CA-I BLD-MCNC: 4.3 MG/DL (ref 4.4–5.2)
CA-I BLD-MCNC: 4.4 MG/DL (ref 4.4–5.2)
CA-I BLD-MCNC: 4.4 MG/DL (ref 4.4–5.2)
CA-I BLD-MCNC: 4.6 MG/DL (ref 4.4–5.2)
CA-I BLD-MCNC: 9.8 MG/DL (ref 4.4–5.2)
CALCIUM SERPL-MCNC: 7.5 MG/DL (ref 8.5–10.1)
CALCIUM SERPL-MCNC: 8 MG/DL (ref 8.5–10.1)
CALCIUM SERPL-MCNC: 8.7 MG/DL (ref 8.5–10.1)
CHLORIDE SERPL-SCNC: 108 MMOL/L (ref 94–109)
CHLORIDE SERPL-SCNC: 109 MMOL/L (ref 94–109)
CHLORIDE SERPL-SCNC: 110 MMOL/L (ref 94–109)
CK SERPL-CCNC: 3227 U/L (ref 30–300)
CK SERPL-CCNC: 3423 U/L (ref 30–300)
CO2 SERPL-SCNC: 23 MMOL/L (ref 20–32)
CO2 SERPL-SCNC: 23 MMOL/L (ref 20–32)
CO2 SERPL-SCNC: 28 MMOL/L (ref 20–32)
CREAT SERPL-MCNC: 0.99 MG/DL (ref 0.66–1.25)
CREAT SERPL-MCNC: 1.17 MG/DL (ref 0.66–1.25)
CREAT SERPL-MCNC: 1.17 MG/DL (ref 0.66–1.25)
ERYTHROCYTE [DISTWIDTH] IN BLOOD BY AUTOMATED COUNT: 15.3 % (ref 10–15)
ERYTHROCYTE [DISTWIDTH] IN BLOOD BY AUTOMATED COUNT: 15.5 % (ref 10–15)
ERYTHROCYTE [DISTWIDTH] IN BLOOD BY AUTOMATED COUNT: 16 % (ref 10–15)
FIBRINOGEN PPP-MCNC: 410 MG/DL (ref 200–420)
GFR SERPL CREATININE-BSD FRML MDRD: 62 ML/MIN/1.7M2
GFR SERPL CREATININE-BSD FRML MDRD: 62 ML/MIN/1.7M2
GFR SERPL CREATININE-BSD FRML MDRD: 74 ML/MIN/1.7M2
GLUCOSE BLD-MCNC: 151 MG/DL (ref 70–99)
GLUCOSE BLD-MCNC: 160 MG/DL (ref 70–99)
GLUCOSE BLD-MCNC: 164 MG/DL (ref 70–99)
GLUCOSE BLDC GLUCOMTR-MCNC: 124 MG/DL (ref 70–99)
GLUCOSE BLDC GLUCOMTR-MCNC: 124 MG/DL (ref 70–99)
GLUCOSE BLDC GLUCOMTR-MCNC: 132 MG/DL (ref 70–99)
GLUCOSE BLDC GLUCOMTR-MCNC: 135 MG/DL (ref 70–99)
GLUCOSE BLDC GLUCOMTR-MCNC: 138 MG/DL (ref 70–99)
GLUCOSE BLDC GLUCOMTR-MCNC: 138 MG/DL (ref 70–99)
GLUCOSE BLDC GLUCOMTR-MCNC: 141 MG/DL (ref 70–99)
GLUCOSE BLDC GLUCOMTR-MCNC: 146 MG/DL (ref 70–99)
GLUCOSE BLDC GLUCOMTR-MCNC: 151 MG/DL (ref 70–99)
GLUCOSE BLDC GLUCOMTR-MCNC: 152 MG/DL (ref 70–99)
GLUCOSE BLDC GLUCOMTR-MCNC: 155 MG/DL (ref 70–99)
GLUCOSE BLDC GLUCOMTR-MCNC: 194 MG/DL (ref 70–99)
GLUCOSE BLDC GLUCOMTR-MCNC: 198 MG/DL (ref 70–99)
GLUCOSE BLDC GLUCOMTR-MCNC: 202 MG/DL (ref 70–99)
GLUCOSE BLDC GLUCOMTR-MCNC: 222 MG/DL (ref 70–99)
GLUCOSE BLDC GLUCOMTR-MCNC: 243 MG/DL (ref 70–99)
GLUCOSE BLDC GLUCOMTR-MCNC: 245 MG/DL (ref 70–99)
GLUCOSE BLDC GLUCOMTR-MCNC: 265 MG/DL (ref 70–99)
GLUCOSE BLDC GLUCOMTR-MCNC: 269 MG/DL (ref 70–99)
GLUCOSE SERPL-MCNC: 135 MG/DL (ref 70–99)
GLUCOSE SERPL-MCNC: 220 MG/DL (ref 70–99)
GLUCOSE SERPL-MCNC: 263 MG/DL (ref 70–99)
HCO3 BLD-SCNC: 23 MMOL/L (ref 21–28)
HCO3 BLD-SCNC: 25 MMOL/L (ref 21–28)
HCO3 BLD-SCNC: 26 MMOL/L (ref 21–28)
HCT VFR BLD AUTO: 30.2 % (ref 40–53)
HCT VFR BLD AUTO: 30.5 % (ref 40–53)
HCT VFR BLD AUTO: 33.4 % (ref 40–53)
HGB BLD-MCNC: 10.3 G/DL (ref 13.3–17.7)
HGB BLD-MCNC: 8.4 G/DL (ref 13.3–17.7)
HGB BLD-MCNC: 8.4 G/DL (ref 13.3–17.7)
HGB BLD-MCNC: 9.3 G/DL (ref 13.3–17.7)
HGB BLD-MCNC: 9.6 G/DL (ref 13.3–17.7)
HGB BLD-MCNC: 9.7 G/DL (ref 13.3–17.7)
INR PPP: 1.28 (ref 0.86–1.14)
INR PPP: 1.31 (ref 0.86–1.14)
INR PPP: 1.53 (ref 0.86–1.14)
KCT BLD-ACNC: 127 SEC (ref 75–150)
KCT BLD-ACNC: 168 SEC (ref 75–150)
KCT BLD-ACNC: 180 SEC (ref 75–150)
KCT BLD-ACNC: 205 SEC (ref 75–150)
LACTATE BLD-SCNC: 1.2 MMOL/L (ref 0.7–2)
LACTATE BLD-SCNC: 1.8 MMOL/L (ref 0.7–2)
LACTATE BLD-SCNC: 1.9 MMOL/L (ref 0.7–2)
LACTATE BLD-SCNC: 1.9 MMOL/L (ref 0.7–2)
LACTATE BLD-SCNC: 2.2 MMOL/L (ref 0.7–2)
LACTATE BLD-SCNC: 3.9 MMOL/L (ref 0.7–2)
LACTATE BLD-SCNC: 4.7 MMOL/L (ref 0.7–2)
LACTATE BLD-SCNC: 4.9 MMOL/L (ref 0.7–2)
LACTATE BLD-SCNC: 5 MMOL/L (ref 0.7–2)
LMWH PPP CHRO-ACNC: <0.1 IU/ML
MAGNESIUM SERPL-MCNC: 2.4 MG/DL (ref 1.6–2.3)
MAGNESIUM SERPL-MCNC: 2.8 MG/DL (ref 1.6–2.3)
MAGNESIUM SERPL-MCNC: 2.8 MG/DL (ref 1.6–2.3)
MCH RBC QN AUTO: 29.4 PG (ref 26.5–33)
MCH RBC QN AUTO: 29.8 PG (ref 26.5–33)
MCH RBC QN AUTO: 29.9 PG (ref 26.5–33)
MCHC RBC AUTO-ENTMCNC: 30.8 G/DL (ref 31.5–36.5)
MCHC RBC AUTO-ENTMCNC: 30.8 G/DL (ref 31.5–36.5)
MCHC RBC AUTO-ENTMCNC: 31.8 G/DL (ref 31.5–36.5)
MCV RBC AUTO: 94 FL (ref 78–100)
MCV RBC AUTO: 96 FL (ref 78–100)
MCV RBC AUTO: 97 FL (ref 78–100)
NUM BPU REQUESTED: 4
NUM BPU REQUESTED: 8
O2/TOTAL GAS SETTING VFR VENT: 40 %
O2/TOTAL GAS SETTING VFR VENT: 40 %
O2/TOTAL GAS SETTING VFR VENT: 50 %
O2/TOTAL GAS SETTING VFR VENT: 51 %
O2/TOTAL GAS SETTING VFR VENT: 51 %
O2/TOTAL GAS SETTING VFR VENT: 60 %
O2/TOTAL GAS SETTING VFR VENT: ABNORMAL %
OXYHGB MFR BLD: 94 % (ref 92–100)
OXYHGB MFR BLD: 95 % (ref 92–100)
OXYHGB MFR BLD: 96 % (ref 92–100)
OXYHGB MFR BLD: 97 % (ref 92–100)
PCO2 BLD: 34 MM HG (ref 35–45)
PCO2 BLD: 35 MM HG (ref 35–45)
PCO2 BLD: 36 MM HG (ref 35–45)
PCO2 BLD: 38 MM HG (ref 35–45)
PCO2 BLD: 38 MM HG (ref 35–45)
PCO2 BLD: 40 MM HG (ref 35–45)
PCO2 BLD: 40 MM HG (ref 35–45)
PCO2 BLD: 42 MM HG (ref 35–45)
PCO2 BLD: 42 MM HG (ref 35–45)
PCO2 BLD: 44 MM HG (ref 35–45)
PCO2 BLD: 47 MM HG (ref 35–45)
PH BLD: 7.3 PH (ref 7.35–7.45)
PH BLD: 7.36 PH (ref 7.35–7.45)
PH BLD: 7.37 PH (ref 7.35–7.45)
PH BLD: 7.38 PH (ref 7.35–7.45)
PH BLD: 7.4 PH (ref 7.35–7.45)
PH BLD: 7.42 PH (ref 7.35–7.45)
PH BLD: 7.42 PH (ref 7.35–7.45)
PH BLD: 7.45 PH (ref 7.35–7.45)
PH BLD: 7.45 PH (ref 7.35–7.45)
PH BLD: 7.46 PH (ref 7.35–7.45)
PH BLD: 7.48 PH (ref 7.35–7.45)
PHOSPHATE SERPL-MCNC: 2.5 MG/DL (ref 2.5–4.5)
PHOSPHATE SERPL-MCNC: 3.2 MG/DL (ref 2.5–4.5)
PHOSPHATE SERPL-MCNC: 5.1 MG/DL (ref 2.5–4.5)
PLATELET # BLD AUTO: 109 10E9/L (ref 150–450)
PLATELET # BLD AUTO: 116 10E9/L (ref 150–450)
PLATELET # BLD AUTO: 141 10E9/L (ref 150–450)
PLATELET # BLD AUTO: 149 10E9/L (ref 150–450)
PO2 BLD: 100 MM HG (ref 80–105)
PO2 BLD: 103 MM HG (ref 80–105)
PO2 BLD: 109 MM HG (ref 80–105)
PO2 BLD: 114 MM HG (ref 80–105)
PO2 BLD: 261 MM HG (ref 80–105)
PO2 BLD: 76 MM HG (ref 80–105)
PO2 BLD: 84 MM HG (ref 80–105)
PO2 BLD: 88 MM HG (ref 80–105)
PO2 BLD: 90 MM HG (ref 80–105)
PO2 BLD: 94 MM HG (ref 80–105)
PO2 BLD: 97 MM HG (ref 80–105)
POTASSIUM BLD-SCNC: 3.7 MMOL/L (ref 3.4–5.3)
POTASSIUM BLD-SCNC: 3.9 MMOL/L (ref 3.4–5.3)
POTASSIUM BLD-SCNC: 4.2 MMOL/L (ref 3.4–5.3)
POTASSIUM SERPL-SCNC: 4.2 MMOL/L (ref 3.4–5.3)
POTASSIUM SERPL-SCNC: 4.2 MMOL/L (ref 3.4–5.3)
POTASSIUM SERPL-SCNC: 4.3 MMOL/L (ref 3.4–5.3)
RADIOLOGIST FLAGS: ABNORMAL
RBC # BLD AUTO: 3.16 10E12/L (ref 4.4–5.9)
RBC # BLD AUTO: 3.26 10E12/L (ref 4.4–5.9)
RBC # BLD AUTO: 3.44 10E12/L (ref 4.4–5.9)
SODIUM BLD-SCNC: 139 MMOL/L (ref 133–144)
SODIUM BLD-SCNC: 140 MMOL/L (ref 133–144)
SODIUM BLD-SCNC: 141 MMOL/L (ref 133–144)
SODIUM SERPL-SCNC: 142 MMOL/L (ref 133–144)
SODIUM SERPL-SCNC: 144 MMOL/L (ref 133–144)
SODIUM SERPL-SCNC: 146 MMOL/L (ref 133–144)
SPECIMEN EXP DATE BLD: NORMAL
TRANSFUSION STATUS PATIENT QL: NORMAL
WBC # BLD AUTO: 14.3 10E9/L (ref 4–11)
WBC # BLD AUTO: 16.3 10E9/L (ref 4–11)
WBC # BLD AUTO: 18.7 10E9/L (ref 4–11)

## 2018-03-24 PROCEDURE — 25000565 ZZH ISOFLURANE, EA 15 MIN: Performed by: SURGERY

## 2018-03-24 PROCEDURE — 84100 ASSAY OF PHOSPHORUS: CPT | Performed by: THORACIC SURGERY (CARDIOTHORACIC VASCULAR SURGERY)

## 2018-03-24 PROCEDURE — 82805 BLOOD GASES W/O2 SATURATION: CPT | Performed by: THORACIC SURGERY (CARDIOTHORACIC VASCULAR SURGERY)

## 2018-03-24 PROCEDURE — 82330 ASSAY OF CALCIUM: CPT | Performed by: THORACIC SURGERY (CARDIOTHORACIC VASCULAR SURGERY)

## 2018-03-24 PROCEDURE — 83735 ASSAY OF MAGNESIUM: CPT | Performed by: THORACIC SURGERY (CARDIOTHORACIC VASCULAR SURGERY)

## 2018-03-24 PROCEDURE — 93010 ELECTROCARDIOGRAM REPORT: CPT | Performed by: INTERNAL MEDICINE

## 2018-03-24 PROCEDURE — C1874 STENT, COATED/COV W/DEL SYS: HCPCS | Performed by: SURGERY

## 2018-03-24 PROCEDURE — 82803 BLOOD GASES ANY COMBINATION: CPT | Performed by: THORACIC SURGERY (CARDIOTHORACIC VASCULAR SURGERY)

## 2018-03-24 PROCEDURE — 25000125 ZZHC RX 250: Performed by: SURGERY

## 2018-03-24 PROCEDURE — 82550 ASSAY OF CK (CPK): CPT | Performed by: THORACIC SURGERY (CARDIOTHORACIC VASCULAR SURGERY)

## 2018-03-24 PROCEDURE — 85730 THROMBOPLASTIN TIME PARTIAL: CPT | Performed by: THORACIC SURGERY (CARDIOTHORACIC VASCULAR SURGERY)

## 2018-03-24 PROCEDURE — 40000196 ZZH STATISTIC RAPCV CVP MONITORING

## 2018-03-24 PROCEDURE — 40000014 ZZH STATISTIC ARTERIAL MONITORING DAILY

## 2018-03-24 PROCEDURE — 93926 LOWER EXTREMITY STUDY: CPT | Mod: RT

## 2018-03-24 PROCEDURE — 80048 BASIC METABOLIC PNL TOTAL CA: CPT | Performed by: THORACIC SURGERY (CARDIOTHORACIC VASCULAR SURGERY)

## 2018-03-24 PROCEDURE — 94002 VENT MGMT INPAT INIT DAY: CPT

## 2018-03-24 PROCEDURE — 40000133 ZZH STATISTIC OT WARD VISIT: Performed by: OCCUPATIONAL THERAPIST

## 2018-03-24 PROCEDURE — 40000003 ZZH STATISTIC IR STAFF TIME IN THE OR

## 2018-03-24 PROCEDURE — 0JQN0ZZ REPAIR RIGHT LOWER LEG SUBCUTANEOUS TISSUE AND FASCIA, OPEN APPROACH: ICD-10-PCS | Performed by: SURGERY

## 2018-03-24 PROCEDURE — 37000009 ZZH ANESTHESIA TECHNICAL FEE, EACH ADDTL 15 MIN: Performed by: SURGERY

## 2018-03-24 PROCEDURE — 00000146 ZZHCL STATISTIC GLUCOSE BY METER IP

## 2018-03-24 PROCEDURE — C1725 CATH, TRANSLUMIN NON-LASER: HCPCS | Performed by: SURGERY

## 2018-03-24 PROCEDURE — P9016 RBC LEUKOCYTES REDUCED: HCPCS | Performed by: PHYSICIAN ASSISTANT

## 2018-03-24 PROCEDURE — C1757 CATH, THROMBECTOMY/EMBOLECT: HCPCS | Performed by: SURGERY

## 2018-03-24 PROCEDURE — 25000128 H RX IP 250 OP 636: Performed by: SURGERY

## 2018-03-24 PROCEDURE — C1894 INTRO/SHEATH, NON-LASER: HCPCS | Performed by: SURGERY

## 2018-03-24 PROCEDURE — 84295 ASSAY OF SERUM SODIUM: CPT | Performed by: THORACIC SURGERY (CARDIOTHORACIC VASCULAR SURGERY)

## 2018-03-24 PROCEDURE — 93005 ELECTROCARDIOGRAM TRACING: CPT

## 2018-03-24 PROCEDURE — 40000275 ZZH STATISTIC RCP TIME EA 10 MIN

## 2018-03-24 PROCEDURE — 04CK0ZZ EXTIRPATION OF MATTER FROM RIGHT FEMORAL ARTERY, OPEN APPROACH: ICD-10-PCS | Performed by: SURGERY

## 2018-03-24 PROCEDURE — 25000125 ZZHC RX 250: Performed by: THORACIC SURGERY (CARDIOTHORACIC VASCULAR SURGERY)

## 2018-03-24 PROCEDURE — 97166 OT EVAL MOD COMPLEX 45 MIN: CPT | Mod: GO | Performed by: OCCUPATIONAL THERAPIST

## 2018-03-24 PROCEDURE — 25000132 ZZH RX MED GY IP 250 OP 250 PS 637: Performed by: SURGERY

## 2018-03-24 PROCEDURE — 20000004 ZZH R&B ICU UMMC

## 2018-03-24 PROCEDURE — 85384 FIBRINOGEN ACTIVITY: CPT | Performed by: THORACIC SURGERY (CARDIOTHORACIC VASCULAR SURGERY)

## 2018-03-24 PROCEDURE — 04VC3DZ RESTRICTION OF RIGHT COMMON ILIAC ARTERY WITH INTRALUMINAL DEVICE, PERCUTANEOUS APPROACH: ICD-10-PCS | Performed by: SURGERY

## 2018-03-24 PROCEDURE — 85347 COAGULATION TIME ACTIVATED: CPT

## 2018-03-24 PROCEDURE — 25000128 H RX IP 250 OP 636: Performed by: NURSE ANESTHETIST, CERTIFIED REGISTERED

## 2018-03-24 PROCEDURE — 25000132 ZZH RX MED GY IP 250 OP 250 PS 637: Performed by: INTERNAL MEDICINE

## 2018-03-24 PROCEDURE — 85520 HEPARIN ASSAY: CPT | Performed by: THORACIC SURGERY (CARDIOTHORACIC VASCULAR SURGERY)

## 2018-03-24 PROCEDURE — 04CC0ZZ EXTIRPATION OF MATTER FROM RIGHT COMMON ILIAC ARTERY, OPEN APPROACH: ICD-10-PCS | Performed by: SURGERY

## 2018-03-24 PROCEDURE — 85027 COMPLETE CBC AUTOMATED: CPT | Performed by: THORACIC SURGERY (CARDIOTHORACIC VASCULAR SURGERY)

## 2018-03-24 PROCEDURE — 84100 ASSAY OF PHOSPHORUS: CPT | Performed by: SURGERY

## 2018-03-24 PROCEDURE — 88304 TISSUE EXAM BY PATHOLOGIST: CPT | Performed by: SURGERY

## 2018-03-24 PROCEDURE — 82805 BLOOD GASES W/O2 SATURATION: CPT | Performed by: STUDENT IN AN ORGANIZED HEALTH CARE EDUCATION/TRAINING PROGRAM

## 2018-03-24 PROCEDURE — 25000132 ZZH RX MED GY IP 250 OP 250 PS 637: Performed by: THORACIC SURGERY (CARDIOTHORACIC VASCULAR SURGERY)

## 2018-03-24 PROCEDURE — 25000125 ZZHC RX 250: Performed by: NURSE ANESTHETIST, CERTIFIED REGISTERED

## 2018-03-24 PROCEDURE — 97530 THERAPEUTIC ACTIVITIES: CPT | Mod: GO | Performed by: OCCUPATIONAL THERAPIST

## 2018-03-24 PROCEDURE — 37000008 ZZH ANESTHESIA TECHNICAL FEE, 1ST 30 MIN: Performed by: SURGERY

## 2018-03-24 PROCEDURE — 84132 ASSAY OF SERUM POTASSIUM: CPT | Performed by: THORACIC SURGERY (CARDIOTHORACIC VASCULAR SURGERY)

## 2018-03-24 PROCEDURE — 85049 AUTOMATED PLATELET COUNT: CPT | Performed by: THORACIC SURGERY (CARDIOTHORACIC VASCULAR SURGERY)

## 2018-03-24 PROCEDURE — C1769 GUIDE WIRE: HCPCS | Performed by: SURGERY

## 2018-03-24 PROCEDURE — B41F1ZZ FLUOROSCOPY OF RIGHT LOWER EXTREMITY ARTERIES USING LOW OSMOLAR CONTRAST: ICD-10-PCS | Performed by: SURGERY

## 2018-03-24 PROCEDURE — 83605 ASSAY OF LACTIC ACID: CPT | Performed by: THORACIC SURGERY (CARDIOTHORACIC VASCULAR SURGERY)

## 2018-03-24 PROCEDURE — 25000128 H RX IP 250 OP 636: Performed by: THORACIC SURGERY (CARDIOTHORACIC VASCULAR SURGERY)

## 2018-03-24 PROCEDURE — 86900 BLOOD TYPING SEROLOGIC ABO: CPT | Performed by: SURGERY

## 2018-03-24 PROCEDURE — 82947 ASSAY GLUCOSE BLOOD QUANT: CPT | Performed by: THORACIC SURGERY (CARDIOTHORACIC VASCULAR SURGERY)

## 2018-03-24 PROCEDURE — 27210794 ZZH OR GENERAL SUPPLY STERILE: Performed by: SURGERY

## 2018-03-24 PROCEDURE — 86901 BLOOD TYPING SEROLOGIC RH(D): CPT | Performed by: SURGERY

## 2018-03-24 PROCEDURE — 97535 SELF CARE MNGMENT TRAINING: CPT | Mod: GO | Performed by: OCCUPATIONAL THERAPIST

## 2018-03-24 PROCEDURE — 40000986 XR ABDOMEN PORT 1 VW

## 2018-03-24 PROCEDURE — 82550 ASSAY OF CK (CPK): CPT | Performed by: SURGERY

## 2018-03-24 PROCEDURE — 83605 ASSAY OF LACTIC ACID: CPT | Performed by: STUDENT IN AN ORGANIZED HEALTH CARE EDUCATION/TRAINING PROGRAM

## 2018-03-24 PROCEDURE — 25800025 ZZH RX 258: Performed by: THORACIC SURGERY (CARDIOTHORACIC VASCULAR SURGERY)

## 2018-03-24 PROCEDURE — 94003 VENT MGMT INPAT SUBQ DAY: CPT

## 2018-03-24 PROCEDURE — 86850 RBC ANTIBODY SCREEN: CPT | Performed by: SURGERY

## 2018-03-24 PROCEDURE — 85610 PROTHROMBIN TIME: CPT | Performed by: THORACIC SURGERY (CARDIOTHORACIC VASCULAR SURGERY)

## 2018-03-24 PROCEDURE — 27210995 ZZH RX 272: Performed by: SURGERY

## 2018-03-24 PROCEDURE — P9041 ALBUMIN (HUMAN),5%, 50ML: HCPCS | Performed by: SURGERY

## 2018-03-24 PROCEDURE — 36000066 ZZH SURGERY LEVEL 4 W FLUORO 1ST 30 MIN - UMMC: Performed by: SURGERY

## 2018-03-24 PROCEDURE — C1887 CATHETER, GUIDING: HCPCS | Performed by: SURGERY

## 2018-03-24 PROCEDURE — 99291 CRITICAL CARE FIRST HOUR: CPT | Mod: GC | Performed by: ANESTHESIOLOGY

## 2018-03-24 PROCEDURE — 36000064 ZZH SURGERY LEVEL 4 EA 15 ADDTL MIN - UMMC: Performed by: SURGERY

## 2018-03-24 PROCEDURE — P9041 ALBUMIN (HUMAN),5%, 50ML: HCPCS | Performed by: THORACIC SURGERY (CARDIOTHORACIC VASCULAR SURGERY)

## 2018-03-24 DEVICE — IMPLANTABLE DEVICE: Type: IMPLANTABLE DEVICE | Site: ILIAC/FEMORALS | Status: FUNCTIONAL

## 2018-03-24 RX ORDER — LIDOCAINE 40 MG/G
CREAM TOPICAL DAILY
Status: DISCONTINUED | OUTPATIENT
Start: 2018-03-24 | End: 2018-03-24 | Stop reason: ALTCHOICE

## 2018-03-24 RX ORDER — PROPOFOL 10 MG/ML
INJECTION, EMULSION INTRAVENOUS CONTINUOUS PRN
Status: DISCONTINUED | OUTPATIENT
Start: 2018-03-24 | End: 2018-03-24

## 2018-03-24 RX ORDER — ALBUMIN, HUMAN INJ 5% 5 %
12.5 SOLUTION INTRAVENOUS ONCE
Status: COMPLETED | OUTPATIENT
Start: 2018-03-24 | End: 2018-03-24

## 2018-03-24 RX ORDER — LIDOCAINE 50 MG/G
OINTMENT TOPICAL EVERY 4 HOURS PRN
Status: DISCONTINUED | OUTPATIENT
Start: 2018-03-24 | End: 2018-04-04 | Stop reason: HOSPADM

## 2018-03-24 RX ORDER — ALBUMIN, HUMAN INJ 5% 5 %
SOLUTION INTRAVENOUS
Status: DISCONTINUED
Start: 2018-03-24 | End: 2018-03-24 | Stop reason: HOSPADM

## 2018-03-24 RX ORDER — CEFAZOLIN SODIUM 2 G/100ML
2 INJECTION, SOLUTION INTRAVENOUS EVERY 8 HOURS
Status: COMPLETED | OUTPATIENT
Start: 2018-03-25 | End: 2018-03-25

## 2018-03-24 RX ORDER — PROPOFOL 10 MG/ML
INJECTION, EMULSION INTRAVENOUS PRN
Status: DISCONTINUED | OUTPATIENT
Start: 2018-03-24 | End: 2018-03-24

## 2018-03-24 RX ORDER — FENTANYL CITRATE 50 UG/ML
INJECTION, SOLUTION INTRAMUSCULAR; INTRAVENOUS PRN
Status: DISCONTINUED | OUTPATIENT
Start: 2018-03-24 | End: 2018-03-24

## 2018-03-24 RX ORDER — CALCIUM CHLORIDE 100 MG/ML
INJECTION INTRAVENOUS; INTRAVENTRICULAR PRN
Status: DISCONTINUED | OUTPATIENT
Start: 2018-03-24 | End: 2018-03-24

## 2018-03-24 RX ORDER — HEPARIN SODIUM 1000 [USP'U]/ML
INJECTION, SOLUTION INTRAVENOUS; SUBCUTANEOUS PRN
Status: DISCONTINUED | OUTPATIENT
Start: 2018-03-24 | End: 2018-03-24

## 2018-03-24 RX ORDER — NALOXONE HYDROCHLORIDE 0.4 MG/ML
.1-.4 INJECTION, SOLUTION INTRAMUSCULAR; INTRAVENOUS; SUBCUTANEOUS
Status: DISCONTINUED | OUTPATIENT
Start: 2018-03-24 | End: 2018-04-04 | Stop reason: HOSPADM

## 2018-03-24 RX ORDER — METOPROLOL TARTRATE 1 MG/ML
5 INJECTION, SOLUTION INTRAVENOUS EVERY 6 HOURS
Status: DISCONTINUED | OUTPATIENT
Start: 2018-03-25 | End: 2018-03-26

## 2018-03-24 RX ORDER — SODIUM CHLORIDE 9 MG/ML
INJECTION, SOLUTION INTRAVENOUS CONTINUOUS PRN
Status: DISCONTINUED | OUTPATIENT
Start: 2018-03-24 | End: 2018-03-24

## 2018-03-24 RX ORDER — CEFAZOLIN SODIUM 2 G/100ML
2 INJECTION, SOLUTION INTRAVENOUS
Status: DISCONTINUED | OUTPATIENT
Start: 2018-03-24 | End: 2018-03-24

## 2018-03-24 RX ORDER — HEPARIN SODIUM 10000 [USP'U]/100ML
500 INJECTION, SOLUTION INTRAVENOUS CONTINUOUS
Status: DISCONTINUED | OUTPATIENT
Start: 2018-03-24 | End: 2018-03-25

## 2018-03-24 RX ORDER — METOPROLOL TARTRATE 1 MG/ML
INJECTION, SOLUTION INTRAVENOUS PRN
Status: DISCONTINUED | OUTPATIENT
Start: 2018-03-24 | End: 2018-03-24

## 2018-03-24 RX ORDER — HEPARIN SODIUM 10000 [USP'U]/100ML
500 INJECTION, SOLUTION INTRAVENOUS CONTINUOUS
Status: DISCONTINUED | OUTPATIENT
Start: 2018-03-24 | End: 2018-03-24

## 2018-03-24 RX ORDER — ESMOLOL HYDROCHLORIDE 10 MG/ML
INJECTION INTRAVENOUS PRN
Status: DISCONTINUED | OUTPATIENT
Start: 2018-03-24 | End: 2018-03-24

## 2018-03-24 RX ADMIN — OXYCODONE HYDROCHLORIDE 5 MG: 5 TABLET ORAL at 10:48

## 2018-03-24 RX ADMIN — CEFAZOLIN SODIUM 2 G: 2 INJECTION, SOLUTION INTRAVENOUS at 05:39

## 2018-03-24 RX ADMIN — VASOPRESSIN 0.5 UNITS/HR: 20 INJECTION INTRAVENOUS at 14:23

## 2018-03-24 RX ADMIN — HUMAN INSULIN 6 UNITS/HR: 100 INJECTION, SOLUTION SUBCUTANEOUS at 18:07

## 2018-03-24 RX ADMIN — ASPIRIN 81 MG: 81 TABLET, COATED ORAL at 07:46

## 2018-03-24 RX ADMIN — PHENYLEPHRINE HYDROCHLORIDE 200 MCG: 10 INJECTION, SOLUTION INTRAMUSCULAR; INTRAVENOUS; SUBCUTANEOUS at 13:44

## 2018-03-24 RX ADMIN — HUMAN INSULIN 24 UNITS/HR: 100 INJECTION, SOLUTION SUBCUTANEOUS at 04:51

## 2018-03-24 RX ADMIN — EPINEPHRINE 0.02 MCG/KG/MIN: 1 INJECTION PARENTERAL at 21:57

## 2018-03-24 RX ADMIN — HUMAN INSULIN 12 UNITS/HR: 100 INJECTION, SOLUTION SUBCUTANEOUS at 07:13

## 2018-03-24 RX ADMIN — AMIODARONE HYDROCHLORIDE 0.5 MG/MIN: 50 INJECTION, SOLUTION INTRAVENOUS at 13:06

## 2018-03-24 RX ADMIN — PROPOFOL 25 MCG/KG/MIN: 10 INJECTION, EMULSION INTRAVENOUS at 16:30

## 2018-03-24 RX ADMIN — SODIUM CHLORIDE: 9 INJECTION, SOLUTION INTRAVENOUS at 13:14

## 2018-03-24 RX ADMIN — ROCURONIUM BROMIDE 50 MG: 10 INJECTION INTRAVENOUS at 13:27

## 2018-03-24 RX ADMIN — FENTANYL CITRATE 100 MCG: 50 INJECTION, SOLUTION INTRAMUSCULAR; INTRAVENOUS at 16:54

## 2018-03-24 RX ADMIN — HUMAN INSULIN 24 UNITS/HR: 100 INJECTION, SOLUTION SUBCUTANEOUS at 06:08

## 2018-03-24 RX ADMIN — SODIUM CHLORIDE: 9 INJECTION, SOLUTION INTRAVENOUS at 15:18

## 2018-03-24 RX ADMIN — ALBUMIN HUMAN 500 ML: 0.05 INJECTION, SOLUTION INTRAVENOUS at 01:07

## 2018-03-24 RX ADMIN — HEPARIN SODIUM 3000 UNITS: 1000 INJECTION, SOLUTION INTRAVENOUS; SUBCUTANEOUS at 14:34

## 2018-03-24 RX ADMIN — ESMOLOL HYDROCHLORIDE 40 MG: 10 INJECTION, SOLUTION INTRAVENOUS at 15:18

## 2018-03-24 RX ADMIN — METOPROLOL TARTRATE 2.5 MG: 5 INJECTION INTRAVENOUS at 15:46

## 2018-03-24 RX ADMIN — ATORVASTATIN CALCIUM 20 MG: 20 TABLET, FILM COATED ORAL at 07:50

## 2018-03-24 RX ADMIN — PROPOFOL 40 MG: 10 INJECTION, EMULSION INTRAVENOUS at 16:54

## 2018-03-24 RX ADMIN — DOCUSATE SODIUM 100 MG: 100 CAPSULE, LIQUID FILLED ORAL at 10:50

## 2018-03-24 RX ADMIN — PHENYLEPHRINE HYDROCHLORIDE 200 MCG: 10 INJECTION, SOLUTION INTRAMUSCULAR; INTRAVENOUS; SUBCUTANEOUS at 13:39

## 2018-03-24 RX ADMIN — METOPROLOL TARTRATE 2.5 MG: 5 INJECTION INTRAVENOUS at 15:58

## 2018-03-24 RX ADMIN — HUMAN INSULIN 12 UNITS/HR: 100 INJECTION, SOLUTION SUBCUTANEOUS at 01:38

## 2018-03-24 RX ADMIN — VASOPRESSIN 2 UNITS: 20 INJECTION, SOLUTION INTRAMUSCULAR; SUBCUTANEOUS at 14:45

## 2018-03-24 RX ADMIN — PROPOFOL 50.06 MCG/KG/MIN: 10 INJECTION, EMULSION INTRAVENOUS at 03:51

## 2018-03-24 RX ADMIN — ESMOLOL HYDROCHLORIDE 30 MG: 10 INJECTION, SOLUTION INTRAVENOUS at 15:12

## 2018-03-24 RX ADMIN — FENTANYL CITRATE 500 MCG: 50 INJECTION, SOLUTION INTRAMUSCULAR; INTRAVENOUS at 13:27

## 2018-03-24 RX ADMIN — CALCIUM CHLORIDE 1000 MG: 100 INJECTION, SOLUTION INTRAVENOUS at 15:08

## 2018-03-24 RX ADMIN — ALBUMIN HUMAN 12.5 G: 0.05 INJECTION, SOLUTION INTRAVENOUS at 02:01

## 2018-03-24 RX ADMIN — HUMAN INSULIN 20 UNITS/HR: 100 INJECTION, SOLUTION SUBCUTANEOUS at 03:35

## 2018-03-24 RX ADMIN — CEFAZOLIN SODIUM 2 G: 2 INJECTION, SOLUTION INTRAVENOUS at 14:00

## 2018-03-24 RX ADMIN — CEFAZOLIN SODIUM 1 G: 2 INJECTION, SOLUTION INTRAVENOUS at 16:00

## 2018-03-24 RX ADMIN — ESMOLOL HYDROCHLORIDE 30 MG: 10 INJECTION, SOLUTION INTRAVENOUS at 15:08

## 2018-03-24 RX ADMIN — ACETAMINOPHEN 975 MG: 325 TABLET, FILM COATED ORAL at 23:05

## 2018-03-24 RX ADMIN — VASOPRESSIN 1 UNITS: 20 INJECTION, SOLUTION INTRAMUSCULAR; SUBCUTANEOUS at 14:18

## 2018-03-24 RX ADMIN — LIDOCAINE: 50 OINTMENT TOPICAL at 08:40

## 2018-03-24 RX ADMIN — HYDROMORPHONE HYDROCHLORIDE 0.5 MG: 1 INJECTION, SOLUTION INTRAMUSCULAR; INTRAVENOUS; SUBCUTANEOUS at 17:52

## 2018-03-24 RX ADMIN — MUPIROCIN 0.5 G: 20 OINTMENT TOPICAL at 00:34

## 2018-03-24 RX ADMIN — ACETAMINOPHEN 975 MG: 325 TABLET, FILM COATED ORAL at 08:30

## 2018-03-24 RX ADMIN — PHENYLEPHRINE HYDROCHLORIDE 200 MCG: 10 INJECTION, SOLUTION INTRAMUSCULAR; INTRAVENOUS; SUBCUTANEOUS at 14:43

## 2018-03-24 RX ADMIN — PROPOFOL 25.39 MCG/KG/MIN: 10 INJECTION, EMULSION INTRAVENOUS at 20:31

## 2018-03-24 RX ADMIN — VASOPRESSIN 1 UNITS: 20 INJECTION, SOLUTION INTRAMUSCULAR; SUBCUTANEOUS at 13:45

## 2018-03-24 RX ADMIN — HEPARIN SODIUM 500 UNITS/HR: 10000 INJECTION, SOLUTION INTRAVENOUS at 10:51

## 2018-03-24 RX ADMIN — PROPOFOL 50.06 MCG/KG/MIN: 10 INJECTION, EMULSION INTRAVENOUS at 01:07

## 2018-03-24 RX ADMIN — PHENYLEPHRINE HYDROCHLORIDE 200 MCG: 10 INJECTION, SOLUTION INTRAMUSCULAR; INTRAVENOUS; SUBCUTANEOUS at 14:42

## 2018-03-24 RX ADMIN — HYDROMORPHONE HYDROCHLORIDE 0.5 MG: 1 INJECTION, SOLUTION INTRAMUSCULAR; INTRAVENOUS; SUBCUTANEOUS at 01:11

## 2018-03-24 RX ADMIN — FINASTERIDE 5 MG: 5 TABLET, FILM COATED ORAL at 07:52

## 2018-03-24 RX ADMIN — HUMAN INSULIN 6 UNITS/HR: 100 INJECTION, SOLUTION SUBCUTANEOUS at 22:43

## 2018-03-24 RX ADMIN — PANTOPRAZOLE SODIUM 40 MG: 40 INJECTION, POWDER, FOR SOLUTION INTRAVENOUS at 07:46

## 2018-03-24 RX ADMIN — POTASSIUM CHLORIDE, DEXTROSE MONOHYDRATE AND SODIUM CHLORIDE: 150; 5; 450 INJECTION, SOLUTION INTRAVENOUS at 00:23

## 2018-03-24 RX ADMIN — ESMOLOL HYDROCHLORIDE 40 MG: 10 INJECTION, SOLUTION INTRAVENOUS at 15:24

## 2018-03-24 RX ADMIN — ESMOLOL HYDROCHLORIDE 40 MG: 10 INJECTION, SOLUTION INTRAVENOUS at 15:15

## 2018-03-24 RX ADMIN — PROPOFOL 50 MG: 10 INJECTION, EMULSION INTRAVENOUS at 13:27

## 2018-03-24 RX ADMIN — ESMOLOL HYDROCHLORIDE 20 MG: 10 INJECTION, SOLUTION INTRAVENOUS at 15:29

## 2018-03-24 RX ADMIN — ESMOLOL HYDROCHLORIDE 30 MG: 10 INJECTION, SOLUTION INTRAVENOUS at 15:31

## 2018-03-24 RX ADMIN — PHENYLEPHRINE HYDROCHLORIDE 200 MCG: 10 INJECTION, SOLUTION INTRAMUSCULAR; INTRAVENOUS; SUBCUTANEOUS at 14:00

## 2018-03-24 RX ADMIN — LIDOCAINE: 50 OINTMENT TOPICAL at 12:44

## 2018-03-24 RX ADMIN — PHENYLEPHRINE HYDROCHLORIDE 200 MCG: 10 INJECTION, SOLUTION INTRAMUSCULAR; INTRAVENOUS; SUBCUTANEOUS at 13:49

## 2018-03-24 RX ADMIN — HEPARIN SODIUM 3000 UNITS: 1000 INJECTION, SOLUTION INTRAVENOUS; SUBCUTANEOUS at 14:14

## 2018-03-24 ASSESSMENT — PAIN DESCRIPTION - DESCRIPTORS: DESCRIPTORS: CONSTANT;SHOOTING

## 2018-03-24 NOTE — ANESTHESIA POSTPROCEDURE EVALUATION
Patient: Izaiah Alvarez    Procedure(s):  Right Groin RE-Exploration, Right Common Iliac and Right Common Femoral Artery  Thromboembolectomy, Right Common Iliac Stent placement, Prevena application, Right lower right extremity fasciotomy, and right leg angiogram. - Wound Class: I-Clean   - Wound Class: I-Clean    Diagnosis:Right leg eschemia  Diagnosis Additional Information: No value filed.    Anesthesia Type:  General    Note:  Anesthesia Post Evaluation    Patient location during evaluation: ICU  Patient participation: Unable to evaluate secondary to administered sedation  Level of consciousness: obtunded/minimal responses  Pain management: unable to assess  Airway patency: patent  Cardiovascular status: hemodynamically stable  Respiratory status: ETT, intubated and ventilator  Hydration status: euvolemic  PONV: unable to assess             Last vitals:  Vitals:    03/24/18 1800 03/24/18 1815 03/24/18 1830   BP:      Resp:      Temp:      SpO2: 98% 99% 100%         Electronically Signed By: Naeem Miranda MD  March 24, 2018  6:35 PM

## 2018-03-24 NOTE — PROGRESS NOTES
CLINICAL NUTRITION SERVICES - BRIEF NOTE    Received provider consult for nutrition education with comments post op cardiovascular surgery (automatic consult on post-op order set). S/p CABG on 3/23/18. Extubated this am. Nutrition education already provided on low Na+ AND cardiac diet on 3/23 by unit RD prior to surgery.     RD will follow per LOS protocol or if re-consulted.     Gabby Llanes RD/ISAAC  Weekend Pager [phone]

## 2018-03-24 NOTE — CONSULTS
Urology Consult History and Physical    Name: Izaiah Alvarez    MRN: [id number]   YOB: 1945       We were asked to see Izaiah Avlarez at the request of Dr. Pearl for evaluation and treatment of the following chief complaint.        Chief Complaint:   Paraphimosis  History is obtained from the patient          History of Present Illness:   Izaiah Alvarez is a 72 year old male with history of obesity, Afib on coumadin, DM2, HTN, and recently underwent CABG yesterday.  He was a transfer from [facility] following MI and new onset heart failture.  He underwent what was evidently traumatic catheter placement at [facility], and this catheter has been kept in place.      At baseline his reports being circumcised in infancy, but has had short penis with associated contracture secondary to obesity and diabetes.  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         Past Medical History:   No past medical history on file.         Past Surgical History:   No past surgical history on file.  Obesity  Afib  DM2  HTN  CAD  TIA         Social History:     Social History   Substance Use Topics     Smoking status: Not on file     Smokeless tobacco: Not on file     Alcohol use Not on file            Family History:   No family history on file.         Allergies:   No Known Allergies         Medications:     Current Facility-Administered Medications   Medication     albumin human 5 % injection     lidocaine (XYLOCAINE) 5 % ointment     heparin drip 25,000 units in 0.45% NaCl 250 mL     thiamine tablet 100 mg     naloxone (NARCAN) injection 0.1-0.4 mg     ipratropium - albuterol 0.5 mg/2.5 mg/3 mL (DUONEB) neb solution 3 mL     HYDROmorphone (DILAUDID) injection 0.3-0.5 mg     propofol (DIPRIVAN) infusion    And     propofol (DIPRIVAN) injection 10 mg/mL vial     dextrose 10 % 1,000 mL infusion     insulin 1 unit/mL in saline (NovoLIN, HumuLIN Regular) drip - ADULT IV Infusion     glucose 40 % gel 15-30 g    Or     dextrose  50 % injection 25-50 mL    Or     glucagon injection 1 mg     dextrose 5% and 0.45% NaCl + KCl 20 mEq/L infusion     hydrALAZINE (APRESOLINE) injection 10 mg     insulin aspart (NovoLOG) inj (RAPID ACTING)     insulin aspart (NovoLOG) inj (RAPID ACTING)     meperidine (DEMEROL) injection 12.5-25 mg     pantoprazole (PROTONIX) 40 mg IV push injection     Warfarin Therapy Reminder (Check START DATE - warfarin may be starting in the FUTURE)     ceFAZolin (ANCEF) intermittent infusion 2 g in 100 mL dextrose PRE-MIX     mupirocin (BACTROBAN) 2 % ointment 0.5 g     EPINEPHrine (ADRENALIN) 5 mg in sodium chloride 0.9 % 250 mL infusion     aspirin EC EC tablet 81 mg     potassium chloride SA (K-DUR/KLOR-CON M) CR tablet 20-40 mEq     potassium chloride (KLOR-CON) Packet 20-40 mEq     potassium chloride 10 mEq in 100 mL intermittent infusion with 10 mg lidocaine     potassium chloride 20 mEq in 50 mL intermittent infusion     magnesium sulfate 2 g in NS intermittent infusion (PharMEDium or FV Cmpd)     magnesium sulfate 4 g in 100 mL sterile water (premade)     potassium phosphate 10 mmol in D5W 250 mL intermittent infusion     potassium phosphate 15 mmol in D5W 250 mL intermittent infusion     potassium phosphate 20 mmol in D5W 500 mL intermittent infusion     potassium phosphate 20 mmol in D5W 250 mL intermittent infusion     potassium phosphate 25 mmol in D5W 500 mL intermittent infusion     acetaminophen (TYLENOL) tablet 975 mg     [START ON 3/26/2018] acetaminophen (TYLENOL) tablet 650 mg     oxyCODONE IR (ROXICODONE) tablet 5-10 mg     ondansetron (ZOFRAN-ODT) ODT tab 4 mg    Or     ondansetron (ZOFRAN) injection 4 mg     docusate sodium (COLACE) capsule 100 mg     amiodarone (NEXTERONE) 1,500 mg in D5W 250 mL infusion     norepinephrine (LEVOPHED) 16 mg in D5W 250 mL infusion     sodium chloride 0.9% infusion     sodium chloride 0.9% infusion     amiodarone (NEXTERONE) 1,500 mg in D5W 250 mL infusion     atorvastatin  (LIPITOR) tablet 20 mg     HOLD nitroGLYcerin IF     Patient is already receiving anticoagulation with heparin, enoxaparin (LOVENOX), warfarin (COUMADIN)  or other anticoagulant medication     magnesium sulfate 2 g in NS intermittent infusion (PharMEDium or FV Cmpd)     Continuing beta blocker from home medication list OR beta blocker order already placed during this visit     albuterol (PROAIR HFA/PROVENTIL HFA/VENTOLIN HFA) Inhaler 2 puff     finasteride (PROSCAR) tablet 5 mg     multivitamin, therapeutic with minerals (THERA-VIT-M) tablet 1 tablet     fish oil-omega-3 fatty acids capsule 1,000 mg             Review of Systems:    ROS: 10 point ROS neg other than the symptoms noted above in the HPI           Physical Exam:   VS:  T: 100.2    HR: Data Unavailable    BP: 87/60    RR: 18   GEN:  AOx3.  NAD.    CV:  RRR  LUNGS: Non-labored breathing.   BACK:  No midline or CVA tenderness.  ABD:  Soft.  NT.  ND.  No rebound or guarding.  No masses.  :  circumcised.  No edematous circular ring proximal to the glans to indicate paraphimosis.  Edema around penile skin, some mild abrasions on glans secondary to Felton trauma          Data:   All laboratory data reviewed:      Recent Labs  Lab 03/24/18  0352 03/23/18 2345 03/23/18 2139 03/23/18 2136 03/23/18 0338   WBC 16.3* 18.7*  --  16.7*  --  10.5   HGB 9.3* 10.3* 8.6* 8.9*  < > 12.0*   * 141*  --  111*  --  136*   < > = values in this interval not displayed.    Recent Labs  Lab 03/24/18  0352 03/23/18  2345 03/23/18 2139 03/23/18  2101 03/23/18  0338 03/22/18  0645   * 144 140 140  < > 139 136   POTASSIUM 4.3 4.2 4.4 5.0  < > 3.0* 3.6   CHLORIDE 110* 108  --   --   --  100 98   CO2 23 23  --   --   --  31 30   BUN 35* 33*  --   --   --  39* 41*   CR 1.17 1.17  --   --   --  1.08 1.13   * 263* 224* 189*  < > 120* 268*   STEFFANY 8.0* 7.5*  --   --   --  8.4* 8.0*   MAG 2.8* 2.8*  --   --   --  2.4* 2.1   PHOS 2.5 5.1*  --   --   --  4.0 3.8   <  > = values in this interval not displayed.  No lab results found in last 7 days.    Invalid input(s): URINEBLOOD         Impression and Plan:   Impression:   Izaiah Alvarez is a 72 year old male with history of traumatic Fel[place] catheter placement.  The patient is circumcised and on exam there is no edematous annular ring of tissue proximal to the glans to suggest paraphimosis.       Plan:    No evidence of paraphimosis on exam  Continue topical cares with vaseline/bacitracin ointment at tip of penis, monitor for increased pain          This patient's exam findings, labs, and imaging discussed with urology chief resident and urology staff surgeon Dr. Carter, who developed the treatment plan.    Ralph Biggs, G2  Urology Resident

## 2018-03-24 NOTE — BRIEF OP NOTE
[facility] [place]    Brief Operative Note    Pre-operative diagnosis: Coronary Artery Disease, A fib, Heart failure.   Post-operative diagnosis Same   Procedure:   -Median sternotomy      -LLE EVH      -CABG x 3; LIMA-LAD, RSVG-PDA, RSVG-OM.       -Bilateral PVI and LAAC placement.       -Right CFA cutdown and removal of percutaneous ventricular assist device (Impella)      Surgeon: Surgeon(s) and Role:     * Eric Marks MD - Primary     * Juan M Irvin MD - Assisting     * Julee Flores PA-C - Assisting  Anesthesia: General   Estimated blood loss: Less than 1000 ml  Drains:  Mediastinal x 2. Left pleural.   Specimens: None   Findings:   See op note   Complications: None.  Implants: None.

## 2018-03-24 NOTE — CONSULTS
History and Physical     Izaiah Alvarez MRN# [id number]   YOB: 1945 Age: 72 year old      Date of Admission:  3/21/2018        Chief Complaint:   CC: cold RLE         History of Present Illnes:     72M with PMH of atrial fibrillation on coumadin, obesity, DM2, HTN who underwent a CABG with R CFA cutdown yesterday for removal of Impella percutaneous ventricular assist device. At the end of the case he had a good femoral pulse after cut down but no distal pulse was assessed. He was transferred to the floor and today reported numbness and coolness in his RLE. He did not have dopplerable signals in that extremity and vascular is consulted for a cold leg. He reports calf pain on ambulation at baseline after a couple blocks. He is currently on 0.02 of epi and is getting a heparin gtt started at 500 for his hx of afib.      Past Medical History:  Obesity, afib on coumadin, DM, hypercholesterolemia, HTN, asthma, TIA    Past Surgical History:  Orchiopexy  CABG 3/23/18    Allergies:   No Known Allergies    Medications:     Current Facility-Administered Medications:      albumin human 5 % injection, , , ,      lidocaine (XYLOCAINE) 5 % ointment, , Topical, Q4H PRN, Eric Marks MD     heparin drip 25,000 units in 0.45% NaCl 250 mL, 500 Units/hr, Intravenous, Continuous, Juan M Irvin MD     thiamine tablet 100 mg, 100 mg, Oral, Daily, Olayinka Orellana MD     naloxone (NARCAN) injection 0.1-0.4 mg, 0.1-0.4 mg, Intravenous, Q2 Min PRN, Juan M Irvin MD     ipratropium - albuterol 0.5 mg/2.5 mg/3 mL (DUONEB) neb solution 3 mL, 3 mL, Nebulization, Q4H PRN, Juan M Irvin MD     HYDROmorphone (DILAUDID) injection 0.3-0.5 mg, 0.3-0.5 mg, Intravenous, Q1H PRN, Juan M Irvin MD, 0.5 mg at 03/24/18 0111     propofol (DIPRIVAN) infusion, 5-75 mcg/kg/min (Dosing Weight), Intravenous, Continuous, Stopped at 03/24/18 0500 **AND** propofol (DIPRIVAN)  injection 10 mg/mL vial, 10-20 mg, Intravenous, Q30 Min PRN **AND** CK total, , , CONDITIONAL (SPECIFY) **AND** Triglycerides, , , CONDITIONAL (SPECIFY), Juan M Irvin MD     dextrose 10 % 1,000 mL infusion, , Intravenous, Continuous PRN, Juan M Irvin MD     insulin 1 unit/mL in saline (NovoLIN, HumuLIN Regular) drip - ADULT IV Infusion, 0-24 Units/hr, Intravenous, Continuous, Juan M Irvin MD, Last Rate: 4 mL/hr at 03/24/18 1000, 4 Units/hr at 03/24/18 1000     glucose 40 % gel 15-30 g, 15-30 g, Oral, Q15 Min PRN **OR** dextrose 50 % injection 25-50 mL, 25-50 mL, Intravenous, Q15 Min PRN **OR** glucagon injection 1 mg, 1 mg, Subcutaneous, Q15 Min PRN, Juan M Irvin MD     dextrose 5% and 0.45% NaCl + KCl 20 mEq/L infusion, , Intravenous, Continuous, Juan M Irvin MD, Last Rate: 10 mL/hr at 03/24/18 1000     hydrALAZINE (APRESOLINE) injection 10 mg, 10 mg, Intravenous, Q30 Min PRN, Juan M Irvin MD     insulin aspart (NovoLOG) inj (RAPID ACTING), , Subcutaneous, TID w/meals, Juan M Irvin MD     insulin aspart (NovoLOG) inj (RAPID ACTING), , Subcutaneous, With Snacks or Supplements, Juan M Irvin MD     meperidine (DEMEROL) injection 12.5-25 mg, 12.5-25 mg, Intravenous, Q15 Min PRN, Juan M Irvin MD     pantoprazole (PROTONIX) 40 mg IV push injection, 40 mg, Intravenous, Daily, Juan M Irvin MD, 40 mg at 03/24/18 0746     Warfarin Therapy Reminder (Check START DATE - warfarin may be starting in the FUTURE), 1 each, Does not apply, Continuous PRN, Juan M Irvin MD     ceFAZolin (ANCEF) intermittent infusion 2 g in 100 mL dextrose PRE-MIX, 2 g, Intravenous, Q8H, Juan M Irvin MD, 2 g at 03/24/18 0539     mupirocin (BACTROBAN) 2 % ointment 0.5 g, 0.5 g, Both Nostrils, BID, Juan M Irvin MD, 0.5 g at 03/24/18 0034     EPINEPHrine (ADRENALIN) 5 mg in sodium chloride 0.9 % 250 mL infusion,  0.01-0.1 mcg/kg/min (Dosing Weight), Intravenous, Continuous, Juan M Irvin MD, Last Rate: 6.8 mL/hr at 03/24/18 1000, 0.02 mcg/kg/min at 03/24/18 1000     aspirin EC EC tablet 81 mg, 81 mg, Oral, Daily, Juan M Irvin MD, 81 mg at 03/24/18 0746     potassium chloride SA (K-DUR/KLOR-CON M) CR tablet 20-40 mEq, 20-40 mEq, Oral, Q2H PRN, Juan M Irvin MD     potassium chloride (KLOR-CON) Packet 20-40 mEq, 20-40 mEq, Oral or Feeding Tube, Q2H PRN, Juan M Irvin MD     potassium chloride 10 mEq in 100 mL intermittent infusion with 10 mg lidocaine, 10 mEq, Intravenous, Q1H PRN, Juan M Irvin MD     potassium chloride 20 mEq in 50 mL intermittent infusion, 20 mEq, Intravenous, Q1H PRN, Juan M Irvin MD     magnesium sulfate 2 g in NS intermittent infusion (PharMEDium or FV Cmpd), 2 g, Intravenous, Daily PRN, Juan M Irvin MD     magnesium sulfate 4 g in 100 mL sterile water (premade), 4 g, Intravenous, Q4H PRN, Juan M Irvin MD     potassium phosphate 10 mmol in D5W 250 mL intermittent infusion, 10 mmol, Intravenous, Daily PRN, Juan M Irvin MD     potassium phosphate 15 mmol in D5W 250 mL intermittent infusion, 15 mmol, Intravenous, Daily PRN, Juan M Irvin MD     potassium phosphate 20 mmol in D5W 500 mL intermittent infusion, 20 mmol, Intravenous, Q6H PRN, Juan M Irvin MD     potassium phosphate 20 mmol in D5W 250 mL intermittent infusion, 20 mmol, Intravenous, Q6H PRN, Juan M Irvin MD     potassium phosphate 25 mmol in D5W 500 mL intermittent infusion, 25 mmol, Intravenous, Q8H PRN, Juan M Irvin MD     acetaminophen (TYLENOL) tablet 975 mg, 975 mg, Oral, Q8H, Juan M Irvin MD, 975 mg at 03/24/18 0830     [START ON 3/26/2018] acetaminophen (TYLENOL) tablet 650 mg, 650 mg, Oral, Q4H PRN, Juan M Irvin MD     oxyCODONE IR (ROXICODONE) tablet 5-10 mg, 5-10 mg, Oral, Q4H  PRN, Juan M Irvin MD     ondansetron (ZOFRAN-ODT) ODT tab 4 mg, 4 mg, Oral, Q6H PRN **OR** ondansetron (ZOFRAN) injection 4 mg, 4 mg, Intravenous, Q6H PRN, Juan M Irvin MD     docusate sodium (COLACE) capsule 100 mg, 100 mg, Oral, BID, Juan M Irvin MD     amiodarone (NEXTERONE) 1,500 mg in D5W 250 mL infusion, 0.5 mg/min, Intravenous, Continuous, Juan M Irvin MD, Last Rate: 5 mL/hr at 03/24/18 1000, 0.5 mg/min at 03/24/18 1000     norepinephrine (LEVOPHED) 16 mg in D5W 250 mL infusion, 0.03-0.4 mcg/kg/min (Dosing Weight), Intravenous, Continuous, Juan M Irvin MD, Stopped at 03/24/18 0625     sodium chloride 0.9% infusion, , Other, Continuous, Olayinka Orellana MD, Last Rate: 3 mL/hr at 03/22/18 0633     sodium chloride 0.9% infusion, , Other, Continuous, Olayinka Orellana MD     amiodarone (NEXTERONE) 1,500 mg in D5W 250 mL infusion, 0.5 mg/min, Intravenous, Continuous, Becca Guy MD, Last Rate: 5 mL/hr at 03/24/18 0000, 0.5 mg/min at 03/24/18 0000     atorvastatin (LIPITOR) tablet 20 mg, 20 mg, Oral, Daily, Kristen Jerome MD, 20 mg at 03/24/18 0750     HOLD nitroGLYcerin IF, , Does not apply, HOLD, Olayinka Orellana MD     Patient is already receiving anticoagulation with heparin, enoxaparin (LOVENOX), warfarin (COUMADIN)  or other anticoagulant medication, , Does not apply, Continuous PRN, Olayinka Orellana MD     magnesium sulfate 2 g in NS intermittent infusion (PharMEDium or FV Cmpd), 2 g, Intravenous, Daily PRN, Olayinka Orellana MD     Continuing beta blocker from home medication list OR beta blocker order already placed during this visit, , Does not apply, DOES NOT GO TO Esteban DAVIS Kevin J, MD     albuterol (PROAIR HFA/PROVENTIL HFA/VENTOLIN HFA) Inhaler 2 puff, 2 puff, Inhalation, Q6H PRN, Olayinka Orellana MD     finasteride (PROSCAR) tablet 5 mg, 5 mg, Oral, Daily, Olayinka Orellana MD, 5 mg at 03/24/18 0752     multivitamin, therapeutic with  "minerals (THERA-VIT-M) tablet 1 tablet, 1 tablet, Oral, Daily, Olayinka Orellana MD, Stopped at 03/23/18 0800     fish oil-omega-3 fatty acids capsule 1,000 mg, 1,000 mg, Oral, Daily, Olayinka Orellana MD, 1,000 mg at 03/22/18 0759    Social History:  Social History     Social History     Marital status: N/A     Spouse name: N/A     Number of children: N/A     Years of education: N/A     Occupational History     Not on file.     Social History Main Topics     Smoking status: Not on file     Smokeless tobacco: Not on file     Alcohol use Not on file     Drug use: Not on file     Sexual activity: Not on file     Other Topics Concern     Not on file     Social History Narrative       Family History:  No family hx of bleeding or clotting dsorders    ROS:  The remainder of the complete ROS was negative unless noted in the HPI.    Exam:  BP (!) 87/60  Temp 100.2  F (37.9  C) (Axillary)  Resp 18  Ht 1.803 m (5' 11\")  Wt 115.7 kg (255 lb 1.2 oz)  SpO2 97%  BMI 35.58 kg/m2  General: Alert, interactive, NAD  HEENT: AT/NC, sclera anicteric, PERRL, EOMI   Neck: Supple, no JVD or cervical LAD  Resp: clear to auscultation bilaterally, no crackles or wheezes  Cardiac: regular rate and rhythm   Abdomen: Soft, nontender, nondistended.   Extremities:  Patient unable to wiggle toes and insensate to knee on RLE  RLE unable to doppler PT or DP. R groin dressing taken down, sutures in place, no hematoma, no thrill, unable to palpate or doppler common femoral artery, large pannus, LLE wrapped from graft site harvest, dopplerable biphasic PT.Skin: Warm and dry, no jaundice or rash  Neuro: Alert & oriented x 3, Cns 2-12 intact, moves all extremities equally    Labs:  Hg 9.3   WBC 16  Plt 149    Imaging:  None    Assessment/ Plan:  72M with critical limb ischemia with loss of motor function. Likely embolism to distal RLE with what sounds like baseline claudication that has not been worked up before. POD1 from CABG and R CFA cutdown. "     -please increase heparin gtt to 800   - MEERS on RLE  -arterial duplex stat of RLE   -possible OR today     Discussed with Dr. Clyde Topete MD PGY2  General surgery

## 2018-03-24 NOTE — PROGRESS NOTES
[facility] Cardiology Progress Note           Assessment and Plan:   72 year old with a PMHx of DM2, HTN, TIA, Atrial Fibrillation on Warfarin who presented to OSHx with STEMI with coronary angiogram showing triple vessel disease (95% LM, 75% LAD, 90% OM,  RCA). ECHO showed EF around 20-25%. The plan was for CABG; however, patient decompensated and required Impella support to maintain cardiac output. Given his operative risk, he was transferred for high risk CABG with possibly hemodynamic support/VAD backup.     Patient is currently stable on Impella support. All his negative ionotropes were stopped on arrival. Patient is scheduled for CABG today.     #Cardiogenic Shock    -Likely 2/2 Ischemic Disease + Addition of Negative Ionotropes  -Stopped CCB and BB on arrival.   -Continue Impella support. Will be removed in surgery after CABG.  -Plan for CABG today.  -After CABG, will assess hemodynamics and assist with medication changes.  -His EF is around 45-50%; therefore, will anticipate not requiring advanced therapies.     #Three Vessel CAD   -CABG today   -Will pull Impella at that time.    #Atrial Fibrillation  -Stop BB and CCB  -Continue Amio     #Congestive Heart Failure   -Plan as above. Stop BB, CCB, and ACEI given that he is on Impella support.  -Repeat ECHO showed EF around 45-50%. Will defer advanced therapies at this time until patient is re-vascularized.     Kristen Jerome PGY-4   Cardiology Fellow   Pager: [phone]      Pt's condition and care plan discussed with fellow but patient not seen personally by me today.    Nita Hairston MD  Section Head - Advanced Heart Failure, Transplantation and Mechanical Circulatory Support  Co-Director - [facility]  Associate Professor of Medicine, [facility]           Subjective:   No acute events. Awaiting CABG.           Review of Systems:   A comprehensive review of systems was performed and found to be  negative except as described in this note          Medications:     Current Facility-Administered Medications   Medication     [Auto Hold] thiamine tablet 100 mg     sodium chloride 0.9% infusion     sodium chloride 0.9% infusion     heparin 25,000 Units in D5W 500 mL Cardiac device purge infusion     dextrose 10 % 1,000 mL infusion     insulin 1 unit/mL in saline (NovoLIN, HumuLIN Regular) drip - ADULT IV Infusion     [Auto Hold] glucose 40 % gel 15-30 g    Or     [Auto Hold] dextrose 50 % injection 25-50 mL    Or     [Auto Hold] glucagon injection 1 mg     amiodarone (NEXTERONE) 1,500 mg in D5W 250 mL infusion     [Auto Hold] atorvastatin (LIPITOR) tablet 20 mg     HOLD nitroGLYcerin IF     Patient is already receiving anticoagulation with heparin, enoxaparin (LOVENOX), warfarin (COUMADIN)  or other anticoagulant medication     [Auto Hold] potassium chloride SA (K-DUR/KLOR-CON M) CR tablet 20-40 mEq     [Auto Hold] potassium chloride (KLOR-CON) Packet 20-40 mEq     [Auto Hold] potassium chloride 10 mEq in 100 mL sterile water intermittent infusion (premix)     [Auto Hold] potassium chloride 10 mEq in 100 mL intermittent infusion with 10 mg lidocaine     [Auto Hold] potassium chloride 20 mEq in 50 mL intermittent infusion     [Auto Hold] magnesium sulfate 2 g in NS intermittent infusion (PharMEDium or FV Cmpd)     [Auto Hold] magnesium sulfate 4 g in 100 mL sterile water (premade)     [Auto Hold] Continuing beta blocker from home medication list OR beta blocker order already placed during this visit     [Auto Hold] acetaminophen (TYLENOL) tablet 650 mg     [Auto Hold] albuterol (PROAIR HFA/PROVENTIL HFA/VENTOLIN HFA) Inhaler 2 puff     [Auto Hold] aspirin chewable tablet 81 mg     [Auto Hold] finasteride (PROSCAR) tablet 5 mg     [Auto Hold] multivitamin, therapeutic with minerals (THERA-VIT-M) tablet 1 tablet     [Auto Hold] fish oil-omega-3 fatty acids capsule 1,000 mg     [Auto Hold] glucose 40 % gel 15-30 g     Or     [Auto Hold] dextrose 50 % injection 25-50 mL    Or     [Auto Hold] glucagon injection 1 mg     Facility-Administered Medications Ordered in Other Encounters   Medication     phenylephrine (DELORES-SYNEPHRINE) injection 1 mg     midazolam (VERSED) injection     fentaNYL (PF) (SUBLIMAZE) injection     lidocaine injection 2% (MDV)     propofol (DIPRIVAN) injection 10 mg/mL vial     rocuronium (ZEMURON) injection     EPINEPHrine 100 mcg/10 mL (1:100,000) in NS (Pharmacy Prepared for OR)     norepinephrine (LEVOPHED) drip 16 mg     lactated ringers infusion     sodium chloride 0.9% infusion     heparin (porcine) injection               Objective:   Temp: 97.8  F (36.6  C) Temp src: Axillary BP: (!) 88/72   Heart Rate: 104 Resp: 18 SpO2: 98 % O2 Device: Oxi Plus Oxygen Delivery: 5 LPM    Gen: No acute distress   HEENT: NC/AT   CV: RRR   Pulm: CTAB   GI: s/nt/nd   Ext: No edema           Data:     Lab Results   Component Value Date    WBC 10.5 03/23/2018    HGB 11.3 (L) 03/23/2018    HCT 38.6 (L) 03/23/2018     (L) 03/23/2018     03/23/2018    POTASSIUM 3.8 03/23/2018    CHLORIDE 100 03/23/2018    CO2 31 03/23/2018    BUN 39 (H) 03/23/2018    CR 1.08 03/23/2018     (H) 03/23/2018    NTBNPI 6123 (H) 03/22/2018    TROPI 31.699 (HH) 03/22/2018     (H) 03/22/2018    ALT 69 03/22/2018    ALKPHOS 55 03/22/2018    BILITOTAL 3.6 (H) 03/22/2018    INR 1.12 03/23/2018     All laboratory data reviewed     EKG: ST Elevation in AVR and III. ST depression in precordial leads.      ECHO:   Interpretation Summary  Technically difficult study.  Mildly (EF 45-50%) reduced left ventricular function is present. Accuracy of  LVEF and RWM assessment affected by atrial fibrillation. Anterior and septal  wall hypokinesis is noted.  An Impella device is present. The inlet area is in appropriate position at 5.5  cm below the aortic valve in the LV mid cavity.  Global right ventricular function and size are normal.  No  significant valvular abnormalities noted.  The inferior vena cava is normal. Estimated mean right atrial pressure is 3  mmHg.  No pericardial effusion is present.     Compared to report from [facility] (no images) dated 3/19/18, the  LVEF has improved from 20-25%.     CXR:   IMPRESSION: Small bilateral pleural effusions with overlying streaky  airspace opacities, suggesting atelectasis and/or pulmonary edema.

## 2018-03-24 NOTE — PROGRESS NOTES
CVICU attending note.    72M with PMH of paroxysmal atrial fibrillation on coumadin, CHF, obesity, DM type II, HTN who who presented to OSHx with STEMI with coronary angiogram showing triple vessel disease (95% LM, 75% LAD, 90% OM,  RCA). ECHO showed EF around 20-25% and Impella device was placed via R femoral artery at the  OSH. The patient underwent a CABG for with R CFA cutdown yesterday for removal of Impella percutaneous ventricular assist device on 3/23. Extubated on POD#1.    1. 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 LVEF at the OSH was 20-25%, TTE on 3/22 revealed LV EF 40-45%. Post op LVE EF was 35% while on inotrope.  NE weaned this AM. On low dose Epi.  Plan: Monitor Lactic acid, wean Inotrope as possible. Start continuous rate Heparin.     2. Acute R lower extremity ischemia. The patient is POD#1 s/p CABG and Impella 5.0 temporary assist device and repair of femoral vessels. This AM he reported numbness and coolness in his RLE.Plan: Vascular surgery consult. To OR for angiogram, possible iliac stent, possible thrombo-embolectomy, possible fem-fem bypass.     3. Anemia and thrombocytopenia of surgical blood loss. CT with minimal bleeding. Plan: Monitor CBC, CT output. Start Heparin infusion 500 UI/hr.     4. Non-oliguric acute kidney injury and lactic acidosis improving. Plan: Monitor UOP, lytes, lactic acid.    I personally managed the ventilator, hemodynamics, sedation,  analgesia, metabolic abnormalities and nutritional status.     The history and the 10 points review of systems are included in the note of Dr. Pearl or Dr. Cunha.     I agree with the resident assessment and plan. I spent 39 minutes of critical care time exclusive of the procedures time, evaluating and managing this patient, discussing with the consultants and the patient's family.    Alysha Coleman MD  Anesthesiology Critical Care Medicine  Pg. [phone]

## 2018-03-24 NOTE — BRIEF OP NOTE
[facility] Brief Operative Note    Pre-operative diagnosis: Right leg eschemia   Post-operative diagnosis Acute Right lower Extremity Ischemia   Procedure: Procedure(s):  Right Groin RE-Exploration, Right Common Iliac and Right Common Femoral Artery  Thromboembolectomy, Right Common Iliac Stent placement, Prevena application, Right lower right extremity fasciotomy, and right leg angiogram. - Wound Class: I-Clean   - Wound Class: I-Clean   Surgeon(s): Surgeon(s) and Role:     * Margarette Tang MD - Primary   Estimated blood loss: 800 mL    Specimens:   ID Type Source Tests Collected by Time Destination   A : Right Common Iliac and Right  common Femoral artery  Thrombus Other (specify in comments) Other SURGICAL PATHOLOGY EXAM Margarette Tang MD 3/24/2018  3:17 PM       Findings: Brisk flow through stent (Common iliac) common femoral and profunda. Occlusion of SFA. Delayed reconstitution of popliteal artery with dominant posterior tibial runoff.   Doppler signal observed in the posterior tibial artery on the right. Four-compartment fasciotomies performed with viable muscle.  Twitch illicited responsive to electrocautery.        Fluoro Time: 2.9 minutes    Radiation Dose: 363 mGy    Contrast: 50 ml     Norris Valdez  Vascular Surgery Fellow  Pager: [phone]

## 2018-03-24 NOTE — ANESTHESIA PREPROCEDURE EVALUATION
Anesthesia Evaluation     .             ROS/MED HX    ENT/Pulmonary:     (+)asthma , . .    Neurologic:     (+)TIA     Cardiovascular: Comment: CAD s/p STEMI and CABG on 3/23/18  S/p removal of impella on 3/23/18 now with lack of flow on CFA     (+) --CAD, -past MI,CABG-date: 3/23/18  CABG x 3; LIMA-LAD, RSVG-PDA, RSVG-OM, . : . CHF etiology: ischemic  Last EF: 20-25% date: 3/24/18 . . :. .       METS/Exercise Tolerance:     Hematologic:         Musculoskeletal:         GI/Hepatic:         Renal/Genitourinary:         Endo:     (+) type II DM .      Psychiatric:         Infectious Disease:         Malignancy:         Other:                     Physical Exam  Normal systems: dental    Airway   Mallampati: III  TM distance: >3 FB  Neck ROM: full    Dental     Cardiovascular   Rhythm and rate: regular and normal      Pulmonary    breath sounds clear to auscultation                    Anesthesia Plan      History & Physical Review  History and physical reviewed and following examination; no interval change.    ASA Status:  4 .        Plan for General with Intravenous induction. Maintenance will be Balanced.    PONV prophylaxis:  Ondansetron (or other 5HT-3)       Postoperative Care  Postoperative pain management:  IV analgesics.      Consents  Anesthetic plan, risks, benefits and alternatives discussed with:  Patient..

## 2018-03-24 NOTE — PLAN OF CARE
Problem: Patient Care Overview  Goal: Plan of Care/Patient Progress Review  Outcome: Improving  Progress Note:  Pt returned from OR following CABG x3 at 2345 last evening.  Rhythm has been NSR in 80's with backup pacer in place at rate of 50, DDDR.  Pt on Epi at 0.06 mcg/kg/min and Levo as high as 0.1 mcg/kg/min with Propofol at 50 mcg/kg/min.  Initial CVP 7 and pressure labile so 500 ml of Albumin given per MD.  Lactic acid 5.0 prior to Albumin and only decreased to 4.7 so an additional 250 ml Albumin given per Dr. Bethea.  Able to titrate Epi to 0.02 mcg/kg/min and Levo at 0.02 mcg/kg/min with Propofol stopped.  PS initiated at 0445, ABG drawn at 0515 and results adequate so pt extubated to 4 liters NC.  Pt alert and oriented and conversing well.  Complaints only of some pressure at incision site and dry mouth.  Insulin remains at max dose of 24 units/hr with blood sugars in 190's.  CT output minimal, 200 ml since OR.  UO remains near 100 ml/hr.  Wife updated last evening.  Progressing towards goals, continue with plan of care.

## 2018-03-24 NOTE — PLAN OF CARE
Problem: Patient Care Overview  Goal: Plan of Care/Patient Progress Review  4E - Pt in OR after RLE pulse & sensation absent with coolness in limb. Will re-assess as medically appropriate.

## 2018-03-24 NOTE — PROGRESS NOTES
Post op patient to the cardiac ICU orally intubated. Endotracheal tube is tapped at 26 cm at the lip. Bite block placesed. He was pleased on a  ventilator. See ventilator sheet for settings. ABG pending.

## 2018-03-24 NOTE — PROGRESS NOTES
[facility] [place]  Procedure Note          Extubation:       Izaiah Alvarez  MRN# [id number]   March 24, 2018, 5:30 AM         Patient extubated at: March 24, 2018, 5:30 AM   Supplemental Oxygen: Via nasal cannula at 4 liters per minute   Cough: The cough is good, moist and productive   Secretion Mode: Able to clear  PRN suction with assistance   Secretion Amount: Small amount, moderately thick and pale yellow in color   Respiratory Exam:: Breath sounds: good aeration and crackles     Location: all lobes and bilaterally   Skin Exam:: Patient color: natural   Patient Status: Currently appears comfortable   Arterial Blood Gasses: pH Arterial (pH)   Date Value   03/24/2018 7.45     pO2 Arterial (mm Hg)   Date Value   03/24/2018 88     pCO2 Arterial (mm Hg)   Date Value   03/24/2018 38     Bicarbonate Arterial (mmol/L)   Date Value   03/24/2018 26            Recorded by Ata Gaviria

## 2018-03-24 NOTE — PROGRESS NOTES
Cardiothoracic Surgery Progress Note - 03/24/18  Pt: Izaiah Alvarez  MRN: [id number]     ID: 72 year old with a PMHx of DM2, HTN, TIA, Atrial Fibrillation on Warfarin who presented to [facility] with STEMI with coronary angiogram showing triple vessel disease (95% LM, 75% LAD, 90% OM,  RCA). ECHO showed EF around 20-25%.     Procedures:   3/23/18 ([facility]) CABG x 3 (LIMA-LAD, RSVG-PDA, RSVG-OM), bilateral pulmonary vein isolation and left atrial appendage clip placement (MAZE).     24 hr events: OR for CABG x 3 and removal of R femoral Impella. No need for mechanical support after bypass.     Subjective:   Afebrile. Pain well controlled. Complaining of RLE numbness and weakness. No nausea or emesis. OOB to chair this am.     Objective:   Temp:  [98.8  F (37.1  C)-100.2  F (37.9  C)] 98.8  F (37.1  C)  Heart Rate:  [] 73  Resp:  [18-28] 18  BP: ()/(53-60) 87/60  MAP:  [59 mmHg-90 mmHg] 75 mmHg  Arterial Line BP: ()/(42-75) 127/56  FiO2 (%):  [40 %-100 %] 40 %  SpO2:  [93 %-100 %] 93 %    I/O last 3 completed shifts:  In: 4418.29 [I.V.:2978.29; Other:440]  Out: 3450 [Urine:2180; Blood:1000; Chest Tube:270]    Ventilation Mode: CPAP/PS  (Continuous positive airway pressure with Pressure Support)  FiO2 (%): 40 %  Rate Set (breaths/minute): 12 breaths/min  Tidal Volume Set (mL): 550 mL  PEEP (cm H2O): 5 cmH2O  Pressure Support (cm H2O): 7 cmH2O  Oxygen Concentration (%): 40 %  Resp: 18    Physical Exam:  Gen: Alert and oriented. No acute distress.  PulmRespirations non labored.  CV: Regular rate and rhythm. NSR on monitor.   Chest: CT output appears serosanguinous.   - Incision:  C/D/I.  Abd: Abdomen soft, non-tender, non-distended    Labs reviewed in full; available in Epic.  Results for orders placed or performed during the hospital encounter of 03/21/18 (from the past 24 hour(s))   Blood component   Result Value Ref Range    Unit Number [id number]     Blood Component Type Apheresis Plasma Thawed      Division Number 00     Status of Unit No longer available 03/23/2018 2333     Blood Product Code K1368I74     Unit Status RET    Blood component   Result Value Ref Range    Unit Number [id number]     Blood Component Type Apheresis Plasma Thawed     Division Number 00     Status of Unit No longer available 03/23/2018 2333     Blood Product Code [id number]     Unit Status RET    Glucose by meter   Result Value Ref Range    Glucose 147 (H) 70 - 99 mg/dL   Arterial Panel   Result Value Ref Range    pH Arterial 7.43 7.35 - 7.45 pH    pCO2 Arterial 44 35 - 45 mm Hg    pO2 Arterial 142 (H) 80 - 105 mm Hg    Bicarbonate Arterial 29 (H) 21 - 28 mmol/L    Base Excess Art 3.8 mmol/L    FIO2 100.0     Sodium 139 133 - 144 mmol/L    Potassium 3.8 3.4 - 5.3 mmol/L    Hemoglobin 11.3 (L) 13.3 - 17.7 g/dL    Glucose 146 (H) 70 - 99 mg/dL    Calcium Ionized Whole Blood 4.3 (L) 4.4 - 5.2 mg/dL   Lactic acid whole blood   Result Value Ref Range    Lactic Acid 0.7 0.7 - 2.0 mmol/L   Arterial Panel   Result Value Ref Range    pH Arterial 7.41 7.35 - 7.45 pH    pCO2 Arterial 40 35 - 45 mm Hg    pO2 Arterial 164 (H) 80 - 105 mm Hg    Bicarbonate Arterial 25 21 - 28 mmol/L    Base Excess Art 0.7 mmol/L    FIO2 69.0     Sodium 141 133 - 144 mmol/L    Potassium 3.9 3.4 - 5.3 mmol/L    Hemoglobin 11.1 (L) 13.3 - 17.7 g/dL    Glucose 176 (H) 70 - 99 mg/dL    Calcium Ionized Whole Blood 4.3 (L) 4.4 - 5.2 mg/dL   Lactic acid whole blood   Result Value Ref Range    Lactic Acid 1.0 0.7 - 2.0 mmol/L   VENOUS PANEL   Result Value Ref Range    Ph Venous 7.30 (L) 7.32 - 7.43 pH    PCO2 Venous 61 (H) 40 - 50 mm Hg    PO2 Venous 42 25 - 47 mm Hg    Bicarbonate Venous 30 (H) 21 - 28 mmol/L    Base Excess Venous 2.8 mmol/L    FIO2 70.0     Sodium 143 133 - 144 mmol/L    Potassium 3.3 (L) 3.4 - 5.3 mmol/L    Hemoglobin 9.4 (L) 13.3 - 17.7 g/dL    Glucose 187 (H) 70 - 99 mg/dL    Calcium Ionized Whole Blood 4.1 (L) 4.4 - 5.2 mg/dL   Lactic acid whole blood    Result Value Ref Range    Lactic Acid 1.0 0.7 - 2.0 mmol/L   Arterial Panel   Result Value Ref Range    pH Arterial 7.30 (L) 7.35 - 7.45 pH    pCO2 Arterial 59 (H) 35 - 45 mm Hg    pO2 Arterial 128 (H) 80 - 105 mm Hg    Bicarbonate Arterial 29 (H) 21 - 28 mmol/L    Base Excess Art 1.8 mmol/L    FIO2 70.0     Sodium 143 133 - 144 mmol/L    Potassium 3.4 3.4 - 5.3 mmol/L    Hemoglobin 9.4 (L) 13.3 - 17.7 g/dL    Glucose 185 (H) 70 - 99 mg/dL    Calcium Ionized Whole Blood 4.1 (L) 4.4 - 5.2 mg/dL   Lactic acid whole blood   Result Value Ref Range    Lactic Acid 1.0 0.7 - 2.0 mmol/L   Arterial Panel   Result Value Ref Range    pH Arterial 7.33 (L) 7.35 - 7.45 pH    pCO2 Arterial 51 (H) 35 - 45 mm Hg    pO2 Arterial 330 (H) 80 - 105 mm Hg    Bicarbonate Arterial 27 21 - 28 mmol/L    Base Excess Art 0.4 mmol/L    FIO2 80.0     Sodium 140 133 - 144 mmol/L    Potassium 5.2 3.4 - 5.3 mmol/L    Hemoglobin 8.4 (L) 13.3 - 17.7 g/dL    Glucose 177 (H) 70 - 99 mg/dL    Calcium Ionized Whole Blood 4.0 (L) 4.4 - 5.2 mg/dL   Lactic acid whole blood   Result Value Ref Range    Lactic Acid 0.9 0.7 - 2.0 mmol/L   Arterial Panel   Result Value Ref Range    pH Arterial 7.35 7.35 - 7.45 pH    pCO2 Arterial 47 (H) 35 - 45 mm Hg    pO2 Arterial 393 (H) 80 - 105 mm Hg    Bicarbonate Arterial 26 21 - 28 mmol/L    Base Deficit Art 0.1 mmol/L    FIO2 90.0     Sodium 140 133 - 144 mmol/L    Potassium 5.0 3.4 - 5.3 mmol/L    Hemoglobin 8.6 (L) 13.3 - 17.7 g/dL    Glucose 189 (H) 70 - 99 mg/dL    Calcium Ionized Whole Blood 4.0 (L) 4.4 - 5.2 mg/dL   Lactic acid whole blood   Result Value Ref Range    Lactic Acid 1.0 0.7 - 2.0 mmol/L   CBC with platelets   Result Value Ref Range    WBC 16.7 (H) 4.0 - 11.0 10e9/L    RBC Count 2.92 (L) 4.4 - 5.9 10e12/L    Hemoglobin 8.9 (L) 13.3 - 17.7 g/dL    Hematocrit 28.6 (L) 40.0 - 53.0 %    MCV 98 78 - 100 fl    MCH 30.5 26.5 - 33.0 pg    MCHC 31.1 (L) 31.5 - 36.5 g/dL    RDW 15.5 (H) 10.0 - 15.0 %     Platelet Count 111 (L) 150 - 450 10e9/L   Fibrinogen activity   Result Value Ref Range    Fibrinogen 468 (H) 200 - 420 mg/dL   INR   Result Value Ref Range    INR 1.51 (H) 0.86 - 1.14   Partial thromboplastin time   Result Value Ref Range    PTT 29 22 - 37 sec   Arterial Panel   Result Value Ref Range    pH Arterial 7.35 7.35 - 7.45 pH    pCO2 Arterial 47 (H) 35 - 45 mm Hg    pO2 Arterial 94 80 - 105 mm Hg    Bicarbonate Arterial 26 21 - 28 mmol/L    Base Deficit Art 0.1 mmol/L    FIO2 97.0     Sodium 140 133 - 144 mmol/L    Potassium 4.4 3.4 - 5.3 mmol/L    Hemoglobin 8.6 (L) 13.3 - 17.7 g/dL    Glucose 224 (H) 70 - 99 mg/dL    Calcium Ionized Whole Blood 4.7 4.4 - 5.2 mg/dL   Lactic acid whole blood   Result Value Ref Range    Lactic Acid 2.6 (H) 0.7 - 2.0 mmol/L   INR   Result Value Ref Range    INR 1.28 (H) 0.86 - 1.14   Partial thromboplastin time   Result Value Ref Range    PTT 28 22 - 37 sec   Blood gas arterial and oxyhgb   Result Value Ref Range    pH Arterial 7.30 (L) 7.35 - 7.45 pH    pCO2 Arterial 47 (H) 35 - 45 mm Hg    pO2 Arterial 261 (H) 80 - 105 mm Hg    Bicarbonate Arterial 23 21 - 28 mmol/L    FIO2 100%     Oxyhemoglobin Arterial 97 92 - 100 %    Base Deficit Art 3.7 mmol/L   CBC with platelets   Result Value Ref Range    WBC 18.7 (H) 4.0 - 11.0 10e9/L    RBC Count 3.44 (L) 4.4 - 5.9 10e12/L    Hemoglobin 10.3 (L) 13.3 - 17.7 g/dL    Hematocrit 33.4 (L) 40.0 - 53.0 %    MCV 97 78 - 100 fl    MCH 29.9 26.5 - 33.0 pg    MCHC 30.8 (L) 31.5 - 36.5 g/dL    RDW 15.5 (H) 10.0 - 15.0 %    Platelet Count 141 (L) 150 - 450 10e9/L   Basic metabolic panel   Result Value Ref Range    Sodium 144 133 - 144 mmol/L    Potassium 4.2 3.4 - 5.3 mmol/L    Chloride 108 94 - 109 mmol/L    Carbon Dioxide 23 20 - 32 mmol/L    Anion Gap 13 3 - 14 mmol/L    Glucose 263 (H) 70 - 99 mg/dL    Urea Nitrogen 33 (H) 7 - 30 mg/dL    Creatinine 1.17 0.66 - 1.25 mg/dL    GFR Estimate 62 >60 mL/min/1.7m2    GFR Estimate If Black 74  >60 mL/min/1.7m2    Calcium 7.5 (L) 8.5 - 10.1 mg/dL   Lactic acid whole blood   Result Value Ref Range    Lactic Acid 4.9 (HH) 0.7 - 2.0 mmol/L   Calcium ionized whole blood   Result Value Ref Range    Calcium Ionized Whole Blood 4.4 4.4 - 5.2 mg/dL   Magnesium   Result Value Ref Range    Magnesium 2.8 (H) 1.6 - 2.3 mg/dL   Phosphorus   Result Value Ref Range    Phosphorus 5.1 (H) 2.5 - 4.5 mg/dL   Glucose by meter   Result Value Ref Range    Glucose 269 (H) 70 - 99 mg/dL   EKG 12-lead, tracing only   Result Value Ref Range    Interpretation ECG Click View Image link to view waveform and result    XR Abdomen Port 1 View    Narrative    EXAM: XR ABDOMEN PORT 1 VW  3/24/2018 12:25 AM      HISTORY: Og;     COMPARISON: CT chest 3/23/2018    FINDINGS: Portable supine AP view of the left upper quadrant obtained.  Gastric tube tip projects over the stomach, sidehole projects above  the level the gastroesophageal junction. Median sternotomy wires. Left  basilar chest tube. Mediastinal drains. Nonobstructive bowel gas  pattern. No definite free air on this supine study. Residual oral  contrast is seen in the colon. No pneumobilia portal venous gas in the  visualized right upper quadrant.       Impression    IMPRESSION: Gastric tube tip projects in the stomach, however sidehole  projects above the level of the gastroesophageal junction. Recommend  advancing 10 cm.    I have personally reviewed the examination and initial interpretation  and I agree with the findings.    TIMOTHY PATHAK MD   XR Chest Port 1 View    Narrative    EXAM: XR CHEST PORT 1 VW  3/24/2018 12:26 AM      HISTORY: Endotracheal tube positioning;     COMPARISON: CT 3/23/2018    FINDINGS: Supine AP view of the chest. Endotracheal tube tip projects  6.4 cm from the fabio. Right IJ central line tip projects over the  mid SVC. Left IJ central line tip projects over the left innominate  vein. Mediastinal drains. Left basilar chest tube.  Postsurgical  changes of CABG, including median sternotomy wires. Left atrial  appendage exclusion device.    The trachea is midline. The cardiac silhouette is within normal  limits. Small bilateral pleural effusions with associated bibasilar  patchy and streaky opacities. No pneumothorax. Trace  pneumomediastinum.       Impression    IMPRESSION:   1. Endotracheal tube tip projects 6.4 cm from the fabio.  2. Small bilateral pleural effusions with probable mild pulmonary  edema and atelectasis.  3. Trace pneumomediastinum with mediastinal drains in place.    I have personally reviewed the examination and initial interpretation  and I agree with the findings.    TIMOTHY PATHAK MD   Blood gas arterial and oxyhgb   Result Value Ref Range    pH Arterial 7.36 7.35 - 7.45 pH    pCO2 Arterial 42 35 - 45 mm Hg    pO2 Arterial 100 80 - 105 mm Hg    Bicarbonate Arterial 23 21 - 28 mmol/L    FIO2 50     Oxyhemoglobin Arterial 94 92 - 100 %    Base Deficit Art 2.1 mmol/L   Lactic acid whole blood   Result Value Ref Range    Lactic Acid 5.0 (HH) 0.7 - 2.0 mmol/L   Glucose by meter   Result Value Ref Range    Glucose 265 (H) 70 - 99 mg/dL   Glucose by meter   Result Value Ref Range    Glucose 245 (H) 70 - 99 mg/dL   Glucose by meter   Result Value Ref Range    Glucose 243 (H) 70 - 99 mg/dL   INR   Result Value Ref Range    INR 1.31 (H) 0.86 - 1.14   Basic metabolic panel   Result Value Ref Range    Sodium 146 (H) 133 - 144 mmol/L    Potassium 4.3 3.4 - 5.3 mmol/L    Chloride 110 (H) 94 - 109 mmol/L    Carbon Dioxide 23 20 - 32 mmol/L    Anion Gap 14 3 - 14 mmol/L    Glucose 220 (H) 70 - 99 mg/dL    Urea Nitrogen 35 (H) 7 - 30 mg/dL    Creatinine 1.17 0.66 - 1.25 mg/dL    GFR Estimate 62 >60 mL/min/1.7m2    GFR Estimate If Black 74 >60 mL/min/1.7m2    Calcium 8.0 (L) 8.5 - 10.1 mg/dL   Magnesium   Result Value Ref Range    Magnesium 2.8 (H) 1.6 - 2.3 mg/dL   Heparin Xa (10a) Level   Result Value Ref Range    Heparin 10A  Level <0.10 IU/mL   CBC with platelets   Result Value Ref Range    WBC 16.3 (H) 4.0 - 11.0 10e9/L    RBC Count 3.16 (L) 4.4 - 5.9 10e12/L    Hemoglobin 9.3 (L) 13.3 - 17.7 g/dL    Hematocrit 30.2 (L) 40.0 - 53.0 %    MCV 96 78 - 100 fl    MCH 29.4 26.5 - 33.0 pg    MCHC 30.8 (L) 31.5 - 36.5 g/dL    RDW 15.3 (H) 10.0 - 15.0 %    Platelet Count 149 (L) 150 - 450 10e9/L   Calcium ionized whole blood   Result Value Ref Range    Calcium Ionized Whole Blood 4.4 4.4 - 5.2 mg/dL   Phosphorus   Result Value Ref Range    Phosphorus 2.5 2.5 - 4.5 mg/dL   Blood gas arterial and oxyhgb   Result Value Ref Range    pH Arterial 7.42 7.35 - 7.45 pH    pCO2 Arterial 40 35 - 45 mm Hg    pO2 Arterial 84 80 - 105 mm Hg    Bicarbonate Arterial 26 21 - 28 mmol/L    FIO2 40     Oxyhemoglobin Arterial 94 92 - 100 %    Base Excess Art 0.9 mmol/L   Lactic acid whole blood   Result Value Ref Range    Lactic Acid 4.7 (HH) 0.7 - 2.0 mmol/L   Glucose by meter   Result Value Ref Range    Glucose 222 (H) 70 - 99 mg/dL   Glucose by meter   Result Value Ref Range    Glucose 198 (H) 70 - 99 mg/dL   Blood gas arterial and oxyhgb   Result Value Ref Range    pH Arterial 7.45 7.35 - 7.45 pH    pCO2 Arterial 38 35 - 45 mm Hg    pO2 Arterial 88 80 - 105 mm Hg    Bicarbonate Arterial 26 21 - 28 mmol/L    FIO2 40     Oxyhemoglobin Arterial 95 92 - 100 %    Base Excess Art 2.1 mmol/L   Lactic acid whole blood   Result Value Ref Range    Lactic Acid 3.9 (H) 0.7 - 2.0 mmol/L   Glucose by meter   Result Value Ref Range    Glucose 194 (H) 70 - 99 mg/dL   Glucose by meter   Result Value Ref Range    Glucose 202 (H) 70 - 99 mg/dL   Glucose by meter   Result Value Ref Range    Glucose 146 (H) 70 - 99 mg/dL   EKG 12-lead, tracing only   Result Value Ref Range    Interpretation ECG Click View Image link to view waveform and result    Glucose by meter   Result Value Ref Range    Glucose 124 (H) 70 - 99 mg/dL   Blood gas arterial and oxyhgb   Result Value Ref Range    pH  Arterial 7.46 (H) 7.35 - 7.45 pH    pCO2 Arterial 36 35 - 45 mm Hg    pO2 Arterial 90 80 - 105 mm Hg    Bicarbonate Arterial 25 21 - 28 mmol/L    FIO2 4L     Oxyhemoglobin Arterial 95 92 - 100 %    Base Excess Art 1.4 mmol/L   Lactic acid whole blood   Result Value Ref Range    Lactic Acid 1.8 0.7 - 2.0 mmol/L   Urology IP Consult: paraphimosis; Consultant may enter orders: Yes; Patient to be seen: Routine - within 24 hours    Narrative    Ralph Biggs MD     3/24/2018 10:50 AM  Urology Consult History and Physical    Name: Izaiah Alvarez    MRN: [id number]   YOB: 1945       We were asked to see Izaiah Alvarez at the request of Dr. Pearl for   evaluation and treatment of the following chief complaint.        Chief Complaint:   Paraphimosis  History is obtained from the patient          History of Present Illness:   Izaiah Alvarez is a 72 year old male with history of obesity, Afib on   coumadin, DM2, HTN, and recently underwent CABG yesterday.  He   was a transfer from [facility] following MI and new onset heart   failture.  He underwent what was evidently traumatic catheter   placement at [facility], and this catheter has been kept in place.        At baseline his reports being circumcised in infancy, but has had   short penis with associated contracture secondary to obesity and   diabetes.  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         Past Medical History:   No past medical history on file.         Past Surgical History:   No past surgical history on file.  Obesity  Afib  DM2  HTN  CAD  TIA         Social History:     Social History   Substance Use Topics     Smoking status: Not on file     Smokeless tobacco: Not on file     Alcohol use Not on file            Family History:   No family history on file.         Allergies:   No Known Allergies         Medications:     Current Facility-Administered Medications   Medication     albumin human 5 % injection     lidocaine (XYLOCAINE) 5 % ointment      heparin drip 25,000 units in 0.45% NaCl 250 mL     thiamine tablet 100 mg     naloxone (NARCAN) injection 0.1-0.4 mg     ipratropium - albuterol 0.5 mg/2.5 mg/3 mL (DUONEB) neb   solution 3 mL     HYDROmorphone (DILAUDID) injection 0.3-0.5 mg     propofol (DIPRIVAN) infusion    And     propofol (DIPRIVAN) injection 10 mg/mL vial     dextrose 10 % 1,000 mL infusion     insulin 1 unit/mL in saline (NovoLIN, HumuLIN Regular) drip -   ADULT IV Infusion     glucose 40 % gel 15-30 g    Or     dextrose 50 % injection 25-50 mL    Or     glucagon injection 1 mg     dextrose 5% and 0.45% NaCl + KCl 20 mEq/L infusion     hydrALAZINE (APRESOLINE) injection 10 mg     insulin aspart (NovoLOG) inj (RAPID ACTING)     insulin aspart (NovoLOG) inj (RAPID ACTING)     meperidine (DEMEROL) injection 12.5-25 mg     pantoprazole (PROTONIX) 40 mg IV push injection     Warfarin Therapy Reminder (Check START DATE - warfarin may be   starting in the FUTURE)     ceFAZolin (ANCEF) intermittent infusion 2 g in 100 mL dextrose   PRE-MIX     mupirocin (BACTROBAN) 2 % ointment 0.5 g     EPINEPHrine (ADRENALIN) 5 mg in sodium chloride 0.9 % 250 mL   infusion     aspirin EC EC tablet 81 mg     potassium chloride SA (K-DUR/KLOR-CON M) CR tablet 20-40 mEq     potassium chloride (KLOR-CON) Packet 20-40 mEq     potassium chloride 10 mEq in 100 mL intermittent infusion with   10 mg lidocaine     potassium chloride 20 mEq in 50 mL intermittent infusion     magnesium sulfate 2 g in NS intermittent infusion (PharMEDium   or [facility] Cmpd)     magnesium sulfate 4 g in 100 mL sterile water (premade)     potassium phosphate 10 mmol in D5W 250 mL intermittent infusion       potassium phosphate 15 mmol in D5W 250 mL intermittent infusion       potassium phosphate 20 mmol in D5W 500 mL intermittent infusion       potassium phosphate 20 mmol in D5W 250 mL intermittent infusion       potassium phosphate 25 mmol in D5W 500 mL intermittent infusion       acetaminophen  (TYLENOL) tablet 975 mg     [START ON 3/26/2018] acetaminophen (TYLENOL) tablet 650 mg     oxyCODONE IR (ROXICODONE) tablet 5-10 mg     ondansetron (ZOFRAN-ODT) ODT tab 4 mg    Or     ondansetron (ZOFRAN) injection 4 mg     docusate sodium (COLACE) capsule 100 mg     amiodarone (NEXTERONE) 1,500 mg in D5W 250 mL infusion     norepinephrine (LEVOPHED) 16 mg in D5W 250 mL infusion     sodium chloride 0.9% infusion     sodium chloride 0.9% infusion     amiodarone (NEXTERONE) 1,500 mg in D5W 250 mL infusion     atorvastatin (LIPITOR) tablet 20 mg     HOLD nitroGLYcerin IF     Patient is already receiving anticoagulation with heparin,   enoxaparin (LOVENOX), warfarin (COUMADIN)  or other anticoagulant   medication     magnesium sulfate 2 g in NS intermittent infusion (PharMEDium   or [facility] Cmpd)     Continuing beta blocker from home medication list OR beta   blocker order already placed during this visit     albuterol (PROAIR HFA/PROVENTIL HFA/VENTOLIN HFA) Inhaler 2   puff     finasteride (PROSCAR) tablet 5 mg     multivitamin, therapeutic with minerals (THERA-VIT-M) tablet 1   tablet     fish oil-omega-3 fatty acids capsule 1,000 mg             Review of Systems:    ROS: 10 point ROS neg other than the symptoms noted above in the   HPI           Physical Exam:   VS:  T: 100.2    HR: Data Unavailable    BP: 87/60    RR: 18   GEN:  AOx3.  NAD.    CV:  RRR  LUNGS: Non-labored breathing.   BACK:  No midline or CVA tenderness.  ABD:  Soft.  NT.  ND.  No rebound or guarding.  No masses.  :  circumcised.  No edematous circular ring proximal to the   glans to indicate paraphimosis.  Edema around penile skin, some   mild abrasions on glans secondary to Felton trauma          Data:   All laboratory data reviewed:      Recent Labs  Lab 03/24/18  0352 03/23/18  2345 03/23/18 2139 03/23/18  2136 03/23/18  0338   WBC 16.3* 18.7*  --  16.7*  --  10.5   HGB 9.3* 10.3* 8.6* 8.9*  < > 12.0*   * 141*  --  111*  --  136*   < > =  values in this interval not displayed.    Recent Labs  Lab 03/24/18  0352 03/23/18  2345 03/23/18  2139 03/23/18  2101  03/23/18  0338 03/22/18  0645   * 144 140 140  < > 139 136   POTASSIUM 4.3 4.2 4.4 5.0  < > 3.0* 3.6   CHLORIDE 110* 108  --   --   --  100 98   CO2 23 23  --   --   --  31 30   BUN 35* 33*  --   --   --  39* 41*   CR 1.17 1.17  --   --   --  1.08 1.13   * 263* 224* 189*  < > 120* 268*   STEFFANY 8.0* 7.5*  --   --   --  8.4* 8.0*   MAG 2.8* 2.8*  --   --   --  2.4* 2.1   PHOS 2.5 5.1*  --   --   --  4.0 3.8   < > = values in this interval not displayed.  No lab results found in last 7 days.    Invalid input(s): URINEBLOOD         Impression and Plan:   Impression:   Izaiah Alvarez is a 72 year old male with history of traumatic Felton   catheter placement.  The patient is circumcised and on exam there   is no edematous annular ring of tissue proximal to the glans to   suggest paraphimosis.       Plan:    No evidence of paraphimosis on exam  Continue topical cares with vaseline/bacitracin ointment at tip   of penis, monitor for increased pain          This patient's exam findings, labs, and imaging discussed with   urology chief resident and urology staff surgeon Dr. Carter, who   developed the treatment plan.    Ralph Biggs, G2  Urology Resident      Glucose by meter   Result Value Ref Range    Glucose 138 (H) 70 - 99 mg/dL   Glucose by meter   Result Value Ref Range    Glucose 152 (H) 70 - 99 mg/dL   US Lower Extremity Arterial rt   Result Value Ref Range    Radiologist flags Occluded right common femoral. (Urgent)     Narrative    Preliminary Results. Full dictation to follow.        Impression    Impression:   Short segment occlusion of the right common femoral artery with a  small collateral noted just distal to the occlusion. Slow flow and  dampened/abnormal waveforms in the distal right lower extremity  arteries.    [Urgent Result: Occluded right common femoral.]    Finding was identified on  3/24/2018 12:11 PM.     Dr. Dunham was contacted by Dr. Ladd at 3/24/2018 12:12 PM and  verbalized understanding of the urgent finding.    Glucose by meter   Result Value Ref Range    Glucose 155 (H) 70 - 99 mg/dL   Plasma prepare order unit   Result Value Ref Range    Blood Component Type Plasma     Units Ordered 2    Blood component   Result Value Ref Range    Unit Number [id number]     Blood Component Type Apheresis Plasma Thawed     Division Number 00     Status of Unit Released to care unit 03/24/2018 1257     Blood Product Code A3160C68     Unit Status ISS    Blood component   Result Value Ref Range    Unit Number [id number]     Blood Component Type Apheresis Plasma Thawed     Division Number 00     Status of Unit Released to care unit 03/24/2018 1257     Blood Product Code G0547F84     Unit Status ISS        A/P  Izaiah Alvarez is a 72 year old male with a history of coronary artery disease who is POD# 1 s/p CABG x 3 and modified MAZE, LAAC.    NEURO:  -Acute pain: Tylenol and Oxycodone PRN.    CV:     -Acute on chronic RLE ischemia: Heparin gtt started at fixed rate. Vascular surgery consult. RLE Duplex showed short segment occlusion of R CFA. Patient is going to OR for angio, thromboembolectomy and possible fasciotomies.   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 No need for mechanical support after OR, completion MACARENA showed improved EF-45%. Weaning epi. Aspirin today. Statin when able. Holding BB until off epi.   -Lactic acidosis; improving.   -Paroxysmal A fib: s/p modified MAZE and LAAC placement. On NSR currently. Continue Amiodarone for 3 weeks.   -HTN: Hold antihypertensives for now.   -Keep chest tubes and wires for now.     PULM:   -Hypoxic respiratory failure: resolved. Extubated this am.   -Post op atelectasis: IS q 10 min.     FEN/GI:  -Euvolemic, good UOP. Hold diuresis.   -Hypophosphatemia: on electrolyte replacement protocol.    -Will  keep NPO for OR this am.   -Hold bowel regimen.     :   -Paraphymosis: Urology consult.   -Felton in for critical illness.     HEME/ID:  -Mild leukocytosis. Recent OR and afebrile.   -Acute blood loss anemia: Hb stable.    -Start therapeutic anticoagulation for both acute limb ischemia and history of A fib. (fixed rate due to recent sternotomy).     ENDO:   -DM: On insulin gtt at 12 units/hr    LINES:   -RIJ CVC, PIVs, Mediastinal x 2 and left pleural, Felton.     PPx:   -SCDs and therapeutic anticoagulation.   -PPI for GI prophylaxis.     DISPO:   -CVICU after OR.

## 2018-03-24 NOTE — ANESTHESIA PROCEDURE NOTES
Perioperative MACARENA Report  Anesthesia Information  MACARENA probe placed and report generated by: : INDER MESA DANIELA VIOLETA  Images for this study have been archived.   Surgeon:  JUAN PABLO CASON      Preanesthesia Checklist:  Patient identified, IV assessed, risks and benefits discussed, monitors and equipment assessed, procedure being performed at surgeon's request and anesthesia consent obtained.  General Procedure Information  Modalities:  2D, 3D, PW Doppler, CW Doppler and Color flow mapping    MACARENA for monitoring purpose  Echocardiographic and Doppler Measurements  Right Ventricle:  Hypertrophy not present.   Thrombus not present.    Global function moderately impaired.   Ejection Fraction 35%.    Left Ventricle:  Hypertrophy present.   Thrombus not present.   Global Function moderately impaired.   Ejection Fraction 35%.      Ventricular Regional Function:  1- Basal Anteroseptal:  hypokinetic  2- Basal Anterior:  hypokinetic  3- Basal Anterolateral:  hypokinetic  4- Basal Inferolateral:  hypokinetic  5- Basal Inferior:  hypokinetic  6- Basal Inferoseptal:  hypokinetic  7- Mid Anteroseptal:  hypokinetic  8- Mid Anterior:  hypokinetic  9- Mid Anterolateral:  hypokinetic  10- Mid Inferolateral:  hypokinetic  11- Mid Inferior:  hypokinetic  12- Mid Inferoseptal:  hypokinetic  13- Apical Anterior:  hypokinetic  14- Apical Lateral:  hypokinetic  15- Apical Inferior:  hypokinetic  16- Apical Septal:  hypokinetic  17- [place]:  hypokinetic    Valves  Aortic Valve: Annulus normal.  Stenosis not present.  Regurgitation absent.  Leaflets normal.  Leaflet motions normal.    Mitral Valve: Annulus normal.  Stenosis not present.  Regurgitation absent.  Leaflets normal.  Leaflet motions normal.    Tricuspid Valve: Annulus normal.  Stenosis not present.  Regurgitation absent.  Leaflets normal.  Leaflet motions normal.    Pulmonic Valve: Annulus normal.  Stenosis not present.  Regurgitation absent.       Aorta: Ascending Aorta: Size normal.  Dissection not present.  Plaque thickness less than 3 mm.  Mobile plaque not present.    Aortic Arch: Size normal.   Dissection not present.   Plaque thickness less than 3 mm.   Mobile plaque not present.    Descending Aorta: Size normal.   Dissection not present.   Plaque thickness less than 3 mm.   Mobile plaque not present.        Right Atrium:  Size dilated.   Spontaneous echo contrast not present.   Thrombus not present.   Tumor not present.   Device not present.     Left Atrium: Size dilated.  Spontaneous echo contrast not present.  Thrombus not present.  Tumor not present.  Device not present.    Left atrial appendage normal.     Atrial Septum: Intra-atrial septal morphology normal.     Ventricular Septum: Intra-ventricular septum morphology normal.       Other Findings:   Pericardium:  normal.  Pleural Effusion:  bilateral. Pulmonary Arteries:  normal.  Pulmonary Venous Flow:  normal.  [place] sinus catheter present.  Coronary sinus size (mm):  10.  .  Post Intervention Findings  Procedure(s) performed:  CABG. Global function:  Improved.   Regional wall motion:  Improved   Surgeon(s) notified of all postintervention findings:  Yes  .  .  .   .  .  .  .  .  .  .        Echocardiogram Comments

## 2018-03-24 NOTE — ANESTHESIA PREPROCEDURE EVALUATION
Anesthesia Evaluation     .             ROS/MED HX    ENT/Pulmonary:     (+)sleep apnea, uses CPAP , . .    Neurologic:       Cardiovascular:     (+) hypertension--CAD, -past MI,-. : . . . :. dysrhythmias a-fib, .       METS/Exercise Tolerance:     Hematologic:         Musculoskeletal:         GI/Hepatic:         Renal/Genitourinary:         Endo:     (+) type II DM .      Psychiatric:         Infectious Disease:         Malignancy:         Other:                                    Anesthesia Plan      History & Physical Review  History and physical reviewed and following examination; no interval change.    ASA Status:  4 .    NPO Status:  > 8 hours    Plan for General and ETT with Intravenous induction. Maintenance will be Balanced.    PONV prophylaxis:  Ondansetron (or other 5HT-3)  Additional equipment: 2nd IV, Arterial Line, Central Line, Videolaryngoscope, MACARENA and CVP      Postoperative Care  Postoperative pain management:  IV analgesics.      Consents  Anesthetic plan, risks, benefits and alternatives discussed with:  Patient.  Use of blood products discussed: Yes.   Use of blood products discussed with Patient.  .          Anesthesiology Attending Note    HPI: Izaiah Alvarez is a 72 year old male who  has no past medical history on file.  has no past surgical history on file. with a STEMI;Heart failure;Heart failure (H);Coronary Artery Disea* scheduled for Procedure(s):  Coronary Artery Bypass Graft x 3, MAZE Procedure and Left Atrial Appendage Ligation, Median Sternotomy, on Pump Oxygenation, Left Leg Endoscopic Saphaneous Vein [place], and Right Femoral Artery Cut Down Removal of Impella Device - Wound Class: I-Clean   - Wound Class: I-Clean.      PMHx/PSHx/ROS:  No past medical history on file.    No past surgical history on file.    Soc Hx:   Social History   Substance Use Topics     Smoking status: Not on file     Smokeless tobacco: Not on file     Alcohol use Not on file         Allergies: No Known  Allergies    Meds:   Prescriptions Prior to Admission   Medication Sig Dispense Refill Last Dose     ATORVASTATIN CALCIUM PO Take 5 mg by mouth daily        Omega-3 Fatty Acids (FISH OIL) 500 MG CAPS Take 500 mg by mouth daily   3/21/2018 at Unknown time     CHLORTHALIDONE PO Take 12.5 mg by mouth daily   3/21/2018 at Unknown time     LISINOPRIL PO Take 30 mg by mouth daily   3/21/2018 at Unknown time     FINASTERIDE PO Take 5 mg by mouth daily   3/21/2018 at Unknown time     Multiple Vitamins-Minerals (MULTIVITAMIN & MINERAL PO) Take 1 tablet by mouth daily   3/21/2018 at Unknown time     albuterol (PROAIR HFA/PROVENTIL HFA/VENTOLIN HFA) 108 (90 BASE) MCG/ACT Inhaler Inhale 2 puffs into the lungs every 6 hours as needed for shortness of breath / dyspnea or wheezing        ASPIRIN 81 PO Take 1 tablet by mouth daily        METOPROLOL TARTRATE PO Take 50 mg by mouth 2 times daily        METFORMIN HCL PO Take 1,000 mg by mouth 2 times daily (with meals)        GLIPIZIDE PO Take 20 mg by mouth 2 times daily (before meals)        insulin glargine (LANTUS) 100 UNIT/ML injection Inject 60 Units Subcutaneous At Bedtime        WARFARIN SODIUM PO Take 2 mg by mouth   Unknown at Unknown time     fluticasone (FLOVENT DISKUS) 50 MCG/BLIST AEPB Inhale 2 puffs into the lungs daily as needed Both nostrils for rhinitis   Unknown at Unknown time     Acetaminophen (TYLENOL PO) Take 650 mg by mouth every 6 hours as needed for mild pain   Unknown at Unknown time       No current outpatient prescriptions on file.         Labs:  BMP:  Recent Labs   Lab Test  03/23/18 2139 03/23/18 0338   NA  140   < >  139   POTASSIUM  4.4   < >  3.0*   CHLORIDE   --    --   100   CO2   --    --   31   BUN   --    --   39*   CR   --    --   1.08   GLC  224*   < >  120*   STEFFANY   --    --   8.4*    < > = values in this interval not displayed.     CBC:   Recent Labs   Lab Test  03/23/18 2139 03/23/18 2136   WBC   --   16.7*   RBC   --   2.92*   HGB   8.6*  8.9*   HCT   --   28.6*   MCV   --   98   MCH   --   30.5   MCHC   --   31.1*   RDW   --   15.5*   PLT   --   111*     Coags:  Recent Labs   Lab Test  03/23/18   2136   INR  1.51*   PTT  29   FIBR  468*     HCG:  No results found for: HCGQUANT  Cardiac enzymes:  No results found for: CKTOTAL, CKMB, TROPN        ECG  ECHO  CXR  CT    Vital Signs:  T 97.8  P Data Unavailable  BP 88/72  R 18  SpO2 98%    Anesthetic Assessment and Plan:    72 year old male with 3 vessel CAD on Impella device, A fib, HTN, DM, to OR for Procedure(s):  Coronary Artery Bypass Graft x 3, MAZE Procedure and Left Atrial Appendage Ligation, Median Sternotomy, on Pump Oxygenation, Left Leg Endoscopic Saphaneous Vein [place], and Right Femoral Artery Cut Down Removal of Impella Device - Wound Class: I-Clean   - Wound Class: I-Clean    NPO status adequate.    ASA 4    Plan for GA, standard monitors, large bore IVs, invasive monitors, intraoperative MACARENA, possible blood transfusion, postoperative ICU.  PONV prophylaxis. Antibiotics per primary service.    Prior to anesthetic I have examined the patient, reviewed the medical history and pertinent results, and discussed the ssessment and plan with the anesthesia and surgery teams.  Anesthetic risks discussed with the patient, consent in chart.      Melissa Snider MD  Anesthesiology Attending  03/23/18                   .

## 2018-03-24 NOTE — ANESTHESIA POSTPROCEDURE EVALUATION
Patient: Izaiah Alvarez    Procedure(s):  Coronary Artery Bypass Graft x 3, MAZE Procedure and Left Atrial Appendage Ligation, Median Sternotomy, on Pump Oxygenation, Left Leg Endoscopic Saphaneous Vein [place], and Right Femoral Artery Cut Down Removal of Impella Device - Wound Class: I-Clean   - Wound Class: I-Clean    Diagnosis:Coronary Artery Disease   Diagnosis Additional Information: No value filed.    Anesthesia Type:  No value filed.    Note:  Anesthesia Post Evaluation    Patient location during evaluation: ICU  Patient participation: Unable to evaluate secondary to administered sedation  Level of consciousness: obtunded/minimal responses  Pain management: adequate  Airway patency: patent  Cardiovascular status: hemodynamically stable  Respiratory status: acceptable  Hydration status: acceptable  PONV: none     Anesthetic complications: None    Comments: Patient transported to ICU intubated, sedated, ventilated with 100%O2, fully monitored.  VSS during transport.  Patient left in stable condition in the care of ICU team.  Full report given.        Last vitals:  Vitals:    03/23/18 1200 03/23/18 1300 03/23/18 1400   BP:      Resp: 18     Temp: 36.6  C (97.8  F)     SpO2: 98% 97% 98%         Electronically Signed By: Melissa Snider MD  March 23, 2018  11:40 PM

## 2018-03-24 NOTE — PROGRESS NOTES
Cardiothoracic Surgery Progress Note - 03/24/18  Pt: Izaiah Alvarez  MRN: [id number]     ID: 72 year old with a PMHx of DM2, HTN, TIA, Atrial Fibrillation on Warfarin who presented to [facility] with STEMI with coronary angiogram showing triple vessel disease (95% LM, 75% LAD, 90% OM,  RCA). ECHO showed EF around 20-25%.     Procedures:   3/23/18 ([facility]) CABG x 3 (LIMA-LAD, RSVG-PDA, RSVG-OM), bilateral pulmonary vein isolation and left atrial appendage clip placement (MAZE).     24 hr events: OR for CABG x 3 and removal of R femoral Impella. No need for mechanical support after bypass.     Brought to OR 3/24 for RLE thrombectomy and fasciotomy with Dr. Tang.     Subjective:   Intubated and sedated.     Objective:   Temp:  [98.8  F (37.1  C)-100.2  F (37.9  C)] 98.8  F (37.1  C)  Heart Rate:  [72-86] 73  Resp:  [18-28] 18  BP: ()/(53-60) 87/60  MAP:  [59 mmHg-90 mmHg] 75 mmHg  Arterial Line BP: ()/(42-75) 127/56  FiO2 (%):  [40 %-100 %] 40 %  SpO2:  [93 %-100 %] 93 %    I/O last 3 completed shifts:  In: 5430.64 [P.O.:500; I.V.:3490.64; Other:440]  Out: 3715 [Urine:2295; Blood:1000; Chest Tube:420]    Ventilation Mode: CMV/AC  (Continuous Mandatory Ventilation/ Assist Control)  FiO2 (%): 40 %  Rate Set (breaths/minute): 12 breaths/min  Tidal Volume Set (mL): 550 mL  PEEP (cm H2O): 8 cmH2O  Pressure Support (cm H2O): 7 cmH2O  Oxygen Concentration (%): 90 %  Resp: 18    Physical Exam:  Gen: Alert and oriented. No acute distress.  PulmRespirations non labored.  CV: Regular rate and rhythm. NSR on monitor.   Chest: CT output appears serosanguinous.   - Incision:  C/D/I.  Abd: Abdomen soft, non-tender, non-distended    Labs reviewed in full; available in Epic.  Results for orders placed or performed during the hospital encounter of 03/21/18 (from the past 24 hour(s))   Arterial Panel   Result Value Ref Range    pH Arterial 7.41 7.35 - 7.45 pH    pCO2 Arterial 40 35 - 45 mm Hg    pO2 Arterial 164 (H) 80 - 105 mm Hg     Bicarbonate Arterial 25 21 - 28 mmol/L    Base Excess Art 0.7 mmol/L    FIO2 69.0     Sodium 141 133 - 144 mmol/L    Potassium 3.9 3.4 - 5.3 mmol/L    Hemoglobin 11.1 (L) 13.3 - 17.7 g/dL    Glucose 176 (H) 70 - 99 mg/dL    Calcium Ionized Whole Blood 4.3 (L) 4.4 - 5.2 mg/dL   Lactic acid whole blood   Result Value Ref Range    Lactic Acid 1.0 0.7 - 2.0 mmol/L   VENOUS PANEL   Result Value Ref Range    Ph Venous 7.30 (L) 7.32 - 7.43 pH    PCO2 Venous 61 (H) 40 - 50 mm Hg    PO2 Venous 42 25 - 47 mm Hg    Bicarbonate Venous 30 (H) 21 - 28 mmol/L    Base Excess Venous 2.8 mmol/L    FIO2 70.0     Sodium 143 133 - 144 mmol/L    Potassium 3.3 (L) 3.4 - 5.3 mmol/L    Hemoglobin 9.4 (L) 13.3 - 17.7 g/dL    Glucose 187 (H) 70 - 99 mg/dL    Calcium Ionized Whole Blood 4.1 (L) 4.4 - 5.2 mg/dL   Lactic acid whole blood   Result Value Ref Range    Lactic Acid 1.0 0.7 - 2.0 mmol/L   Arterial Panel   Result Value Ref Range    pH Arterial 7.30 (L) 7.35 - 7.45 pH    pCO2 Arterial 59 (H) 35 - 45 mm Hg    pO2 Arterial 128 (H) 80 - 105 mm Hg    Bicarbonate Arterial 29 (H) 21 - 28 mmol/L    Base Excess Art 1.8 mmol/L    FIO2 70.0     Sodium 143 133 - 144 mmol/L    Potassium 3.4 3.4 - 5.3 mmol/L    Hemoglobin 9.4 (L) 13.3 - 17.7 g/dL    Glucose 185 (H) 70 - 99 mg/dL    Calcium Ionized Whole Blood 4.1 (L) 4.4 - 5.2 mg/dL   Lactic acid whole blood   Result Value Ref Range    Lactic Acid 1.0 0.7 - 2.0 mmol/L   Arterial Panel   Result Value Ref Range    pH Arterial 7.33 (L) 7.35 - 7.45 pH    pCO2 Arterial 51 (H) 35 - 45 mm Hg    pO2 Arterial 330 (H) 80 - 105 mm Hg    Bicarbonate Arterial 27 21 - 28 mmol/L    Base Excess Art 0.4 mmol/L    FIO2 80.0     Sodium 140 133 - 144 mmol/L    Potassium 5.2 3.4 - 5.3 mmol/L    Hemoglobin 8.4 (L) 13.3 - 17.7 g/dL    Glucose 177 (H) 70 - 99 mg/dL    Calcium Ionized Whole Blood 4.0 (L) 4.4 - 5.2 mg/dL   Lactic acid whole blood   Result Value Ref Range    Lactic Acid 0.9 0.7 - 2.0 mmol/L   Arterial  Panel   Result Value Ref Range    pH Arterial 7.35 7.35 - 7.45 pH    pCO2 Arterial 47 (H) 35 - 45 mm Hg    pO2 Arterial 393 (H) 80 - 105 mm Hg    Bicarbonate Arterial 26 21 - 28 mmol/L    Base Deficit Art 0.1 mmol/L    FIO2 90.0     Sodium 140 133 - 144 mmol/L    Potassium 5.0 3.4 - 5.3 mmol/L    Hemoglobin 8.6 (L) 13.3 - 17.7 g/dL    Glucose 189 (H) 70 - 99 mg/dL    Calcium Ionized Whole Blood 4.0 (L) 4.4 - 5.2 mg/dL   Lactic acid whole blood   Result Value Ref Range    Lactic Acid 1.0 0.7 - 2.0 mmol/L   CBC with platelets   Result Value Ref Range    WBC 16.7 (H) 4.0 - 11.0 10e9/L    RBC Count 2.92 (L) 4.4 - 5.9 10e12/L    Hemoglobin 8.9 (L) 13.3 - 17.7 g/dL    Hematocrit 28.6 (L) 40.0 - 53.0 %    MCV 98 78 - 100 fl    MCH 30.5 26.5 - 33.0 pg    MCHC 31.1 (L) 31.5 - 36.5 g/dL    RDW 15.5 (H) 10.0 - 15.0 %    Platelet Count 111 (L) 150 - 450 10e9/L   Fibrinogen activity   Result Value Ref Range    Fibrinogen 468 (H) 200 - 420 mg/dL   INR   Result Value Ref Range    INR 1.51 (H) 0.86 - 1.14   Partial thromboplastin time   Result Value Ref Range    PTT 29 22 - 37 sec   Arterial Panel   Result Value Ref Range    pH Arterial 7.35 7.35 - 7.45 pH    pCO2 Arterial 47 (H) 35 - 45 mm Hg    pO2 Arterial 94 80 - 105 mm Hg    Bicarbonate Arterial 26 21 - 28 mmol/L    Base Deficit Art 0.1 mmol/L    FIO2 97.0     Sodium 140 133 - 144 mmol/L    Potassium 4.4 3.4 - 5.3 mmol/L    Hemoglobin 8.6 (L) 13.3 - 17.7 g/dL    Glucose 224 (H) 70 - 99 mg/dL    Calcium Ionized Whole Blood 4.7 4.4 - 5.2 mg/dL   Lactic acid whole blood   Result Value Ref Range    Lactic Acid 2.6 (H) 0.7 - 2.0 mmol/L   INR   Result Value Ref Range    INR 1.28 (H) 0.86 - 1.14   Partial thromboplastin time   Result Value Ref Range    PTT 28 22 - 37 sec   Blood gas arterial and oxyhgb   Result Value Ref Range    pH Arterial 7.30 (L) 7.35 - 7.45 pH    pCO2 Arterial 47 (H) 35 - 45 mm Hg    pO2 Arterial 261 (H) 80 - 105 mm Hg    Bicarbonate Arterial 23 21 - 28 mmol/L     FIO2 100%     Oxyhemoglobin Arterial 97 92 - 100 %    Base Deficit Art 3.7 mmol/L   CBC with platelets   Result Value Ref Range    WBC 18.7 (H) 4.0 - 11.0 10e9/L    RBC Count 3.44 (L) 4.4 - 5.9 10e12/L    Hemoglobin 10.3 (L) 13.3 - 17.7 g/dL    Hematocrit 33.4 (L) 40.0 - 53.0 %    MCV 97 78 - 100 fl    MCH 29.9 26.5 - 33.0 pg    MCHC 30.8 (L) 31.5 - 36.5 g/dL    RDW 15.5 (H) 10.0 - 15.0 %    Platelet Count 141 (L) 150 - 450 10e9/L   Basic metabolic panel   Result Value Ref Range    Sodium 144 133 - 144 mmol/L    Potassium 4.2 3.4 - 5.3 mmol/L    Chloride 108 94 - 109 mmol/L    Carbon Dioxide 23 20 - 32 mmol/L    Anion Gap 13 3 - 14 mmol/L    Glucose 263 (H) 70 - 99 mg/dL    Urea Nitrogen 33 (H) 7 - 30 mg/dL    Creatinine 1.17 0.66 - 1.25 mg/dL    GFR Estimate 62 >60 mL/min/1.7m2    GFR Estimate If Black 74 >60 mL/min/1.7m2    Calcium 7.5 (L) 8.5 - 10.1 mg/dL   Lactic acid whole blood   Result Value Ref Range    Lactic Acid 4.9 (HH) 0.7 - 2.0 mmol/L   Calcium ionized whole blood   Result Value Ref Range    Calcium Ionized Whole Blood 4.4 4.4 - 5.2 mg/dL   Magnesium   Result Value Ref Range    Magnesium 2.8 (H) 1.6 - 2.3 mg/dL   Phosphorus   Result Value Ref Range    Phosphorus 5.1 (H) 2.5 - 4.5 mg/dL   Glucose by meter   Result Value Ref Range    Glucose 269 (H) 70 - 99 mg/dL   EKG 12-lead, tracing only   Result Value Ref Range    Interpretation ECG Click View Image link to view waveform and result    XR Abdomen Port 1 View    Narrative    EXAM: XR ABDOMEN PORT 1 VW  3/24/2018 12:25 AM      HISTORY: [place];     COMPARISON: CT chest 3/23/2018    FINDINGS: Portable supine AP view of the left upper quadrant obtained.  Gastric tube tip projects over the stomach, sidehole projects above  the level the gastroesophageal junction. Median sternotomy wires. Left  basilar chest tube. Mediastinal drains. Nonobstructive bowel gas  pattern. No definite free air on this supine study. Residual oral  contrast is seen in the colon. No  pneumobilia portal venous gas in the  visualized right upper quadrant.       Impression    IMPRESSION: Gastric tube tip projects in the stomach, however sidehole  projects above the level of the gastroesophageal junction. Recommend  advancing 10 cm.    I have personally reviewed the examination and initial interpretation  and I agree with the findings.    TIMOTHY PATHAK MD   XR Chest Port 1 View    Narrative    EXAM: XR CHEST PORT 1 VW  3/24/2018 12:26 AM      HISTORY: Endotracheal tube positioning;     COMPARISON: CT 3/23/2018    FINDINGS: Supine AP view of the chest. Endotracheal tube tip projects  6.4 cm from the fabio. Right IJ central line tip projects over the  mid SVC. Left IJ central line tip projects over the left innominate  vein. Mediastinal drains. Left basilar chest tube. Postsurgical  changes of CABG, including median sternotomy wires. Left atrial  appendage exclusion device.    The trachea is midline. The cardiac silhouette is within normal  limits. Small bilateral pleural effusions with associated bibasilar  patchy and streaky opacities. No pneumothorax. Trace  pneumomediastinum.       Impression    IMPRESSION:   1. Endotracheal tube tip projects 6.4 cm from the fabio.  2. Small bilateral pleural effusions with probable mild pulmonary  edema and atelectasis.  3. Trace pneumomediastinum with mediastinal drains in place.    I have personally reviewed the examination and initial interpretation  and I agree with the findings.    TIMOTHY PATHAK MD   Blood gas arterial and oxyhgb   Result Value Ref Range    pH Arterial 7.36 7.35 - 7.45 pH    pCO2 Arterial 42 35 - 45 mm Hg    pO2 Arterial 100 80 - 105 mm Hg    Bicarbonate Arterial 23 21 - 28 mmol/L    FIO2 50     Oxyhemoglobin Arterial 94 92 - 100 %    Base Deficit Art 2.1 mmol/L   Lactic acid whole blood   Result Value Ref Range    Lactic Acid 5.0 (HH) 0.7 - 2.0 mmol/L   Glucose by meter   Result Value Ref Range    Glucose 265 (H) 70 - 99  mg/dL   Glucose by meter   Result Value Ref Range    Glucose 245 (H) 70 - 99 mg/dL   Glucose by meter   Result Value Ref Range    Glucose 243 (H) 70 - 99 mg/dL   INR   Result Value Ref Range    INR 1.31 (H) 0.86 - 1.14   Basic metabolic panel   Result Value Ref Range    Sodium 146 (H) 133 - 144 mmol/L    Potassium 4.3 3.4 - 5.3 mmol/L    Chloride 110 (H) 94 - 109 mmol/L    Carbon Dioxide 23 20 - 32 mmol/L    Anion Gap 14 3 - 14 mmol/L    Glucose 220 (H) 70 - 99 mg/dL    Urea Nitrogen 35 (H) 7 - 30 mg/dL    Creatinine 1.17 0.66 - 1.25 mg/dL    GFR Estimate 62 >60 mL/min/1.7m2    GFR Estimate If Black 74 >60 mL/min/1.7m2    Calcium 8.0 (L) 8.5 - 10.1 mg/dL   Magnesium   Result Value Ref Range    Magnesium 2.8 (H) 1.6 - 2.3 mg/dL   Heparin Xa (10a) Level   Result Value Ref Range    Heparin 10A Level <0.10 IU/mL   CBC with platelets   Result Value Ref Range    WBC 16.3 (H) 4.0 - 11.0 10e9/L    RBC Count 3.16 (L) 4.4 - 5.9 10e12/L    Hemoglobin 9.3 (L) 13.3 - 17.7 g/dL    Hematocrit 30.2 (L) 40.0 - 53.0 %    MCV 96 78 - 100 fl    MCH 29.4 26.5 - 33.0 pg    MCHC 30.8 (L) 31.5 - 36.5 g/dL    RDW 15.3 (H) 10.0 - 15.0 %    Platelet Count 149 (L) 150 - 450 10e9/L   Calcium ionized whole blood   Result Value Ref Range    Calcium Ionized Whole Blood 4.4 4.4 - 5.2 mg/dL   Phosphorus   Result Value Ref Range    Phosphorus 2.5 2.5 - 4.5 mg/dL   Blood gas arterial and oxyhgb   Result Value Ref Range    pH Arterial 7.42 7.35 - 7.45 pH    pCO2 Arterial 40 35 - 45 mm Hg    pO2 Arterial 84 80 - 105 mm Hg    Bicarbonate Arterial 26 21 - 28 mmol/L    FIO2 40     Oxyhemoglobin Arterial 94 92 - 100 %    Base Excess Art 0.9 mmol/L   Lactic acid whole blood   Result Value Ref Range    Lactic Acid 4.7 (HH) 0.7 - 2.0 mmol/L   Glucose by meter   Result Value Ref Range    Glucose 222 (H) 70 - 99 mg/dL   Glucose by meter   Result Value Ref Range    Glucose 198 (H) 70 - 99 mg/dL   Blood gas arterial and oxyhgb   Result Value Ref Range    pH  Arterial 7.45 7.35 - 7.45 pH    pCO2 Arterial 38 35 - 45 mm Hg    pO2 Arterial 88 80 - 105 mm Hg    Bicarbonate Arterial 26 21 - 28 mmol/L    FIO2 40     Oxyhemoglobin Arterial 95 92 - 100 %    Base Excess Art 2.1 mmol/L   Lactic acid whole blood   Result Value Ref Range    Lactic Acid 3.9 (H) 0.7 - 2.0 mmol/L   Glucose by meter   Result Value Ref Range    Glucose 194 (H) 70 - 99 mg/dL   Glucose by meter   Result Value Ref Range    Glucose 202 (H) 70 - 99 mg/dL   Glucose by meter   Result Value Ref Range    Glucose 146 (H) 70 - 99 mg/dL   EKG 12-lead, tracing only   Result Value Ref Range    Interpretation ECG Click View Image link to view waveform and result    Glucose by meter   Result Value Ref Range    Glucose 124 (H) 70 - 99 mg/dL   Blood gas arterial and oxyhgb   Result Value Ref Range    pH Arterial 7.46 (H) 7.35 - 7.45 pH    pCO2 Arterial 36 35 - 45 mm Hg    pO2 Arterial 90 80 - 105 mm Hg    Bicarbonate Arterial 25 21 - 28 mmol/L    FIO2 4L     Oxyhemoglobin Arterial 95 92 - 100 %    Base Excess Art 1.4 mmol/L   Lactic acid whole blood   Result Value Ref Range    Lactic Acid 1.8 0.7 - 2.0 mmol/L   Urology IP Consult: paraphimosis; Consultant may enter orders: Yes; Patient to be seen: Routine - within 24 hours    Narrative    Ralph Biggs MD     3/24/2018 10:50 AM  Urology Consult History and Physical    Name: Izaiah Alvarez    MRN: [id number]   YOB: 1945       We were asked to see Izaiah Alvarez at the request of Dr. Pearl for   evaluation and treatment of the following chief complaint.        Chief Complaint:   Paraphimosis  History is obtained from the patient          History of Present Illness:   Izaiah Alvarez is a 72 year old male with history of obesity, Afib on   coumadin, DM2, HTN, and recently underwent CABG yesterday.  He   was a transfer from [facility] following MI and new onset heart   failture.  He underwent what was evidently traumatic catheter   placement at [facility], and this catheter has  been kept in place.        At baseline his reports being circumcised in infancy, but has had   short penis with associated contracture secondary to obesity and   diabetes.  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         Past Medical History:   No past medical history on file.         Past Surgical History:   No past surgical history on file.  Obesity  Afib  DM2  HTN  CAD  TIA         Social History:     Social History   Substance Use Topics     Smoking status: Not on file     Smokeless tobacco: Not on file     Alcohol use Not on file            Family History:   No family history on file.         Allergies:   No Known Allergies         Medications:     Current Facility-Administered Medications   Medication     albumin human 5 % injection     lidocaine (XYLOCAINE) 5 % ointment     heparin drip 25,000 units in 0.45% NaCl 250 mL     thiamine tablet 100 mg     naloxone (NARCAN) injection 0.1-0.4 mg     ipratropium - albuterol 0.5 mg/2.5 mg/3 mL (DUONEB) neb   solution 3 mL     HYDROmorphone (DILAUDID) injection 0.3-0.5 mg     propofol (DIPRIVAN) infusion    And     propofol (DIPRIVAN) injection 10 mg/mL vial     dextrose 10 % 1,000 mL infusion     insulin 1 unit/mL in saline (NovoLIN, HumuLIN Regular) drip -   ADULT IV Infusion     glucose 40 % gel 15-30 g    Or     dextrose 50 % injection 25-50 mL    Or     glucagon injection 1 mg     dextrose 5% and 0.45% NaCl + KCl 20 mEq/L infusion     hydrALAZINE (APRESOLINE) injection 10 mg     insulin aspart (NovoLOG) inj (RAPID ACTING)     insulin aspart (NovoLOG) inj (RAPID ACTING)     meperidine (DEMEROL) injection 12.5-25 mg     pantoprazole (PROTONIX) 40 mg IV push injection     Warfarin Therapy Reminder (Check START DATE - warfarin may be   starting in the FUTURE)     ceFAZolin (ANCEF) intermittent infusion 2 g in 100 mL dextrose   PRE-MIX     mupirocin (BACTROBAN) 2 % ointment 0.5 g     EPINEPHrine (ADRENALIN) 5 mg in sodium chloride 0.9 % 250 mL    infusion     aspirin EC EC tablet 81 mg     potassium chloride SA (K-DUR/KLOR-CON M) CR tablet 20-40 mEq     potassium chloride (KLOR-CON) Packet 20-40 mEq     potassium chloride 10 mEq in 100 mL intermittent infusion with   10 mg lidocaine     potassium chloride 20 mEq in 50 mL intermittent infusion     magnesium sulfate 2 g in NS intermittent infusion (PharMEDium   or [facility] Cmpd)     magnesium sulfate 4 g in 100 mL sterile water (premade)     potassium phosphate 10 mmol in D5W 250 mL intermittent infusion       potassium phosphate 15 mmol in D5W 250 mL intermittent infusion       potassium phosphate 20 mmol in D5W 500 mL intermittent infusion       potassium phosphate 20 mmol in D5W 250 mL intermittent infusion       potassium phosphate 25 mmol in D5W 500 mL intermittent infusion       acetaminophen (TYLENOL) tablet 975 mg     [START ON 3/26/2018] acetaminophen (TYLENOL) tablet 650 mg     oxyCODONE IR (ROXICODONE) tablet 5-10 mg     ondansetron (ZOFRAN-ODT) ODT tab 4 mg    Or     ondansetron (ZOFRAN) injection 4 mg     docusate sodium (COLACE) capsule 100 mg     amiodarone (NEXTERONE) 1,500 mg in D5W 250 mL infusion     norepinephrine (LEVOPHED) 16 mg in D5W 250 mL infusion     sodium chloride 0.9% infusion     sodium chloride 0.9% infusion     amiodarone (NEXTERONE) 1,500 mg in D5W 250 mL infusion     atorvastatin (LIPITOR) tablet 20 mg     HOLD nitroGLYcerin IF     Patient is already receiving anticoagulation with heparin,   enoxaparin (LOVENOX), warfarin (COUMADIN)  or other anticoagulant   medication     magnesium sulfate 2 g in NS intermittent infusion ([facility]ar[facility]   or [facility] Cmpd)     Continuing beta blocker from home medication list OR beta   blocker order already placed during this visit     albuterol (PROAIR HFA/PROVENTIL HFA/VENTOLIN HFA) Inhaler 2   puff     finasteride (PROSCAR) tablet 5 mg     multivitamin, therapeutic with minerals (THERA-VIT-M) tablet 1   tablet     fish oil-omega-3 fatty acids capsule  1,000 mg             Review of Systems:    ROS: 10 point ROS neg other than the symptoms noted above in the   HPI           Physical Exam:   VS:  T: 100.2    HR: Data Unavailable    BP: 87/60    RR: 18   GEN:  AOx3.  NAD.    CV:  RRR  LUNGS: Non-labored breathing.   BACK:  No midline or CVA tenderness.  ABD:  Soft.  NT.  ND.  No rebound or guarding.  No masses.  :  circumcised.  No edematous circular ring proximal to the   glans to indicate paraphimosis.  Edema around penile skin, some   mild abrasions on glans secondary to [place] trauma          Data:   All laboratory data reviewed:      Recent Labs  Lab 03/24/18 0352 03/23/18 2345 03/23/18 2139 03/23/18 2136 03/23/18 0338   WBC 16.3* 18.7*  --  16.7*  --  10.5   HGB 9.3* 10.3* 8.6* 8.9*  < > 12.0*   * 141*  --  111*  --  136*   < > = values in this interval not displayed.    Recent Labs  Lab 03/24/18 0352 03/23/18 2345 03/23/18 2139 03/23/18 2101 03/23/18 0338 03/22/18  0645   * 144 140 140  < > 139 136   POTASSIUM 4.3 4.2 4.4 5.0  < > 3.0* 3.6   CHLORIDE 110* 108  --   --   --  100 98   CO2 23 23  --   --   --  31 30   BUN 35* 33*  --   --   --  39* 41*   CR 1.17 1.17  --   --   --  1.08 1.13   * 263* 224* 189*  < > 120* 268*   STEFFANY 8.0* 7.5*  --   --   --  8.4* 8.0*   MAG 2.8* 2.8*  --   --   --  2.4* 2.1   PHOS 2.5 5.1*  --   --   --  4.0 3.8   < > = values in this interval not displayed.  No lab results found in last 7 days.    Invalid input(s): URINEBLOOD         Impression and Plan:   Impression:   Izaiah Alvarez is a 72 year old male with history of traumatic [place]   catheter placement.  The patient is circumcised and on exam there   is no edematous annular ring of tissue proximal to the glans to   suggest paraphimosis.       Plan:    No evidence of paraphimosis on exam  Continue topical cares with vaseline/bacitracin ointment at tip   of penis, monitor for increased pain          This patient's exam findings, labs, and  imaging discussed with   urology chief resident and urology staff surgeon Dr. Carter, who   developed the treatment plan.    Ralph Biggs, G2  Urology Resident      Glucose by meter   Result Value Ref Range    Glucose 138 (H) 70 - 99 mg/dL   Glucose by meter   Result Value Ref Range    Glucose 152 (H) 70 - 99 mg/dL   US Lower Extremity Arterial rt   Result Value Ref Range    Radiologist flags Occluded right common femoral. (Urgent)     Narrative    Exam: Duplex ultrasound of right lower extremity arteries dated  3/24/2018 12:05 PM     Clinical information: cold leg, s/p R CFA cutdown;      Comparison: No prior for comparison    Technique: Includes Gray Scale images, color Doppler, spectral Doppler  waveforms and velocities with appropriate angles of 60 degrees or  less.    Ordering provider: REFERRED SELF    Findings:     Right lower extremity:     Central EIA: Velocity: 84 cm/s, biphasic  Low external iliac artery: Not visualized  Common femoral artery, proximal: No flow identified.   Collateral artery emptying just distal to the occlusion: 56 cm/s,  monophasic  Common femoral artery, distal to occlusion: 16 cm/s, monophasic  Deep femoral artery: 24 cm/sec. Waveforms: Monophasic  Proximal SFA: 20 cm/sec. Waveforms: Monophasic  Mid SFA: 13 cm/sec. Waveforms: Monophasic  Distal SFA: 14 cm/sec. Waveforms: Monophasic  Popliteal artery: 12 cm/sec. Waveforms: Monophasic  PTA ankle: No flow identified.  ALVARO ankle: 7 cm/sec. Waveforms: Monophasic      Impression    Impression:     1. 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 Slow flow and  dampened/abnormal waveforms in the distal right lower extremity  arteries.  2. Probable occlusion of the peripheral right external iliac artery as  well as the artery is not visualized. The central portion of the right  external iliac artery is seen with monophasic flow.      [Urgent Result: Occluded right common  femoral.]    Finding was identified on 3/24/2018 12:11 PM.     Dr. Dunham was contacted by Dr. Ladd at 3/24/2018 12:12 PM and  verbalized understanding of the urgent finding.     I have personally reviewed the examination and initial interpretation  and I agree with the findings.    TIMOTHY PATHAK MD   Glucose by meter   Result Value Ref Range    Glucose 155 (H) 70 - 99 mg/dL   Plasma prepare order unit   Result Value Ref Range    Blood Component Type Plasma     Units Ordered 4    Blood component   Result Value Ref Range    Unit Number [id number]     Blood Component Type Apheresis Plasma Thawed     Division Number 00     Status of Unit Released to care unit 03/24/2018 1257     Blood Product Code W8973I40     Unit Status ISS    Blood component   Result Value Ref Range    Unit Number [id number]     Blood Component Type Apheresis Plasma Thawed     Division Number 00     Status of Unit Released to care unit 03/24/2018 1257     Blood Product Code [id number]     Unit Status ISS    Blood component   Result Value Ref Range    Unit Number [id number]     Blood Component Type Apheresis Plasma Thawed     Division Number 00     Status of Unit Released to care unit 03/24/2018 1556     Blood Product Code J7793H65     Unit Status ISS    Blood component   Result Value Ref Range    Unit Number [id number]     Blood Component Type Apheresis Plasma Thawed     Division Number 00     Status of Unit Released to care unit 03/24/2018 1556     Blood Product Code Y5757P95     Unit Status ISS    Activated clotting time POCT   Result Value Ref Range    Activated Clot Time 127 75 - 150 sec   Glucose by meter   Result Value Ref Range    Glucose 151 (H) 70 - 99 mg/dL   Arterial Panel   Result Value Ref Range    pH Arterial 7.45 7.35 - 7.45 pH    pCO2 Arterial 38 35 - 45 mm Hg    pO2 Arterial 76 (L) 80 - 105 mm Hg    Bicarbonate Arterial 26 21 - 28 mmol/L    Base Excess Art 2.3 mmol/L    FIO2 OR 24  FIO2=51%     Sodium 141 133 - 144  mmol/L    Potassium 3.7 3.4 - 5.3 mmol/L    Hemoglobin 8.4 (L) 13.3 - 17.7 g/dL    Glucose 160 (H) 70 - 99 mg/dL    Calcium Ionized Whole Blood 4.3 (L) 4.4 - 5.2 mg/dL   Lactic acid whole blood   Result Value Ref Range    Lactic Acid 1.9 0.7 - 2.0 mmol/L   Activated clotting time POCT   Result Value Ref Range    Activated Clot Time 168 (H) 75 - 150 sec   Activated clotting time POCT   Result Value Ref Range    Activated Clot Time 205 (H) 75 - 150 sec   Arterial Panel   Result Value Ref Range    pH Arterial 7.38 7.35 - 7.45 pH    pCO2 Arterial 42 35 - 45 mm Hg    pO2 Arterial 97 80 - 105 mm Hg    Bicarbonate Arterial 25 21 - 28 mmol/L    Base Deficit Art 0.6 mmol/L    FIO2 51     Sodium 139 133 - 144 mmol/L    Potassium 3.9 3.4 - 5.3 mmol/L    Hemoglobin 8.4 (L) 13.3 - 17.7 g/dL    Glucose 164 (H) 70 - 99 mg/dL    Calcium Ionized Whole Blood 9.8 (HH) 4.4 - 5.2 mg/dL   Activated clotting time POCT   Result Value Ref Range    Activated Clot Time 180 (H) 75 - 150 sec   Arterial Panel   Result Value Ref Range    pH Arterial 7.40 7.35 - 7.45 pH    pCO2 Arterial 40 35 - 45 mm Hg    pO2 Arterial 109 (H) 80 - 105 mm Hg    Bicarbonate Arterial 25 21 - 28 mmol/L    Base Excess Art 0.0 mmol/L    FIO2 51     Sodium 140 133 - 144 mmol/L    Potassium 4.2 3.4 - 5.3 mmol/L    Hemoglobin 9.6 (L) 13.3 - 17.7 g/dL    Glucose 151 (H) 70 - 99 mg/dL    Calcium Ionized Whole Blood 4.6 4.4 - 5.2 mg/dL   Fibrinogen activity   Result Value Ref Range    Fibrinogen 410 200 - 420 mg/dL   INR   Result Value Ref Range    INR 1.53 (H) 0.86 - 1.14   Lactic acid whole blood   Result Value Ref Range    Lactic Acid 2.2 (H) 0.7 - 2.0 mmol/L   Platelet count   Result Value Ref Range    Platelet Count 116 (L) 150 - 450 10e9/L   Partial thromboplastin time   Result Value Ref Range     (HH) 22 - 37 sec   IR OR Angiogram    Narrative    This exam was marked as non-reportable because it will not be read by a   radiologist or a [facility] non-radiologist  provider.             Glucose by meter   Result Value Ref Range    Glucose 138 (H) 70 - 99 mg/dL   Lactic acid whole blood   Result Value Ref Range    Lactic Acid 1.9 0.7 - 2.0 mmol/L   Blood gas arterial and oxyhgb   Result Value Ref Range    pH Arterial 7.37 7.35 - 7.45 pH    pCO2 Arterial 44 35 - 45 mm Hg    pO2 Arterial 103 80 - 105 mm Hg    Bicarbonate Arterial 26 21 - 28 mmol/L    FIO2 60.0     Oxyhemoglobin Arterial 95 92 - 100 %    Base Excess Art 0.2 mmol/L   EKG 12-lead, complete   Result Value Ref Range    Interpretation ECG Click View Image link to view waveform and result        A/P  Izaiah Alvarez is a 72 year old male with a history of coronary artery disease who is POD# 1 s/p CABG x 3 and modified MAZE, LAAC, and s/p 3/24 RLE thrombectomy and fasciotomy with Dr. Tang.     NEURO:  -Acute pain: Tylenol and Oxycodone PRN.    CV:     -Acute on chronic RLE ischemia: Heparin gtt started at fixed rate. Vascular surgery consult. RLE Duplex showed short segment occlusion of R CFA. Patient is going to OR for angio, thromboembolectomy and possible fasciotomies.   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 No need for mechanical support after OR, completion MACARENA showed improved EF-45%. Weaning epi. Aspirin today. Statin when able. Holding BB until off epi.   -Lactic acidosis; improving.   -Paroxysmal A fib: s/p modified MAZE and LAAC placement. On NSR currently. Continue Amiodarone for 3 weeks.   -HTN: Hold antihypertensives for now.   -Keep chest tubes and wires for now.     PULM:   -Hypoxic respiratory failure: resolved. Extubated this am.   -Post op atelectasis: IS q 10 min.     FEN/GI:  -Euvolemic, good UOP. Hold diuresis.   -Hypophosphatemia: on electrolyte replacement protocol.    -Will keep NPO for OR this am.   -Hold bowel regimen.     :   -Paraphymosis: Urology consult.   -Felton in for critical illness.     HEME/ID:  -Mild leukocytosis. Recent OR  and afebrile.   -Acute blood loss anemia: Hb stable.    -Start therapeutic anticoagulation for both acute limb ischemia and history of A fib. (fixed rate due to recent sternotomy).     ENDO:   -DM: On insulin gtt at 12 units/hr    LINES:   -RIJ CVC, PIVs, Mediastinal x 2 and left pleural, Felton.     PPx:   -SCDs and therapeutic anticoagulation.   -PPI for GI prophylaxis.     DISPO:   -CVICU after OR.

## 2018-03-24 NOTE — PLAN OF CARE
Problem: Patient Care Overview  Goal: Plan of Care/Patient Progress Review  Outcome: No Change  D/I/A:  Pre-op CABG, scheduled for 3/23. AOx4 in room. AF (70's - 100's), SaO2 >95% on 5L oxyplus. Pt denied pain, nausea. Stable Impella @91 cm. Amio decreased to 0.5 mg/min. K = 3.7, replaced per protocol. Chest CT in AM. R-IJ oozing blood, drsg changed 2x before OR. 2nd OR scrub done at ~12:00. To OR at ~15:30.    Plan:  Notify provider of changes.

## 2018-03-24 NOTE — OP NOTE
OPERATIVE DATE: 3/23/2018    PRE-OPERATIVE DIAGNOSIS:  1) Coronary artery disease  2) Congestive heart failure  3) Paroxysmal atrial fibrillation  4) Diabetes mellitus  5) Hypertension  6) History of TIA  7) Obesity    POST-OPERATIVE DIAGNOSIS:  1) Coronary artery disease  2) Congestive heart failure  3) Paroxysmal atrial fibrillation  4) Diabetes mellitus  5) Hypertension  6) History of TIA  7) Obesity    PROCEDURE:  1) Coronary artery bypass grafting x 3.   - Left internal mammary artery to left anterior descending artery   - Reversed saphenous vein graft to right posterior descending artery   - Reversed saphenous vein graft to obtuse marginal artery  2) Endoscopic vein harvest left lower extremity  3) Transesophageal echocardiogram  4) Bilateral pulmonary vein isolation MAZE procedure  5) Left atrial appendage ligation with 45mm AtriClip  6) Right femoral arterial cutdown  7) Removal of Impella 5.0 temporary left ventricular assist device (explant of extracorporeal left ventricular device)  8) Repair of femoral vessels    SURGEON: Eric Marks MD    ASSISTANT: Juan M Pearl MD; Jose Lopez MD    ANESTHESIA: GETA    ESTIMATED BLOOD LOSS: 1000cc    OPERATIVE FINDINGS:  1) Ejection fraction: 25% pre-op; 45% post-bypass  2) Left internal mammary artery 3mm and excellent flow.  3) Left greater saphenous vein 5-6mm and suitable for bypass.  4) Left anterior descending artery 2.5mm and heavy posterior disease at anastomosis.  5) Right posterior descending artery 2mm and moderate disease at anastomosis.  6) Obtuse marginal artery 2mm and free of disease at anastomosis.    CARDIOPULMONARY BYPASS TIME: 125 minutes    AORTIC CROSS CLAMP TIME: 101 minutes    INDICATIONS:  Mr. MONSE FAROOQ is a 72 year old male admitted with coronary artery disease and acute decompensated ischemic cardiomyopathy.  We were asked to evaluate for high risk surgical revascularization.  Risks and benefits of the operation were explained to the  patient and their family including, but not limited to, bleeding, infection, stroke and even death.  They understood these risks and agreed to proceed electively.    OPERATIVE REPORT:  The patient was transferred to the operating room and positioned supine on the OR table.  General anesthesia was initiated by the anesthesia team.  Endotracheal intubation and central venous access was performed by anesthesia.  The patients neck, chest, abdomen and bilateral lower extremities were clipped, prepped and draped in sterile fashion.  A pre-procedure time-out was performed confirming the correct patient, correct site and correct procedure.    Simultaneous median sternotomy and left lower extremity skin incisions were made.  Endoscopic vein harvest of the left greater saphenous vein was performed.  The vein was ligated at the proximal and distal ends, harvested from the leg, cannulated in reverse orientation, and flushed with heparinized saline.  Branches of the vein were triply clipped with metal clips.  The vein was then stored in heparinized saline.    Median sternotomy was made with reciprocating saw.  The bone was made hemostatic with cautery and bone wax.  A mammary retractor was placed.  The left pleural space was opened.  The left internal mammary artery was mobilized.  Branches of the artery were clipped with metal clips and divided with cautery.      The patient was given 300 mg/kg IV heparin.  The mammary pedicle was clamped with a tonsil clamp.  The mammary artery was divided with metzenbaum scissors.  There was excellent pulsatile flow from the mammary artery.  This was controlled with a bulldog clamp.  The distal aspect was tied with 0-ethibond tie and double clipped.  The proximal end was spatulated in preparation for anastomosis and flushed with papavarine.      Pledgeted 2-0 ethibond pursestring was made in the ascending aorta.  A 20F EOPA arterial cannula was placed here and connected to the bypass circuit.   A 2-0 ethibond pursestring was made in the right atrial appendage.  A 28F three stage venous cannula was placed here and connected to the cardiopulmonary bypass circuit.  A 4-0 prolene pursestring was made in the right atrium.  A stab incision was made here.  A retrograde cardioplegia catheter was placed and connected to the cardioplegia circuit.  Next the aorto-pulmonary window was developed.  A 4-0 prolene pursestring was made in the ascending aorta.  A DLP root vent / antegrade cardioplegia catheter was placed here and connected to the cardioplegia circuit.    Cardiopulmonary bypass was initiated with good flows.  The Impella device was turned off and removed from the LV cavity.    Right sided pulmonary vein isolation MAZE procedure was completed while on cardiopulmonary bypass with AtriCure clamp.  Next flow on the circuit was decreased.  A large aortic cross clamp was applied.  Flow on the circuit was resumed.  1500cc of retrograde cold blood cardioplegia was delivered with good diastolic arrest.     Next the left sided pulmonary vein isolation MAZE procedure was performed with the AtriCure clamp.  A 45mm AtriClip was applied to the left atrial appendage.    We focused our attention on the distal bypass anastomoses next.    The following bypasses were constructed using reversed saphenous vein in end-to-side fashion with running 7-0 prolene:  1) Reversed saphenous vein graft to right posterior descending artery.  2) Reversed saphenous vein graft to obtuse marginal artery.    The anastomoses were tested by flushing with cold blood cardioplegia to ensure hemostasis.  An additional dose of retrograde cold blood cardioplegia was delivered between completion of each anastomosis.    Next a rent was made in the left pericardium.  The left internal mammary artery was then anastomosed to the left anterior descending artery in end-to-side fashion using running 8-0 prolene.  The mammary pedicle clamp was removed.  The  anastomosis was hemostatic.  The clamp was replaced.  The pedicle was tacked to the epicardium using 6-0 prolene.    We focused our attention on the proximal vein graft anastomoses next.  The vein grafts were sized to fit to the ascending aorta.  Two additional aortotomies were made with 11-blade knife.  The aortotomies were extended with 4mm punch.  The vein grafts were anastomosed to the ascending aorta in end-to-side fashion using running 6-0 prolene.    A retrograde hot shot of warm blood cardiplegia was delivered.  The clamp on the mammary pedicle was removed.  A bulldog clamp was placed on the proximal vein grafts.  After completion of the hot shot, flow on the bypass circuit was decreased and the aortic cross clamp was removed.  Flow on the bypass circuit was resumed.  The proximal vein grafts were de-aired using an insulin needle.  The bulldog clamp on the vein grafts was removed.  The retrograde cardioplegia catheter was removed.  The pursestring was tied.  The site was over-sewn with a 4-0 prolene.  The distal anastomoses were inspected and hemostatic.  Ventricular pacing wires were placed and delivered through the anterior abdominal wall and secured to the skin with 0-ethibond stitches.  The patient had normal sinus rhythm and was AV paced at 90 bpm.      The left pleural space was suctioned dry.  The lungs were ventilated bilaterally.  MACARENA showed no intra-cardiac air.  The DLP root vent / antegrade cardioplegia catheter was removed.  Intravenous calcium was administered.  Flow on the bypass circuit was weaned to off.  The patient was stable on moderate dose epinephrine and levophed.  The venous cannula was removed.  Pursestring at this site was tied.  This was over-sewed with 0-ethibond.  The remaining pump blood volume was returned via the arterial cannula.  A test dose of protamine was administered.  The arterial cannula was removed.  The pursestrings at this site were tied.  This was oversewn with  pledgeted 4-0 prolene.    A longitudinal skin incision was made in the right groin.  The femoral artery was controlled with vessel loop.  A 5-0 prolene was used to make a pursestring around the Impella catheter.  The Impella 5.0 temporary LVAD was removed.   The site was hemostatic.  The groin was closed in layers with vicryl stitches.  The skin was closed with 3-0 nylons.    The sternal retractor was removed.  Two 32F straight argyle mediastinal chest tubes were placed and one 28F left pleural chest tube was placed.  These were delivered through the anterior abdominal wall and secured to the skin with 0-ethibond stitches.      The wound was made hemostatic with cautery.  The subcutaneous tissue, sternal edges, thymic fat, pericardial edges and all anastomotic and cannulation sites were inspected and hemostatic.    The wound was irrigated with warm antibiotic saline.  The sternum was reapproximated with sternal wires.    The wound was made hemostatic then closed in layers using vicryl stitches.  The subcutaneous tissue was closed with 2-0 vicryl stitches.  The skin was closed with 3-0 vicryl.  Dermabond skin glue was applied.  A sterile dressing was applied.      The patient was then transferred from the operating bed to an ICU bed and transferred to the ICU in critical, but stable, condition.    All needle, sponge and instrument counts were correct at the end of the case.    Eric Marks  Cardiothoracic Surgery  Pager: [phone]

## 2018-03-24 NOTE — ANESTHESIA CARE TRANSFER NOTE
Patient: Izaiah Alvarez    Procedure(s):  Coronary Artery Bypass Graft x 3, MAZE Procedure and Left Atrial Appendage Ligation, Median Sternotomy, on Pump Oxygenation, Left Leg Endoscopic Saphaneous Vein Pearson, and Right Femoral Artery Cut Down Removal of Impella Device - Wound Class: I-Clean   - Wound Class: I-Clean    Diagnosis: Coronary Artery Disease   Diagnosis Additional Information: No value filed.    Anesthesia Type:   No value filed.     Note:  Airway :ETT and Ventilator  Patient transferred to:ICU  Comments: Transported with propofol for sedation, also on NE and epi. VSS throughout transport. Report given to CVICU team.ICU Handoff: Call for PAUSE to initiate/utilize ICU HANDOFF, Identified Patient, Identified Responsible Provider, Reviewed the Pertinent Medical History, Discussed Surgical Course, Reviewed Intra-OP Anesthesia Management and Issues during Anesthesia, Set Expectations for Post Procedure Period and Allowed Opportunity for Questions and Acknowledgement of Understanding      Vitals: (Last set prior to Anesthesia Care Transfer)    CRNA VITALS  3/23/2018 2249 - 3/23/2018 2342      3/23/2018             ART Mean: 65    Ht Rate: 85    SpO2: 98 %                Electronically Signed By: Cary Rincon MD  March 23, 2018  11:42 PM

## 2018-03-24 NOTE — PLAN OF CARE
Problem: Patient Care Overview  Goal: Plan of Care/Patient Progress Review  Discharge Planner OT   Patient plan for discharge: Not stated  Current status: OT/CR eval completed and tx initiated. Pt educated on surgical precautions, impact on ADLs/activity, and compensatory strategies for tasks. Pt up to EOB with mod A of 2 and VCs; pt able to stand with mod A of 2 and VCs - episode of R knee buckling in standing due to sensory loss (team aware; ok'd session) - pt required mod A to maintain standing during episode and was dependently lifted to recliner via ceiling lift for safety. Pt with normal CV response to activity - HR 73-78, O2 sats mid 90s on 4L NC, pre BP 87/60 (MAP 68), post BP 95/58 (70).  Barriers to return to prior living situation: weakness, pain, surgical precautions, sensory loss, impaired balance  Recommendations for discharge: TCU vs ARU pending tolerance for therapies  Rationale for recommendations: to increase pt's (I) with ADL/IADLs       Entered by: Libertad Mcgregor 03/24/2018 3:38 PM

## 2018-03-24 NOTE — PROGRESS NOTES
03/24/18 0955   Quick Adds   Type of Visit Initial Occupational Therapy Evaluation   Living Environment   Lives With spouse   Living Arrangements apartment  (3rd floor)   Home Accessibility stairs to enter home   Number of Stairs to Enter Home (3 outside stairs and then 3 flights up to his unit)   Number of Stairs Within Home 0   Transportation Available car;family or friend will provide   Living Environment Comment Pt's spouse is retired however cannot provide any physical assist due to her own medical issues (uses a walker at baseline); pt does have some nephews and nieces that can provide some support   Self-Care   Dominant Hand right   Usual Activity Tolerance moderate   Current Activity Tolerance fair   Regular Exercise yes   Activity/Exercise Type walking   Equipment Currently Used at Home cane, straight   Activity/Exercise/Self-Care Comment Pt had started walking for exercise daily ~6 mos ago (2 blocks x 2) however was unable to complete over past several months due to sxs; also reported increasingly difficult to manage stairs at home and grocery shopping   Functional Level Prior   Ambulation 1-->assistive equipment  (uses cane for longer distances)   Transferring 0-->independent   Toileting 0-->independent   Bathing 0-->independent   Dressing 0-->independent   Eating 0-->independent   Communication 0-->understands/communicates without difficulty   Swallowing 0-->swallows foods/liquids without difficulty   Cognition 0 - no cognition issues reported   Fall history within last six months no   Which of the above functional risks had a recent onset or change? ambulation;transferring;toileting;bathing;dressing   Prior Functional Level Comment Pt was (I) with all ADL/IADLs including driving and full time work at a desk job   General Information   Onset of Illness/Injury or Date of Surgery - Date 03/21/18   Referring Physician Juan M Irvin MD   Patient/Family Goals Statement to get OOB   Additional  Occupational Profile Info/Pertinent History of Current Problem Pt is a 72M with PMH of paroxysmal atrial fibrillation on coumadin, CHF, obesity, DM type II, HTN who who presented to [facility] with STEMI with coronary angiogram showing triple vessel disease (95% LM, 75% LAD, 90% OM,  RCA). ECHO showed EF around 20-25% and Impella device was placed via R femoral artery at the  OSH. The patient underwent a CABG for with R CFA cutdown yesterday for removal of Impella percutaneous ventricular assist device on 3/23. Extubated on POD#1   Precautions/Limitations sternal precautions   Weight-Bearing Status - LUE (10# lifting/pushing/pulling restriction)   Weight-Bearing Status - RUE (10# lifting/pushing/pulling restriction)   Weight-Bearing Status - LLE full weight-bearing   Weight-Bearing Status - RLE full weight-bearing   Heart Disease Risk Factors Diabetes;High blood pressure;Overweight;Medical history;Age   Cognitive Status Examination   Orientation orientation to person, place and time  (near date)   Level of Consciousness alert   Able to Follow Commands WNL/WFL   Personal Safety (Cognitive) WNL/WFL   Memory intact   Visual Perception   Visual Perception Wears glasses  (all the time; no new concerns)   Sensory Examination   Sensory Quick Adds Other (describe)   Sensory Comments Pt having new numbness in RLE since surgery - MDs aware and believe it is likely blood clot from impella; pt unable to sense light touch - does still have motor fx however did experience knee buckling in standing   Pain Assessment   Patient Currently in Pain (well managed)   Range of Motion (ROM)   ROM Comment BUEs WFL   Strength   Strength Comments N/T due to precautions   Mobility   Bed Mobility Bed mobility skill: Supine to sit;Bed mobility skill: Rolling/Turning;Bed mobility skill: Scooting/Bridging   Bed Mobility Skill: Rolling/Turning   Level of [place] - Bed Mobility Skill Rolling Turning maximum assist (25% patients effort)   Physical  Assist/Nonphysical Assist verbal cues;2 persons   Bed Mobility Skill: Scooting/Bridging   Level of [place]: Scooting/Bridging maximum assist (25% patients effort)   Physical Assist/Nonphysical Assist: Scooting/Bridging verbal cues;1 person + 1 person to manage equipment   Bed Mobility Skill: Supine to Sit   Level of [place]: Supine/Sit moderate assist (50% patients effort)   Physical Assist/Nonphysical Assist: Supine/Sit verbal cues;2 persons   Transfer Skill: Bed to Chair/Chair to Bed   Level of [place]: Bed to Chair dependent (less than 25% patients effort)   Physical Assist/Nonphysical Assist: Bed to Chair 2 persons   Assistive Device - Transfer Skill Bed to Chair Chair to Bed Rehab Eval (ceiling lift)   Transfer Skill: Sit to Stand   Level of [place]: Sit/Stand moderate assist (50% patients effort)   Physical Assist/Nonphysical Assist: Sit/Stand verbal cues;2 persons   Lower Body Dressing   Level of [place]: Dress Lower Body dependent (less than 25% patients effort)   Physical Assist/Nonphysical Assist: Dress Lower Body 1 person assist   Activities of Daily Living Analysis   Impairments Contributing to Impaired Activities of Daily Living balance impaired;pain;post surgical precautions;sensation decreased;sensory feedback impaired;strength decreased   General Therapy Interventions   Planned Therapy Interventions ADL retraining;IADL retraining;bed mobility training;ROM;strengthening;stretching;transfer training;home program guidelines;progressive activity/exercise;risk factor education   Clinical Impression   Criteria for Skilled Therapeutic Interventions Met yes, treatment indicated   OT Diagnosis decreased ADL/IADL (I)   Influenced by the following impairments pain, surgical precautions, weakness, impaired balance   Assessment of Occupational Performance 5 or more Performance Deficits   Identified Performance Deficits functional transfers, toileting, bathing, dressing, household chores,  "work, driving   Clinical Decision Making (Complexity) Moderate complexity   Therapy Frequency daily   Predicted Duration of Therapy Intervention (days/wks) 14-21 days   Anticipated Equipment Needs at Discharge (TBD pending progress)   Anticipated Discharge Disposition Home with Home Therapy;Home with Outpatient Therapy;Transitional Care Facility;Acute Rehabilitation Facility   Risks and Benefits of Treatment have been explained. Yes   Patient, Family & other staff in agreement with plan of care Yes   [facility] TM \"6 Clicks\"   2016, Trustees of [facility], under license to [employer].  All rights reserved.   6 Clicks Short Forms Daily Activity Inpatient Short Form   [facility]  \"6 Clicks\" Daily Activity Inpatient Short Form   1. Putting on and taking off regular lower body clothing? 1 - Total   2. Bathing (including washing, rinsing, drying)? 2 - A Lot   3. Toileting, which includes using toilet, bedpan or urinal? 1 - Total   4. Putting on and taking off regular upper body clothing? 2 - A Lot   5. Taking care of personal grooming such as brushing teeth? 2 - A Lot   6. Eating meals? 3 - A Little   Daily Activity Raw Score (Score out of 24.Lower scores equate to lower levels of function) 11   Total Evaluation Time   Total Evaluation Time (Minutes) 12     "

## 2018-03-25 ENCOUNTER — APPOINTMENT (OUTPATIENT)
Dept: PHYSICAL THERAPY | Facility: CLINIC | Age: 73
DRG: 228 | End: 2018-03-25
Attending: THORACIC SURGERY (CARDIOTHORACIC VASCULAR SURGERY)
Payer: COMMERCIAL

## 2018-03-25 LAB
ABO + RH BLD: NORMAL
ABO + RH BLD: NORMAL
ANION GAP SERPL CALCULATED.3IONS-SCNC: 10 MMOL/L (ref 3–14)
APTT PPP: 37 SEC (ref 22–37)
BASE EXCESS BLDA CALC-SCNC: 0.9 MMOL/L
BASE EXCESS BLDA CALC-SCNC: 2.5 MMOL/L
BASE EXCESS BLDA CALC-SCNC: 3 MMOL/L
BASE EXCESS BLDA CALC-SCNC: 3.4 MMOL/L
BASOPHILS # BLD AUTO: 0 10E9/L (ref 0–0.2)
BASOPHILS NFR BLD AUTO: 0.1 %
BLD GP AB SCN SERPL QL: NORMAL
BLOOD BANK CMNT PATIENT-IMP: NORMAL
BUN SERPL-MCNC: 23 MG/DL (ref 7–30)
CALCIUM SERPL-MCNC: 8 MG/DL (ref 8.5–10.1)
CHLORIDE SERPL-SCNC: 110 MMOL/L (ref 94–109)
CK SERPL-CCNC: 2743 U/L (ref 30–300)
CO2 SERPL-SCNC: 23 MMOL/L (ref 20–32)
CREAT SERPL-MCNC: 1.06 MG/DL (ref 0.66–1.25)
DIFFERENTIAL METHOD BLD: ABNORMAL
EOSINOPHIL # BLD AUTO: 0 10E9/L (ref 0–0.7)
EOSINOPHIL NFR BLD AUTO: 0.2 %
ERYTHROCYTE [DISTWIDTH] IN BLOOD BY AUTOMATED COUNT: 16.7 % (ref 10–15)
ERYTHROCYTE [DISTWIDTH] IN BLOOD BY AUTOMATED COUNT: 16.8 % (ref 10–15)
GFR SERPL CREATININE-BSD FRML MDRD: 69 ML/MIN/1.7M2
GLUCOSE BLDC GLUCOMTR-MCNC: 100 MG/DL (ref 70–99)
GLUCOSE BLDC GLUCOMTR-MCNC: 121 MG/DL (ref 70–99)
GLUCOSE BLDC GLUCOMTR-MCNC: 123 MG/DL (ref 70–99)
GLUCOSE BLDC GLUCOMTR-MCNC: 134 MG/DL (ref 70–99)
GLUCOSE BLDC GLUCOMTR-MCNC: 135 MG/DL (ref 70–99)
GLUCOSE BLDC GLUCOMTR-MCNC: 138 MG/DL (ref 70–99)
GLUCOSE BLDC GLUCOMTR-MCNC: 140 MG/DL (ref 70–99)
GLUCOSE BLDC GLUCOMTR-MCNC: 140 MG/DL (ref 70–99)
GLUCOSE BLDC GLUCOMTR-MCNC: 142 MG/DL (ref 70–99)
GLUCOSE BLDC GLUCOMTR-MCNC: 144 MG/DL (ref 70–99)
GLUCOSE BLDC GLUCOMTR-MCNC: 146 MG/DL (ref 70–99)
GLUCOSE BLDC GLUCOMTR-MCNC: 147 MG/DL (ref 70–99)
GLUCOSE BLDC GLUCOMTR-MCNC: 148 MG/DL (ref 70–99)
GLUCOSE SERPL-MCNC: 124 MG/DL (ref 70–99)
HCO3 BLD-SCNC: 25 MMOL/L (ref 21–28)
HCO3 BLD-SCNC: 27 MMOL/L (ref 21–28)
HCT VFR BLD AUTO: 26.3 % (ref 40–53)
HCT VFR BLD AUTO: 28.3 % (ref 40–53)
HGB BLD-MCNC: 8.4 G/DL (ref 13.3–17.7)
HGB BLD-MCNC: 9.1 G/DL (ref 13.3–17.7)
IMM GRANULOCYTES # BLD: 0.1 10E9/L (ref 0–0.4)
IMM GRANULOCYTES NFR BLD: 0.4 %
INR PPP: 1.47 (ref 0.86–1.14)
LACTATE BLD-SCNC: 0.8 MMOL/L (ref 0.7–2)
LACTATE BLD-SCNC: 1.2 MMOL/L (ref 0.7–2)
LMWH PPP CHRO-ACNC: 0.27 IU/ML
LMWH PPP CHRO-ACNC: <0.1 IU/ML
LMWH PPP CHRO-ACNC: <0.1 IU/ML
LYMPHOCYTES # BLD AUTO: 2.6 10E9/L (ref 0.8–5.3)
LYMPHOCYTES NFR BLD AUTO: 21.1 %
MAGNESIUM SERPL-MCNC: 2.2 MG/DL (ref 1.6–2.3)
MCH RBC QN AUTO: 29.7 PG (ref 26.5–33)
MCH RBC QN AUTO: 29.8 PG (ref 26.5–33)
MCHC RBC AUTO-ENTMCNC: 31.9 G/DL (ref 31.5–36.5)
MCHC RBC AUTO-ENTMCNC: 32.2 G/DL (ref 31.5–36.5)
MCV RBC AUTO: 93 FL (ref 78–100)
MCV RBC AUTO: 93 FL (ref 78–100)
MONOCYTES # BLD AUTO: 1.3 10E9/L (ref 0–1.3)
MONOCYTES NFR BLD AUTO: 10.7 %
NEUTROPHILS # BLD AUTO: 8.3 10E9/L (ref 1.6–8.3)
NEUTROPHILS NFR BLD AUTO: 67.5 %
NRBC # BLD AUTO: 0 10*3/UL
NRBC BLD AUTO-RTO: 0 /100
O2/TOTAL GAS SETTING VFR VENT: 40 %
O2/TOTAL GAS SETTING VFR VENT: 50 %
O2/TOTAL GAS SETTING VFR VENT: 60 %
O2/TOTAL GAS SETTING VFR VENT: NORMAL %
OXYHGB MFR BLD: 93 % (ref 92–100)
OXYHGB MFR BLD: 95 % (ref 92–100)
OXYHGB MFR BLD: 96 % (ref 92–100)
OXYHGB MFR BLD: 96 % (ref 92–100)
PCO2 BLD: 34 MM HG (ref 35–45)
PCO2 BLD: 35 MM HG (ref 35–45)
PCO2 BLD: 35 MM HG (ref 35–45)
PCO2 BLD: 40 MM HG (ref 35–45)
PH BLD: 7.44 PH (ref 7.35–7.45)
PH BLD: 7.46 PH (ref 7.35–7.45)
PH BLD: 7.48 PH (ref 7.35–7.45)
PH BLD: 7.5 PH (ref 7.35–7.45)
PLATELET # BLD AUTO: 107 10E9/L (ref 150–450)
PLATELET # BLD AUTO: 112 10E9/L (ref 150–450)
PO2 BLD: 116 MM HG (ref 80–105)
PO2 BLD: 124 MM HG (ref 80–105)
PO2 BLD: 68 MM HG (ref 80–105)
PO2 BLD: 88 MM HG (ref 80–105)
POTASSIUM SERPL-SCNC: 3.9 MMOL/L (ref 3.4–5.3)
POTASSIUM SERPL-SCNC: 4.1 MMOL/L (ref 3.4–5.3)
RBC # BLD AUTO: 2.82 10E12/L (ref 4.4–5.9)
RBC # BLD AUTO: 3.06 10E12/L (ref 4.4–5.9)
SODIUM SERPL-SCNC: 144 MMOL/L (ref 133–144)
SPECIMEN EXP DATE BLD: NORMAL
WBC # BLD AUTO: 12.4 10E9/L (ref 4–11)
WBC # BLD AUTO: 12.5 10E9/L (ref 4–11)

## 2018-03-25 PROCEDURE — 80048 BASIC METABOLIC PNL TOTAL CA: CPT | Performed by: SURGERY

## 2018-03-25 PROCEDURE — 85027 COMPLETE CBC AUTOMATED: CPT | Performed by: ANESTHESIOLOGY

## 2018-03-25 PROCEDURE — 00000146 ZZHCL STATISTIC GLUCOSE BY METER IP

## 2018-03-25 PROCEDURE — 40000014 ZZH STATISTIC ARTERIAL MONITORING DAILY

## 2018-03-25 PROCEDURE — 83735 ASSAY OF MAGNESIUM: CPT | Performed by: SURGERY

## 2018-03-25 PROCEDURE — 82805 BLOOD GASES W/O2 SATURATION: CPT | Performed by: STUDENT IN AN ORGANIZED HEALTH CARE EDUCATION/TRAINING PROGRAM

## 2018-03-25 PROCEDURE — 85520 HEPARIN ASSAY: CPT | Performed by: ANESTHESIOLOGY

## 2018-03-25 PROCEDURE — P9059 PLASMA, FRZ BETWEEN 8-24HOUR: HCPCS | Performed by: THORACIC SURGERY (CARDIOTHORACIC VASCULAR SURGERY)

## 2018-03-25 PROCEDURE — 85610 PROTHROMBIN TIME: CPT | Performed by: SURGERY

## 2018-03-25 PROCEDURE — 94003 VENT MGMT INPAT SUBQ DAY: CPT

## 2018-03-25 PROCEDURE — 40000193 ZZH STATISTIC PT WARD VISIT

## 2018-03-25 PROCEDURE — 40000275 ZZH STATISTIC RCP TIME EA 10 MIN

## 2018-03-25 PROCEDURE — 83605 ASSAY OF LACTIC ACID: CPT | Performed by: STUDENT IN AN ORGANIZED HEALTH CARE EDUCATION/TRAINING PROGRAM

## 2018-03-25 PROCEDURE — 40000196 ZZH STATISTIC RAPCV CVP MONITORING

## 2018-03-25 PROCEDURE — 82805 BLOOD GASES W/O2 SATURATION: CPT | Performed by: THORACIC SURGERY (CARDIOTHORACIC VASCULAR SURGERY)

## 2018-03-25 PROCEDURE — 25000132 ZZH RX MED GY IP 250 OP 250 PS 637: Performed by: THORACIC SURGERY (CARDIOTHORACIC VASCULAR SURGERY)

## 2018-03-25 PROCEDURE — 25000128 H RX IP 250 OP 636: Performed by: ANESTHESIOLOGY

## 2018-03-25 PROCEDURE — 85520 HEPARIN ASSAY: CPT | Performed by: SURGERY

## 2018-03-25 PROCEDURE — 86901 BLOOD TYPING SEROLOGIC RH(D): CPT | Performed by: THORACIC SURGERY (CARDIOTHORACIC VASCULAR SURGERY)

## 2018-03-25 PROCEDURE — 85025 COMPLETE CBC W/AUTO DIFF WBC: CPT | Performed by: SURGERY

## 2018-03-25 PROCEDURE — 25000132 ZZH RX MED GY IP 250 OP 250 PS 637: Performed by: INTERNAL MEDICINE

## 2018-03-25 PROCEDURE — 25000132 ZZH RX MED GY IP 250 OP 250 PS 637: Performed by: SURGERY

## 2018-03-25 PROCEDURE — 25000128 H RX IP 250 OP 636: Performed by: SURGERY

## 2018-03-25 PROCEDURE — 97530 THERAPEUTIC ACTIVITIES: CPT | Mod: GP

## 2018-03-25 PROCEDURE — 83605 ASSAY OF LACTIC ACID: CPT | Performed by: THORACIC SURGERY (CARDIOTHORACIC VASCULAR SURGERY)

## 2018-03-25 PROCEDURE — 20000004 ZZH R&B ICU UMMC

## 2018-03-25 PROCEDURE — 97110 THERAPEUTIC EXERCISES: CPT | Mod: GP

## 2018-03-25 PROCEDURE — 84132 ASSAY OF SERUM POTASSIUM: CPT | Performed by: THORACIC SURGERY (CARDIOTHORACIC VASCULAR SURGERY)

## 2018-03-25 PROCEDURE — 86850 RBC ANTIBODY SCREEN: CPT | Performed by: THORACIC SURGERY (CARDIOTHORACIC VASCULAR SURGERY)

## 2018-03-25 PROCEDURE — 99291 CRITICAL CARE FIRST HOUR: CPT | Mod: GC | Performed by: ANESTHESIOLOGY

## 2018-03-25 PROCEDURE — 25000125 ZZHC RX 250: Performed by: THORACIC SURGERY (CARDIOTHORACIC VASCULAR SURGERY)

## 2018-03-25 PROCEDURE — 86900 BLOOD TYPING SEROLOGIC ABO: CPT | Performed by: THORACIC SURGERY (CARDIOTHORACIC VASCULAR SURGERY)

## 2018-03-25 PROCEDURE — 85730 THROMBOPLASTIN TIME PARTIAL: CPT | Performed by: SURGERY

## 2018-03-25 PROCEDURE — 97162 PT EVAL MOD COMPLEX 30 MIN: CPT | Mod: GP

## 2018-03-25 PROCEDURE — 82550 ASSAY OF CK (CPK): CPT | Performed by: SURGERY

## 2018-03-25 PROCEDURE — 25000128 H RX IP 250 OP 636: Performed by: THORACIC SURGERY (CARDIOTHORACIC VASCULAR SURGERY)

## 2018-03-25 RX ORDER — WARFARIN SODIUM 6 MG/1
6 TABLET ORAL
Status: COMPLETED | OUTPATIENT
Start: 2018-03-25 | End: 2018-03-25

## 2018-03-25 RX ORDER — HEPARIN SODIUM 10000 [USP'U]/100ML
0-3500 INJECTION, SOLUTION INTRAVENOUS CONTINUOUS
Status: DISCONTINUED | OUTPATIENT
Start: 2018-03-25 | End: 2018-03-28

## 2018-03-25 RX ADMIN — ASPIRIN 81 MG: 81 TABLET, COATED ORAL at 09:32

## 2018-03-25 RX ADMIN — WARFARIN SODIUM 6 MG: 6 TABLET ORAL at 17:42

## 2018-03-25 RX ADMIN — PROPOFOL 35.61 MCG/KG/MIN: 10 INJECTION, EMULSION INTRAVENOUS at 04:13

## 2018-03-25 RX ADMIN — LIDOCAINE: 50 OINTMENT TOPICAL at 09:44

## 2018-03-25 RX ADMIN — ACETAMINOPHEN 975 MG: 325 TABLET, FILM COATED ORAL at 09:33

## 2018-03-25 RX ADMIN — CEFAZOLIN SODIUM 2 G: 2 INJECTION, SOLUTION INTRAVENOUS at 09:25

## 2018-03-25 RX ADMIN — POTASSIUM CHLORIDE 20 MEQ: 400 INJECTION, SOLUTION INTRAVENOUS at 04:53

## 2018-03-25 RX ADMIN — ACETAMINOPHEN 975 MG: 325 TABLET, FILM COATED ORAL at 16:54

## 2018-03-25 RX ADMIN — CEFAZOLIN SODIUM 2 G: 2 INJECTION, SOLUTION INTRAVENOUS at 00:03

## 2018-03-25 RX ADMIN — HEPARIN SODIUM 1500 UNITS/HR: 10000 INJECTION, SOLUTION INTRAVENOUS at 18:54

## 2018-03-25 RX ADMIN — ACETAMINOPHEN 975 MG: 325 TABLET, FILM COATED ORAL at 23:19

## 2018-03-25 RX ADMIN — PANTOPRAZOLE SODIUM 40 MG: 40 INJECTION, POWDER, FOR SOLUTION INTRAVENOUS at 09:32

## 2018-03-25 RX ADMIN — AMIODARONE HYDROCHLORIDE 0.5 MG/MIN: 50 INJECTION, SOLUTION INTRAVENOUS at 09:34

## 2018-03-25 RX ADMIN — LIDOCAINE: 50 OINTMENT TOPICAL at 01:44

## 2018-03-25 RX ADMIN — ATORVASTATIN CALCIUM 20 MG: 20 TABLET, FILM COATED ORAL at 19:17

## 2018-03-25 RX ADMIN — HYDROMORPHONE HYDROCHLORIDE 0.5 MG: 1 INJECTION, SOLUTION INTRAMUSCULAR; INTRAVENOUS; SUBCUTANEOUS at 04:13

## 2018-03-25 RX ADMIN — DOCUSATE SODIUM 100 MG: 100 CAPSULE, LIQUID FILLED ORAL at 19:17

## 2018-03-25 RX ADMIN — HUMAN INSULIN 6 UNITS/HR: 100 INJECTION, SOLUTION SUBCUTANEOUS at 03:19

## 2018-03-25 RX ADMIN — EPINEPHRINE 0.02 MCG/KG/MIN: 1 INJECTION PARENTERAL at 05:45

## 2018-03-25 RX ADMIN — OXYCODONE HYDROCHLORIDE 5 MG: 5 TABLET ORAL at 09:52

## 2018-03-25 RX ADMIN — OXYCODONE HYDROCHLORIDE 5 MG: 5 TABLET ORAL at 20:03

## 2018-03-25 RX ADMIN — HUMAN INSULIN 5 UNITS/HR: 100 INJECTION, SOLUTION SUBCUTANEOUS at 09:35

## 2018-03-25 RX ADMIN — PROPOFOL 35.61 MCG/KG/MIN: 10 INJECTION, EMULSION INTRAVENOUS at 00:03

## 2018-03-25 RX ADMIN — DOCUSATE SODIUM 100 MG: 100 CAPSULE, LIQUID FILLED ORAL at 09:32

## 2018-03-25 RX ADMIN — FINASTERIDE 5 MG: 5 TABLET, FILM COATED ORAL at 09:32

## 2018-03-25 RX ADMIN — HUMAN INSULIN 4.5 UNITS/HR: 100 INJECTION, SOLUTION SUBCUTANEOUS at 14:21

## 2018-03-25 ASSESSMENT — PAIN DESCRIPTION - DESCRIPTORS
DESCRIPTORS: ACHING
DESCRIPTORS: NUMBNESS

## 2018-03-25 NOTE — PLAN OF CARE
Problem: Patient Care Overview  Goal: Plan of Care/Patient Progress Review  4E - Evaluation completed and treatment initiated.   Discharge Planner PT   Patient plan for discharge: rehab, knows he has too many stairs to enter home at this time  Current status: dependent with use of sling for transfers, rolling with mod A; limited by sternal precautions and RLE fasciotomy(no precautions per RN, per verbal from vascular), Able to move RLE through ROM with exception of R ankle DF/PF with min A.   Barriers to return to prior living situation: stairs, impaired mobility, strength, pain, ROM, sensation, sternal precautions  Recommendations for discharge: TCU vs ARU pending participation with therapy  Rationale for recommendations: impaired functional mobility below baseline, pt with significant deficits and limited by precautions with RLE fasciotomy pending progress with therapy.         Entered by: Neela Garber 03/25/2018 4:58 PM

## 2018-03-25 NOTE — PLAN OF CARE
Pt POD 2 CAB POD 1 iliac thrombectomy. Afib . Amio gtt 0.5 continued. Exutbated @ 0730 to 4L NC. Fasciotomy drsg changed by Dr Tang. Pt c/o RLE numbness, denies pain in extremity. Pt OOB to chair with lift. Oxy and tylenol for pain. Low intensity heparin gtt started. Plan to close fasciotomy sites in next 48 hours.   Trinh Lan  3/25/2018

## 2018-03-25 NOTE — PROGRESS NOTES
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; nephew and brother are looking for a new place for pt/spouse to live, spouse is retired, able to assist with light chores but no physical assist due to her own medical issues, nephew/nieces are able to provide some support   Self-Care   Usual Activity Tolerance moderate   Current Activity Tolerance poor   Regular Exercise no   Equipment Currently Used at Home cane, straight   Activity/Exercise/Self-Care Comment Previously pt had started walking for exercise about 6 months ago, but was not able to over the past few months due to decompensation, increasing difficult to manage stairs and grocery shopping   Functional Level Prior   Ambulation 1-->assistive equipment  (cane for long distances only, otherwise IND)   Transferring 0-->independent   Toileting 0-->independent   Bathing 0-->independent   Dressing 0-->independent   Eating 0-->independent   Communication 0-->understands/communicates without difficulty   Swallowing 0-->swallows foods/liquids without difficulty   Cognition 0 - no cognition issues reported   Fall history within last six months no   Which of the above functional risks had a recent onset or change? ambulation;transferring;toileting;bathing;dressing   Prior Functional Level Comment Pt was IND with ADLs and IADLs   General Information   Onset of Illness/Injury or Date of Surgery - Date 03/21/18   Referring Physician Juan M Irvin MD   Patient/Family Goals Statement get well & stronger   Pertinent History of Current  Problem (include personal factors and/or comorbidities that impact the POC) 72M with PMH of paroxysmal atrial fibrillation on coumadin, CHF, obesity, DM type II, HTN who who presented to [facility] with STEMI with coronary angiogram showing triple vessel disease (95% LM, 75% LAD, 90% OM,  RCA). ECHO showed EF around 20-25% and Impella device was placed via R femoral artery at the  OSH. The patient underwent a CABG for with R CFA cutdown yesterday for removal of Impella percutaneous ventricular assist device on 3/23. Extubated on POD#1. Underwent take back after pain of RLE and found to have thrombus that was removed. Left intubated after procedure. Extubated 3/25 and now will need fasciotomy sites closed by vascular surgery.   Precautions/Limitations fall precautions;other (see comments);sternal precautions  (RLE fasciotomy)   Weight-Bearing Status - RLE weight-bearing as tolerated  (per Vascular no restrictions)   Heart Disease Risk Factors Overweight;Diabetes;High blood pressure;Dislipidemia;Age;Gender;Medical history   Cognitive Status Examination   Orientation orientation to person, place and time  (oriented to day of week but not specific date (25th))   Level of Consciousness alert   Follows Commands and Answers Questions 100% of the time   Personal Safety and Judgment intact   Pain Assessment   Patient Currently in Pain No   Integumentary/Edema   Integumentary/Edema Comments ACE bandage covering RLE due to open fasciotomies   Posture    Posture Forward head position;Protracted shoulders   Range of Motion (ROM)   ROM Comment RLE and LLE limited due to pain, BUE appear grossly WFL   Strength   Strength Comments Demonstrates 2 to 3/5 in RLE and 3/5 in LLE with AROM, BUE appear grossly WFL   Bed Mobility   Bed Mobility Comments B rolling with mod A   Transfer Skills   Transfer Comments Dependent with use of sling   Gait   Gait Comments Not appropriate to assess at this time   Sensory Examination   Sensory Perception  "Comments Impaired sensation from R knee to foot; otherwise reports WFL; unable to assess fully due to ace bandage but pt reporting \"numb\" feeling   General Therapy Interventions   Planned Therapy Interventions balance training;bed mobility training;gait training;motor coordination training;neuromuscular re-education;ROM;strengthening;stretching;transfer training;home program guidelines;progressive activity/exercise;risk factor education   Clinical Impression   Criteria for Skilled Therapeutic Intervention yes, treatment indicated   PT Diagnosis Impaired functional mobility   Influenced by the following impairments RLE pain, RLE fasciotomies, LLE incisions, RLE sensation impairments, pain, sternal precautions, decreased strength, impaired activity tolerance   Functional limitations due to impairments gait, stairs, bed mobility, functional endurance, transfers   Clinical Presentation Evolving/Changing   Clinical Presentation Rationale RLE fasciotomies remain open and will need to return to OR to close, pain, sensation impairments,    Clinical Decision Making (Complexity) Moderate complexity   Therapy Frequency` other (see comments)  (6x/wk)   Predicted Duration of Therapy Intervention (days/wks) 3 weeks   Anticipated Discharge Disposition Transitional Care Facility;Acute Rehabilitation Facility   Risk & Benefits of therapy have been explained Yes   Patient, Family & other staff in agreement with plan of care Yes   [facility] TM \"6 Clicks\"   2016, Trustees of [facility], under license to [employer].  All rights reserved.   6 Clicks Short Forms Basic Mobility Inpatient Short Form   [facility]  \"6 Clicks\" V.2 Basic Mobility Inpatient Short Form   1. Turning from your back to your side while in a flat bed without using bedrails? 2 - A Lot   2. Moving from lying on your back to sitting on the side of a flat bed without using bedrails? 2 - A Lot   3. Moving to and from a bed to a chair " (including a wheelchair)? 1 - Total   4. Standing up from a chair using your arms (e.g., wheelchair, or bedside chair)? 1 - Total   5. To walk in hospital room? 1 - Total   6. Climbing 3-5 steps with a railing? 1 - Total   Basic Mobility Raw Score (Score out of 24.Lower scores equate to lower levels of function) 8   Total Evaluation Time   Total Evaluation Time (Minutes) 9

## 2018-03-25 NOTE — PROGRESS NOTES
CVTS PROGRESS NOTE  March 25, 2018      CO-MORBIDITIES:   No diagnosis found.    ASSESSMENT: Izaiah Alvarez is a 72M with PMH of paroxysmal atrial fibrillation on coumadin, CHF, obesity, DM type II, HTN who who presented to [facility] with STEMI with coronary angiogram showing triple vessel disease (95% LM, 75% LAD, 90% OM,  RCA). ECHO showed EF around 20-25% and Impella device was placed via R femoral artery at the  OSH. The patient underwent a CABG for with R CFA cutdown yesterday for removal of Impella percutaneous ventricular assist device on 3/23. Extubated on POD#1. Underwent take back after pain of RLE and found to have thrombus that was removed. Left intubated after procedure. Extubated 3/25 and now will need fasciotomy sites closed by vascular surgery.     Today's update:  Extubate.       PLAN:  NEURO:  -Acute pain: Tylenol, dilauded, Oxycodone PRN.    CV:                      -Acute on chronic RLE ischemia: Heparin gtt started at fixed rate. Vascular surgery consult. RLE Duplex showed short segment occlusion of R CFA. Patient is going to OR for angio, thromboembolectomy and possible fasciotomies.   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 No need for mechanical support after OR, completion MACARENA showed improved EF-45%. Weaning epi. Statin when able.    -Lactic acidosis; improving.   -Paroxysmal A fib: s/p modified MAZE and LAAC placement. On NSR currently. Continue Amiodarone for 3 weeks.   -HTN: Hold antihypertensives for now.   -Keep chest tubes and wires for now.     PULM:                -Hypoxic respiratory failure: resolved. Extubated this am.   -Post op atelectasis: IS q 10 min.     FEN/GI:  -Euvolemic, good UOP. Hold diuresis.   -Hypophosphatemia: on electrolyte replacement protocol.    -Will keep NPO for OR this am.   -Hold bowel regimen.     :                      -Paraphymosis: Urology consult.   -Felton in for critical illness.      HEME/ID:  -Mild leukocytosis. Recent OR and afebrile.   -Acute blood loss anemia: Hb stable.    -Start therapeutic anticoagulation for both acute limb ischemia and history of A fib. (fixed rate due to recent sternotomy).      ENDO:                -DM: On insulin gtt at 12 units/hr     LINES:                -RIJ CVC, PIVs, Mediastinal x 2 and left pleural, Felton.     PPx:                    -SCDs and therapeutic anticoagulation.   -PPI for GI prophylaxis.     DISPO:               -CVICU    Patient seen, findings and plan discussed with CVTS Fellow.     Imtiaz Cunha MD  CA-2/PGY-3    ====================================    TODAY'S PROGRESS:   SUBJECTIVE:   - No acute events overnight.   -Extubated this am. Denying any pain or SOB, F/C, N/V.     OBJECTIVE:   1. VITAL SIGNS:   Temp:  [97.8  F (36.6  C)-100.2  F (37.9  C)] 98.9  F (37.2  C)  Heart Rate:  [] 101  Resp:  [14-24] 20  MAP:  [59 mmHg-84 mmHg] 66 mmHg  Arterial Line BP: ()/(46-70) 96/52  FiO2 (%):  [40 %-60 %] 50 %  SpO2:  [93 %-100 %] 94 %  Ventilation Mode: Other (see comments) (pt extubated)  FiO2 (%): 50 %  Rate Set (breaths/minute): 14 breaths/min  Tidal Volume Set (mL): 550 mL (changed per MD order)  PEEP (cm H2O): 5 cmH2O  Pressure Support (cm H2O): 7 cmH2O  Oxygen Concentration (%): 50 %  Resp: 20    2. INTAKE/ OUTPUT:   I/O last 3 completed shifts:  In: 4233.56 [P.O.:500; I.V.:2830.56; NG/GT:50]  Out: 2640 [Urine:1540; Blood:800; Chest Tube:300]    3. PHYSICAL EXAMINATION:   General: laying in bed following commands.   Neuro: A&Ox3, NAD  Resp: Breathing non-labored  CV: RRR  Abdomen: Soft, Non-distended, Non-tender  Incisions: CDI  Extremities: warm and well perfused    4. INVESTIGATIONS:   Arterial Blood Gases     Recent Labs  Lab 03/25/18  0957 03/25/18  0717 03/25/18  0350 03/24/18  2346   PH 7.44 7.46* 7.48* 7.50*   PCO2 40 35 35 34*   PO2 88 124* 68* 116*   HCO3 27 25 27 27     Complete Blood Count     Recent Labs  Lab  03/25/18 0350 03/24/18 1708 03/24/18 1558 03/24/18 1511 03/24/18 0352 03/23/18  2345   WBC 12.4* 14.3*  --   --   --  16.3* 18.7*   HGB 9.1* 9.7* 9.6* 8.4*  < > 9.3* 10.3*   * 109* 116*  --   --  149* 141*   < > = values in this interval not displayed.  Basic Metabolic Panel    Recent Labs  Lab 03/25/18  0957 03/25/18 0350 03/24/18 1708 03/24/18 1558 03/24/18 1511 03/24/18 0352 03/23/18  2345   NA  --  144 142 140 139  < > 146* 144   POTASSIUM 4.1 3.9 4.2 4.2 3.9  < > 4.3 4.2   CHLORIDE  --  110* 109  --   --   --  110* 108   CO2  --  23 28  --   --   --  23 23   BUN  --  23 26  --   --   --  35* 33*   CR  --  1.06 0.99  --   --   --  1.17 1.17   GLC  --  124* 135* 151* 164*  < > 220* 263*   < > = values in this interval not displayed.  Liver Function Tests    Recent Labs  Lab 03/25/18 0350 03/24/18 1558 03/24/18 0352 03/23/18  2345  03/22/18  0645  03/22/18  0016   AST  --   --   --   --   --  264*  --  310*   ALT  --   --   --   --   --  69  --  82*   ALKPHOS  --   --   --   --   --  55  --  57   BILITOTAL  --   --   --   --   --  3.6*  --  4.5*   ALBUMIN  --   --   --   --   --  2.8*  --  2.9*   INR 1.47* 1.53* 1.31* 1.28*  < >  --   < > 1.20*   < > = values in this interval not displayed.  Pancreatic Enzymes  No lab results found in last 7 days.  Coagulation Profile    Recent Labs  Lab 03/25/18 0350 03/24/18 1558 03/24/18  0352 03/23/18  2345 03/23/18  2136   INR 1.47* 1.53* 1.31* 1.28* 1.51*   PTT 37 109*  --  28 29     Lactate  Invalid input(s): LACTATE    5. RADIOLOGY:   Recent Results (from the past 24 hour(s))   US Lower Extremity Arterial rt   Result Value    Radiologist flags Occluded right common femoral. (Urgent)    Narrative    Exam: Duplex ultrasound of right lower extremity arteries dated  3/24/2018 12:05 PM     Clinical information: cold leg, s/p R CFA cutdown;      Comparison: No prior for comparison    Technique: Includes Gray Scale images, color Doppler, spectral  Doppler  waveforms and velocities with appropriate angles of 60 degrees or  less.    Ordering provider: REFERRED SELF    Findings:     Right lower extremity:     Central EIA: Velocity: 84 cm/s, biphasic  Low external iliac artery: Not visualized  Common femoral artery, proximal: No flow identified.   Collateral artery emptying just distal to the occlusion: 56 cm/s,  monophasic  Common femoral artery, distal to occlusion: 16 cm/s, monophasic  Deep femoral artery: 24 cm/sec. Waveforms: Monophasic  Proximal SFA: 20 cm/sec. Waveforms: Monophasic  Mid SFA: 13 cm/sec. Waveforms: Monophasic  Distal SFA: 14 cm/sec. Waveforms: Monophasic  Popliteal artery: 12 cm/sec. Waveforms: Monophasic  PTA ankle: No flow identified.  ALVARO ankle: 7 cm/sec. Waveforms: Monophasic      Impression    Impression:     1. 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 Slow flow and  dampened/abnormal waveforms in the distal right lower extremity  arteries.  2. Probable occlusion of the peripheral right external iliac artery as  well as the artery is not visualized. The central portion of the right  external iliac artery is seen with monophasic flow.      [Urgent Result: Occluded right common femoral.]    Finding was identified on 3/24/2018 12:11 PM.     Dr. Dunham was contacted by Dr. Ladd at 3/24/2018 12:12 PM and  verbalized understanding of the urgent finding.     I have personally reviewed the examination and initial interpretation  and I agree with the findings.    TIMOTHY PATHAK MD   IR OR Angiogram    Narrative    This exam was marked as non-reportable because it will not be read by a   radiologist or a [facility] non-radiologist provider.                 =========================================

## 2018-03-25 NOTE — PROGRESS NOTES
"Patient status post CABG for ACS. POD1 right iliac thrombectomy with iliac stent and fasciotomies.  Doing well.  Able to move foot to command and has sensation.    BP (!) 87/60  Temp 99.8  F (37.7  C) (Axillary)  Resp 24  Ht 5' 11\"  Wt 264 lb 1.8 oz  SpO2 98%  BMI 36.84 kg/m2   Fasciotomy incisions clean and with healthy muscle without bleeding.   Good PT doppler signal    IMP/PLAN:  Doing very well. Plan to close Fasciotomies in next 48hrs.     "

## 2018-03-25 NOTE — PROGRESS NOTES
CVICU attending note:    This is a 72M with PMH of paroxysmal atrial fibrillation on coumadin, CHF, obesity, DM type II, HTN who who presented to [facility] with STEMI with coronary angiogram showing triple vessel disease (95% LM, 75% LAD, 90% OM,  RCA). ECHO showed EF around 20-25% and Impella device was placed via R femoral artery at the  OSH. The patient underwent a CABG for with R CFA cutdown yesterday for removal of Impella percutaneous ventricular assist device on 3/23. Extubated on POD#1 after cardiac surgery.The patient  emergent Right common femoral and iliac artery thrombectomy with stent placement in the common iliac artery on 3/24. Extubated on POD#1 after vascular surgery.      1. 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 LVEF at the OSH was 20-25%, TTE on 3/22 revealed LV EF 40-45%. Post op LVE EF was around 35% while on inotrope. On low dose Epi and Vasopresin Plan: Monitor Lactic acid, wean Inotrope and pressor as possible. Increase to low intensity Heparin Infusion.       2. Acute R lower extremity ischemia s/p  Right common femoral and iliac artery thrombectomy with stent placement in the common iliac artery and R leg fasciotomies. on 3/24. Plan: Dressing changes by vascular surgery. Monitor peripheral pulses in all extremities. Trend Lactic acid, K, and CK.       3. Anemia and thrombocytopenia of surgical blood loss. CT with minimal bleeding. Plan: Monitor CBC, CT output. Start Heparin infusion 500 UI/hr.       4. Non-oliguric acute kidney injury and lactic acidosis improving. Plan: Monitor UOP, lytes, lactic acid.    5. Rhabdomyolysis improving likely related to RLE acute ischemia. Plan: Monitor CK and RLE perfusion.       I personally managed the ventilator, hemodynamics, sedation,  analgesia, metabolic abnormalities and nutritional status.       The history and the 10 points review of systems are included in the note of  Dr. Cunha.       I agree with the resident assessment and plan. I spent  42 minutes of critical care time exclusive of the procedures time, evaluating and managing this patient, discussing with the consultants and the patient's family.    Alysha Coleman MD  Anesthesiology Critical Care Medicine  Pg. [phone]

## 2018-03-25 NOTE — OP NOTE
Procedure Date: 03/24/2018      DATE OF SERVICE: 03/24/2018.      LOCATION:  [facility] hybrid  operating room.      PREOPERATIVE DIAGNOSIS:  Right leg acute limb ischemia.      POSTOPERATIVE DIAGNOSIS:  Right leg acute limb ischemia.      PROCEDURE:  Right groin reexploration, right common femoral and iliac artery  thrombectomy, right leg angiogram, stent placement right common iliac artery and then 4 compartment right lower leg fasciotomies.      SURGEON:  Margarette Tang MD     Contrast: 50 cc.         FINDINGS:  Fresh thrombus occluding calcified, but widely otherwise patent common femoral artery with only profunda runoff and chronically occluded SFA.  Significant fresh thrombus cleared with the Vignesh catheter, and some inadequate inflow restored.  On angiography, patent lower abdominal aorta, which appears to mildly aneurysmal, mildly aneurysmal left common iliac artery, but patent with patent internal and external iliac arteries.  Occluded right common iliac artery with reconstituted iliac bifurcation and patent internal iliac and external iliac arteries with also some irregularity and mild aneurysmal degeneration.  Stent graft placed was a Viabahn 10 mm x 5 cm self-expanding stent, post-balloon treatment after stent deployment.  Completion angiogram showed widely patent inline flow from the aorta through the iliac arteries bilaterally and down into the outflow.  After primary repair of common femoral artery retrograde angiogram with the catheter at the external iliac level showed widely patent in-line flow into the common femoral artery and profunda runoff without stenosis.  Reconstitution of the popliteal artery through profunda collaterals and single vessel posterior tibial runoff that appears to peter out above the ankle.  This was felt to be related to residual ischemia and spasm versus compartment syndrome.  On 4 compartment exploration, healthy-appearing muscle responding well to  cautery and without evidence of significant edema or ischemia.  Good posterior tibial Doppler signal in the fasciotomy bed.  Estimated blood loss was almost 800 mL from bleeding around the sheath and Vignesh catheters during the groin thrombectomy and repair.  This is unclampable vessel that was challenging to work with.  The patient received 4 units of blood during the procedure and had some mild arrhythmia during it as well.  At the completion, there were strong Doppler signal at the posterior tibial level.      BRIEF CLINICAL HISTORY:  Mr. Alvarez is a 72-year-old gentleman with acute coronary syndrome, transferred from an outside hospital after failed attempted coronary intervention undergoing coronary artery bypass yesterday here and maze procedure Impella device was utilized in transfer and explanted at the end of the case yesterday from the right groin.  At that time, we felt that there was adequate flow to the leg, but by this morning the patient was noticed numbness to the mid shin and significantly diminished motor function consistent with grade 3 acute limb ischemia.  Duplex showed common femoral occlusion.  Plan therefore for urgent flow restoration.      PROCEDURE NOTE:  The patient was prepped and draped for access to his lower abdomen and both lower extremities circumferentially.  The prior incision in the groin was reopened and retractor was placed in the field.  The common femoral artery was palpated as an extremely calcified vessel without a pulse in it.  After controlling the profunda, the SFA and the common femoral, the common femoral was opened longitudinally and fresh thrombus was seen this was cleared completely.  We could identify that the SFA was occluded at its origin, but the profunda had excellent backbleeding. We then used a #4 Vignesh followed by a #5 Vignesh catheter to thrombectomize the artery.  We cleared significant thrombus and resulted in some improvement in flow, but not to the  pulsatile liver that  I felt was appropriate.  We could not pass the Vignesh catheter beyond 20 cm with either #5 or # 4 in this context.  We placed an 11-Panamanian sheath through the vessel under fluoroscopy and performed retrograde angiogram. We could see no filling in the common iliac artery with this.  We were able to pass a Glidewire fairly easily up into the aorta level and through the 11-Panamanian sheath then advanced a 7-Panamanian sheath over the Glidewire into the aorta above performed an angiogram with the findings noted above, namely occluded iliac artery on the right side and some aneurysmal changes to its origin as well as to its outflow.  We at this point, largely for expediency, but also for definitive therapy and concern that this was an aneurysm, we placed a covered stent graft through the common iliac artery as noted above and deployed it and post-ballooned it.  Completion angiogram showed restored inflow that was essentially normal at this point. At this point at the groin level, given this was a redo groin in an obese diabetic patient with potential need for further revision and a widely patent lumen,  I felt it was superior to perform a primary repair.  This also was pushed by the patient's urgency of operation as the patient was having  arrhythmias at that point.  We therefore repaired the common femoral artery with a running 5-0 Prolene stitch. The vessel was over 1 cm in diameter and I felt this was reasonable.  After this, above the calcified area, placed a U-stitch and through this a micropuncture sheath going retrograde, performed angiography from here down the leg.  This showed no stenosis of the common femoral and profunda and single vessel runoff through the profunda. I was not sure if there was a risk of compartment syndrome and so fasciotomies were performed as noted in the standard technique.  A medial incision was made just 1 cm breadth away from the tibia and extended down to subcutaneous  tissue and fascia, we projected the saphenous vein and ligated branches with Hemoclips.  We exposed the gastrocnemius muscle and took it down this healthy-appearing muscle then took the soleus muscle down off its attachment down the tibia and found the deeper muscle layers were all healthy in response to cautery.  In the wound bed, I identified the posterior  tibial artery and vein and found a good Doppler signal in these. We then turned our attention to the lateral side, made an incision between the fibular head and the tibia, extended this down for a segment of only 10 cm given that the other side had shown healthy-appearing tissue and here identified both the anterior compartment. A separate fascial incision was made to expose the lateral compartment and both again showed healthy-appearing muscle.  At this point, we placed dressings over these.  At the groin, we closed with a 2-0 Vicryl for fascia, 3-0 Vicryl for subcutaneous tissue and 4-0 subcuticular for skin staples for skin.  A Soila vacuum dressing was applied over the groin incision.  Procedure was reasonably tolerated, although the patient's arrhythmias make his prognosis guarded.  Eventually placed on a pacer at the end of the case and transferred to the ICU.         JAREN PLUMMER MD             D: 2018   T: 2018   MT: ETHAN      Name:     MONSE FAROOQ   MRN:      [id number]        Account:        [id number]   :      1945           Procedure Date: 2018      Document: [id number]

## 2018-03-25 NOTE — PROGRESS NOTES
CV ICU PROGRESS NOTE  March 25, 2018      CO-MORBIDITIES:   No diagnosis found.    ASSESSMENT: Izaiah Alvarez is a 72M with PMH of paroxysmal atrial fibrillation on coumadin, CHF, obesity, DM type II, HTN who who presented to [facility] with STEMI with coronary angiogram showing triple vessel disease (95% LM, 75% LAD, 90% OM,  RCA). ECHO showed EF around 20-25% and Impella device was placed via R femoral artery at the  OSH. The patient underwent a CABG for with R CFA cutdown yesterday for removal of Impella percutaneous ventricular assist device on 3/23. Extubated on POD#1. Underwent take back after pain of RLE and found to have thrombus that was removed. Left intubated after procedure. Extubated 3/25 and now will need fasciotomy sites closed by vascular surgery.     PLAN:  NEURO:  -Acute pain: Tylenol, dilauded, Oxycodone PRN.    CV:                      -Acute on chronic RLE ischemia: Heparin gtt started at fixed rate. Vascular surgery consult. RLE Duplex showed short segment occlusion of R CFA. Patient is going to OR for angio, thromboembolectomy and possible fasciotomies.   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 No need for mechanical support after OR, completion MACARENA showed improved EF-45%. Weaning epi. Statin when able.    -Lactic acidosis; improving.   -Paroxysmal A fib: s/p modified MAZE and LAAC placement. On NSR currently. Continue Amiodarone for 3 weeks.   -HTN: Hold antihypertensives for now.   -Keep chest tubes and wires for now.     PULM:                -Hypoxic respiratory failure: resolved. Extubated this am.   -Post op atelectasis: IS q 10 min.     FEN/GI:  -Euvolemic, good UOP. Hold diuresis.   -Hypophosphatemia: on electrolyte replacement protocol.    -Will keep NPO for OR this am.   -Hold bowel regimen.     :                      -Paraphymosis: Urology consult.   -Felton in for critical illness.     HEME/ID:  -Mild leukocytosis. Recent OR and  afebrile.   -Acute blood loss anemia: Hb stable.    -Start therapeutic anticoagulation for both acute limb ischemia and history of A fib. (fixed rate due to recent sternotomy).      ENDO:                -DM: On insulin gtt at 12 units/hr     LINES:                -RIJ CVC, PIVs, Mediastinal x 2 and left pleural, Felton.     PPx:                    -SCDs and therapeutic anticoagulation.   -PPI for GI prophylaxis.     DISPO:               -CVICU    Patient seen, findings and plan discussed with surgical ICU staff, Dr. Coleman.    Imtiaz Cunha MD  CA-2/PGY-3    ====================================    TODAY'S PROGRESS:   SUBJECTIVE:   - No acute events overnight.   -Extubated this am. Denying any pain or SOB, F/C, N/V.     OBJECTIVE:   1. VITAL SIGNS:   Temp:  [97.8  F (36.6  C)-100.2  F (37.9  C)] 100  F (37.8  C)  Heart Rate:  [72-93] 89  Resp:  [14-18] 14  BP: (87)/(60) 87/60  MAP:  [59 mmHg-90 mmHg] 59 mmHg  Arterial Line BP: ()/(42-75) 84/46  FiO2 (%):  [40 %-60 %] 50 %  SpO2:  [93 %-100 %] 97 %  Ventilation Mode: CMV/AC  (Continuous Mandatory Ventilation/ Assist Control)  FiO2 (%): 50 %  Rate Set (breaths/minute): 14 breaths/min  Tidal Volume Set (mL): 550 mL (changed per MD order)  PEEP (cm H2O): 5 cmH2O  Pressure Support (cm H2O): 7 cmH2O  Oxygen Concentration (%): 60 %  Resp: 14    2. INTAKE/ OUTPUT:   I/O last 3 completed shifts:  In: 5046.1 [P.O.:500; I.V.:2943.1]  Out: 3220 [Urine:1915; Blood:800; Chest Tube:505]    3. PHYSICAL EXAMINATION:   General: laying in bed following commands.   Neuro: A&Ox3, NAD  Resp: Breathing non-labored  CV: RRR  Abdomen: Soft, Non-distended, Non-tender  Incisions: CDI  Extremities: warm and well perfused    4. INVESTIGATIONS:   Arterial Blood Gases     Recent Labs  Lab 03/25/18  0350 03/24/18  2346 03/24/18  2148 03/24/18  1805   PH 7.48* 7.50* 7.48* 7.42   PCO2 35 34* 34* 35   PO2 68* 116* 114* 94   HCO3 27 27 26 23     Complete Blood Count     Recent Labs  Lab  03/25/18 0350 03/24/18 1708 03/24/18 1558 03/24/18 1511 03/24/18 0352 03/23/18  2345   WBC 12.4* 14.3*  --   --   --  16.3* 18.7*   HGB 9.1* 9.7* 9.6* 8.4*  < > 9.3* 10.3*   * 109* 116*  --   --  149* 141*   < > = values in this interval not displayed.  Basic Metabolic Panel    Recent Labs  Lab 03/25/18 0350 03/24/18 1708 03/24/18 1558 03/24/18 1511 03/24/18 0352 03/23/18  2345    142 140 139  < > 146* 144   POTASSIUM 3.9 4.2 4.2 3.9  < > 4.3 4.2   CHLORIDE 110* 109  --   --   --  110* 108   CO2 23 28  --   --   --  23 23   BUN 23 26  --   --   --  35* 33*   CR 1.06 0.99  --   --   --  1.17 1.17   * 135* 151* 164*  < > 220* 263*   < > = values in this interval not displayed.  Liver Function Tests    Recent Labs  Lab 03/25/18 0350 03/24/18 1558 03/24/18 0352 03/23/18 2345 03/22/18  0645  03/22/18  0016   AST  --   --   --   --   --  264*  --  310*   ALT  --   --   --   --   --  69  --  82*   ALKPHOS  --   --   --   --   --  55  --  57   BILITOTAL  --   --   --   --   --  3.6*  --  4.5*   ALBUMIN  --   --   --   --   --  2.8*  --  2.9*   INR 1.47* 1.53* 1.31* 1.28*  < >  --   < > 1.20*   < > = values in this interval not displayed.  Pancreatic Enzymes  No lab results found in last 7 days.  Coagulation Profile    Recent Labs  Lab 03/25/18 0350 03/24/18 1558 03/24/18 0352 03/23/18  2345 03/23/18  2136   INR 1.47* 1.53* 1.31* 1.28* 1.51*   PTT 37 109*  --  28 29     Lactate  Invalid input(s): LACTATE    5. RADIOLOGY:   Recent Results (from the past 24 hour(s))   US Lower Extremity Arterial rt   Result Value    Radiologist flags Occluded right common femoral. (Urgent)    Narrative    Exam: Duplex ultrasound of right lower extremity arteries dated  3/24/2018 12:05 PM     Clinical information: cold leg, s/p R CFA cutdown;      Comparison: No prior for comparison    Technique: Includes Gray Scale images, color Doppler, spectral Doppler  waveforms and velocities with appropriate angles  of 60 degrees or  less.    Ordering provider: REFERRED SELF    Findings:     Right lower extremity:     Central EIA: Velocity: 84 cm/s, biphasic  Low external iliac artery: Not visualized  Common femoral artery, proximal: No flow identified.   Collateral artery emptying just distal to the occlusion: 56 cm/s,  monophasic  Common femoral artery, distal to occlusion: 16 cm/s, monophasic  Deep femoral artery: 24 cm/sec. Waveforms: Monophasic  Proximal SFA: 20 cm/sec. Waveforms: Monophasic  Mid SFA: 13 cm/sec. Waveforms: Monophasic  Distal SFA: 14 cm/sec. Waveforms: Monophasic  Popliteal artery: 12 cm/sec. Waveforms: Monophasic  PTA ankle: No flow identified.  ALVARO ankle: 7 cm/sec. Waveforms: Monophasic      Impression    Impression:     1. 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 Slow flow and  dampened/abnormal waveforms in the distal right lower extremity  arteries.  2. Probable occlusion of the peripheral right external iliac artery as  well as the artery is not visualized. The central portion of the right  external iliac artery is seen with monophasic flow.      [Urgent Result: Occluded right common femoral.]    Finding was identified on 3/24/2018 12:11 PM.     Dr. Dunham was contacted by Dr. Ladd at 3/24/2018 12:12 PM and  verbalized understanding of the urgent finding.     I have personally reviewed the examination and initial interpretation  and I agree with the findings.    TIMOTHY PATHAK MD   IR OR Angiogram    Narrative    This exam was marked as non-reportable because it will not be read by a   radiologist or a [facility] non-radiologist provider.                 =========================================

## 2018-03-25 NOTE — PROGRESS NOTES
Pt returned from OR @ 1715 post thromboembolectomy with illiac stent placement, RLE fasciotomy. Pt intubated 14/65010 60%. Vaso and Epi titrated for map > 65. Pt 100% paced per mikhail Chisholm in OR. Plan to keep pt intubated and paced overnight. Serial CK and lactic acid ordered. Monitor UOP and notify MD of changes.   Trinh Lan  3/24/2018

## 2018-03-25 NOTE — PLAN OF CARE
Problem: Patient Care Overview  Goal: Plan of Care/Patient Progress Review  Outcome: Improving  Progress Note:  Pt intubated and sedated overnight.  Vent CMV 14/550/5/50% and sedated with Propofol of 35 mcg/kg/min most of shift.  PS initiated at 0600 and Propofol turned off.  Pt awakes easily and able to WOLF's while responding appropriately to yes/no questions.  Pt continues to endorse some numbness in RLE, still able to doppler post-tibial pulse and unable to find pedal pulse.  Able to doppler both post-tibial and pedal pulse on LLE.  Rhythm 100% paced at 90 bpm, Temporary pacemaker DDD via epicardial wires.  During night Epi and Vaso both weaned off with MAP remaining greater than 65.  Insulin drip at 6 units/hr entire night.  ABG's and CK labs reported to Dr. SANTOYO.  Wife updated last evening.  Progressing towards goals, continue with plan of care.

## 2018-03-26 ENCOUNTER — ANESTHESIA EVENT (OUTPATIENT)
Dept: SURGERY | Facility: CLINIC | Age: 73
DRG: 228 | End: 2018-03-26
Payer: COMMERCIAL

## 2018-03-26 ENCOUNTER — APPOINTMENT (OUTPATIENT)
Dept: PHYSICAL THERAPY | Facility: CLINIC | Age: 73
DRG: 228 | End: 2018-03-26
Attending: THORACIC SURGERY (CARDIOTHORACIC VASCULAR SURGERY)
Payer: COMMERCIAL

## 2018-03-26 LAB
ANION GAP SERPL CALCULATED.3IONS-SCNC: 9 MMOL/L (ref 3–14)
BUN SERPL-MCNC: 23 MG/DL (ref 7–30)
CALCIUM SERPL-MCNC: 8.3 MG/DL (ref 8.5–10.1)
CHLORIDE SERPL-SCNC: 108 MMOL/L (ref 94–109)
CO2 SERPL-SCNC: 24 MMOL/L (ref 20–32)
CREAT SERPL-MCNC: 0.86 MG/DL (ref 0.66–1.25)
ERYTHROCYTE [DISTWIDTH] IN BLOOD BY AUTOMATED COUNT: 16.3 % (ref 10–15)
GFR SERPL CREATININE-BSD FRML MDRD: 88 ML/MIN/1.7M2
GLUCOSE BLDC GLUCOMTR-MCNC: 117 MG/DL (ref 70–99)
GLUCOSE BLDC GLUCOMTR-MCNC: 118 MG/DL (ref 70–99)
GLUCOSE BLDC GLUCOMTR-MCNC: 126 MG/DL (ref 70–99)
GLUCOSE BLDC GLUCOMTR-MCNC: 127 MG/DL (ref 70–99)
GLUCOSE BLDC GLUCOMTR-MCNC: 128 MG/DL (ref 70–99)
GLUCOSE BLDC GLUCOMTR-MCNC: 133 MG/DL (ref 70–99)
GLUCOSE BLDC GLUCOMTR-MCNC: 142 MG/DL (ref 70–99)
GLUCOSE BLDC GLUCOMTR-MCNC: 143 MG/DL (ref 70–99)
GLUCOSE BLDC GLUCOMTR-MCNC: 145 MG/DL (ref 70–99)
GLUCOSE BLDC GLUCOMTR-MCNC: 147 MG/DL (ref 70–99)
GLUCOSE BLDC GLUCOMTR-MCNC: 149 MG/DL (ref 70–99)
GLUCOSE BLDC GLUCOMTR-MCNC: 152 MG/DL (ref 70–99)
GLUCOSE BLDC GLUCOMTR-MCNC: 157 MG/DL (ref 70–99)
GLUCOSE BLDC GLUCOMTR-MCNC: 160 MG/DL (ref 70–99)
GLUCOSE BLDC GLUCOMTR-MCNC: 166 MG/DL (ref 70–99)
GLUCOSE BLDC GLUCOMTR-MCNC: 176 MG/DL (ref 70–99)
GLUCOSE SERPL-MCNC: 126 MG/DL (ref 70–99)
HCT VFR BLD AUTO: 27 % (ref 40–53)
HGB BLD-MCNC: 8.6 G/DL (ref 13.3–17.7)
INR PPP: 1.44 (ref 0.86–1.14)
INTERPRETATION ECG - MUSE: NORMAL
LMWH PPP CHRO-ACNC: 0.11 IU/ML
LMWH PPP CHRO-ACNC: 0.19 IU/ML
MAGNESIUM SERPL-MCNC: 2.4 MG/DL (ref 1.6–2.3)
MCH RBC QN AUTO: 30.1 PG (ref 26.5–33)
MCHC RBC AUTO-ENTMCNC: 31.9 G/DL (ref 31.5–36.5)
MCV RBC AUTO: 94 FL (ref 78–100)
PLATELET # BLD AUTO: 141 10E9/L (ref 150–450)
POTASSIUM SERPL-SCNC: 4 MMOL/L (ref 3.4–5.3)
RBC # BLD AUTO: 2.86 10E12/L (ref 4.4–5.9)
SODIUM SERPL-SCNC: 141 MMOL/L (ref 133–144)
WBC # BLD AUTO: 13.8 10E9/L (ref 4–11)

## 2018-03-26 PROCEDURE — 85520 HEPARIN ASSAY: CPT | Performed by: STUDENT IN AN ORGANIZED HEALTH CARE EDUCATION/TRAINING PROGRAM

## 2018-03-26 PROCEDURE — 25000132 ZZH RX MED GY IP 250 OP 250 PS 637: Performed by: SURGERY

## 2018-03-26 PROCEDURE — 21400003 ZZH R&B CCU CRITICAL UMMC

## 2018-03-26 PROCEDURE — 40000193 ZZH STATISTIC PT WARD VISIT

## 2018-03-26 PROCEDURE — 85520 HEPARIN ASSAY: CPT | Performed by: THORACIC SURGERY (CARDIOTHORACIC VASCULAR SURGERY)

## 2018-03-26 PROCEDURE — 99233 SBSQ HOSP IP/OBS HIGH 50: CPT | Mod: GC | Performed by: INTERNAL MEDICINE

## 2018-03-26 PROCEDURE — 25000128 H RX IP 250 OP 636: Performed by: ANESTHESIOLOGY

## 2018-03-26 PROCEDURE — 40000048 ZZH STATISTIC DAILY SWAN MONITORING

## 2018-03-26 PROCEDURE — 00000146 ZZHCL STATISTIC GLUCOSE BY METER IP

## 2018-03-26 PROCEDURE — 40000196 ZZH STATISTIC RAPCV CVP MONITORING

## 2018-03-26 PROCEDURE — 25000125 ZZHC RX 250: Performed by: THORACIC SURGERY (CARDIOTHORACIC VASCULAR SURGERY)

## 2018-03-26 PROCEDURE — 25000132 ZZH RX MED GY IP 250 OP 250 PS 637: Performed by: INTERNAL MEDICINE

## 2018-03-26 PROCEDURE — 83735 ASSAY OF MAGNESIUM: CPT | Performed by: THORACIC SURGERY (CARDIOTHORACIC VASCULAR SURGERY)

## 2018-03-26 PROCEDURE — 25000132 ZZH RX MED GY IP 250 OP 250 PS 637: Performed by: THORACIC SURGERY (CARDIOTHORACIC VASCULAR SURGERY)

## 2018-03-26 PROCEDURE — 97530 THERAPEUTIC ACTIVITIES: CPT | Mod: GP

## 2018-03-26 PROCEDURE — 25000128 H RX IP 250 OP 636: Performed by: SURGERY

## 2018-03-26 PROCEDURE — 85027 COMPLETE CBC AUTOMATED: CPT | Performed by: THORACIC SURGERY (CARDIOTHORACIC VASCULAR SURGERY)

## 2018-03-26 PROCEDURE — 80048 BASIC METABOLIC PNL TOTAL CA: CPT | Performed by: THORACIC SURGERY (CARDIOTHORACIC VASCULAR SURGERY)

## 2018-03-26 PROCEDURE — 25000125 ZZHC RX 250: Performed by: SURGERY

## 2018-03-26 PROCEDURE — 85610 PROTHROMBIN TIME: CPT | Performed by: THORACIC SURGERY (CARDIOTHORACIC VASCULAR SURGERY)

## 2018-03-26 PROCEDURE — 40000014 ZZH STATISTIC ARTERIAL MONITORING DAILY

## 2018-03-26 RX ORDER — POLYETHYLENE GLYCOL 3350 17 G/17G
17 POWDER, FOR SOLUTION ORAL DAILY
Status: DISCONTINUED | OUTPATIENT
Start: 2018-03-26 | End: 2018-04-04 | Stop reason: HOSPADM

## 2018-03-26 RX ORDER — CEFAZOLIN SODIUM 2 G/100ML
2 INJECTION, SOLUTION INTRAVENOUS
Status: COMPLETED | OUTPATIENT
Start: 2018-03-27 | End: 2018-03-27

## 2018-03-26 RX ORDER — AMOXICILLIN 250 MG
1 CAPSULE ORAL AT BEDTIME
Status: DISCONTINUED | OUTPATIENT
Start: 2018-03-26 | End: 2018-03-29

## 2018-03-26 RX ORDER — METOPROLOL TARTRATE 50 MG
50 TABLET ORAL 2 TIMES DAILY
Status: DISCONTINUED | OUTPATIENT
Start: 2018-03-26 | End: 2018-03-28

## 2018-03-26 RX ORDER — AMIODARONE HYDROCHLORIDE 200 MG/1
200 TABLET ORAL 2 TIMES DAILY
Status: DISCONTINUED | OUTPATIENT
Start: 2018-03-26 | End: 2018-03-28

## 2018-03-26 RX ORDER — FUROSEMIDE 10 MG/ML
40 INJECTION INTRAMUSCULAR; INTRAVENOUS ONCE
Status: COMPLETED | OUTPATIENT
Start: 2018-03-26 | End: 2018-03-26

## 2018-03-26 RX ORDER — PANTOPRAZOLE SODIUM 40 MG/1
40 TABLET, DELAYED RELEASE ORAL EVERY MORNING
Status: DISCONTINUED | OUTPATIENT
Start: 2018-03-27 | End: 2018-04-04 | Stop reason: HOSPADM

## 2018-03-26 RX ADMIN — ACETAMINOPHEN 975 MG: 325 TABLET, FILM COATED ORAL at 15:49

## 2018-03-26 RX ADMIN — MULTIPLE VITAMINS W/ MINERALS TAB 1 TABLET: TAB at 08:33

## 2018-03-26 RX ADMIN — Medication 1000 MG: at 08:26

## 2018-03-26 RX ADMIN — HUMAN INSULIN 4 UNITS/HR: 100 INJECTION, SOLUTION SUBCUTANEOUS at 09:44

## 2018-03-26 RX ADMIN — HEPARIN SODIUM 1650 UNITS/HR: 10000 INJECTION, SOLUTION INTRAVENOUS at 08:35

## 2018-03-26 RX ADMIN — METOROPROLOL TARTRATE 5 MG: 5 INJECTION, SOLUTION INTRAVENOUS at 08:32

## 2018-03-26 RX ADMIN — HUMAN INSULIN 3 UNITS/HR: 100 INJECTION, SOLUTION SUBCUTANEOUS at 00:41

## 2018-03-26 RX ADMIN — POLYETHYLENE GLYCOL 3350 17 G: 17 POWDER, FOR SOLUTION ORAL at 11:45

## 2018-03-26 RX ADMIN — HUMAN INSULIN 6 UNITS/HR: 100 INJECTION, SOLUTION SUBCUTANEOUS at 16:15

## 2018-03-26 RX ADMIN — FINASTERIDE 5 MG: 5 TABLET, FILM COATED ORAL at 08:25

## 2018-03-26 RX ADMIN — ATORVASTATIN CALCIUM 20 MG: 20 TABLET, FILM COATED ORAL at 20:16

## 2018-03-26 RX ADMIN — AMIODARONE HYDROCHLORIDE 200 MG: 200 TABLET ORAL at 11:41

## 2018-03-26 RX ADMIN — FUROSEMIDE 40 MG: 10 INJECTION, SOLUTION INTRAVENOUS at 11:42

## 2018-03-26 RX ADMIN — METOPROLOL TARTRATE 50 MG: 50 TABLET, FILM COATED ORAL at 20:16

## 2018-03-26 RX ADMIN — DOCUSATE SODIUM 100 MG: 100 CAPSULE, LIQUID FILLED ORAL at 08:24

## 2018-03-26 RX ADMIN — AMIODARONE HYDROCHLORIDE 200 MG: 200 TABLET ORAL at 20:16

## 2018-03-26 RX ADMIN — LIDOCAINE: 50 OINTMENT TOPICAL at 12:00

## 2018-03-26 RX ADMIN — PANTOPRAZOLE SODIUM 40 MG: 40 INJECTION, POWDER, FOR SOLUTION INTRAVENOUS at 08:34

## 2018-03-26 RX ADMIN — Medication 100 MG: at 08:34

## 2018-03-26 RX ADMIN — Medication 12.5 MG: at 11:41

## 2018-03-26 RX ADMIN — ACETAMINOPHEN 975 MG: 325 TABLET, FILM COATED ORAL at 08:22

## 2018-03-26 RX ADMIN — ASPIRIN 81 MG: 81 TABLET, COATED ORAL at 08:23

## 2018-03-26 ASSESSMENT — PAIN DESCRIPTION - DESCRIPTORS: DESCRIPTORS: DISCOMFORT

## 2018-03-26 NOTE — PROGRESS NOTES
CV ICU PROGRESS NOTE  March 26, 2018      CO-MORBIDITIES:   Obesity  Afib  HLD  HTN  DM  Asthma  CHF  STEMI s/p imeplla  CAD    ASSESSMENT: Izaiah Alvarez is a 72M with PMH of paroxysmal atrial fibrillation on coumadin, CHF, obesity, DM type II, HTN who who presented to [facility] with STEMI with coronary angiogram showing triple vessel disease (95% LM, 75% LAD, 90% OM,  RCA). ECHO showed EF around 20-25% and Impella device was placed via R femoral artery at the  OSH. The patient underwent a CABG for with R CFA cutdown yesterday for removal of Impella percutaneous ventricular assist device on 3/23. Extubated on POD#1. Underwent take back after pain of RLE and found to have thrombus that was removed. Left intubated after procedure. Extubated 3/25 and now will need fasciotomy sites closed by vascular surgery.     Today's progress:  - Plan to return to OR tomorrow for fasciotomy closure  - Remove arterial and central lines  - Transition to PO amiodarone  - Bowel regimen  - ADAT to cardiac diet  - Remove mediastinal chest tube - hold heparin for 4 hours  - Goal net negative 1L - give 40 mg lasix  - Transition to PO meds    PLAN:  NEURO:  -Acute pain: Tylenol, dilauded, Oxycodone PRN.    CV:                      #Acute on chronic RLE ischemia s/p thrombectomy and fasciotomy  #Multivessel CAD (LM and  of RCA) with low EF (25%) s/p CABG x3  #Paroxysmal A fib: s/p modified MAZE and LAAC placement  #HTN  -Heparin gtt started at fixed rate. Appreciate Vascular surgery consult. Plan for return to OR 3/27 for closure of fasciotomies  -No need for mechanical support after OR, completion MACARENA showed improved EF-45%. Statin today  -NSR currently. Continue Amiodarone for 3 weeks and transition to PO today  -Keep chest tubes and wires for now.     PULM:                #Post op atelectasis  -Doing well on 2L NC  -IS q 10 min.     FEN/GI:  #Hypophosphatemia  -Goal net negative 1L. 40 mg lasix  -ICU electrolyte replacement protocol.     -ADAT to cardiac diet. NPO at midnight for OR  -Bowel regimen.     :                      #Paraphymosis  -Difficult gallardo insertion, urology following. Will keep for today due to OR tomorrow    HEME/ID:  #Acute blood loss anemia  -Hgb stable.    -Hep gtt for both acute limb ischemia and history of A fib. (fixed rate due to recent sternotomy).      ENDO:                #DM  - Insulin gtt - will transition to SSI likely tomorrow when tolerating more of a diet     LINES:  -PIVs, Mediastinal x 2 and left pleural, Gallardo.     PPx:  -SCDs and hep gtt.   -PPI for GI prophylaxis.     DISPO:  -Xfer to 6c today    Patient seen, findings and plan discussed with surgical ICU staff, Dr. Guo.    William Cancino MD  General Surgery PGY-3      ====================================    TODAY'S PROGRESS:   SUBJECTIVE:   - No acute events overnight.   - Denying any pain or SOB, F/C, N/V.     OBJECTIVE:   1. VITAL SIGNS:   Temp:  [98.1  F (36.7  C)-99  F (37.2  C)] 99  F (37.2  C)  Heart Rate:  [] 87  Resp:  [14-20] 18  MAP:  [64 mmHg-109 mmHg] 87 mmHg  Arterial Line BP: ()/(48-88) 122/72  SpO2:  [94 %-99 %] 96 %  Ventilation Mode: Other (see comments) (pt extubated)  FiO2 (%): 50 %  Rate Set (breaths/minute): 14 breaths/min  Tidal Volume Set (mL): 550 mL (changed per MD order)  PEEP (cm H2O): 5 cmH2O  Pressure Support (cm H2O): 7 cmH2O  Oxygen Concentration (%): 50 %  Resp: 18    2. INTAKE/ OUTPUT:   I/O last 3 completed shifts:  In: 1632.18 [P.O.:740; I.V.:892.18]  Out: 1020 [Urine:830; Chest Tube:190]    3. PHYSICAL EXAMINATION:   General: laying in bed following commands.   Neuro: A&Ox3, NAD  Resp: Breathing non-labored  CV: RRR  Abdomen: Soft, Non-distended, Non-tender  Incisions: CDI  Extremities: warm and well perfused    4. INVESTIGATIONS:   Arterial Blood Gases     Recent Labs  Lab 03/25/18  0957 03/25/18  0717 03/25/18  0350 03/24/18  2346   PH 7.44 7.46* 7.48* 7.50*   PCO2 40 35 35 34*   PO2 88 124* 68* 116*    HCO3 27 25 27 27     Complete Blood Count     Recent Labs  Lab 03/26/18  0420 03/25/18  1323 03/25/18  0350 03/24/18  1708   WBC 13.8* 12.5* 12.4* 14.3*   HGB 8.6* 8.4* 9.1* 9.7*   * 107* 112* 109*     Basic Metabolic Panel    Recent Labs  Lab 03/26/18  0420 03/25/18  0957 03/25/18  0350 03/24/18  1708 03/24/18  1558  03/24/18  0352     --  144 142 140  < > 146*   POTASSIUM 4.0 4.1 3.9 4.2 4.2  < > 4.3   CHLORIDE 108  --  110* 109  --   --  110*   CO2 24  --  23 28  --   --  23   BUN 23  --  23 26  --   --  35*   CR 0.86  --  1.06 0.99  --   --  1.17   *  --  124* 135* 151*  < > 220*   < > = values in this interval not displayed.  Liver Function Tests    Recent Labs  Lab 03/26/18 0420 03/25/18  0350 03/24/18  1558 03/24/18  0352  03/22/18  0645  03/22/18  0016   AST  --   --   --   --   --  264*  --  310*   ALT  --   --   --   --   --  69  --  82*   ALKPHOS  --   --   --   --   --  55  --  57   BILITOTAL  --   --   --   --   --  3.6*  --  4.5*   ALBUMIN  --   --   --   --   --  2.8*  --  2.9*   INR 1.44* 1.47* 1.53* 1.31*  < >  --   < > 1.20*   < > = values in this interval not displayed.  Coagulation Profile    Recent Labs  Lab 03/26/18 0420 03/25/18 0350 03/24/18  1558 03/24/18  0352 03/23/18  2345 03/23/18  2136   INR 1.44* 1.47* 1.53* 1.31* 1.28* 1.51*   PTT  --  37 109*  --  28 29     =========================================

## 2018-03-26 NOTE — PLAN OF CARE
Pt POD 3 CAB iliac thrombectomy. Afib . Amio gtt 0.5 continues.  4L NC. Fasciotomy drsg CDI.  Pt c/o RLE numbness but states it is getting better. Heparin gtt bumped up to 1650 w/ 300 unit bolus. Plan to close fasciotomy sites in next 48 hours. Gallardo is not patent as urine leaks out whenever pt feels need to void. Urology consulting on gallardo and site issues. Pacer at bedside. Paced x2 as patient HR went down to 80 twice.

## 2018-03-26 NOTE — PROGRESS NOTES
Report called to 6C RN (Cher).  House orderly to bring 6C bed and we will transfer patient from chair to 6C bed.  Awaiting Urology to round on patient regarding urine leaking around catheter and continued penile pain.  All belongings gathered and will be sent up with the patient.

## 2018-03-26 NOTE — ANESTHESIA PREPROCEDURE EVALUATION
Anesthesia Evaluation     . Pt has had prior anesthetic. Type: General and MAC    No history of anesthetic complications          ROS/MED HX    ENT/Pulmonary:     (+)sleep apnea, asthma uses CPAP , . .    Neurologic:     (+)TIA     Cardiovascular: Comment:   -CAD s/p STEMI and CABG on 3/23/18  S/p removal of impella on 3/23/18 now with resultant findings of lack of flow on CFA with right lower extremity fasciotomy, with plans for closure      (+) Dyslipidemia, --CAD, -past MI,CABG-date: 3/23/18  CABG x 3; LIMA-LAD, RSVG-PDA, RSVG-OM, . : . CHF etiology: ischemic  Last EF: 40-45% date: 3/24/18 . . :. . Previous cardiac testing Echodate:3/22/2018results:  ECHO:Interpretation Summary  Technically difficult study.  Mildly (EF 45-50%) reduced left ventricular function is present. Accuracy of  LVEF and RWM assessment affected by atrial fibrillation. Anterior and septal  wall hypokinesis is noted.  An Impella device is present. The inlet area is in appropriate position at 5.5  cm below the aortic valve in the LV mid cavity.  Global right ventricular function and size are normal.  No significant valvular abnormalities noted.  The inferior vena cava is normal. Estimated mean right atrial pressure is 3  mmHg.  No pericardial effusion is present.    date: results:ECG reviewed date: results: date: results:          METS/Exercise Tolerance:     Hematologic:     (+) Anemia, History of Transfusion no previous transfusion reaction -      Musculoskeletal: Comment:   -s/p RLE fasciotomy with daily dressing changes, with planned closure    (+) , , other musculoskeletal-       GI/Hepatic:     (+) GERD       Renal/Genitourinary: Comment:   -GFR ~88          Endo: Comment:   -Transitioning to insulin SS, was on insulin gtt      (+) type II DM Last HgA1c: 7.8 date: 3/22 Using insulin Obesity, .      Psychiatric:  - neg psychiatric ROS       Infectious Disease:  - neg infectious disease ROS       Malignancy:      - no malignancy   Other:                      Physical Exam      Airway   Mallampati: II  TM distance: >3 FB  Neck ROM: full    Dental   (+) missing and chipped  Comment:   -Several missing teeth in upper and lower, poor dentition      Cardiovascular   Rhythm and rate: regular and normal      Pulmonary    breath sounds clear to auscultation                    Anesthesia Plan      History & Physical Review  History and physical reviewed and following examination; no interval change.    ASA Status:  3 .    NPO Status:  > 8 hours    Plan for MAC with Propofol and Intravenous induction. Maintenance will be TIVA.  Reason for MAC:  Deep or markedly invasive procedure (G8)  PONV prophylaxis:  Ondansetron (or other 5HT-3) and Dexamethasone or Solumedrol      71 yo male with PMH CAD s/p STEMI and CABG on 3/23/18 s/p placement and removal of impella on 3/23/18 with resultant findings of lack of flow on   CFA with right lower extremity fasciotomy, with plans for closure under sedation. Patient's most recent ECHO has improved (~40-50%), and patient was   extubated 3/25 with stability on minimal supplemental oxygen. He is on a heparin gtt, with plans for transition. He also has JASON on CPAP, and atrial   fibrillation on oral amiodarone. Much improved post CABG, and with some sensation in RLE, improving and tolerating daily dressing changes with   minimal opioids.    -Plan for MAC (moderate sedation with propofol, +/- ketamine) for fasciotomy closure  -Discussed possible conversion to general (LMA/ETT) if necessary for safety and completion of procedure, including risks of MAC and general anesthesia, patient agreeable to proceed.    Ignacio Layton MD  3/26/2018 6:06 PM  Anesthesia Resident  PGY3/CA-2        Postoperative Care  Postoperative pain management:  IV analgesics, Oral pain medications and Multi-modal analgesia.      Consents  Anesthetic plan, risks, benefits and alternatives discussed with:  Patient.  Use of blood products discussed: Yes.   Use of  blood products discussed with Patient.  Consented to blood products.  .

## 2018-03-26 NOTE — PLAN OF CARE
Problem: Patient Care Overview  Goal: Plan of Care/Patient Progress Review  Outcome: Improving  O2 titrated to room air with appropriate sats - requires 2L NC while sleeping r/t sat of 88%.  Pacer wires capped.  Up in chair with 3 assist and mechanical lift.  Felton continues with notable penile pain/redness/blistering surrounding catheter insertion site.  WOC RN consult ordered and pending.  Urine continues to leak around catheter site - this increases the patient's penile pain.  Awaiting call back from Urology regarding this.  Hep gtt continues.  Insulin gtt continues with scheduled carb coverage sliding scale.  Appetite decreased - has only had small bites of applesauce/mashed potatoes so far today. Bowel regimen initiated and BM pending.  + flatus noted.  Amio gtt transitioned to PO.  R EILEEN MILLAN dc'd.  L brachial alysha dc'd.  OR planned for potential fasciotomy closure tomorrow with vascular team - long form consent in paper chart per team's request.  NPO after midnight.  Chest tubes to be potentially dc'd tomorrow.  Transfer to floor orders have been placed -transfer pending bed availability.

## 2018-03-26 NOTE — PLAN OF CARE
Problem: Patient Care Overview  Goal: Plan of Care/Patient Progress Review  OT:  Pt with another care provider, will reschedule.

## 2018-03-26 NOTE — PROGRESS NOTES
CV ICU PROGRESS NOTE  March 26, 2018      CO-MORBIDITIES:   Obesity  Afib  HLD  HTN  DM  Asthma  CHF  STEMI s/p imeplla  CAD    ASSESSMENT: Izaiah Alvarez is a 72M with PMH of paroxysmal atrial fibrillation on coumadin, CHF, obesity, DM type II, HTN who who presented to [facility] with STEMI with coronary angiogram showing triple vessel disease (95% LM, 75% LAD, 90% OM,  RCA). ECHO showed EF around 20-25% and Impella device was placed via R femoral artery at the  OSH. The patient underwent a CABG for with R CFA cutdown yesterday for removal of Impella percutaneous ventricular assist device on 3/23. Extubated on POD#1. Underwent take back after pain of RLE and found to have thrombus that was removed. Left intubated after procedure. Extubated 3/25 and now will need fasciotomy sites closed by vascular surgery.     Today's progress:  - Plan to return to OR tomorrow for fasciotomy closure  - Remove arterial and central lines  - Transition to PO amiodarone  - Bowel regimen  - ADAT to cardiac diet  - Remove mediastinal chest tube - hold heparin for 4 hours  - Goal net negative 1L - give 40 mg lasix  - Transition to PO meds    PLAN:  NEURO:  -Acute pain: Tylenol, dilauded, Oxycodone PRN.    CV:                      #Acute on chronic RLE ischemia s/p thrombectomy and fasciotomy  #Multivessel CAD (LM and  of RCA) with low EF (25%) s/p CABG x3  #Paroxysmal A fib: s/p modified MAZE and LAAC placement  #HTN  -Heparin gtt started at fixed rate. Appreciate Vascular surgery consult. Plan for return to OR 3/27 for closure of fasciotomies  -No need for mechanical support after OR, completion MACARENA showed improved EF-45%. Statin today  -NSR currently. Continue Amiodarone for 3 weeks and transition to PO today  -Keep chest tubes and wires for now.     PULM:                #Post op atelectasis  -Doing well on 2L NC  -IS q 10 min.     FEN/GI:  #Hypophosphatemia  -Goal net negative 1L. 40 mg lasix  -ICU electrolyte replacement protocol.     -ADAT to cardiac diet. NPO at midnight for OR  -Bowel regimen.     :                      #Paraphymosis  -Difficult gallardo insertion, urology following. Will keep for today due to OR tomorrow    HEME/ID:  #Acute blood loss anemia  -Hgb stable.    -Hep gtt for both acute limb ischemia and history of A fib. (fixed rate due to recent sternotomy).      ENDO:                #DM  - Insulin gtt - will transition to SSI likely tomorrow when tolerating more of a diet     LINES:  -PIVs, Mediastinal x 2 and left pleural, Gallardo.     PPx:  -SCDs and hep gtt.   -PPI for GI prophylaxis.     DISPO:  -Xfer to 6c today    Patient discussed with CVTS fellow.    William Cancino MD  General Surgery PGY-3      ====================================    TODAY'S PROGRESS:   SUBJECTIVE:   - No acute events overnight.   - Denying any pain or SOB, F/C, N/V.     OBJECTIVE:   1. VITAL SIGNS:   Temp:  [98.1  F (36.7  C)-99  F (37.2  C)] 99  F (37.2  C)  Heart Rate:  [] 87  Resp:  [14-20] 18  MAP:  [64 mmHg-109 mmHg] 87 mmHg  Arterial Line BP: ()/(48-88) 122/72  SpO2:  [94 %-99 %] 96 %  Ventilation Mode: Other (see comments) (pt extubated)  FiO2 (%): 50 %  Rate Set (breaths/minute): 14 breaths/min  Tidal Volume Set (mL): 550 mL (changed per MD order)  PEEP (cm H2O): 5 cmH2O  Pressure Support (cm H2O): 7 cmH2O  Oxygen Concentration (%): 50 %  Resp: 18    2. INTAKE/ OUTPUT:   I/O last 3 completed shifts:  In: 1632.18 [P.O.:740; I.V.:892.18]  Out: 1020 [Urine:830; Chest Tube:190]    3. PHYSICAL EXAMINATION:   General: laying in bed following commands.   Neuro: A&Ox3, NAD  Resp: Breathing non-labored  CV: RRR  Abdomen: Soft, Non-distended, Non-tender  Incisions: CDI  Extremities: warm and well perfused    4. INVESTIGATIONS:   Arterial Blood Gases     Recent Labs  Lab 03/25/18  0957 03/25/18  0717 03/25/18  0350 03/24/18  2346   PH 7.44 7.46* 7.48* 7.50*   PCO2 40 35 35 34*   PO2 88 124* 68* 116*   HCO3 27 25 27 27     Complete Blood Count      Recent Labs  Lab 03/26/18 0420 03/25/18  1323 03/25/18  0350 03/24/18  1708   WBC 13.8* 12.5* 12.4* 14.3*   HGB 8.6* 8.4* 9.1* 9.7*   * 107* 112* 109*     Basic Metabolic Panel    Recent Labs  Lab 03/26/18 0420 03/25/18  0957 03/25/18  0350 03/24/18  1708 03/24/18  1558  03/24/18  0352     --  144 142 140  < > 146*   POTASSIUM 4.0 4.1 3.9 4.2 4.2  < > 4.3   CHLORIDE 108  --  110* 109  --   --  110*   CO2 24  --  23 28  --   --  23   BUN 23  --  23 26  --   --  35*   CR 0.86  --  1.06 0.99  --   --  1.17   *  --  124* 135* 151*  < > 220*   < > = values in this interval not displayed.  Liver Function Tests    Recent Labs  Lab 03/26/18 0420 03/25/18 0350 03/24/18  1558 03/24/18  0352 03/22/18  0645  03/22/18  0016   AST  --   --   --   --   --  264*  --  310*   ALT  --   --   --   --   --  69  --  82*   ALKPHOS  --   --   --   --   --  55  --  57   BILITOTAL  --   --   --   --   --  3.6*  --  4.5*   ALBUMIN  --   --   --   --   --  2.8*  --  2.9*   INR 1.44* 1.47* 1.53* 1.31*  < >  --   < > 1.20*   < > = values in this interval not displayed.  Coagulation Profile    Recent Labs  Lab 03/26/18 0420 03/25/18 0350 03/24/18  1558 03/24/18 0352 03/23/18  2345 03/23/18  2136   INR 1.44* 1.47* 1.53* 1.31* 1.28* 1.51*   PTT  --  37 109*  --  28 29     =========================================

## 2018-03-27 ENCOUNTER — ANESTHESIA (OUTPATIENT)
Dept: SURGERY | Facility: CLINIC | Age: 73
DRG: 228 | End: 2018-03-27
Payer: COMMERCIAL

## 2018-03-27 ENCOUNTER — APPOINTMENT (OUTPATIENT)
Dept: OCCUPATIONAL THERAPY | Facility: CLINIC | Age: 73
DRG: 228 | End: 2018-03-27
Attending: THORACIC SURGERY (CARDIOTHORACIC VASCULAR SURGERY)
Payer: COMMERCIAL

## 2018-03-27 ENCOUNTER — APPOINTMENT (OUTPATIENT)
Dept: GENERAL RADIOLOGY | Facility: CLINIC | Age: 73
DRG: 228 | End: 2018-03-27
Attending: PHYSICIAN ASSISTANT
Payer: COMMERCIAL

## 2018-03-27 LAB
ANION GAP SERPL CALCULATED.3IONS-SCNC: 11 MMOL/L (ref 3–14)
BUN SERPL-MCNC: 29 MG/DL (ref 7–30)
CALCIUM SERPL-MCNC: 8.3 MG/DL (ref 8.5–10.1)
CHLORIDE SERPL-SCNC: 107 MMOL/L (ref 94–109)
CO2 SERPL-SCNC: 22 MMOL/L (ref 20–32)
COPATH REPORT: NORMAL
CREAT SERPL-MCNC: 0.91 MG/DL (ref 0.66–1.25)
ERYTHROCYTE [DISTWIDTH] IN BLOOD BY AUTOMATED COUNT: 15.9 % (ref 10–15)
GFR SERPL CREATININE-BSD FRML MDRD: 82 ML/MIN/1.7M2
GLUCOSE BLDC GLUCOMTR-MCNC: 112 MG/DL (ref 70–99)
GLUCOSE BLDC GLUCOMTR-MCNC: 113 MG/DL (ref 70–99)
GLUCOSE BLDC GLUCOMTR-MCNC: 114 MG/DL (ref 70–99)
GLUCOSE BLDC GLUCOMTR-MCNC: 115 MG/DL (ref 70–99)
GLUCOSE BLDC GLUCOMTR-MCNC: 117 MG/DL (ref 70–99)
GLUCOSE BLDC GLUCOMTR-MCNC: 121 MG/DL (ref 70–99)
GLUCOSE BLDC GLUCOMTR-MCNC: 123 MG/DL (ref 70–99)
GLUCOSE BLDC GLUCOMTR-MCNC: 125 MG/DL (ref 70–99)
GLUCOSE BLDC GLUCOMTR-MCNC: 131 MG/DL (ref 70–99)
GLUCOSE BLDC GLUCOMTR-MCNC: 132 MG/DL (ref 70–99)
GLUCOSE BLDC GLUCOMTR-MCNC: 135 MG/DL (ref 70–99)
GLUCOSE BLDC GLUCOMTR-MCNC: 138 MG/DL (ref 70–99)
GLUCOSE BLDC GLUCOMTR-MCNC: 148 MG/DL (ref 70–99)
GLUCOSE BLDC GLUCOMTR-MCNC: 157 MG/DL (ref 70–99)
GLUCOSE BLDC GLUCOMTR-MCNC: 158 MG/DL (ref 70–99)
GLUCOSE BLDC GLUCOMTR-MCNC: 161 MG/DL (ref 70–99)
GLUCOSE BLDC GLUCOMTR-MCNC: 161 MG/DL (ref 70–99)
GLUCOSE BLDC GLUCOMTR-MCNC: 164 MG/DL (ref 70–99)
GLUCOSE BLDC GLUCOMTR-MCNC: 209 MG/DL (ref 70–99)
GLUCOSE BLDC GLUCOMTR-MCNC: 238 MG/DL (ref 70–99)
GLUCOSE BLDC GLUCOMTR-MCNC: 264 MG/DL (ref 70–99)
GLUCOSE SERPL-MCNC: 151 MG/DL (ref 70–99)
HCT VFR BLD AUTO: 32 % (ref 40–53)
HGB BLD-MCNC: 9.9 G/DL (ref 13.3–17.7)
INR PPP: 1.34 (ref 0.86–1.14)
LMWH PPP CHRO-ACNC: <0.1 IU/ML
MAGNESIUM SERPL-MCNC: 2.4 MG/DL (ref 1.6–2.3)
MCH RBC QN AUTO: 30 PG (ref 26.5–33)
MCHC RBC AUTO-ENTMCNC: 30.9 G/DL (ref 31.5–36.5)
MCV RBC AUTO: 97 FL (ref 78–100)
PLATELET # BLD AUTO: 235 10E9/L (ref 150–450)
POTASSIUM SERPL-SCNC: 4.3 MMOL/L (ref 3.4–5.3)
RBC # BLD AUTO: 3.3 10E12/L (ref 4.4–5.9)
SODIUM SERPL-SCNC: 140 MMOL/L (ref 133–144)
WBC # BLD AUTO: 11.8 10E9/L (ref 4–11)

## 2018-03-27 PROCEDURE — 25000132 ZZH RX MED GY IP 250 OP 250 PS 637: Performed by: THORACIC SURGERY (CARDIOTHORACIC VASCULAR SURGERY)

## 2018-03-27 PROCEDURE — 25000132 ZZH RX MED GY IP 250 OP 250 PS 637: Performed by: INTERNAL MEDICINE

## 2018-03-27 PROCEDURE — 71045 X-RAY EXAM CHEST 1 VIEW: CPT

## 2018-03-27 PROCEDURE — 25000125 ZZHC RX 250: Performed by: THORACIC SURGERY (CARDIOTHORACIC VASCULAR SURGERY)

## 2018-03-27 PROCEDURE — 25000128 H RX IP 250 OP 636: Performed by: PHYSICIAN ASSISTANT

## 2018-03-27 PROCEDURE — 37000008 ZZH ANESTHESIA TECHNICAL FEE, 1ST 30 MIN: Performed by: SURGERY

## 2018-03-27 PROCEDURE — 83735 ASSAY OF MAGNESIUM: CPT | Performed by: THORACIC SURGERY (CARDIOTHORACIC VASCULAR SURGERY)

## 2018-03-27 PROCEDURE — 36000051 ZZH SURGERY LEVEL 2 1ST 30 MIN - UMMC: Performed by: SURGERY

## 2018-03-27 PROCEDURE — 80048 BASIC METABOLIC PNL TOTAL CA: CPT | Performed by: THORACIC SURGERY (CARDIOTHORACIC VASCULAR SURGERY)

## 2018-03-27 PROCEDURE — 80048 BASIC METABOLIC PNL TOTAL CA: CPT | Performed by: PHYSICIAN ASSISTANT

## 2018-03-27 PROCEDURE — 85610 PROTHROMBIN TIME: CPT | Performed by: THORACIC SURGERY (CARDIOTHORACIC VASCULAR SURGERY)

## 2018-03-27 PROCEDURE — 97535 SELF CARE MNGMENT TRAINING: CPT | Mod: GO | Performed by: OCCUPATIONAL THERAPIST

## 2018-03-27 PROCEDURE — 40000133 ZZH STATISTIC OT WARD VISIT: Performed by: OCCUPATIONAL THERAPIST

## 2018-03-27 PROCEDURE — 0JQN0ZZ REPAIR RIGHT LOWER LEG SUBCUTANEOUS TISSUE AND FASCIA, OPEN APPROACH: ICD-10-PCS | Performed by: SURGERY

## 2018-03-27 PROCEDURE — 97530 THERAPEUTIC ACTIVITIES: CPT | Mod: GO | Performed by: OCCUPATIONAL THERAPIST

## 2018-03-27 PROCEDURE — 25000132 ZZH RX MED GY IP 250 OP 250 PS 637: Performed by: SURGERY

## 2018-03-27 PROCEDURE — 85027 COMPLETE CBC AUTOMATED: CPT | Performed by: THORACIC SURGERY (CARDIOTHORACIC VASCULAR SURGERY)

## 2018-03-27 PROCEDURE — 27210794 ZZH OR GENERAL SUPPLY STERILE: Performed by: SURGERY

## 2018-03-27 PROCEDURE — 93005 ELECTROCARDIOGRAM TRACING: CPT

## 2018-03-27 PROCEDURE — 36415 COLL VENOUS BLD VENIPUNCTURE: CPT | Performed by: THORACIC SURGERY (CARDIOTHORACIC VASCULAR SURGERY)

## 2018-03-27 PROCEDURE — G0463 HOSPITAL OUTPT CLINIC VISIT: HCPCS

## 2018-03-27 PROCEDURE — 25000128 H RX IP 250 OP 636: Performed by: THORACIC SURGERY (CARDIOTHORACIC VASCULAR SURGERY)

## 2018-03-27 PROCEDURE — 74018 RADEX ABDOMEN 1 VIEW: CPT

## 2018-03-27 PROCEDURE — 25000132 ZZH RX MED GY IP 250 OP 250 PS 637: Performed by: PHYSICIAN ASSISTANT

## 2018-03-27 PROCEDURE — 25000132 ZZH RX MED GY IP 250 OP 250 PS 637

## 2018-03-27 PROCEDURE — 25000128 H RX IP 250 OP 636: Performed by: NURSE ANESTHETIST, CERTIFIED REGISTERED

## 2018-03-27 PROCEDURE — 25000125 ZZHC RX 250: Performed by: SURGERY

## 2018-03-27 PROCEDURE — 25000131 ZZH RX MED GY IP 250 OP 636 PS 637: Performed by: THORACIC SURGERY (CARDIOTHORACIC VASCULAR SURGERY)

## 2018-03-27 PROCEDURE — 40000170 ZZH STATISTIC PRE-PROCEDURE ASSESSMENT II: Performed by: SURGERY

## 2018-03-27 PROCEDURE — 40000141 ZZH STATISTIC PERIPHERAL IV START W/O US GUIDANCE

## 2018-03-27 PROCEDURE — 36000053 ZZH SURGERY LEVEL 2 EA 15 ADDTL MIN - UMMC: Performed by: SURGERY

## 2018-03-27 PROCEDURE — 93010 ELECTROCARDIOGRAM REPORT: CPT | Performed by: INTERNAL MEDICINE

## 2018-03-27 PROCEDURE — 37000009 ZZH ANESTHESIA TECHNICAL FEE, EACH ADDTL 15 MIN: Performed by: SURGERY

## 2018-03-27 PROCEDURE — 00000146 ZZHCL STATISTIC GLUCOSE BY METER IP

## 2018-03-27 PROCEDURE — 85520 HEPARIN ASSAY: CPT | Performed by: THORACIC SURGERY (CARDIOTHORACIC VASCULAR SURGERY)

## 2018-03-27 PROCEDURE — 21400006 ZZH R&B CCU INTERMEDIATE UMMC

## 2018-03-27 RX ORDER — FENTANYL CITRATE 50 UG/ML
25-50 INJECTION, SOLUTION INTRAMUSCULAR; INTRAVENOUS
Status: CANCELLED | OUTPATIENT
Start: 2018-03-27

## 2018-03-27 RX ORDER — SODIUM CHLORIDE, SODIUM LACTATE, POTASSIUM CHLORIDE, CALCIUM CHLORIDE 600; 310; 30; 20 MG/100ML; MG/100ML; MG/100ML; MG/100ML
INJECTION, SOLUTION INTRAVENOUS CONTINUOUS PRN
Status: DISCONTINUED | OUTPATIENT
Start: 2018-03-27 | End: 2018-03-27

## 2018-03-27 RX ORDER — FENTANYL CITRATE 50 UG/ML
INJECTION, SOLUTION INTRAMUSCULAR; INTRAVENOUS PRN
Status: DISCONTINUED | OUTPATIENT
Start: 2018-03-27 | End: 2018-03-27

## 2018-03-27 RX ORDER — LIDOCAINE HYDROCHLORIDE 10 MG/ML
INJECTION, SOLUTION EPIDURAL; INFILTRATION; INTRACAUDAL; PERINEURAL PRN
Status: DISCONTINUED | OUTPATIENT
Start: 2018-03-27 | End: 2018-03-27 | Stop reason: HOSPADM

## 2018-03-27 RX ORDER — SODIUM CHLORIDE, SODIUM LACTATE, POTASSIUM CHLORIDE, CALCIUM CHLORIDE 600; 310; 30; 20 MG/100ML; MG/100ML; MG/100ML; MG/100ML
INJECTION, SOLUTION INTRAVENOUS CONTINUOUS
Status: CANCELLED | OUTPATIENT
Start: 2018-03-27

## 2018-03-27 RX ORDER — FUROSEMIDE 10 MG/ML
40 INJECTION INTRAMUSCULAR; INTRAVENOUS ONCE
Status: DISCONTINUED | OUTPATIENT
Start: 2018-03-27 | End: 2018-03-27

## 2018-03-27 RX ORDER — PROPOFOL 10 MG/ML
INJECTION, EMULSION INTRAVENOUS PRN
Status: DISCONTINUED | OUTPATIENT
Start: 2018-03-27 | End: 2018-03-27

## 2018-03-27 RX ORDER — BISACODYL 10 MG
10 SUPPOSITORY, RECTAL RECTAL ONCE
Status: COMPLETED | OUTPATIENT
Start: 2018-03-27 | End: 2018-03-27

## 2018-03-27 RX ORDER — NALOXONE HYDROCHLORIDE 0.4 MG/ML
.1-.4 INJECTION, SOLUTION INTRAMUSCULAR; INTRAVENOUS; SUBCUTANEOUS
Status: DISCONTINUED | OUTPATIENT
Start: 2018-03-27 | End: 2018-03-27

## 2018-03-27 RX ORDER — ONDANSETRON 4 MG/1
4 TABLET, ORALLY DISINTEGRATING ORAL EVERY 30 MIN PRN
Status: CANCELLED | OUTPATIENT
Start: 2018-03-27

## 2018-03-27 RX ORDER — ONDANSETRON 2 MG/ML
4 INJECTION INTRAMUSCULAR; INTRAVENOUS EVERY 30 MIN PRN
Status: CANCELLED | OUTPATIENT
Start: 2018-03-27

## 2018-03-27 RX ORDER — OXYBUTYNIN CHLORIDE 5 MG/1
5 TABLET ORAL 2 TIMES DAILY
Status: DISCONTINUED | OUTPATIENT
Start: 2018-03-27 | End: 2018-03-29

## 2018-03-27 RX ORDER — FUROSEMIDE 10 MG/ML
40 INJECTION INTRAMUSCULAR; INTRAVENOUS ONCE
Status: COMPLETED | OUTPATIENT
Start: 2018-03-27 | End: 2018-03-27

## 2018-03-27 RX ORDER — PROPOFOL 10 MG/ML
INJECTION, EMULSION INTRAVENOUS CONTINUOUS PRN
Status: DISCONTINUED | OUTPATIENT
Start: 2018-03-27 | End: 2018-03-27

## 2018-03-27 RX ORDER — WARFARIN SODIUM 5 MG/1
5 TABLET ORAL
Status: COMPLETED | OUTPATIENT
Start: 2018-03-27 | End: 2018-03-27

## 2018-03-27 RX ADMIN — MULTIPLE VITAMINS W/ MINERALS TAB 1 TABLET: TAB at 11:47

## 2018-03-27 RX ADMIN — PANTOPRAZOLE SODIUM 40 MG: 40 TABLET, DELAYED RELEASE ORAL at 11:48

## 2018-03-27 RX ADMIN — SENNOSIDES AND DOCUSATE SODIUM 1 TABLET: 8.6; 5 TABLET ORAL at 22:07

## 2018-03-27 RX ADMIN — FENTANYL CITRATE 50 MCG: 50 INJECTION, SOLUTION INTRAMUSCULAR; INTRAVENOUS at 08:19

## 2018-03-27 RX ADMIN — HUMAN INSULIN 12 UNITS/HR: 100 INJECTION, SOLUTION SUBCUTANEOUS at 22:07

## 2018-03-27 RX ADMIN — BISACODYL 10 MG: 10 SUPPOSITORY RECTAL at 13:30

## 2018-03-27 RX ADMIN — HUMAN INSULIN 6 UNITS/HR: 100 INJECTION, SOLUTION SUBCUTANEOUS at 15:27

## 2018-03-27 RX ADMIN — CEFAZOLIN SODIUM 2 G: 2 INJECTION, SOLUTION INTRAVENOUS at 08:22

## 2018-03-27 RX ADMIN — Medication 100 MG: at 11:50

## 2018-03-27 RX ADMIN — Medication 12.5 MG: at 11:53

## 2018-03-27 RX ADMIN — FINASTERIDE 5 MG: 5 TABLET, FILM COATED ORAL at 11:53

## 2018-03-27 RX ADMIN — PROPOFOL 30 MG: 10 INJECTION, EMULSION INTRAVENOUS at 08:25

## 2018-03-27 RX ADMIN — OXYBUTYNIN CHLORIDE 5 MG: 5 TABLET ORAL at 14:39

## 2018-03-27 RX ADMIN — HUMAN INSULIN 2 UNITS/HR: 100 INJECTION, SOLUTION SUBCUTANEOUS at 13:18

## 2018-03-27 RX ADMIN — SODIUM CHLORIDE, POTASSIUM CHLORIDE, SODIUM LACTATE AND CALCIUM CHLORIDE: 600; 310; 30; 20 INJECTION, SOLUTION INTRAVENOUS at 08:12

## 2018-03-27 RX ADMIN — MIDAZOLAM 1 MG: 1 INJECTION INTRAMUSCULAR; INTRAVENOUS at 08:28

## 2018-03-27 RX ADMIN — WARFARIN SODIUM 5 MG: 5 TABLET ORAL at 18:17

## 2018-03-27 RX ADMIN — AMIODARONE HYDROCHLORIDE 200 MG: 200 TABLET ORAL at 20:18

## 2018-03-27 RX ADMIN — INSULIN ASPART 3 UNITS: 100 INJECTION, SOLUTION INTRAVENOUS; SUBCUTANEOUS at 20:20

## 2018-03-27 RX ADMIN — HUMAN INSULIN 2 UNITS/HR: 100 INJECTION, SOLUTION SUBCUTANEOUS at 06:01

## 2018-03-27 RX ADMIN — POLYETHYLENE GLYCOL 3350 17 G: 17 POWDER, FOR SOLUTION ORAL at 11:48

## 2018-03-27 RX ADMIN — ATORVASTATIN CALCIUM 20 MG: 20 TABLET, FILM COATED ORAL at 20:19

## 2018-03-27 RX ADMIN — ASPIRIN 81 MG: 81 TABLET, COATED ORAL at 11:46

## 2018-03-27 RX ADMIN — LIDOCAINE: 50 OINTMENT TOPICAL at 14:48

## 2018-03-27 RX ADMIN — OXYCODONE HYDROCHLORIDE 5 MG: 5 TABLET ORAL at 22:07

## 2018-03-27 RX ADMIN — FUROSEMIDE 40 MG: 10 INJECTION, SOLUTION INTRAVENOUS at 14:48

## 2018-03-27 RX ADMIN — AMIODARONE HYDROCHLORIDE 200 MG: 200 TABLET ORAL at 11:45

## 2018-03-27 RX ADMIN — METOPROLOL TARTRATE 50 MG: 50 TABLET, FILM COATED ORAL at 07:59

## 2018-03-27 RX ADMIN — HYDROMORPHONE HYDROCHLORIDE 0.3 MG: 1 INJECTION, SOLUTION INTRAMUSCULAR; INTRAVENOUS; SUBCUTANEOUS at 11:09

## 2018-03-27 RX ADMIN — PROPOFOL 75 MCG/KG/MIN: 10 INJECTION, EMULSION INTRAVENOUS at 08:23

## 2018-03-27 RX ADMIN — Medication 1000 MG: at 11:47

## 2018-03-27 NOTE — PROGRESS NOTES
Neuro: A&Ox4.   Cardiac: VSS, NSR. Pacer wires capped.   Respiratory: 2L per nasal cannula at night for sleep    GI/: Felton intact but leaking at site, brief on overnight to prevent skin breakdown. No BM this shift.   Diet/appetite: Tolerating NPO diet. Denies nausea   Activity: Up with mech lift assist    Pain: . Denies   Skin: No new deficits noted- RLE ACE wrap intact. R groin wound vac intact. Erythema and blisters around catheter site- WOC consult placed yesterday   Lines: PIV infusing insulin gtt at 2u/hr on Alg 4. Recheck at 0700.  Heparin gtt stopped at 0615 per surgery orders.   Drains: 3 Chest tubes y-sited into 1 cannister to -20 suction with small serosang output     Plan for fasciotomy closure with vascular surgery pt left floor around 0630   Pt has been resting comfortably throughout night, will continue to monitor and follow plan of care.

## 2018-03-27 NOTE — OP NOTE
Procedure Date: 03/27/2018      DATE OF PROCEDURE:  03/27/2018      LOCATION:  [facility] main operating room.       PREOPERATIVE DIAGNOSIS:  Acute limb ischemia, right leg.      POSTOPERATIVE DIAGNOSIS:  Acute limb ischemia, right leg.      PROCEDURE:  Closure right lower leg 4 compartment fasciotomies.        SURGEON:  Margarette Tang MD      ASSISTANTS:  MACARIO DREW PA-C      FINDINGS:  Healthy-appearing muscle in both medial and lateral incisions without evidence of active infection or ischemia after primary delayed closure good skin apposition. Blood loss less than 10 mL. Procedure well tolerated.  Note that this is a class II wound as the wound was a delayed primary closure.      BRIEF CLINICAL HISTORY:  Mr. Farooq is a 72-year-old gentleman who came in after acute coronary syndrome requiring emergency CABG post procedure and removal of Impella device had an ischemic right leg that required thrombectomy and iliac stent placement by myself 2 days ago. As part of that procedure 4 compartment fasciotomies were done as a preventative measure to eliminate risk of compartment syndrome. Has done well from this without important swelling and recovery of motor and sensory function, now presents for closure of his fasciotomy incisions.      DESCRIPTION OF PROCEDURE:  The patient was prepped and draped for access to his right leg. Local lidocaine 1% was infused at each staple point were vessel loop approximation had been performed, the staples were removed as was the vessel loop. The wounds were irrigated and then interrupted nylon 3-0 sutures were used to close the leg with the mattress technique.  Procedure was well tolerated.       Please note that MACARIO DREW PA-C was assisting and required for the entire operation and was involved in the preparation and wound closure from start to finish.         MARGARETTE TANG MD             D: 03/27/2018   T: 03/27/2018   MT: AUGUSTIN      Name:     MONSE FAROOQ   MRN:       [id number]        Account:        [id number]   :      1945           Procedure Date: 2018      Document: [id number]       cc: Eric Marks MD

## 2018-03-27 NOTE — PROGRESS NOTES
Received report from nurse on 6c.  Stated urine leaking around urethral catheter.  Brief also placed.  Area reddened.  Called dr. erwin (urology resident).  Stated no improvement in leakage would be made by changing catheter; spasms may be causing leakage, therefore would occur with replacement catheter.

## 2018-03-27 NOTE — PLAN OF CARE
Problem: Patient Care Overview  Goal: Plan of Care/Patient Progress Review  OT/CR 6C: Discharge Planner OT   Patient plan for discharge: Rehab  Current status: Pt quite fatigued and limited by pain.  Receptive to education on activity within post surgical precautions and benefits of increased time OOB.  Pt Mod A to roll bilaterally, dependent for bedside transfers.  SBA for oral/facial hygiene seated in bedside chair, VSS on 2L nc  Barriers to return to prior living situation: weakness, pain, post surgical precautions, deconditioing  Recommendations for discharge: TCU  Rationale for recommendations: Pt is currently below baseline with regards to mobility and independence with self cares and will benefit from continued skilled therapy intervention to address deficits.         Entered by: Lyric Delacruz 03/27/2018 3:10 PM

## 2018-03-27 NOTE — PROGRESS NOTES
CARDIOTHORACIC SURGERY PROGRESS NOTE  3/27/2018      SUBJECTIVE:    No acute issues over night. Back up from OR at 1000 this AM.   Pain well controlled.   No acute complaints. Reports high UOP around gallardo  Patient denies SOB, CP, fever, chills, sweats.     Patient has been tolerating clear liquid diet. no BM. + flatus.  NSR with PVCs and PACs. HR as low as 57 this am, but now in 70s. No significant pauses or dropped beats..       OBJECTIVE:   Temp:  [97.5  F (36.4  C)-99.6  F (37.6  C)] 97.5  F (36.4  C)  Heart Rate:  [60-84] 62  Resp:  [16-20] 20  BP: (118-174)/(70-95) 124/81  SpO2:  [88 %-100 %] 100 %    Gen: A&Ox3, NAD  Neck: + JVD   CV: RRR, normal S1, S2, no murmurs, rubs or gallops   Pulm: Lungs with crackles bilaterally, no wheezing or rhonchi  Abd: Soft, NT, ND, +BS  Ext: 1+ left LE edema, bandage in place over fasciotomy closure on right LE  Neuro: Nonfocal  Incision: c/d/i, no erythema, sternum stable  Tubes/drains: Dressing clean and dry, 130/120cc serosanguinous output from all tubes, no air leak.     I&O's:  I/O last 3 completed shifts:  In: 214.35 [I.V.:214.35]  Out: 350 [Urine:230; Chest Tube:120]    Labs:   BMP    Recent Labs  Lab 03/27/18  1011 03/26/18  0420 03/25/18  0957 03/25/18  0350 03/24/18  1708    141  --  144 142   POTASSIUM 4.3 4.0 4.1 3.9 4.2   CHLORIDE 107 108  --  110* 109   STEFFANY 8.3* 8.3*  --  8.0* 8.7   CO2 22 24  --  23 28   BUN 29 23  --  23 26   CR 0.91 0.86  --  1.06 0.99   * 126*  --  124* 135*     CBC    Recent Labs  Lab 03/27/18  1011 03/26/18  0420 03/25/18  1323 03/25/18  0350   WBC 11.8* 13.8* 12.5* 12.4*   RBC 3.30* 2.86* 2.82* 3.06*   HGB 9.9* 8.6* 8.4* 9.1*   HCT 32.0* 27.0* 26.3* 28.3*   MCV 97 94 93 93   MCH 30.0 30.1 29.8 29.7   MCHC 30.9* 31.9 31.9 32.2   RDW 15.9* 16.3* 16.8* 16.7*    141* 107* 112*     INR    Recent Labs  Lab 03/27/18  1011 03/26/18  0420 03/25/18  0350 03/24/18  1558   INR 1.34* 1.44* 1.47* 1.53*      Hepatic Panel     Recent  Labs  Lab 03/22/18  0645 03/22/18  0016   * 310*   ALT 69 82*   ALKPHOS 55 57   BILITOTAL 3.6* 4.5*   ALBUMIN 2.8* 2.9*     Glucose  Recent Labs  Lab 03/27/18  1158 03/27/18  1103 03/27/18  1011 03/27/18  0949 03/27/18  0901 03/27/18  0710 03/27/18  0557  03/26/18  0420  03/25/18  0350  03/24/18  1708  03/24/18  1558 03/24/18  1511   GLC  --   --  151*  --   --   --   --   --  126*  --  124*  --  135*  --  151* 164*   * 138*  --  158* 157* 148* 117*  < >  --   < >  --   < >  --   < >  --   --    < > = values in this interval not displayed.    Imaging:   No results found for this or any previous visit (from the past 24 hour(s)).      ASSESSMENT/PLAN: Izaiah Alvarez is a 72M with PMH of paroxysmal atrial fibrillation on coumadin, CHF, obesity, DM type II, HTN who who presented to [facility] with STEMI with coronary angiogram showing triple vessel disease (95% LM, 75% LAD, 90% OM,  RCA). ECHO showed EF around 20-25% and Impella device was placed via R femoral artery at the  OSH. The patient underwent a CABG for with R CFA cutdown yesterday for removal of Impella percutaneous ventricular assist device on 3/23. Extubated on POD#1. Underwent take back after pain of RLE and found to have thrombus that was removed. Left intubated after procedure. Extubated 3/25 and now will need fasciotomy sites closed by vascular surgery.      NEURO:  -Acute pain: Tylenol, dilauded, Oxycodone PRN.    CV:                      #Acute on chronic RLE ischemia s/p thrombectomy and fasciotomy  #Multivessel CAD (LM and  of RCA) with low EF (25%) s/p CABG x3, Post-op EF 45%  #Paroxysmal A fib: s/p modified MAZE and LAAC placement  #HTN  -Heparin gtt started at fixed rate. Appreciate Vascular surgery consult. Returned to OR 3/27 for closure of fasciotomies  -NSR currently. Continue Amiodarone for 3 weeks and transitioned to PO 3/26  -Removed TPW and mediastinal chest tubes 3/27, keep left pleural tube to suction.      PULM:                #Post op atelectasis  -Doing well on 2L NC, wean as able  -IS q 10 min.     FEN/GI:  #Hypophosphatemia  -ADAT to cardiac diet.  -Bowel regimen, no BM, will add suppository this PM if no BM    /Renal:                      #Paraphymosis  -Difficult gallardo insertion, urology following, leaking around gallardo, d/w urology, recommended starting oxybutynin for bladder spasms  -Goal net negative 1L. Reported good UOP, not recorded due to leak around gallardo, will give 40 mg lasix again today, weight trending down.    HEME/ID:  #Acute blood loss anemia  -Hgb stable.    -Hep gtt for both acute limb ischemia and history of A fib. (fixed rate due to recent sternotomy). Start coumadin tonight       ENDO:                #DM  - Insulin gtt - will transition to SSI likely tomorrow when tolerating more of a diet    ID:   - Periop ancef for fasciotomy closure 3/27  - No signs or symptoms of infection.     PPx:  -SCDs and hep gtt.   -PPI for GI prophylaxis.     DISPO:  -6c since 3/26     Patient discussed with CVTS fellow.      Staff surgeons have been informed of changes through both verbal and written communication.         Scott Sanon PA-C  Cardiothoracic Surgery  March 27, 2018 10:27 AM   p: [phone]

## 2018-03-27 NOTE — PLAN OF CARE
Problem: Patient Care Overview  Goal: Plan of Care/Patient Progress Review  PT-6C- Cancel, AM attempt pt to OR for fasciotomy closure, PM attempt pt declines PT reports increased pain in RLE and anxious about trying to have BM. PT encouraged pt to perform muscle pumping and exercise to help promote BM.

## 2018-03-27 NOTE — PLAN OF CARE
Problem: Patient Care Overview  Goal: Plan of Care/Patient Progress Review  PT 4C: Attempted to reposition pt to edge of chair with mod A however pt with increased pain in groin and gallardo discomfort, plans to return to OR tomorrow for fasciotomy closure. Will reschedule.

## 2018-03-27 NOTE — ANESTHESIA POSTPROCEDURE EVALUATION
Patient: Izaiah Alvarez    Procedure(s):  Right Leg Fasciotomy Closure x2 - Wound Class: II-Clean Contaminated    Diagnosis:Right Iliac Thrombosis   Diagnosis Additional Information: No value filed.    Anesthesia Type:  MAC    Note:  Anesthesia Post Evaluation    Patient location during evaluation: Bedside  Patient participation: Able to fully participate in evaluation  Level of consciousness: awake  Pain management: adequate  Airway patency: patent  Cardiovascular status: acceptable  Respiratory status: acceptable  Hydration status: acceptable  PONV: none     Anesthetic complications: None          Last vitals:  Vitals:    03/26/18 2315 03/27/18 0720 03/27/18 0945   BP: 131/76 (!) 174/91 133/87   Resp: 16 20 20   Temp: 37.2  C (98.9  F) 37.4  C (99.3  F) 36.4  C (97.5  F)   SpO2: 95% 98% 99%         Electronically Signed By: Esteban Man MD  March 27, 2018  10:02 AM

## 2018-03-27 NOTE — PLAN OF CARE
Problem: Patient Care Overview  Goal: Plan of Care/Patient Progress Review  OT 6C: Cancel.  Pt gone to OR this AM for fasciotomy closure.

## 2018-03-27 NOTE — PROGRESS NOTES
[facility] Nurse Inpatient Wound Assessment     Initial  Assessment  Reason for consultation: Evaluate and treat Penis wound     Assessment  Penis wound due to Trauma and irritation from Gallardo's catheter  Status: initial assessment    Treatment Plan  Penis wound: Daily catheter care as per unit routine.   Apply lidocaine jelly as prescribed.  Ensure catheter is not too tight. Reposition the securing device daily.  Reassess gallardo's need daily and discontinue when not appropriate per MD order.    Orders Written  WOC Nurse follow-up plan:weekly  Nursing to notify the Provider(s) and re-consult the WO Nurse if wound(s) deteriorates or new skin concern.    Patient History  According to provider note(s):  Izaiah Alvarez is a 72M with PMH of paroxysmal atrial fibrillation on coumadin, CHF, obesity, DM type II, HTN who who presented to [facility] with STEMI with coronary angiogram showing triple vessel disease (95% LM, 75% LAD, 90% OM,  RCA). ECHO showed EF around 20-25% and Impella device was placed via R femoral artery at the  OSH. The patient underwent a CABG for with R CFA cutdown yesterday for removal of Impella percutaneous ventricular assist device on 3/23. Extubated on POD#1. Underwent take back after pain of RLE and found to have thrombus that was removed. Left intubated after procedure. Extubated 3/25 and now will need fasciotomy sites closed by vascular surgery.     Objective Data  Containment of urine/stool: Continent of bowel and Indwelling catheter    Active Diet Order    Active Diet Order      Advance Diet as Tolerated: Clear Liquid Diet    Output:   I/O last 3 completed shifts:  In: 214.35 [I.V.:214.35]  Out: 350 [Urine:230; Chest Tube:120]    Risk Assessment:    Donn Donn Score  Av.1  Min: 12  Max: 19                            Labs:   Recent Labs  Lab 18  1011  18  0148   HGB 9.9*  < > 13.1*   INR 1.34*  < > 1.18*   WBC 11.8*  < > 10.1   A1C  --   --  7.8*   < > = values in this interval not  displayed.      No lab results found in last 7 days.    Physical Exam  Skin assessment:   Focused skin inspection: Penis    Wound Location:  Penis  Date of last photo- Refuse to have pic taken  Wound History: Per pt and family, he had traumatic experience during gallardo's insertion on Monday. It took four trial for placement, it has been sore since then.  Measurements (length x width x depth, in cm) 0.2-0.3 minimal erosion on the tip of the penis circumferentially  Wound Base:  mucosal  Tunneling N/A  Undermining N/A  Palpation of the wound bed: normal   Periwound skin: mucosal  Color: pink  Temperature: normal   Drainage:, scant  Description of drainage: serous  Odor: mild  Pain: irritable and very sensitive during cleansing    Interventions  Current support surface: Standard  Atmos Air mattress    Current off-loading measures: Pillows under calves  Visual inspection of wound(s) completed  Wound Care: Area cleansed and assessed.    Supplies: none  Education provided today: importance of keeping it clean and dry    Discussed plan of care with Patient, Family and Nurse      Chrystal Alford RN

## 2018-03-27 NOTE — PROGRESS NOTES
Urology Brief Note    Mr. Alvarez is a 72 year old male recently transferred from [facility] with a MI for which he underwent CABG last week. Prior to transfer, he had a traumatic gallardo placement at the outside hospital; please see 3/24/18 urology consult note by Dr. Biggs for further details. Called today regarding ongoing bladder spasms and leaking around gallardo catheter. On exam, the gallardo is secured with tension to the stat lock on the patient's leg. The gallardo is able to be advanced fully into the bladder where it easily irrigated with 120cc NS. There was return of yellow urine with irrigation. Gallardo was repositioned on less tension with ongoing drainage of clear urine and no leaking around catheter.     Recommend continuing oxybutynin 5mg TID PRN for bladder spasms (although would hold this for 12 hours prior to attempted gallardo removal). Okay to irrigate gallardo with 60-120cc NS PRN for sluggish drainage or leakage.     Urology will sign off. Call with questions or concerns.     Wesly Servin MD  Urology PGY4

## 2018-03-27 NOTE — PROGRESS NOTES
D: Pt admitted 3/21/18 from OSH with an MI who underwent CABG 3/23/18 c/b RLE thrombus requiring thrombectomy and fasciotomy    I: Monitored vitals and assessed pt status.   Changed: Temp pacemaker wires and mediastinal chest tubes removed by team. 1:1 pleural chest tube -20 sxn remaining. New gallardo stat lock in place.  Running: Regular Insulin gtt at 2 units/hr and Heparin at 1650 units/hr  PRN: Oxybutynin for bladder spasm. Lidocaine ointment to penis for pain    A: A0x4. VSS. Afebrile. Urology consult for leaking gallardo catheter. Adequate UOP s/p gallardo irrigation.    P: Continue to monitor pt status and report changes to treatment team.    Temp:  [97.5  F (36.4  C)-99.3  F (37.4  C)] 98.5  F (36.9  C)  Heart Rate:  [60-84] 82  Resp:  [16-20] 20  BP: (124-174)/(76-91) 131/80  SpO2:  [94 %-100 %] 94 %

## 2018-03-27 NOTE — ANESTHESIA CARE TRANSFER NOTE
Patient: Izaiah Alvarez    Procedure(s):  Right Leg Fasciotomy Closure x2 - Wound Class: II-Clean Contaminated    Diagnosis: Right Iliac Thrombosis   Diagnosis Additional Information: No value filed.    Anesthesia Type:   MAC     Note:  Airway :Nasal Cannula  Patient transferred to:Medical/Surgical Unit  Comments: Tolerated wellHandoff Report: Identifed the Patient, Identified the Reponsible Provider, Reviewed the pertinent medical history, Discussed the surgical course, Reviewed Intra-OP anesthesia mangement and issues during anesthesia, Set expectations for post-procedure period and Allowed opportunity for questions and acknowledgement of understanding      Vitals: (Last set prior to Anesthesia Care Transfer)    CRNA VITALS  3/27/2018 0854 - 3/27/2018 0939      3/27/2018             Pulse: 73    SpO2: 93 %    Resp Rate (set): 10                Electronically Signed By: CRISTIAN Calero CRNA  March 27, 2018  9:39 AM

## 2018-03-27 NOTE — PLAN OF CARE
Problem: Patient Care Overview  Goal: Plan of Care/Patient Progress Review  Outcome: No Change  Pt is alert, oriented, and able to make his needs known but very tired.   Denied pain but is weak.   IV insulin running as ordered.  and 117. Insulin drip reduced from 6 ml/hr 2 ml/hr as per algorithm 4. Pt tolerated well.   Heart rhythm Afib. Per pts wife, this is baseline.   Will continue to assess labs, VS, and heart rhythm. Changes and concerns will be reported to provider.

## 2018-03-28 ENCOUNTER — APPOINTMENT (OUTPATIENT)
Dept: OCCUPATIONAL THERAPY | Facility: CLINIC | Age: 73
DRG: 228 | End: 2018-03-28
Attending: THORACIC SURGERY (CARDIOTHORACIC VASCULAR SURGERY)
Payer: COMMERCIAL

## 2018-03-28 ENCOUNTER — APPOINTMENT (OUTPATIENT)
Dept: GENERAL RADIOLOGY | Facility: CLINIC | Age: 73
DRG: 228 | End: 2018-03-28
Attending: PHYSICIAN ASSISTANT
Payer: COMMERCIAL

## 2018-03-28 ENCOUNTER — APPOINTMENT (OUTPATIENT)
Dept: PHYSICAL THERAPY | Facility: CLINIC | Age: 73
DRG: 228 | End: 2018-03-28
Attending: THORACIC SURGERY (CARDIOTHORACIC VASCULAR SURGERY)
Payer: COMMERCIAL

## 2018-03-28 LAB
ANION GAP SERPL CALCULATED.3IONS-SCNC: 7 MMOL/L (ref 3–14)
BUN SERPL-MCNC: 27 MG/DL (ref 7–30)
CALCIUM SERPL-MCNC: 8.1 MG/DL (ref 8.5–10.1)
CHLORIDE SERPL-SCNC: 106 MMOL/L (ref 94–109)
CO2 SERPL-SCNC: 26 MMOL/L (ref 20–32)
CREAT SERPL-MCNC: 0.93 MG/DL (ref 0.66–1.25)
ERYTHROCYTE [DISTWIDTH] IN BLOOD BY AUTOMATED COUNT: 16.1 % (ref 10–15)
GFR SERPL CREATININE-BSD FRML MDRD: 80 ML/MIN/1.7M2
GLUCOSE BLDC GLUCOMTR-MCNC: 102 MG/DL (ref 70–99)
GLUCOSE BLDC GLUCOMTR-MCNC: 106 MG/DL (ref 70–99)
GLUCOSE BLDC GLUCOMTR-MCNC: 112 MG/DL (ref 70–99)
GLUCOSE BLDC GLUCOMTR-MCNC: 113 MG/DL (ref 70–99)
GLUCOSE BLDC GLUCOMTR-MCNC: 114 MG/DL (ref 70–99)
GLUCOSE BLDC GLUCOMTR-MCNC: 114 MG/DL (ref 70–99)
GLUCOSE BLDC GLUCOMTR-MCNC: 120 MG/DL (ref 70–99)
GLUCOSE BLDC GLUCOMTR-MCNC: 122 MG/DL (ref 70–99)
GLUCOSE BLDC GLUCOMTR-MCNC: 129 MG/DL (ref 70–99)
GLUCOSE BLDC GLUCOMTR-MCNC: 134 MG/DL (ref 70–99)
GLUCOSE BLDC GLUCOMTR-MCNC: 138 MG/DL (ref 70–99)
GLUCOSE BLDC GLUCOMTR-MCNC: 149 MG/DL (ref 70–99)
GLUCOSE BLDC GLUCOMTR-MCNC: 152 MG/DL (ref 70–99)
GLUCOSE BLDC GLUCOMTR-MCNC: 89 MG/DL (ref 70–99)
GLUCOSE SERPL-MCNC: 105 MG/DL (ref 70–99)
HCT VFR BLD AUTO: 29 % (ref 40–53)
HGB BLD-MCNC: 8.8 G/DL (ref 13.3–17.7)
INR PPP: 1.51 (ref 0.86–1.14)
INTERPRETATION ECG - MUSE: NORMAL
LMWH PPP CHRO-ACNC: 0.11 IU/ML
LMWH PPP CHRO-ACNC: 0.19 IU/ML
MAGNESIUM SERPL-MCNC: 2.2 MG/DL (ref 1.6–2.3)
MCH RBC QN AUTO: 29.6 PG (ref 26.5–33)
MCHC RBC AUTO-ENTMCNC: 30.3 G/DL (ref 31.5–36.5)
MCV RBC AUTO: 98 FL (ref 78–100)
PLATELET # BLD AUTO: 257 10E9/L (ref 150–450)
POTASSIUM SERPL-SCNC: 3.5 MMOL/L (ref 3.4–5.3)
RBC # BLD AUTO: 2.97 10E12/L (ref 4.4–5.9)
SODIUM SERPL-SCNC: 138 MMOL/L (ref 133–144)
WBC # BLD AUTO: 11.1 10E9/L (ref 4–11)

## 2018-03-28 PROCEDURE — 85027 COMPLETE CBC AUTOMATED: CPT | Performed by: PHYSICIAN ASSISTANT

## 2018-03-28 PROCEDURE — 25000132 ZZH RX MED GY IP 250 OP 250 PS 637

## 2018-03-28 PROCEDURE — 25000131 ZZH RX MED GY IP 250 OP 636 PS 637: Performed by: NURSE PRACTITIONER

## 2018-03-28 PROCEDURE — 25000132 ZZH RX MED GY IP 250 OP 250 PS 637: Performed by: STUDENT IN AN ORGANIZED HEALTH CARE EDUCATION/TRAINING PROGRAM

## 2018-03-28 PROCEDURE — 80048 BASIC METABOLIC PNL TOTAL CA: CPT | Performed by: PHYSICIAN ASSISTANT

## 2018-03-28 PROCEDURE — 25000128 H RX IP 250 OP 636: Performed by: SURGERY

## 2018-03-28 PROCEDURE — 25000125 ZZHC RX 250: Performed by: THORACIC SURGERY (CARDIOTHORACIC VASCULAR SURGERY)

## 2018-03-28 PROCEDURE — 83735 ASSAY OF MAGNESIUM: CPT | Performed by: PHYSICIAN ASSISTANT

## 2018-03-28 PROCEDURE — 25000132 ZZH RX MED GY IP 250 OP 250 PS 637: Performed by: THORACIC SURGERY (CARDIOTHORACIC VASCULAR SURGERY)

## 2018-03-28 PROCEDURE — 25000132 ZZH RX MED GY IP 250 OP 250 PS 637: Performed by: SURGERY

## 2018-03-28 PROCEDURE — 36415 COLL VENOUS BLD VENIPUNCTURE: CPT | Performed by: THORACIC SURGERY (CARDIOTHORACIC VASCULAR SURGERY)

## 2018-03-28 PROCEDURE — 25000128 H RX IP 250 OP 636: Performed by: PHYSICIAN ASSISTANT

## 2018-03-28 PROCEDURE — 40000133 ZZH STATISTIC OT WARD VISIT: Performed by: OCCUPATIONAL THERAPIST

## 2018-03-28 PROCEDURE — 97110 THERAPEUTIC EXERCISES: CPT | Mod: GP

## 2018-03-28 PROCEDURE — 25000132 ZZH RX MED GY IP 250 OP 250 PS 637: Performed by: PHYSICIAN ASSISTANT

## 2018-03-28 PROCEDURE — 21400006 ZZH R&B CCU INTERMEDIATE UMMC

## 2018-03-28 PROCEDURE — 97535 SELF CARE MNGMENT TRAINING: CPT | Mod: GO | Performed by: OCCUPATIONAL THERAPIST

## 2018-03-28 PROCEDURE — 85610 PROTHROMBIN TIME: CPT | Performed by: THORACIC SURGERY (CARDIOTHORACIC VASCULAR SURGERY)

## 2018-03-28 PROCEDURE — 25000132 ZZH RX MED GY IP 250 OP 250 PS 637: Performed by: INTERNAL MEDICINE

## 2018-03-28 PROCEDURE — 00000146 ZZHCL STATISTIC GLUCOSE BY METER IP

## 2018-03-28 PROCEDURE — 36415 COLL VENOUS BLD VENIPUNCTURE: CPT | Performed by: PHYSICIAN ASSISTANT

## 2018-03-28 PROCEDURE — 40000986 XR CHEST PORT 1 VW

## 2018-03-28 PROCEDURE — 85520 HEPARIN ASSAY: CPT | Performed by: THORACIC SURGERY (CARDIOTHORACIC VASCULAR SURGERY)

## 2018-03-28 PROCEDURE — 40000193 ZZH STATISTIC PT WARD VISIT

## 2018-03-28 PROCEDURE — 97530 THERAPEUTIC ACTIVITIES: CPT | Mod: GP

## 2018-03-28 RX ORDER — WARFARIN SODIUM 5 MG/1
5 TABLET ORAL
Status: COMPLETED | OUTPATIENT
Start: 2018-03-28 | End: 2018-03-28

## 2018-03-28 RX ORDER — FUROSEMIDE 10 MG/ML
20 INJECTION INTRAMUSCULAR; INTRAVENOUS
Status: DISCONTINUED | OUTPATIENT
Start: 2018-03-28 | End: 2018-04-03

## 2018-03-28 RX ORDER — HEPARIN SODIUM 10000 [USP'U]/100ML
0-3500 INJECTION, SOLUTION INTRAVENOUS CONTINUOUS
Status: DISCONTINUED | OUTPATIENT
Start: 2018-03-28 | End: 2018-03-31

## 2018-03-28 RX ADMIN — OXYBUTYNIN CHLORIDE 5 MG: 5 TABLET ORAL at 08:02

## 2018-03-28 RX ADMIN — SENNOSIDES AND DOCUSATE SODIUM 1 TABLET: 8.6; 5 TABLET ORAL at 22:16

## 2018-03-28 RX ADMIN — HUMAN INSULIN 2 UNITS/HR: 100 INJECTION, SOLUTION SUBCUTANEOUS at 04:55

## 2018-03-28 RX ADMIN — MULTIPLE VITAMINS W/ MINERALS TAB 1 TABLET: TAB at 08:01

## 2018-03-28 RX ADMIN — FUROSEMIDE 20 MG: 10 INJECTION, SOLUTION INTRAVENOUS at 15:55

## 2018-03-28 RX ADMIN — AMIODARONE HYDROCHLORIDE 200 MG: 200 TABLET ORAL at 08:01

## 2018-03-28 RX ADMIN — POLYETHYLENE GLYCOL 3350 17 G: 17 POWDER, FOR SOLUTION ORAL at 08:00

## 2018-03-28 RX ADMIN — Medication 12.5 MG: at 08:01

## 2018-03-28 RX ADMIN — INSULIN ASPART 4 UNITS: 100 INJECTION, SOLUTION INTRAVENOUS; SUBCUTANEOUS at 18:25

## 2018-03-28 RX ADMIN — POTASSIUM CHLORIDE 20 MEQ: 1.5 POWDER, FOR SOLUTION ORAL at 10:39

## 2018-03-28 RX ADMIN — INSULIN GLARGINE 45 UNITS: 100 INJECTION, SOLUTION SUBCUTANEOUS at 13:21

## 2018-03-28 RX ADMIN — HEPARIN SODIUM 1800 UNITS/HR: 10000 INJECTION, SOLUTION INTRAVENOUS at 14:29

## 2018-03-28 RX ADMIN — ATORVASTATIN CALCIUM 20 MG: 20 TABLET, FILM COATED ORAL at 20:49

## 2018-03-28 RX ADMIN — Medication 100 MG: at 08:00

## 2018-03-28 RX ADMIN — ACETAMINOPHEN 650 MG: 325 TABLET, FILM COATED ORAL at 20:49

## 2018-03-28 RX ADMIN — INSULIN ASPART 2 UNITS: 100 INJECTION, SOLUTION INTRAVENOUS; SUBCUTANEOUS at 09:09

## 2018-03-28 RX ADMIN — Medication 1000 MG: at 08:00

## 2018-03-28 RX ADMIN — OXYBUTYNIN CHLORIDE 5 MG: 5 TABLET ORAL at 20:49

## 2018-03-28 RX ADMIN — INSULIN ASPART 1 UNITS: 100 INJECTION, SOLUTION INTRAVENOUS; SUBCUTANEOUS at 11:55

## 2018-03-28 RX ADMIN — Medication: at 15:33

## 2018-03-28 RX ADMIN — BENZOCAINE, MENTHOL 1 LOZENGE: 15; 3.6 LOZENGE ORAL at 23:18

## 2018-03-28 RX ADMIN — SODIUM CHLORIDE: 9 INJECTION, SOLUTION INTRAVENOUS at 10:41

## 2018-03-28 RX ADMIN — METOPROLOL TARTRATE 50 MG: 50 TABLET, FILM COATED ORAL at 08:09

## 2018-03-28 RX ADMIN — ASPIRIN 81 MG: 81 TABLET, COATED ORAL at 08:01

## 2018-03-28 RX ADMIN — WARFARIN SODIUM 5 MG: 5 TABLET ORAL at 18:27

## 2018-03-28 RX ADMIN — METOPROLOL TARTRATE 12.5 MG: 25 TABLET, FILM COATED ORAL at 20:49

## 2018-03-28 RX ADMIN — PANTOPRAZOLE SODIUM 40 MG: 40 TABLET, DELAYED RELEASE ORAL at 08:00

## 2018-03-28 RX ADMIN — FINASTERIDE 5 MG: 5 TABLET, FILM COATED ORAL at 08:01

## 2018-03-28 RX ADMIN — HUMAN INSULIN 2 UNITS/HR: 100 INJECTION, SOLUTION SUBCUTANEOUS at 13:35

## 2018-03-28 NOTE — PROGRESS NOTES
SURGERY CROSS COVER    Paged regarding BP in the 80s/50s with HR in the 110s. Pt is asymptomatic w/o palpitations, chest pain, light headedness. Paged cardiology fellow on call who recommended against cardioversion at this time. He said continue to monitor, let him know if pressure drops more or HR increases. Asked nursing staff to notify me if pressures go below 80/50 or HR over 125. Holding metoprolol for now.    --  Chavez Waller MD  Gen Surg PGY1

## 2018-03-28 NOTE — PROVIDER NOTIFICATION
MD Notification    MD Notified: On call surgical resident    Notification date/time: 3/27/18 8PM    Notification interaction: Spoke with resident on phone who stated she would consult with fellow.    Purpose of notification: Pt converted to Afib around 1745, SBP 80's (from 130's). Pt asymptomatic, denies SOB or lightheadedness.     Comments: Administered scheduled PO Amio. Will hold metoprolol dose for now. Continue to monitor and wait to hear back from MD.

## 2018-03-28 NOTE — PLAN OF CARE
Problem: Patient Care Overview  Goal: Plan of Care/Patient Progress Review  OT/CR 6C: Discharge Planner OT   Patient plan for discharge: rehab  Current status: Pt more alert today.  Mod-max A x 2 supine to EOB then CGA-Min A to maintain sitting balance EOB.  Pt mod A x 2 sit to stand x 2 reps in preparation for functional transfers - tolerates static standing approx 20-30 seconds with min A x 2.  Dependent for safe transfers at this time.  HR 80s-110s - max  with exertion.  BP stable with position change 90s/60s.   Barriers to return to prior living situation: weakness, deconditioning, fatigue, acute medical needs  Recommendations for discharge: ARU  Rationale for recommendations: Pt is motivated to regain independence, Pt is currently below baseline with regards to mobility and independence with self cares and will benefit from continued skilled therapy intervention to address deficits.         Entered by: Lyric Delacruz 03/28/2018 10:51 AM

## 2018-03-28 NOTE — PROGRESS NOTES
"CT Surgery Progress Note    Coronary artery disease, CHF, and PAF s/p CABG x 3/bilateral pulmonary vein isolation MAZE/L atrial appendage ligation with 45 mm AtriClip/removal of impella POD #5    R leg acute limb ischemia s/p R groin reexploration, R common femoral and iliac artery thrombectomy, stent placement R common iliac artery and 4 compartment R lower leg fasciotomies POD #4    Closure of R lower leg 4 compartment fasciotomies POD #1    Subjective:  States that pain is being controlled. Denies any hallucinations. Breathing is good. Working with IS. Appetite is good. Denies any nausea. +BM, denies any popping/clicking in his breast bone, has not ambulated, was able to get some sleep, has been in and out of afib preop, denies any symptoms when he goes in to afib, was transferred from [place] due to the possibility of complications with heart surgery, sami doing much better now, no further leakage    Objective:  Lying in bed, comfortable, +O2 (2L NC)  BP 98/63  Pulse 96  Temp 98.3  F (36.8  C) (Oral)  Resp 18  Ht 1.803 m (5' 11\")  Wt 121.2 kg (267 lb 1.6 oz)  SpO2 96%  BMI 37.25 kg/m2  CT - decreased serous output, -leak  CV - IRRR, telemetry afib 90s  Pulm - decreased breath sounds, IS 0575-4448 ml  Abd - BS+, NT/ND  Derm - sternal incision D/I   L LE incisions D/I (EVH leg)   R LE incisions D/I, +dressings   R groin incision +prevena  Lab - labs pending  +insulin gtt  +heparin gtt  +gallardo    A/P:  1. S/p CABG x 3/bilateral pulmonary vein isolation MAZE/L atrial appendage ligation with 45 mm AtriClip/removal of impella POD #5  2. R leg acute limb ischemia s/p R groin reexploration, R common femoral and iliac artery thrombectomy, stent placement R common iliac artery and 4 compartment R lower leg fasciotomies POD #4  3. Closure of R lower leg 4 compartment fasciotomies POD #1  4. DM type II - insulin gtt, consulted Endo today  5. PAF - had pre-op, was on coumadin prior, +heparin gtt/+coumadin, will " discontinue amiodarone  6. Traumatic gallardo placement - urology consulted and have signed off, plan to leave gallardo in x 1 week, then do a voiding trial as outpatient, +flomax, +ditropan (plan to stop prior to gallardo out)  7. Hx of TIA  8. STEMI  9. Obesity  10. Asthma    11. HTN - restart lopressor 12.5 mg bid  12. Hyperlipidemia - statin  13. R groin incision - prevena will be discontinue on 3/31/18  Encouraged IS/TCDB/amb. Sternal precautions. Will remove last chest tube. Chest xray later today. Will leave insulin gtt per Endo. Continue to monitor.     Bertram Vail PA-C  CT Surgery      Alfonzo Mckeon PA-C  Cardiothoracic Surgery  Pager [phone]    2:07 PM   March 28, 2018

## 2018-03-28 NOTE — PLAN OF CARE
Problem: Patient Care Overview  Goal: Plan of Care/Patient Progress Review  D: From OSH on 3/21 with MI/ New onset HF. CABG 3/23, RLE thrombectomy and fasciotomy     I: Insulin gtt as ordered, currently on Algorithm 4. Heparin gtt at 1800, next 10A at 0830. PRN Oxycodone for incisional pain once. PRN Oxybutinin and lidocaine ointment for catheter pain, not needed during shift, pt states pain has improved. CT to (-) 20 suction.     A: A&O, transfer from chair to bed with lift. Oxygen stable on 2L NC.  Pt tachycardic and hypotensive at start of shift, converted to Afib, MD notified (see note). BP improving throughout night SBP 's. Tele continues to be Afib, -110's, pt asymptomatic. Felton in place, good urine output, no leakage around catheter. Pt reports having BM yesterday, continue scheduled stool softeners. Tolerating clear liquid diet well, advanced to full liquid diet this AM. Pt slept between cares, up frequently d/t every hour BS checks.     P: Continue to monitor, notify MD of pertinent changes.

## 2018-03-28 NOTE — PROGRESS NOTES
D: Pt admitted 3/21/18 from OSH with an MI who underwent CABG 3/23/18 c/b RLE thrombus requiring thrombectomy and fasciotomy    I: Monitored vitals and assessed pt status.    Changed: Last chest tube removed. Insulin gtt d/c'd and transitioned to SubQ. Potassium supplemented.  Running: Heparin at 1800 units/hr    A: A0x4. VSS. Atrial fibrillation with occasional PVC's. Hypotensive throughout day high 80s'-90s systolic. Asymptomatic. Assist x2 with walker for transfers to commode and chair.    P: Continue to monitor pt status and report changes to treatment team.    Temp:  [97.5  F (36.4  C)-99.1  F (37.3  C)] 97.5  F (36.4  C)  Pulse:  [96] 96  Heart Rate:  [] 119  Resp:  [16-18] 18  BP: ()/(57-80) 119/68  SpO2:  [93 %-98 %] 93 %

## 2018-03-28 NOTE — PROGRESS NOTES
"VASCULAR SURGERY PROGRESS NOTE    Subjective:  Patient resting in bed, endorses adequate pain control.     Objective:  BP 93/64 (BP Location: Right arm)  Temp 98.6  F (37  C) (Oral)  Resp 18  Ht 1.803 m (5' 11\")  Wt 121.2 kg (267 lb 1.6 oz)  SpO2 95%  BMI 37.25 kg/m2      Intake/Output Summary (Last 24 hours) at 03/28/18 0734  Last data filed at 03/28/18 0650   Gross per 24 hour   Intake          1582.05 ml   Output             2885 ml   Net         -1302.95 ml       Labs:  ROUTINE IP LABS (Last four results)  BMP    Recent Labs  Lab 03/27/18  1011 03/26/18  0420 03/25/18  0957 03/25/18  0350 03/24/18  1708    141  --  144 142   POTASSIUM 4.3 4.0 4.1 3.9 4.2   CHLORIDE 107 108  --  110* 109   STEFFANY 8.3* 8.3*  --  8.0* 8.7   CO2 22 24  --  23 28   BUN 29 23  --  23 26   CR 0.91 0.86  --  1.06 0.99   * 126*  --  124* 135*     CBC    Recent Labs  Lab 03/27/18  1011 03/26/18  0420 03/25/18  1323 03/25/18  0350   WBC 11.8* 13.8* 12.5* 12.4*   RBC 3.30* 2.86* 2.82* 3.06*   HGB 9.9* 8.6* 8.4* 9.1*   HCT 32.0* 27.0* 26.3* 28.3*   MCV 97 94 93 93   MCH 30.0 30.1 29.8 29.7   MCHC 30.9* 31.9 31.9 32.2   RDW 15.9* 16.3* 16.8* 16.7*    141* 107* 112*     INR    Recent Labs  Lab 03/27/18  1011 03/26/18  0420 03/25/18  0350 03/24/18  1558   INR 1.34* 1.44* 1.47* 1.53*     PHYSICAL EXAM:  General: The patient is alert and oriented. Appropriate. No acute disctress  Psych: pleasant affect, answers questions appropriately  Skin: Color appropriate for race, warm, dry.  Extremities: Doppler right PT and DP, Fasciotomy closure sites dressings changed, sutures intact, no signs of infection, minimal edema and drainage. Adaptic and Primep[place] dressings applied to sites.  Right groin Prevena intact        ASSESSMENT:  Right leg acute limb ischemia. S/P Right groin reexploration, right common femoral and iliac artery  thrombectomy, right leg angiogram, stent placement right common iliac artery and then 4 compartment " right lower leg fasciotomies 3/24/18 with Dr. Tang.  On 3/27/2018, he underwent closure of right leg fasciotomy X 2 with Dr. Tang.     PLAN:  - fasciotomy closure sites healthy  - improved right leg motor and sensation  - right groin Prevena vac will remain in place until it looses suction for another ~5 days   - vascular will remove fasciotomy sutures in ~ 3 weeks      Jenny FOSTER, CNS  Division of Vascular Surgery  [facility]  Pager [phone]

## 2018-03-28 NOTE — PLAN OF CARE
Problem: Patient Care Overview  Goal: Plan of Care/Patient Progress Review  PT - 6C    Discharge Planner PT   Patient plan for discharge: ARU.  Current status: Pt performed supine->sit at Max A of 2 and sit<>stand at Min A-Mod A of 2 up to FWW, with use of heart pillow to maintain sternal precautions. Pt performed 3 stand pivot transfers at Min A-Mod A of 2 using FWW. Pt performed seated therex. VSS on RA.  Barriers to return to prior living situation: Medical status, deconditioned, below baseline for functional mobility, stairs to return home, level of assistance needed.  Recommendations for discharge: ARU.  Rationale for recommendations: Pt is motivated and able to participate in intensive therapies and would benefit from doing so to continue making improvements towards independence.          Entered by: Estrella Vigil 03/28/2018 5:13 PM

## 2018-03-28 NOTE — CONSULTS
Diabetes/Hyperglycemia Management Consult    Chief Complaint transition off IV insulin  Consult requested by: Alfonzo Mckeon PA-C  History of Present Illness Izaiah Alvarez is a 72 year old male with history of type 2 diabetes, hypertension, hyperlipidemia, atrial fibrillation ( on warfarin), TIA, obesity transferred from [facility] following STEMI with new onset of left heart failure ( EF 20-25%). Impella device was placed via right femoral artery at the OSH. he underwent coronary artery bypass grafting x 3 and removal of Impella on  (3/23/2018) by .  Found to have critical limb ischemia with loss of motor function s/p Right groin reexploration, right common femoral and iliac artery  thrombectomy, right leg angiogram, stent placement right common iliac artery and then 4 compartment right lower leg fasciotomies 3/24/18 with Dr. Tang.  On 3/27/2018, he underwent closure of right leg fasciotomy X 2 with Dr. Tang.      Inpatient Diabetes team consulted for transition off IV insulin    Mr. Alvarez was dx with type 2 diabetes 6 years ago. PTA as listed below. Tests his glucose twice daily ( at 0630 and 1730). Glucose in am 180, pm 140 range. Reports feeling like he has been hypoglycemic two times ( dizzy, fuzzy) did not test. Ate some hard candy and felt better.  Has been on IV insulin with glucose in good control, < 200 and no hypoglycemia. IV insulin range from 2-7 units. Has been NPO for procedures followed by clear liquid diet. Has  not had solid food until today.    Currently, denies fever, chills, chest pain, shortness of breath, abdominal pain, nausea and or vomiting, bowel or bladder issues. Appetite is improving.      Recent Labs  Lab 03/28/18  0907 03/28/18  0844 03/28/18  0831 03/28/18  0731 03/28/18  0609 03/28/18  0454 03/28/18  0348  03/27/18  1011  03/26/18  0420  03/25/18  0350  03/24/18  1708  03/24/18  1558   GLC  --  105*  --   --   --   --   --   --  151*  --  126*  --  124*  --  135*  --  151*  "  *  --  114* 129* 122* 113* 114*  < >  --   < >  --   < >  --   < >  --   < >  --    < > = values in this interval not displayed.      Diabetes Type:   Type 2 Diabetes  Diabetes Duration: dx 6 years ago  Usual Diabetes Regimen:   lantus 60 units  Glipizide 20 mg bid  Metformin 1000 mg bid  Ability to [place] Prescribed Regimen: yes  Diabetes Control:   Lab Results   Component Value Date    A1C 7.8 03/22/2018     Diabetes Complications: possible peripheral neuropathy  Able to Detect Hypoglycemia: yes  Usual Diabetes Care Provider: [facility], Diabetic NP  Factors Impacting Glucose Control: surgery, diet      Review of Systems  10 point ROS completed with pertinent positives and negatives noted in the HPI    Past medical, family and social histories are reviewed and updated.    Past Medical History  Type 2 diabetes  Hyperlipidemia  Hypertension  Atrial fibrillation on warfarin  Obesity  TIA    Family History  Family history specific for diabetes, dad and a cousin on mothers side with type 1 daibetes    Social History  Social History     Social History     Marital status: N/A     Spouse name: N/A     Number of children: N/A     Years of education: N/A     Social History Main Topics     Smoking status: Former Smoker     Quit date: 3/27/1977     Smokeless tobacco: Former User     Alcohol use None     Drug use: None     Sexual activity: Not Asked     Other Topics Concern     None     Social History Narrative         Physical Exam  BP (!) 86/63 (BP Location: Left arm)  Pulse 96  Temp 99.1  F (37.3  C) (Oral)  Resp 16  Ht 1.803 m (5' 11\")  Wt 121.2 kg (267 lb 1.6 oz)  SpO2 94%  BMI 37.25 kg/m2    General:  pleasant  resting in bed, in no distress.   HEENT:  PER and anicteric, non-injected, oral mucous membranes moist.   Lungs: unlabored  ABD: obese, soft  Skin: warm and dry,   MSK: moving upper extremities  Mental status:  alert, oriented x3, communicating clearly  Psych:  calm, even " mood    Laboratory  Recent Labs   Lab Test  03/28/18   0844  03/27/18   1011   NA  138  140   POTASSIUM  3.5  4.3   CHLORIDE  106  107   CO2  26  22   ANIONGAP  7  11   GLC  105*  151*   BUN  27  29   CR  0.93  0.91   STEFFANY  8.1*  8.3*     CBC RESULTS:   Recent Labs   Lab Test  03/28/18   0844   WBC  11.1*   RBC  2.97*   HGB  8.8*   HCT  29.0*   MCV  98   MCH  29.6   MCHC  30.3*   RDW  16.1*   PLT  257       Liver Function Studies -   Recent Labs   Lab Test  03/22/18   0645   PROTTOTAL  7.0   ALBUMIN  2.8*   BILITOTAL  3.6*   ALKPHOS  55   AST  264*   ALT  69       Active Medications  Current Facility-Administered Medications   Medication     metoprolol tartrate (LOPRESSOR) half-tab 12.5 mg     heparin  drip 25,000 units in 0.45% NaCl 250 mL (see additional administration details for dose)     heparin bolus from infusion pump     furosemide (LASIX) injection 20 mg     Warfarin Therapy Reminder (Check START DATE - warfarin may be starting in the FUTURE)     oxybutynin (DITROPAN) tablet 5 mg     senna-docusate (SENOKOT-S;PERICOLACE) 8.6-50 MG per tablet 1 tablet     polyethylene glycol (MIRALAX/GLYCOLAX) Packet 17 g     pantoprazole (PROTONIX) EC tablet 40 mg     lidocaine (XYLOCAINE) 5 % ointment     naloxone (NARCAN) injection 0.1-0.4 mg     thiamine tablet 100 mg     ipratropium - albuterol 0.5 mg/2.5 mg/3 mL (DUONEB) neb solution 3 mL     HYDROmorphone (DILAUDID) injection 0.3-0.5 mg     dextrose 10 % 1,000 mL infusion     insulin 1 unit/mL in saline (NovoLIN, HumuLIN Regular) drip - ADULT IV Infusion     glucose 40 % gel 15-30 g    Or     dextrose 50 % injection 25-50 mL    Or     glucagon injection 1 mg     dextrose 5% and 0.45% NaCl + KCl 20 mEq/L infusion     hydrALAZINE (APRESOLINE) injection 10 mg     insulin aspart (NovoLOG) inj (RAPID ACTING)     insulin aspart (NovoLOG) inj (RAPID ACTING)     aspirin EC EC tablet 81 mg     potassium chloride SA (K-DUR/KLOR-CON M) CR tablet 20-40 mEq     potassium chloride  (KLOR-CON) Packet 20-40 mEq     potassium chloride 10 mEq in 100 mL intermittent infusion with 10 mg lidocaine     potassium chloride 20 mEq in 50 mL intermittent infusion     magnesium sulfate 2 g in NS intermittent infusion ([facility] or [facility] C[facility]d)     magnesium sulfate 4 g in 100 mL sterile water (premade)     potassium phosphate 10 mmol in D5W 250 mL intermittent infusion     potassium phosphate 15 mmol in D5W 250 mL intermittent infusion     potassium phosphate 20 mmol in D5W 500 mL intermittent infusion     potassium phosphate 20 mmol in D5W 250 mL intermittent infusion     potassium phosphate 25 mmol in D5W 500 mL intermittent infusion     acetaminophen (TYLENOL) tablet 650 mg     oxyCODONE IR (ROXICODONE) tablet 5-10 mg     ondansetron (ZOFRAN-ODT) ODT tab 4 mg    Or     ondansetron (ZOFRAN) injection 4 mg     sodium chloride 0.9% infusion     sodium chloride 0.9% infusion     atorvastatin (LIPITOR) tablet 20 mg     albuterol (PROAIR HFA/PROVENTIL HFA/VENTOLIN HFA) Inhaler 2 puff     finasteride (PROSCAR) tablet 5 mg     multivitamin, therapeutic with minerals (THERA-VIT-M) tablet 1 tablet     No current outpatient prescriptions on file.       Current Diet    Active Diet Order      Moderate Consistent CHO Diet      Assessment: roverto Alvarez is a 72 year old male with history of type 2 diabetes, hypertension, hyperlipidemia, atrial fibrillation ( on warfarin), TIA, obesity transferred from [facility] following STEMI with new onset of left heart failure ( EF 20-25%). Impella device was placed via right femoral artery at the OSH. he underwent coronary artery bypass grafting x 3 and removal of Impella on  (3/23/2018) by .  Found to have critical limb ischemia with loss of motor function s/p Right groin reexploration, right common femoral and iliac artery  thrombectomy, right leg angiogram, stent placement right common iliac artery and then 4 compartment right lower leg fasciotomies 3/24/18 with Dr. Tang.  On  3/27/2018, he underwent closure of right leg fasciotomy X 2 with Dr. Tang.        Type 2 diabetic: A1C 7.8% ( 3/22/2018)  Will transition off  IV insulin, calculated ~ 2 units per hour x 24 = 48 units,   Plan  - lantus 45 units  D/c IV insulin 2 hours after sub-q insulin  -Novolog 1 unit per 5 grams of CHO with meals and snacks  -Novolog high intensity sliding scale before meals and HS  -monitor glucose before meals, HS and 0200  Will continue to follow    Candido Piedra, -2018    Diabetes Management Team job code: [id number]

## 2018-03-28 NOTE — PROGRESS NOTES
BUD received note from [facility] admissions team that the pt was approved for ARU however it is unclear if the pt's primary insurance is [facility] through [place].  SW to meet with family to offer choice in rehab placement and to clarify payor source for rehab.    MONROE Zheng, APSW  6C Unit   Phone: [phone]  Pager: [phone]  Unit: [phone]

## 2018-03-29 ENCOUNTER — APPOINTMENT (OUTPATIENT)
Dept: PHYSICAL THERAPY | Facility: CLINIC | Age: 73
DRG: 228 | End: 2018-03-29
Attending: THORACIC SURGERY (CARDIOTHORACIC VASCULAR SURGERY)
Payer: COMMERCIAL

## 2018-03-29 ENCOUNTER — APPOINTMENT (OUTPATIENT)
Dept: OCCUPATIONAL THERAPY | Facility: CLINIC | Age: 73
DRG: 228 | End: 2018-03-29
Attending: THORACIC SURGERY (CARDIOTHORACIC VASCULAR SURGERY)
Payer: COMMERCIAL

## 2018-03-29 LAB
ANION GAP SERPL CALCULATED.3IONS-SCNC: 9 MMOL/L (ref 3–14)
BUN SERPL-MCNC: 24 MG/DL (ref 7–30)
CALCIUM SERPL-MCNC: 8.3 MG/DL (ref 8.5–10.1)
CHLORIDE SERPL-SCNC: 106 MMOL/L (ref 94–109)
CO2 SERPL-SCNC: 24 MMOL/L (ref 20–32)
CREAT SERPL-MCNC: 0.9 MG/DL (ref 0.66–1.25)
ERYTHROCYTE [DISTWIDTH] IN BLOOD BY AUTOMATED COUNT: 15.9 % (ref 10–15)
GFR SERPL CREATININE-BSD FRML MDRD: 83 ML/MIN/1.7M2
GLUCOSE BLDC GLUCOMTR-MCNC: 146 MG/DL (ref 70–99)
GLUCOSE BLDC GLUCOMTR-MCNC: 152 MG/DL (ref 70–99)
GLUCOSE BLDC GLUCOMTR-MCNC: 158 MG/DL (ref 70–99)
GLUCOSE BLDC GLUCOMTR-MCNC: 174 MG/DL (ref 70–99)
GLUCOSE BLDC GLUCOMTR-MCNC: 225 MG/DL (ref 70–99)
GLUCOSE SERPL-MCNC: 160 MG/DL (ref 70–99)
HCT VFR BLD AUTO: 29.3 % (ref 40–53)
HGB BLD-MCNC: 9.1 G/DL (ref 13.3–17.7)
INR PPP: 1.66 (ref 0.86–1.14)
LMWH PPP CHRO-ACNC: 0.33 IU/ML
MAGNESIUM SERPL-MCNC: 2 MG/DL (ref 1.6–2.3)
MCH RBC QN AUTO: 29.8 PG (ref 26.5–33)
MCHC RBC AUTO-ENTMCNC: 31.1 G/DL (ref 31.5–36.5)
MCV RBC AUTO: 96 FL (ref 78–100)
PLATELET # BLD AUTO: 318 10E9/L (ref 150–450)
POTASSIUM SERPL-SCNC: 3.6 MMOL/L (ref 3.4–5.3)
RBC # BLD AUTO: 3.05 10E12/L (ref 4.4–5.9)
SODIUM SERPL-SCNC: 139 MMOL/L (ref 133–144)
WBC # BLD AUTO: 11.6 10E9/L (ref 4–11)

## 2018-03-29 PROCEDURE — 25000132 ZZH RX MED GY IP 250 OP 250 PS 637: Performed by: PHYSICIAN ASSISTANT

## 2018-03-29 PROCEDURE — 25000132 ZZH RX MED GY IP 250 OP 250 PS 637

## 2018-03-29 PROCEDURE — 00000146 ZZHCL STATISTIC GLUCOSE BY METER IP

## 2018-03-29 PROCEDURE — 36415 COLL VENOUS BLD VENIPUNCTURE: CPT | Performed by: PHYSICIAN ASSISTANT

## 2018-03-29 PROCEDURE — 25000132 ZZH RX MED GY IP 250 OP 250 PS 637: Performed by: THORACIC SURGERY (CARDIOTHORACIC VASCULAR SURGERY)

## 2018-03-29 PROCEDURE — 40000193 ZZH STATISTIC PT WARD VISIT

## 2018-03-29 PROCEDURE — 40000133 ZZH STATISTIC OT WARD VISIT: Performed by: OCCUPATIONAL THERAPIST

## 2018-03-29 PROCEDURE — 25000132 ZZH RX MED GY IP 250 OP 250 PS 637: Performed by: INTERNAL MEDICINE

## 2018-03-29 PROCEDURE — 80048 BASIC METABOLIC PNL TOTAL CA: CPT | Performed by: PHYSICIAN ASSISTANT

## 2018-03-29 PROCEDURE — 85027 COMPLETE CBC AUTOMATED: CPT | Performed by: PHYSICIAN ASSISTANT

## 2018-03-29 PROCEDURE — 25000132 ZZH RX MED GY IP 250 OP 250 PS 637: Performed by: SURGERY

## 2018-03-29 PROCEDURE — 40000275 ZZH STATISTIC RCP TIME EA 10 MIN

## 2018-03-29 PROCEDURE — 97116 GAIT TRAINING THERAPY: CPT | Mod: GP

## 2018-03-29 PROCEDURE — 25000125 ZZHC RX 250: Performed by: THORACIC SURGERY (CARDIOTHORACIC VASCULAR SURGERY)

## 2018-03-29 PROCEDURE — 97535 SELF CARE MNGMENT TRAINING: CPT | Mod: GO | Performed by: OCCUPATIONAL THERAPIST

## 2018-03-29 PROCEDURE — 85610 PROTHROMBIN TIME: CPT | Performed by: PHYSICIAN ASSISTANT

## 2018-03-29 PROCEDURE — 97530 THERAPEUTIC ACTIVITIES: CPT | Mod: GP

## 2018-03-29 PROCEDURE — 83735 ASSAY OF MAGNESIUM: CPT | Performed by: PHYSICIAN ASSISTANT

## 2018-03-29 PROCEDURE — 85520 HEPARIN ASSAY: CPT | Performed by: PHYSICIAN ASSISTANT

## 2018-03-29 PROCEDURE — 21400006 ZZH R&B CCU INTERMEDIATE UMMC

## 2018-03-29 PROCEDURE — 97110 THERAPEUTIC EXERCISES: CPT | Mod: GO | Performed by: OCCUPATIONAL THERAPIST

## 2018-03-29 PROCEDURE — 25000128 H RX IP 250 OP 636: Performed by: PHYSICIAN ASSISTANT

## 2018-03-29 RX ORDER — METOPROLOL TARTRATE 25 MG/1
25 TABLET, FILM COATED ORAL 2 TIMES DAILY
Status: DISCONTINUED | OUTPATIENT
Start: 2018-03-29 | End: 2018-03-30

## 2018-03-29 RX ORDER — AMOXICILLIN 250 MG
2 CAPSULE ORAL AT BEDTIME
Status: DISCONTINUED | OUTPATIENT
Start: 2018-03-29 | End: 2018-04-04 | Stop reason: HOSPADM

## 2018-03-29 RX ORDER — WARFARIN SODIUM 5 MG/1
5 TABLET ORAL
Status: COMPLETED | OUTPATIENT
Start: 2018-03-29 | End: 2018-03-29

## 2018-03-29 RX ADMIN — MULTIPLE VITAMINS W/ MINERALS TAB 1 TABLET: TAB at 08:58

## 2018-03-29 RX ADMIN — Medication 2 G: at 16:57

## 2018-03-29 RX ADMIN — WARFARIN SODIUM 5 MG: 5 TABLET ORAL at 18:02

## 2018-03-29 RX ADMIN — INSULIN ASPART 6 UNITS: 100 INJECTION, SOLUTION INTRAVENOUS; SUBCUTANEOUS at 12:21

## 2018-03-29 RX ADMIN — SENNOSIDES AND DOCUSATE SODIUM 2 TABLET: 8.6; 5 TABLET ORAL at 21:51

## 2018-03-29 RX ADMIN — METOPROLOL TARTRATE 12.5 MG: 25 TABLET, FILM COATED ORAL at 10:38

## 2018-03-29 RX ADMIN — METOPROLOL TARTRATE 25 MG: 25 TABLET ORAL at 20:11

## 2018-03-29 RX ADMIN — HEPARIN SODIUM 1800 UNITS/HR: 10000 INJECTION, SOLUTION INTRAVENOUS at 05:35

## 2018-03-29 RX ADMIN — ATORVASTATIN CALCIUM 20 MG: 20 TABLET, FILM COATED ORAL at 20:11

## 2018-03-29 RX ADMIN — INSULIN ASPART 9 UNITS: 100 INJECTION, SOLUTION INTRAVENOUS; SUBCUTANEOUS at 18:03

## 2018-03-29 RX ADMIN — HEPARIN SODIUM 1800 UNITS/HR: 10000 INJECTION, SOLUTION INTRAVENOUS at 20:33

## 2018-03-29 RX ADMIN — OXYBUTYNIN CHLORIDE 5 MG: 5 TABLET ORAL at 08:59

## 2018-03-29 RX ADMIN — FUROSEMIDE 20 MG: 10 INJECTION, SOLUTION INTRAVENOUS at 16:45

## 2018-03-29 RX ADMIN — ASPIRIN 81 MG: 81 TABLET, COATED ORAL at 08:59

## 2018-03-29 RX ADMIN — Medication 100 MG: at 08:58

## 2018-03-29 RX ADMIN — FINASTERIDE 5 MG: 5 TABLET, FILM COATED ORAL at 08:59

## 2018-03-29 RX ADMIN — INSULIN GLARGINE 45 UNITS: 100 INJECTION, SOLUTION SUBCUTANEOUS at 13:32

## 2018-03-29 RX ADMIN — POTASSIUM CHLORIDE 20 MEQ: 1.5 POWDER, FOR SOLUTION ORAL at 20:11

## 2018-03-29 RX ADMIN — PANTOPRAZOLE SODIUM 40 MG: 40 TABLET, DELAYED RELEASE ORAL at 08:59

## 2018-03-29 RX ADMIN — POLYETHYLENE GLYCOL 3350 17 G: 17 POWDER, FOR SOLUTION ORAL at 08:57

## 2018-03-29 RX ADMIN — INSULIN ASPART 4 UNITS: 100 INJECTION, SOLUTION INTRAVENOUS; SUBCUTANEOUS at 10:02

## 2018-03-29 RX ADMIN — FUROSEMIDE 20 MG: 10 INJECTION, SOLUTION INTRAVENOUS at 08:58

## 2018-03-29 RX ADMIN — METOPROLOL TARTRATE 12.5 MG: 25 TABLET, FILM COATED ORAL at 08:59

## 2018-03-29 ASSESSMENT — PAIN DESCRIPTION - DESCRIPTORS: DESCRIPTORS: DISCOMFORT

## 2018-03-29 NOTE — PROGRESS NOTES
"CT Surgery Progress Note    Coronary artery disease, CHF, and PAF s/p CABG x 3/bilateral pulmonary vein isolation MAZE/L atrial appendage ligation with 45 mm AtriClip/removal of impella POD #6    R leg acute limb ischemia s/p R groin reexploration, R common femoral and iliac artery thrombectomy, stent placement R common iliac artery and 4 compartment R lower leg fasciotomies POD #5    Closure of R lower leg 4 compartment fasciotomies POD #2    Subjective:  States that pain is being controlled. Denies any hallucinations. Breathing is good. Working with IS. Appetite is good. Denies any nausea. +BM (small), denies any popping/clicking in his breast bone, has not ambulated, he is able to place weight on his RLE, sleep was decreased due to his neighbor, has been in and out of afib preop, denies any symptoms when he goes in to afib, was transferred from [place] due to the possibility of complications with heart surgery, sami doing much better now, no further leakage, plans to discharge to [place] rehab    Objective:  Lying in bed, comfortable, -O2  /77 (BP Location: Left arm)  Pulse 96  Temp 97.6  F (36.4  C) (Oral)  Resp 18  Ht 1.803 m (5' 11\")  Wt 117.3 kg (258 lb 9.6 oz)  SpO2 96%  BMI 36.07 kg/m2  CT - removed  CV - IRRR, telemetry afib 100s  Pulm - decreased breath sounds  Abd - BS+, NT/ND  Derm - sternal incision D/I   L LE incisions D/I (EVH leg)   R LE incisions D/I, +dressings   R groin incision +prevena  Lab - WBC 11.6   Hgb/Hct 9.1/29.3   Plt 318   Na 139   K 3.6   Cr/BUN 0.9/24   Mg 2.0   Glucose 138-160  +heparin gtt  +gallardo    A/P:  1. S/p CABG x 3/bilateral pulmonary vein isolation MAZE/L atrial appendage ligation with 45 mm AtriClip/removal of impella POD #6  2. R leg acute limb ischemia s/p R groin reexploration, R common femoral and iliac artery thrombectomy, stent placement R common iliac artery and 4 compartment R lower leg fasciotomies POD #5  3. Closure of R lower leg 4 compartment " fasciotomies POD #2  4. DM type II - recommendations per Endo  5. PAF - had pre-op, was on coumadin prior, +heparin gtt/+coumadin, increase lopressor  6. Traumatic gallardo placement - +flomax, discontinue ditropan today, remove gallardo in am tomorrow at 0600  7. Hx of TIA  8. STEMI  9. Obesity  10. Asthma    11. HTN - increase lopressor to 25 mg bid  12. Hyperlipidemia - statin  13. R groin incision - prevena will be discontinue on 3/31/18  Encouraged IS/TCDB/amb. Sternal precautions. Looking at possible discharge to [facility] rehab on Saturday or Monday pending INR, strength, and wounds. Continue to monitor.     Bertram Vail PA-C  CT Surgery    Addendum: Pt had stent placed in R LE. May need to be on plavix if/when able to get off coumadin. Needs to be on coumadin at least 6 months due to thrombosis in leg. PAF was indication for coumadin preop. If no afib with MAZE procedure may be able to get off of the coumadin in 6 months.

## 2018-03-29 NOTE — PROGRESS NOTES
Pt has been calm and cooperative throughout shift. Up with assist of 2 to chair and commode. Dyspnea on exertion, pt states he knows that he needs to move more. Left leg sore from graft, but pain tolerable, and he is able to bear weight on leg periodically. VSS A&O x 4.   Using IS with encouragement. Has gotten up to [id number] per pt statement.   Plan to D/C early next week. Home or TCU in [place].   Wound vac dry and intact,   Felton draining well, plan to remove tomorrow (3/30)     Will continue to monitor.   Connie Cruz RN on 3/29/2018 at 2:50 PM

## 2018-03-29 NOTE — PROGRESS NOTES
Social Work Services Progress Note     Hospital Day: 7  Date of Initial Social Work Evaluation:  3/22/18  Collaborated with:  Pt, spouse     Data: Pt is a 72 year old male being followed by SW for placement to ARU/Swing bed in [facility].     Intervention:  SW met with pt and spouse to introduce self and role.  SW gave education about rehab ARU at  and in [facility].  Pt and spouse are wanting to be placed in the [place] area if at all possible.  SW to start referrals for placement/care.  Pt stating that he is a service connected  and was not sure if his VA connection would give him coverage of services in the [facility].  SW to reach out to pt's PCP clinic for assistance with this question.     - Referrals in Process:    [facility]  PH: [phone]  F: [phone]    [facility]  PH: [phone]  F: [phone]     Assessment: Pt and spouse adjusting appropriately to situation.       Plan:    Anticipated Disposition: Facility:  ARU or Swing bed placement    Barriers to d/c plan: Medical stability, bed availability    Follow Up: SW to start referrals for placement.  SW to also call financial counseling to add pt's Medicare insurance coverage to face sheet for referrals.     MONROE Zheng, APSW  6C Unit   Phone: [phone]  Pager: [phone]  Unit: [phone]    ___________________________________________________________________________________________________________________________________________________    Referrals Discontinued:  None    Community Case Management/Community Services in place:   Pt is VA connected for Primary Care at the [facility].  Pt does not have a  there.  PCP is Dr. Aishwarya Mayer.

## 2018-03-29 NOTE — PROGRESS NOTES
"VASCULAR SURGERY PROGRESS NOTE    Subjective:  Patient resting in bed, endorses adequate pain control. Offers no specific complaints.    Objective:  /76 (BP Location: Left arm)  Pulse 96  Temp 97.8  F (36.6  C) (Oral)  Resp 18  Ht 1.803 m (5' 11\")  Wt 117.3 kg (258 lb 9.6 oz)  SpO2 93%  BMI 36.07 kg/m2      Intake/Output Summary (Last 24 hours) at 03/29/18 0750  Last data filed at 03/29/18 0651   Gross per 24 hour   Intake           2103.1 ml   Output             2110 ml   Net             -6.9 ml         Labs:  ROUTINE IP LABS (Last four results)  BMP    Recent Labs  Lab 03/28/18  0844 03/27/18  1011 03/26/18  0420 03/25/18  0957 03/25/18  0350    140 141  --  144   POTASSIUM 3.5 4.3 4.0 4.1 3.9   CHLORIDE 106 107 108  --  110*   STEFFANY 8.1* 8.3* 8.3*  --  8.0*   CO2 26 22 24  --  23   BUN 27 29 23  --  23   CR 0.93 0.91 0.86  --  1.06   * 151* 126*  --  124*     CBC    Recent Labs  Lab 03/28/18  0844 03/27/18  1011 03/26/18  0420 03/25/18  1323   WBC 11.1* 11.8* 13.8* 12.5*   RBC 2.97* 3.30* 2.86* 2.82*   HGB 8.8* 9.9* 8.6* 8.4*   HCT 29.0* 32.0* 27.0* 26.3*   MCV 98 97 94 93   MCH 29.6 30.0 30.1 29.8   MCHC 30.3* 30.9* 31.9 31.9   RDW 16.1* 15.9* 16.3* 16.8*    235 141* 107*     INR    Recent Labs  Lab 03/28/18  0844 03/27/18  1011 03/26/18  0420 03/25/18  0350   INR 1.51* 1.34* 1.44* 1.47*     PHYSICAL EXAM:  General: The patient is alert and oriented. Appropriate. No acute disctress  Psych: pleasant affect, answers questions appropriately  Skin: Color appropriate for race, warm, dry.  Extremities: Doppler right PT and DP, Fasciotomy closure sites dressings changed, sutures intact, no signs of infection, minimal edema and small amount of serous drainage. Adaptic and 4 x 4 dressings applied to sites.  Right groin Prevena intact        ASSESSMENT:  Right leg acute limb ischemia. S/P Right groin reexploration, right common femoral and iliac artery  thrombectomy, right leg angiogram, " stent placement right common iliac artery and then 4 compartment right lower leg fasciotomies 3/24/18 with Dr. Tang.  On 3/27/2018, he underwent closure of right leg fasciotomy X 2 with Dr. Tang.     PLAN:  - fasciotomy closure sites healthy, vascular surgery will continue to perform daily dressing changes.   - improved right leg motor and sensation  - right groin Prevena vac will remain in place until it looses suction for another 3-4 days   - vascular will remove fasciotomy sutures in ~ 3 weeks or if patient goes to rehab in [facility] for them to remove sutures   - no activity restrictions for right lower extremity      Jenny FOSTER, CNS  Division of Vascular Surgery  [facility]  Pager [phone]

## 2018-03-29 NOTE — PLAN OF CARE
Problem: Patient Care Overview  Goal: Plan of Care/Patient Progress Review  OT/CR 6C: Discharge Planner OT   Patient plan for discharge: Rehab in [place]  Current status: Pt and wife very engaged and asking questions appropriately during discussion on post surgical precautions, task modifications for participation in ADL/IADLS, process of rehabilitation from inpt rehab to OP CR, education on benefits of aerobic exercise, safe activity after heart surgery and use of OMNI effort scale to grade exercise.  Tolerates 3 minutes on LE ergometer - limited primarily by RLE pain - VSS  Barriers to return to prior living situation: weakness, deconditioning, post surgical precautions  Recommendations for discharge: ARU  Rationale for recommendations: Pt is currently below baseline with regards to mobility and independence with self cares and will benefit from continued skilled therapy intervention to address deficits.  Pt is very motivated and engaged in therapy, good family support.         Entered by: Lyric Delacruz 03/29/2018 9:10 AM

## 2018-03-29 NOTE — PLAN OF CARE
Problem: Patient Care Overview  Goal: Plan of Care/Patient Progress Review  Outcome: Improving  D: From OSH on 3/21 with MI/ New onset HF. CABG 3/23, RLE thrombectomy and fasciotomy      I: Heparin gtt at 1800, next 10A this AM. PRN Tylenol for incisional pain once during shift. Old CT site dressing changed d/t soiled dressing.      A: A&O, VSS, up with 2 and walker. Up to commode once during shift. Oxygen stable on RA. Tele Afib, -110's. Felton in place, good urine output. Passing gas but no BM during shift. Pt states he is feeling much better today compared to yesterday.     P: Continue to monitor, notify MD of pertinent changes.

## 2018-03-29 NOTE — PLAN OF CARE
Problem: Patient Care Overview  Goal: Plan of Care/Patient Progress Review  PT - 6C    Discharge Planner PT   Patient plan for discharge: ARU.  Current status: Pt transferred supine->sit with Mod A x2. Patient transferred sit<>stand up to FWW 3x at Min A - Mod A of 2. Patient ambulated ~4 steps forward/backward x6 bouts with FWW at CGA-Min A x2 with cuing needed to  feet and bring walker along. VSS on RA.   Barriers to return to prior living situation: Deconditioning, numerous stairs in home, below baseline for functional mobility, medical status.   Recommendations for discharge: ARU.  Rationale for recommendations: Pt would benefit from intensive therapies to continue making progressions towards independence with functional mobility.          Entered by: Estrella Vigil 03/29/2018 3:45 PM

## 2018-03-29 NOTE — PROGRESS NOTES
Diabetes Consult Daily  Progress Note          Assessment/Plan:                          Izaiah Alvarez is a 72 year old male with history of type 2 diabetes, hypertension, hyperlipidemia, atrial fibrillation ( on warfarin), TIA, obesity transferred from [facility] following STEMI with new onset of left heart failure ( EF 20-25%). Impella device was placed via right femoral artery at the OSH. he underwent coronary artery bypass grafting x 3 and removal of Impella on  (3/23/2018) by .  Found to have critical limb ischemia with loss of motor function s/p Right groin reexploration, right common femoral and iliac artery  thrombectomy, right leg angiogram, stent placement right common iliac artery and then 4 compartment right lower leg fasciotomies 3/24/18 with Dr. Tang.  On 3/27/2018, he underwent closure of right leg fasciotomy X 2 with Dr. Tang.          Type 2 diabetic: A1C 7.8% ( 3/22/2018), PTA on lantus 60 units, Glipizide 20 mg bid, Metformin 1000 mg bid  Plan  - lantus 45 units daily ( given at 1330)  -Novolog 1 unit per 5 grams of CHO with meals and snacks  -Novolog high intensity sliding scale before meals and HS  -monitor glucose before meals, HS and 0200  Will continue to follow     Plan discussed with patient,and primary team.    Possible discharge to TCU in [place] either on Saturday or Monday           Interval History:   The last 24 hours progress and nursing notes reviewed.  Transitioned off IV insulin yesterday     Blood sugars are moderately controlled  No glucose documented at 0200  Fasting 160/158  glucose of 225, is after eating and timing of insulin is off ( patient reports he had eaten early in the morning)      Recent Labs  Lab 03/29/18  0953 03/29/18  0824 03/29/18  0744 03/28/18  2210 03/28/18  1700 03/28/18  1532 03/28/18  1333  03/28/18  0844  03/27/18  1011  03/26/18  0420  03/25/18  0350  03/24/18  1708   GLC  --   --  160*  --   --   --   --   --  105*  --  151*   "--  126*  --  124*  --  135*   * 158*  --  138* 152* 112* 120*  < >  --   < >  --   < >  --   < >  --   < >  --    < > = values in this interval not displayed.            Review of Systems:   See interval hx          Medications:       Active Diet Order      Moderate Consistent CHO Diet     Physical Exam:  Gen: Alert, resting in bed, in NAD   HEENT:  mucous membranes are moist  Resp: Unlabored  Neuro:oriented x3, communicating clearly  /84  Pulse 108  Temp 98.1  F (36.7  C) (Oral)  Resp 18  Ht 1.803 m (5' 11\")  Wt 117.3 kg (258 lb 9.6 oz)  SpO2 96%  BMI 36.07 kg/m2           Data:     Lab Results   Component Value Date    A1C 7.8 03/22/2018              CBC RESULTS:   Recent Labs   Lab Test  03/29/18   0744   WBC  11.6*   RBC  3.05*   HGB  9.1*   HCT  29.3*   MCV  96   MCH  29.8   MCHC  31.1*   RDW  15.9*   PLT  318     Recent Labs   Lab Test  03/29/18   0744  03/28/18   0844   NA  139  138   POTASSIUM  3.6  3.5   CHLORIDE  106  106   CO2  24  26   ANIONGAP  9  7   GLC  160*  105*   BUN  24  27   CR  0.90  0.93   STEFFANY  8.3*  8.1*     Liver Function Studies -   Recent Labs   Lab Test  03/22/18   0645   PROTTOTAL  7.0   ALBUMIN  2.8*   BILITOTAL  3.6*   ALKPHOS  55   AST  264*   ALT  69     Lab Results   Component Value Date    INR 1.66 03/29/2018    INR 1.51 03/28/2018    INR 1.34 03/27/2018    INR 1.44 03/26/2018    INR 1.47 03/25/2018    INR 1.53 03/24/2018    INR 1.31 03/24/2018    INR 1.28 03/23/2018    INR 1.51 03/23/2018    INR 1.12 03/23/2018    INR 1.18 03/22/2018    INR 1.20 03/22/2018           Candido Piedra -[phone]  Diabetes Management job code [id number]            "

## 2018-03-29 NOTE — PROGRESS NOTES
"CLINICAL NUTRITION SERVICES - REASSESSMENT NOTE     Nutrition Prescription    RECOMMENDATIONS FOR MDs/PROVIDERS TO ORDER:  None at this time.    Recommendations already ordered by Registered Dietitian (RD):  Prn supplement order    Future/Additional Recommendations:  1. Continue current diet as ordered. Once oral intake improves, rec add cardiac diet restrictions as a \"Combination Diet.\"  2. Continue multivitamin with minerals to help ensure micronutrient needs are met.  3. Monitor BG control. Hgb A1c of 7.8 on 3/22/18 and pt with DM II. Endo is following pt.  4. Continue omega-3 supplementation and thiamine supplementation as per team.  5. Rec continue bowel regimen with constipation. Pt is ordered to receive dulcolax, colace, miralax, and senna.      EVALUATION OF THE PROGRESS TOWARD GOALS   Diet: Moderate Consistent Carbohydrate diet since 3/28. He was extubated 3/25, and diet was advanced on 3/26. Little to no oral intake 3/23-3/25. Has a 2 L fluid restriction.  Intake: Fair diet tolerance. Flowsheets indicate pt consuming % of meals with a fair to good appetite 3/28. Denies nausea. Per pt, his appetite had been poor due to surgeries/procedures, constipation, NPO status and recent diet advancement. States he feels that he receives enough food on his current diet order. Believes his appetite has started to improve and is eating more today (3/29/2018).      NEW FINDINGS   Deconditioning. Pt states he felt especially weak earlier this admission but now feeling stronger.     Per WO nurse 3/27: Penis wound due to Trauma and irritation from Felton's catheter. Status: initial assessment. No mention of pressure injury.    ASSESSED NUTRITION NEEDS (updated):  Dosing Weight: 87 kg (adjusted, based on lowest wt this admission of 114.2 kg on 3/21, admission)  Estimated Energy Needs: 7890-5032 kcals/day (20-25 kcals/kg)  Justification: Estimated needs with wt status  Estimated Protein Needs: 104-131+ grams protein/day " (1.2 - 1.5+ grams of pro/kg)  Justification: Increased needs with post-op status  Estimated Fluid Needs: 5110-6525 mL/day (25-30 mL/kg)   Justification: Maintenance needs and per provider, pending fluid status    MALNUTRITION  % Intake: < 75% for > 7 days (non-severe)  % Weight Loss: None noted (as per discussion with pt)  Subcutaneous Fat Loss: None observed, but difficult to assess with body habitus. Pt busy with nursing cares   Muscle Loss: None observed, but difficult to assess with body habitus and pt busy with nursing cares. Pt busy with nursing cares   Fluid Accumulation/Edema: None noted  Malnutrition Diagnosis: Patient does not meet two of the above criteria necessary for diagnosing malnutrition but is at risk for malnutrition    Previous Goals   Patient to consume % of nutritionally adequate meal trays TID, or the equivalent with supplements/snacks.  Evaluation: Improving, but not meeting goal consistently.    Previous Nutrition Diagnosis  Inadequate protein-energy intake related to decreased appetite and current diet status as evidenced by decreased/sydney PO intake since admit x 2 day and currently NPO meeting 0% kcal/PRO needs.   Evaluation: Not met. Changed to new nutrition dx below.    CURRENT NUTRITION DIAGNOSIS  Inadequate oral intake related to surgeries/procedures, constipation, NPO status and recent diet advancement as evidenced by pt consuming 50% of meals at times.      INTERVENTIONS  Implementation  Medical food supplement therapy: Placed prn supplement order. Pt may request oral supplements prn.   Nutrition education for nutrition relationship to health/disease: Reinforced heart-healthy eating. Encouraged pt to continue to make good, heart-healthy changes and consistent carbohydrate intake. Provided handouts: Nutrition Care Manual handout on Heart-Healthy Nutrition Therapy. How to Read Nutrition Labels, Low-Sodium Recipes, and Heart-Healthy Recipes.     Goals  Patient to consume %  of nutritionally adequate meal trays TID, or the equivalent with supplements/snacks.    Monitoring/Evaluation  Progress toward goals will be monitored and evaluated per protocol.     Nutrition will continue to follow.     Codi Young MS, RD, LD, [facility]   6C Pgr: [phone]

## 2018-03-30 ENCOUNTER — APPOINTMENT (OUTPATIENT)
Dept: PHYSICAL THERAPY | Facility: CLINIC | Age: 73
DRG: 228 | End: 2018-03-30
Attending: THORACIC SURGERY (CARDIOTHORACIC VASCULAR SURGERY)
Payer: COMMERCIAL

## 2018-03-30 ENCOUNTER — APPOINTMENT (OUTPATIENT)
Dept: OCCUPATIONAL THERAPY | Facility: CLINIC | Age: 73
DRG: 228 | End: 2018-03-30
Attending: THORACIC SURGERY (CARDIOTHORACIC VASCULAR SURGERY)
Payer: COMMERCIAL

## 2018-03-30 LAB
ANION GAP SERPL CALCULATED.3IONS-SCNC: 10 MMOL/L (ref 3–14)
BUN SERPL-MCNC: 21 MG/DL (ref 7–30)
CALCIUM SERPL-MCNC: 8.6 MG/DL (ref 8.5–10.1)
CHLORIDE SERPL-SCNC: 104 MMOL/L (ref 94–109)
CO2 SERPL-SCNC: 24 MMOL/L (ref 20–32)
CREAT SERPL-MCNC: 0.97 MG/DL (ref 0.66–1.25)
ERYTHROCYTE [DISTWIDTH] IN BLOOD BY AUTOMATED COUNT: 16.3 % (ref 10–15)
GFR SERPL CREATININE-BSD FRML MDRD: 77 ML/MIN/1.7M2
GLUCOSE BLDC GLUCOMTR-MCNC: 148 MG/DL (ref 70–99)
GLUCOSE BLDC GLUCOMTR-MCNC: 158 MG/DL (ref 70–99)
GLUCOSE BLDC GLUCOMTR-MCNC: 160 MG/DL (ref 70–99)
GLUCOSE BLDC GLUCOMTR-MCNC: 164 MG/DL (ref 70–99)
GLUCOSE BLDC GLUCOMTR-MCNC: 189 MG/DL (ref 70–99)
GLUCOSE SERPL-MCNC: 159 MG/DL (ref 70–99)
HCT VFR BLD AUTO: 29.4 % (ref 40–53)
HGB BLD-MCNC: 9.1 G/DL (ref 13.3–17.7)
INR PPP: 1.87 (ref 0.86–1.14)
LMWH PPP CHRO-ACNC: 0.29 IU/ML
MAGNESIUM SERPL-MCNC: 2 MG/DL (ref 1.6–2.3)
MCH RBC QN AUTO: 30.2 PG (ref 26.5–33)
MCHC RBC AUTO-ENTMCNC: 31 G/DL (ref 31.5–36.5)
MCV RBC AUTO: 98 FL (ref 78–100)
PLATELET # BLD AUTO: 356 10E9/L (ref 150–450)
POTASSIUM SERPL-SCNC: 3.5 MMOL/L (ref 3.4–5.3)
RBC # BLD AUTO: 3.01 10E12/L (ref 4.4–5.9)
SODIUM SERPL-SCNC: 138 MMOL/L (ref 133–144)
WBC # BLD AUTO: 13.2 10E9/L (ref 4–11)

## 2018-03-30 PROCEDURE — 25000132 ZZH RX MED GY IP 250 OP 250 PS 637: Performed by: THORACIC SURGERY (CARDIOTHORACIC VASCULAR SURGERY)

## 2018-03-30 PROCEDURE — 40000133 ZZH STATISTIC OT WARD VISIT: Performed by: OCCUPATIONAL THERAPIST

## 2018-03-30 PROCEDURE — 25000132 ZZH RX MED GY IP 250 OP 250 PS 637: Performed by: NURSE PRACTITIONER

## 2018-03-30 PROCEDURE — 25000132 ZZH RX MED GY IP 250 OP 250 PS 637: Performed by: PHYSICIAN ASSISTANT

## 2018-03-30 PROCEDURE — 25000125 ZZHC RX 250: Performed by: THORACIC SURGERY (CARDIOTHORACIC VASCULAR SURGERY)

## 2018-03-30 PROCEDURE — 00000146 ZZHCL STATISTIC GLUCOSE BY METER IP

## 2018-03-30 PROCEDURE — 25000132 ZZH RX MED GY IP 250 OP 250 PS 637

## 2018-03-30 PROCEDURE — 85520 HEPARIN ASSAY: CPT | Performed by: PHYSICIAN ASSISTANT

## 2018-03-30 PROCEDURE — 21400006 ZZH R&B CCU INTERMEDIATE UMMC

## 2018-03-30 PROCEDURE — 36415 COLL VENOUS BLD VENIPUNCTURE: CPT | Performed by: PHYSICIAN ASSISTANT

## 2018-03-30 PROCEDURE — 97110 THERAPEUTIC EXERCISES: CPT | Mod: GO | Performed by: OCCUPATIONAL THERAPIST

## 2018-03-30 PROCEDURE — 25000132 ZZH RX MED GY IP 250 OP 250 PS 637: Performed by: INTERNAL MEDICINE

## 2018-03-30 PROCEDURE — 25000128 H RX IP 250 OP 636: Performed by: PHYSICIAN ASSISTANT

## 2018-03-30 PROCEDURE — 85610 PROTHROMBIN TIME: CPT | Performed by: PHYSICIAN ASSISTANT

## 2018-03-30 PROCEDURE — 83605 ASSAY OF LACTIC ACID: CPT | Performed by: THORACIC SURGERY (CARDIOTHORACIC VASCULAR SURGERY)

## 2018-03-30 PROCEDURE — 40000193 ZZH STATISTIC PT WARD VISIT

## 2018-03-30 PROCEDURE — 97530 THERAPEUTIC ACTIVITIES: CPT | Mod: GP

## 2018-03-30 PROCEDURE — 25000132 ZZH RX MED GY IP 250 OP 250 PS 637: Performed by: SURGERY

## 2018-03-30 PROCEDURE — 83735 ASSAY OF MAGNESIUM: CPT | Performed by: PHYSICIAN ASSISTANT

## 2018-03-30 PROCEDURE — 85027 COMPLETE CBC AUTOMATED: CPT | Performed by: PHYSICIAN ASSISTANT

## 2018-03-30 PROCEDURE — 80048 BASIC METABOLIC PNL TOTAL CA: CPT | Performed by: PHYSICIAN ASSISTANT

## 2018-03-30 PROCEDURE — 97116 GAIT TRAINING THERAPY: CPT | Mod: GP

## 2018-03-30 RX ORDER — METOPROLOL TARTRATE 25 MG/1
25 TABLET, FILM COATED ORAL ONCE
Status: COMPLETED | OUTPATIENT
Start: 2018-03-30 | End: 2018-03-30

## 2018-03-30 RX ORDER — WARFARIN SODIUM 5 MG/1
5 TABLET ORAL
Status: COMPLETED | OUTPATIENT
Start: 2018-03-30 | End: 2018-03-30

## 2018-03-30 RX ORDER — POTASSIUM CHLORIDE 750 MG/1
20-40 TABLET, EXTENDED RELEASE ORAL
Status: DISCONTINUED | OUTPATIENT
Start: 2018-03-30 | End: 2018-04-04 | Stop reason: HOSPADM

## 2018-03-30 RX ORDER — METOPROLOL TARTRATE 50 MG
50 TABLET ORAL 2 TIMES DAILY
Status: DISCONTINUED | OUTPATIENT
Start: 2018-03-30 | End: 2018-04-04 | Stop reason: HOSPADM

## 2018-03-30 RX ORDER — GLIPIZIDE 10 MG/1
10 TABLET ORAL
Status: DISCONTINUED | OUTPATIENT
Start: 2018-03-30 | End: 2018-04-01

## 2018-03-30 RX ADMIN — METFORMIN HYDROCHLORIDE 500 MG: 500 TABLET, FILM COATED ORAL at 18:07

## 2018-03-30 RX ADMIN — SENNOSIDES AND DOCUSATE SODIUM 2 TABLET: 8.6; 5 TABLET ORAL at 20:56

## 2018-03-30 RX ADMIN — FUROSEMIDE 20 MG: 10 INJECTION, SOLUTION INTRAVENOUS at 16:51

## 2018-03-30 RX ADMIN — METOPROLOL TARTRATE 25 MG: 25 TABLET ORAL at 08:18

## 2018-03-30 RX ADMIN — Medication 2 G: at 10:11

## 2018-03-30 RX ADMIN — ATORVASTATIN CALCIUM 20 MG: 20 TABLET, FILM COATED ORAL at 20:56

## 2018-03-30 RX ADMIN — INSULIN ASPART 4 UNITS: 100 INJECTION, SOLUTION INTRAVENOUS; SUBCUTANEOUS at 08:32

## 2018-03-30 RX ADMIN — INSULIN GLARGINE 45 UNITS: 100 INJECTION, SOLUTION SUBCUTANEOUS at 14:44

## 2018-03-30 RX ADMIN — PANTOPRAZOLE SODIUM 40 MG: 40 TABLET, DELAYED RELEASE ORAL at 08:19

## 2018-03-30 RX ADMIN — FINASTERIDE 5 MG: 5 TABLET, FILM COATED ORAL at 08:20

## 2018-03-30 RX ADMIN — FUROSEMIDE 20 MG: 10 INJECTION, SOLUTION INTRAVENOUS at 08:25

## 2018-03-30 RX ADMIN — HEPARIN SODIUM 1800 UNITS/HR: 10000 INJECTION, SOLUTION INTRAVENOUS at 13:04

## 2018-03-30 RX ADMIN — POLYETHYLENE GLYCOL 3350 17 G: 17 POWDER, FOR SOLUTION ORAL at 08:19

## 2018-03-30 RX ADMIN — METOPROLOL TARTRATE 25 MG: 25 TABLET ORAL at 10:31

## 2018-03-30 RX ADMIN — Medication 100 MG: at 08:19

## 2018-03-30 RX ADMIN — ACETAMINOPHEN 650 MG: 325 TABLET, FILM COATED ORAL at 20:56

## 2018-03-30 RX ADMIN — GLIPIZIDE 10 MG: 10 TABLET ORAL at 18:07

## 2018-03-30 RX ADMIN — POTASSIUM CHLORIDE 20 MEQ: 750 TABLET, EXTENDED RELEASE ORAL at 08:30

## 2018-03-30 RX ADMIN — METOPROLOL TARTRATE 50 MG: 50 TABLET, FILM COATED ORAL at 20:55

## 2018-03-30 RX ADMIN — ASPIRIN 81 MG: 81 TABLET, COATED ORAL at 08:19

## 2018-03-30 RX ADMIN — WARFARIN SODIUM 5 MG: 5 TABLET ORAL at 18:07

## 2018-03-30 RX ADMIN — MULTIPLE VITAMINS W/ MINERALS TAB 1 TABLET: TAB at 08:18

## 2018-03-30 ASSESSMENT — PAIN DESCRIPTION - DESCRIPTORS
DESCRIPTORS: SORE
DESCRIPTORS: SORE

## 2018-03-30 NOTE — PLAN OF CARE
Problem: Patient Care Overview  Goal: Plan of Care/Patient Progress Review  RN:  1.Pt will remain hemodynamically stable.  2.Pt will be free from infection.   PT - 6C    Discharge Planner PT   Patient plan for discharge: ARU.  Current status: Pt transferred supine->stand at Mod A. Pt transferred sit<>stand 6x throughout session up to FWW at Min A-Mod A. Pt ambulated ~75ft 4x throughout session with FWW at CGA, requiring seated rest breaks to relieve SOB and fatigue. VSS on RA.  Barriers to return to prior living situation: Deconditioning, below baseline for functional mobility, stairs at home, medical status.   Recommendations for discharge: ARU.  Rationale for recommendations: Pt would benefit from intensive therapies to improve functional mobility towards independent.         Entered by: Estrella Vigil 03/30/2018 3:00 PM

## 2018-03-30 NOTE — PROGRESS NOTES
Social Work Services Progress Note     Hospital Day: 8  Date of Initial Social Work Evaluation:  3/22/18  Collaborated with:  ARU admissions, Pt, [facility] admissions, [facility]      Data: Pt is a 72 year old male being followed by SW for placement to ARU/Swing bed in [facility].     Intervention:  SW received decline from [facility] as they feel the pt does not meet ARU criteria due to possibly needing longer term rehab placement.  [facility] is requesting PMR consult for recommendations/admissions review.  Updated records sent to this facility.  SW also sent a referral to the [facility] in [facility].  The CLC is pt's first choice.    Addendum: Completed the VA request for financial support/service connection to see if pt is eligible for VA transport to [place] at discharge.  Pt states if he is not eligible his nephew could come get him with 1-2 days early notice.  Pt states his nephew cared for disabled parents and has vehicles and equipment to assist with limited mobility needs.     - Referrals in Process:    [facility]) - reviewing over the weekend and will check in on Monday.   PH: [phone]  F: [phone]    [facility] in [place], [place] - referral sent  PH: [phone]  F: [phone]     Assessment: Pt in a positive mood today and is open to pursuing placement at the [facility].     Plan:    Anticipated Disposition: Facility:  TCU with the VA as able    Barriers to d/c plan: Medical stability, bed availability, VA admissions will not review referral until Monday morning.    Follow Up: SW to follow up with VA admissions team on Monday.    MONROE Zheng, APSW  6C Unit   Phone: [phone]  Pager: [phone]  Unit: [phone]    ___________________________________________________________________________________________________________________________________________________    Referrals Discontinued:  [facility] - declined for program - medical review team felt pt was too complex for  cares at their facility.    Community Case Management/Community Services in place:   Pt is VA connected for Primary Care at the [facility].    PCP: Dr. Aishwarya Mayer  : [place] PH:  [phone]

## 2018-03-30 NOTE — PROGRESS NOTES
"VASCULAR SURGERY PROGRESS NOTE    Subjective:  States he feels better has more sensation in his leg walked 4 times yesterday and again today tolerating it well.     Objective:  BP 96/77 (BP Location: Left arm)  Pulse 101  Temp 97.5  F (36.4  C) (Oral)  Resp 18  Ht 1.803 m (5' 11\")  Wt 116.6 kg (257 lb)  SpO2 93%  BMI 35.84 kg/m2      Intake/Output Summary (Last 24 hours) at 03/29/18 0750  Last data filed at 03/29/18 0651   Gross per 24 hour   Intake           2103.1 ml   Output             2110 ml   Net             -6.9 ml     Alert in no acute distress  Dressing changed with healthy wound mild serous drainage no signs of infection no erythema non tense. The wound was covered with vaseline gauze and primepore  Palpable pulses bilaterally with normal capillary refill    Labs:  ROUTINE IP LABS (Last four results)  BMP    Recent Labs  Lab 03/29/18  0744 03/28/18  0844 03/27/18  1011 03/26/18  0420    138 140 141   POTASSIUM 3.6 3.5 4.3 4.0   CHLORIDE 106 106 107 108   STEFFANY 8.3* 8.1* 8.3* 8.3*   CO2 24 26 22 24   BUN 24 27 29 23   CR 0.90 0.93 0.91 0.86   * 105* 151* 126*     CBC    Recent Labs  Lab 03/29/18  0744 03/28/18  0844 03/27/18  1011 03/26/18  0420   WBC 11.6* 11.1* 11.8* 13.8*   RBC 3.05* 2.97* 3.30* 2.86*   HGB 9.1* 8.8* 9.9* 8.6*   HCT 29.3* 29.0* 32.0* 27.0*   MCV 96 98 97 94   MCH 29.8 29.6 30.0 30.1   MCHC 31.1* 30.3* 30.9* 31.9   RDW 15.9* 16.1* 15.9* 16.3*    257 235 141*     INR    Recent Labs  Lab 03/30/18  0656 03/29/18  0744 03/28/18  0844 03/27/18  1011   INR 1.87* 1.66* 1.51* 1.34*       ASSESSMENT:CAD, CHF, PAF s/p CAB X3 PVI, MAZE and JUN atri clip with removal of impella in POD#6 course complicated by right leg acute limb ischemia. S/P Right groin reexploration, right common femoral and iliac artery  thrombectomy, right leg angiogram, stent placement right common iliac artery and then 4 compartment right lower leg fasciotomies 3/24/18 with Dr. Tang.  On 3/27/2018, " he underwent closure of right leg fasciotomy X 2 with Dr. Tang.     PLAN:  - fasciotomy closure sites healthy, vascular surgery will continue to perform daily dressing changes.   - improved right leg motor and sensation  - right groin Prevena vac will remain in place until it looses suction for another 3-4 days   - vascular will remove fasciotomy sutures in ~ 3 weeks or if patient goes to rehab in [facility] for them to remove sutures   - no activity restrictions for right lower extremity    Estela Mandel  Cardiology fellow, PGY-4

## 2018-03-30 NOTE — PROGRESS NOTES
Social Work Services Progress Note     Hospital Day: 8  Date of Initial Social Work Evaluation:  3/22/18  Collaborated with:  ARU admissions     Data: Pt is a 72 year old male being followed by SW for placement to ARU/Swing bed in [facility].     Intervention:  SW received decline from [facility] as they feel the pt does not meet ARU criteria due to possibly needing longer term rehab placement.  [facility] is requesting PMR consult for recommendations/admissions review.  Will send when completed.     - Referrals in Process:    [facility] - requesting PMR consult  PH: [phone]  F: [phone]    [facility] - declined  PH: [phone]  F: [phone]     Assessment: Pt and spouse adjusting appropriately to situation.       Plan:    Anticipated Disposition: Facility:  ARU or Swing bed placement    Barriers to d/c plan: Medical stability, bed availability    Follow Up: SW to follow up with [facility] tomorrow for status of the referral.    MONROE Zheng, APSW  6C Unit   Phone: [phone]  Pager: [phone]  Unit: [phone]    ___________________________________________________________________________________________________________________________________________________    Referrals Discontinued:  [facility] - declined for program    Community Case Management/Community Services in place:   Pt is VA connected for Primary Care at the [facility].  Pt does not have a  there.  PCP is Dr. Aishwarya Mayer.

## 2018-03-30 NOTE — CONSULTS
Please see consultation from Physical Medicine and Rehabilitation dated 3/30/2018 with recommendations for acute inpatient rehabilitation upon hospital discharge.  Please reach out to our service with any further questions/concerns.    Varinder Villarreal DO, CHARLIE    Department of Rehabilitation Medicine

## 2018-03-30 NOTE — PROGRESS NOTES
"CT Surgery Progress Note    Coronary artery disease, CHF, and PAF s/p CABG x 3/bilateral pulmonary vein isolation MAZE/L atrial appendage ligation with 45 mm AtriClip/removal of impella POD #7    R leg acute limb ischemia s/p R groin reexploration, R common femoral and iliac artery thrombectomy, stent placement R common iliac artery and 4 compartment R lower leg fasciotomies POD #6    Closure of R lower leg 4 compartment fasciotomies POD #3    Subjective:  States that pain is being controlled. Denies any hallucinations. Breathing is good. Working with IS. Appetite is good. Denies any nausea. +BM, denies any popping/clicking in his breast bone, working with rehab, rehab went well yesterday, gallardo removed this am, pt would like to do rehab in [place]    Objective:  Up in chair, comfortable, -O2  BP 96/77 (BP Location: Left arm)  Pulse 101  Temp 97.5  F (36.4  C) (Oral)  Resp 18  Ht 1.803 m (5' 11\")  Wt 116.6 kg (257 lb)  SpO2 93%  BMI 35.84 kg/m2  CT - removed  CV - IRRR, telemetry afib 90-100s  Pulm - CTA, IS 6674-7028 ml  Abd - BS+, NT/ND  Derm - sternal incision D/I   L LE incisions D/I (EVH leg)   R LE incisions D/I, +dressings   R groin incision +prevena  Lab - WBC 13.2   Hgb/Hct 9.1/29.4   Plt 356   INR 1.87   Na 138   K 3.5   Cr/BUN 0.97/21   Mg 2.0   Glucose 158-164  +heparin gtt    A/P:  1. S/p CABG x 3/bilateral pulmonary vein isolation MAZE/L atrial appendage ligation with 45 mm AtriClip/removal of impella POD #7  2. R leg acute limb ischemia s/p R groin reexploration, R common femoral and iliac artery thrombectomy, stent placement R common iliac artery and 4 compartment R lower leg fasciotomies POD #6  3. Closure of R lower leg 4 compartment fasciotomies POD #3  4. DM type II - recommendations per Endo  5. PAF - had pre-op, was on coumadin prior, +heparin gtt/+coumadin, increase lopressor to 50 mg PO bid  6. Traumatic gallardo placement - +flomax, removed gallardo at 0600, should urinate between 4304-0762, " if unable to void and bladder scanned > or = 350 ml please place perm gallardo  7. Hx of TIA  8. STEMI  9. Obesity  10. Asthma    11. HTN - increase lopressor to 50 mg bid, holding ace inh due to low BP  12. Hyperlipidemia - statin  13. R groin incision - prevena will be discontinue on 3/31/18  Encouraged IS/TCDB/amb. Sternal precautions. Looking at possible discharge to [facility] rehab on Saturday or Monday pending INR, strength, and bed placement. Continue to monitor.     Bertram Vail PA-C  CT Surgery    Addendum: Discussed with Vascular Surgery and pt will need to be on plavix for 1 year duration after iliac stent placement. Will need to be started if/when coumadin is stopped. Will need follow up with vascular as outpatient when back at home in [place], [place].

## 2018-03-30 NOTE — PROGRESS NOTES
"D: Pt has been pleasant throughout shift. Tolerating medications. Pt states he is sore, but is refusing medications. States \"if I don't sleep well tonight I may ask for something later.\"   Replaced K+ and Mag+ per protocol.   Up with 1 - 2 assist to commode. Urinal at bedside.   Appetite is good.   Sternal incision clean, dry intact. Old Chest Tube Site, scant serous drainage, dressing changed. MD changed leg dressing, serosanguinous drainage seen on dressing throughout day.       I: Monitored vitals and assessed pt status.   Changed:  Running: Heparin 1800 u/hr   PRN:    A: A0x4. VSS. Afebrile. Urinating adequately after gallardo removal at 0600.  HR Afib     P: Continue to monitor Pt status and report changes to treatment team. Plan to D/C early next week to TCU.     Temp:  [97.5  F (36.4  C)-98.3  F (36.8  C)] 98.2  F (36.8  C)  Pulse:  [101] 101  Heart Rate:  [] 100  Resp:  [16-20] 20  BP: ()/() 124/82  SpO2:  [93 %-99 %] 97 %    "

## 2018-03-30 NOTE — PLAN OF CARE
Problem: Patient Care Overview  Goal: Plan of Care/Patient Progress Review  RN:  1.Pt will remain hemodynamically stable.  2.Pt will be free from infection.   OT/CR 6C: Discharge Planner OT   Patient plan for discharge: Rehab   Current status: Pt ambulated 20 ft x2 with CGA and FWW. Pt tolerated nustep exercise for approximately 5 minutes with VSS. Educated patient on signs/symptoms of exercise intolerance. Mod. Ax2 supine to EOB and Min Ax2 sit to stand x4 during session.   Barriers to return to prior living situation: weakness, deconditioning, post surgical precautions  Recommendations for discharge:  Per plan established by the Occupational Therapist, the recommendation for discharge location is ARU.  Rationale for recommendations: Pt is currently below baseline with regards to mobility and independence with self cares and will benefit from continued skilled therapy intervention to address deficits.  Pt is very motivated and engaged in therapy, good family support      Entered by: Cathie Guerrero 03/30/2018 10:08 AM

## 2018-03-30 NOTE — PROGRESS NOTES
Diabetes Consult Daily  Progress Note          Assessment/Plan:     Izaiah Alvarez is a 72 year old male with history of type 2 diabetes, hypertension, hyperlipidemia, atrial fibrillation ( on warfarin), TIA, obesity transferred from [facility] following STEMI with new onset of left heart failure ( EF 20-25%). Impella device was placed via right femoral artery at the OSH. he underwent coronary artery bypass grafting x 3 and removal of Impella on  (3/23/2018) by .  Found to have critical limb ischemia with loss of motor function s/p Right groin reexploration, right common femoral and iliac artery  thrombectomy, right leg angiogram, stent placement right common iliac artery and then 4 compartment right lower leg fasciotomies 3/24/18 with Dr. Tang.  On 3/27/2018, he underwent closure of right leg fasciotomy X 2 with Dr. Tang.            Type 2 diabetic: A1C 7.8% ( 3/22/2018), PTA on lantus 60 units, Glipizide 20 mg bid, Metformin 1000 mg bid  Plan  - lantus 45 units daily ( given at 1330)  -start metformin 500 mg twice daily with dinner tonight  - d/c Novolog 1 unit per 5 grams of CHO with meals and snacks, starting at dinner  - Start glipizide 10 mg twice daily with dinner  -Novolog high intensity sliding scale before meals and HS  -monitor glucose before meals, HS and 0200  Will continue to follow      Plan discussed with patient,and primary team.     Possible discharge to TCU in [place] either on Saturday or Monday                                   Interval History:   The last 24 hours progress and nursing notes reviewed.  Blood sugars are in good control on lantus and meal insulin  Planning for discharge, will start PTA oral medication regime.  Appetite is back to PTA, per patient  Is working with therapies        Recent Labs  Lab 03/30/18  1230 03/30/18  0717 03/30/18  0656 03/30/18  0222 03/29/18  2134 03/29/18  1731 03/29/18  1706  03/29/18  0744  03/28/18  0844  03/27/18  1011   "03/26/18  0420  03/25/18  0350   GLC  --   --  159*  --   --   --   --   --  160*  --  105*  --  151*  --  126*  --  124*   * 164*  --  158* 174* 146* 152*  < >  --   < >  --   < >  --   < >  --   < >  --    < > = values in this interval not displayed.            Review of Systems:   See interval hx          Medications:       Active Diet Order      Moderate Consistent CHO Diet     Physical Exam:  Gen: Alert,  NAD   HEENT: mucous membranes are moist  Resp: Unlabored  Ext: moving all extremities  Neuro:oriented x3, communicating clearly  BP 99/62 (BP Location: Left arm)  Pulse 101  Temp 98  F (36.7  C) (Axillary)  Resp 18  Ht 1.803 m (5' 11\")  Wt 116.6 kg (257 lb)  SpO2 95%  BMI 35.84 kg/m2           Data:     Lab Results   Component Value Date    A1C 7.8 03/22/2018              CBC RESULTS:   Recent Labs   Lab Test  03/30/18   0656   WBC  13.2*   RBC  3.01*   HGB  9.1*   HCT  29.4*   MCV  98   MCH  30.2   MCHC  31.0*   RDW  16.3*   PLT  356     Recent Labs   Lab Test  03/30/18   0656  03/29/18   0744   NA  138  139   POTASSIUM  3.5  3.6   CHLORIDE  104  106   CO2  24  24   ANIONGAP  10  9   GLC  159*  160*   BUN  21  24   CR  0.97  0.90   STEFFANY  8.6  8.3*     Liver Function Studies -   Recent Labs   Lab Test  03/22/18   0645   PROTTOTAL  7.0   ALBUMIN  2.8*   BILITOTAL  3.6*   ALKPHOS  55   AST  264*   ALT  69     Lab Results   Component Value Date    INR 1.87 03/30/2018    INR 1.66 03/29/2018    INR 1.51 03/28/2018    INR 1.34 03/27/2018    INR 1.44 03/26/2018    INR 1.47 03/25/2018    INR 1.53 03/24/2018    INR 1.31 03/24/2018    INR 1.28 03/23/2018    INR 1.51 03/23/2018    INR 1.12 03/23/2018    INR 1.18 03/22/2018         Candido Piedra -[phone]  Diabetes Management job code [id number]            "

## 2018-03-30 NOTE — CONSULTS
[facility]   PM&R CONSULT    Consulting Provider: Alfonzo Mckeon  Reason for Consult: Assessment of rehabilitation   Location of Patient: [unfilled]  Date of Encounter: 3/30/2018   Date of Admission: 3/21/2018        ASSESSMENT/PLAN:    Mr. Izaiah Alavrez is a Right handed 72 year old yo male who presents with s/p CABG and RLE fasciotomy with resultant impairments in mobility, ADL/IADLs, impaired transferring, impaired functional cognition of BLE, generalized weakness, and fatigue.  Medical management: He will need ongoing medical management to further treat his hypertension, diabetes mellitus type 2, atrial fibrillation and warfarin dosing adjustments.    He will require PT 90 minutes daily for 7 days per week to improve his mobility, transfers, decreased strength in BUE/BLE, fatigue, activity tolerance and functional cognition of BLE with the goal of becoming modified independent with a FWW.    He will require OT 90 minutes daily for 7 days per week to improve his ADL/IADLs, functional transfers, decreased strength in BUE, fatigue, activity tolerance and functional cognition of BLE with the goal of becoming modified independent for all ADL/IADLs,    This patient is an appropriate candidate for acute inpatient rehabilitation from a multidisciplinary approach.    We discussed adjustment to disability, we also discussed post-CABG depression and fatigue. All questions at this time were answered.    These recommendations are not final until co-signed by attending, Dr. Varinder Villarreal.     HPI:    Izaiah Alvarez 72 year old male with PMH obesity, afib on coumadin, dmt2, htn, and TIA who was transferred from [facility] following MI with new onset left HF (20-25% compared to 46% previously) began having chest pain about 6 months ago. Acutely worsened on 3/19 with increased shortness of breath which was his initial presentation to the outside hospital. He was found to have chronic LAD disease with 95%  stenosis and collateral with multi-vessel disease. He developed acute hypoxic respiratory distress after developing plumonary edema and had impella placed on 3/20 with EF of 20/25% with global left hypokinesis reduced RV systolic function. He was started on lasix for fluid, nitroglycerin to decrease afterload and diltiazem for afib with RVR. His chest pain resolved and shortness of breath improved with decreased oxygen requirements. He underwent a CABG with right CFA cutdown on 3/23 and removal of impella percutaneous ventricular assist device. He developed numbness and coolness in his RLE. There were not any dopplerable signals in that extremity and vascular was consulted for a cold leg. He received a right groin reexploration on 3/24 and right common femoral and ilac artery thrombectomy with right leg angiogram and stent placement in the right common iliac artery and then 4 compartment right lower leg fasciotomies. This surgery was complicated by a delayed primary closure. He underwent right leg fasciotomy x2 with Dr. Ruiz.      PREVIOUS LEVEL OF FUNCTION   Modified independence requiring a cane for community distances. 6 months ago he was able to walk 2 blocks twice this physical activity declined has his cardiopulmonary disease worsened.  He was previously independent for transferring, toileting, bathing, dressing, eating, communication, swallowing, and cognition.  Patient was independent with driving and full time work at a desk job.      CURRENT FUNCTION   PT - moderate assistance of two for supine to sit. He requires FWW and mod assistance of 2 for sit to stand. He is able to ambulate about 4 steps with FWW and min assist of .   OT - He is requiring mod assist of 2 for supine to edge of jd then min assist to maintain balance at edge of bed. He was able to perform functional transfers with moderate assist of 2. He is requiring  SLP - Regular diet and thin liquids.     LIVING SITUATION/SUPPORT  Lives in an  apartment on the 3rd floor. There are 3 steps to eneter and 3 flights of stairs up to his unit. His spouse is retired but cannot provide any physical assistance and his spouse requires a walker for mobility. Pt does have family (nephews and nieces) that are local and can provide some support.    SOCIAL HISTORY  Social History     Social History     Marital status:      Spouse name: N/A     Number of children: N/A     Years of education: N/A     Social History Main Topics     Smoking status: Former Smoker     Quit date: 3/27/1977     Smokeless tobacco: Former User     Alcohol use None     Drug use: None     Sexual activity: Not Asked     Other Topics Concern     None     Social History Narrative     Past Medical History:  History reviewed. No pertinent past medical history.    Current Medications:  Current Facility-Administered Medications   Medication     potassium chloride SA (K-DUR/KLOR-CON M) CR tablet 20-40 mEq     metoprolol tartrate (LOPRESSOR) tablet 25 mg     senna-docusate (SENOKOT-S;PERICOLACE) 8.6-50 MG per tablet 2 tablet     heparin  drip 25,000 units in 0.45% NaCl 250 mL (see additional administration details for dose)     heparin bolus from infusion pump     furosemide (LASIX) injection 20 mg     insulin glargine (LANTUS) injection 45 Units     insulin aspart (NovoLOG) inj (RAPID ACTING)     insulin aspart (NovoLOG) inj (RAPID ACTING)     insulin aspart (NovoLOG) inj (RAPID ACTING)     insulin aspart (NovoLOG) inj (RAPID ACTING)     benzocaine-menthol (CEPACOL) 15-3.6 MG lozenge 1 lozenge     Warfarin Therapy Reminder (Check START DATE - warfarin may be starting in the FUTURE)     polyethylene glycol (MIRALAX/GLYCOLAX) Packet 17 g     pantoprazole (PROTONIX) EC tablet 40 mg     lidocaine (XYLOCAINE) 5 % ointment     naloxone (NARCAN) injection 0.1-0.4 mg     thiamine tablet 100 mg     ipratropium - albuterol 0.5 mg/2.5 mg/3 mL (DUONEB) neb solution 3 mL     HYDROmorphone (DILAUDID) injection  0.3-0.5 mg     dextrose 10 % 1,000 mL infusion     glucose 40 % gel 15-30 g    Or     dextrose 50 % injection 25-50 mL    Or     glucagon injection 1 mg     hydrALAZINE (APRESOLINE) injection 10 mg     aspirin EC EC tablet 81 mg     potassium chloride (KLOR-CON) Packet 20-40 mEq     potassium chloride 10 mEq in 100 mL intermittent infusion with 10 mg lidocaine     potassium chloride 20 mEq in 50 mL intermittent infusion     magnesium sulfate 2 g in NS intermittent infusion ([facility]ar[facility] or [facility] Cmpd)     magnesium sulfate 4 g in 100 mL sterile water (premade)     potassium phosphate 10 mmol in D5W 250 mL intermittent infusion     potassium phosphate 15 mmol in D5W 250 mL intermittent infusion     potassium phosphate 20 mmol in D5W 500 mL intermittent infusion     potassium phosphate 20 mmol in D5W 250 mL intermittent infusion     potassium phosphate 25 mmol in D5W 500 mL intermittent infusion     acetaminophen (TYLENOL) tablet 650 mg     oxyCODONE IR (ROXICODONE) tablet 5-10 mg     ondansetron (ZOFRAN-ODT) ODT tab 4 mg    Or     ondansetron (ZOFRAN) injection 4 mg     sodium chloride 0.9% infusion     sodium chloride 0.9% infusion     atorvastatin (LIPITOR) tablet 20 mg     albuterol (PROAIR HFA/PROVENTIL HFA/VENTOLIN HFA) Inhaler 2 puff     finasteride (PROSCAR) tablet 5 mg     multivitamin, therapeutic with minerals (THERA-VIT-M) tablet 1 tablet         Review of Systems:  Total of ten systems reviewed, pertinent positives and negatives as follows  Instability with standing and walking.    Feels generally fatigued  Reports weakness in BUE/BLE  Denies any tingling or numbness  No problems with bladder.   LBM today  No chest pain. No cough or SOB.  No visual changes.   No headache or photophobia.   No nausea, or abdominal pain.  Slept poorly last night  Sternal pain from incision is present.   Mood is good, denies any symptoms of depression.   Remainder of the review of the systems was negative.    Labs   Lab Results  "  Component Value Date    WBC 13.2 (H) 03/30/2018    HGB 9.1 (L) 03/30/2018    HCT 29.4 (L) 03/30/2018    MCV 98 03/30/2018     03/30/2018     Lab Results   Component Value Date     03/30/2018    POTASSIUM 3.5 03/30/2018    CHLORIDE 104 03/30/2018    CO2 24 03/30/2018     (H) 03/30/2018     Lab Results   Component Value Date    GFRESTIMATED 77 03/30/2018    GFRESTBLACK >90 03/30/2018     Lab Results   Component Value Date     (H) 03/22/2018    ALT 69 03/22/2018    ALKPHOS 55 03/22/2018    BILITOTAL 3.6 (H) 03/22/2018     Lab Results   Component Value Date    INR 1.87 (H) 03/30/2018     Lab Results   Component Value Date    BUN 21 03/30/2018    CR 0.97 03/30/2018         ON EXAMINATION:  Vitals:    03/30/18 0300 03/30/18 0400 03/30/18 0600 03/30/18 0750   BP: 119/84   96/77   BP Location: Left arm   Left arm   Pulse:       Resp: 20   18   Temp: 97.8  F (36.6  C)   97.5  F (36.4  C)   TempSrc: Oral   Oral   SpO2: 98% 94% 95% 93%   Weight: 116.6 kg (257 lb)      Height:           Physical Exam:  Blood pressure 96/77, pulse 101, temperature 97.5  F (36.4  C), temperature source Oral, resp. rate 18, height 1.803 m (5' 11\"), weight 116.6 kg (257 lb), SpO2 93 %.    GEN: NAD, seated/lying comfortably in chair  She/He is alert, appropriate, cooperative  Speech is intact, linear logical  Comprehension is intact, alert and oriented to person place and time  HEENT: NCAT  RESPIRATORY: nonlabored breathing on room air  CARDIAC: perfusing extremities  MSK: full active and passive ROM at all major joints of the bilaterally upper and lower extremities  No muscle atrophy noted  ABD: soft, non tender, pos BS  NEURO:   CRANIAL NERVES: Discs flat. Pupils equal, round and reactive to light.  Extraocular movements full. Visual fields full. Face moves symmetrically.  Tongue midline. Hearing intact to finger rubbing.    Sensation: sensation to light touch intact throughout   Strength: 4/5 BUE/BLE    Gait: normal " reciprocal gait requiring min assist and a FWW   Cognition: fund of knowledge and train of thought appropriate  SKIN: no rashes or lesions noted.  Patient has tubes or lines.   EXT: moderate edema bilaterally, chronic stasis changes, no ulcers.   PSYCH: normal affect.  Mood is baseline without signs of depression      Thank you for the consult. Please call for any questions. Pager number [phone]     Vu Holbrook      Total of 70 min spent in this encounter, more than 50% in counseling and coordination.

## 2018-03-30 NOTE — PLAN OF CARE
Problem: Patient Care Overview  Goal: Plan of Care/Patient Progress Review  RN:  1.Pt will remain hemodynamically stable.  2.Pt will be free from infection.  Outcome: Improving  AVSS. Pt denies discomfort.  R leg wound dsg reinforced (tape loose).  R groin wound vac dsg intact.  Pt up to BSC with assist of 1-2.  BMx1.  Felton draining clear mikhail urine.  To be removed at 0600.  BG stable. Pleasant.  Resting between cares and assessments in no apparent distress.  Continue to monitor s/p R leg faciotomies.  Anticipate TCU at d/c.

## 2018-03-31 ENCOUNTER — APPOINTMENT (OUTPATIENT)
Dept: ULTRASOUND IMAGING | Facility: CLINIC | Age: 73
DRG: 228 | End: 2018-03-31
Attending: PHYSICIAN ASSISTANT
Payer: COMMERCIAL

## 2018-03-31 ENCOUNTER — APPOINTMENT (OUTPATIENT)
Dept: CT IMAGING | Facility: CLINIC | Age: 73
DRG: 228 | End: 2018-03-31
Attending: THORACIC SURGERY (CARDIOTHORACIC VASCULAR SURGERY)
Payer: COMMERCIAL

## 2018-03-31 LAB
ANION GAP SERPL CALCULATED.3IONS-SCNC: 7 MMOL/L (ref 3–14)
BUN SERPL-MCNC: 20 MG/DL (ref 7–30)
CALCIUM SERPL-MCNC: 8.2 MG/DL (ref 8.5–10.1)
CHLORIDE SERPL-SCNC: 105 MMOL/L (ref 94–109)
CO2 SERPL-SCNC: 25 MMOL/L (ref 20–32)
CREAT SERPL-MCNC: 1.04 MG/DL (ref 0.66–1.25)
ERYTHROCYTE [DISTWIDTH] IN BLOOD BY AUTOMATED COUNT: 16.5 % (ref 10–15)
GFR SERPL CREATININE-BSD FRML MDRD: 70 ML/MIN/1.7M2
GLUCOSE BLDC GLUCOMTR-MCNC: 118 MG/DL (ref 70–99)
GLUCOSE BLDC GLUCOMTR-MCNC: 133 MG/DL (ref 70–99)
GLUCOSE BLDC GLUCOMTR-MCNC: 136 MG/DL (ref 70–99)
GLUCOSE BLDC GLUCOMTR-MCNC: 142 MG/DL (ref 70–99)
GLUCOSE BLDC GLUCOMTR-MCNC: 144 MG/DL (ref 70–99)
GLUCOSE BLDC GLUCOMTR-MCNC: 74 MG/DL (ref 70–99)
GLUCOSE SERPL-MCNC: 121 MG/DL (ref 70–99)
HCT VFR BLD AUTO: 30.5 % (ref 40–53)
HGB BLD-MCNC: 9.1 G/DL (ref 13.3–17.7)
INR PPP: 2.29 (ref 0.86–1.14)
LMWH PPP CHRO-ACNC: 0.31 IU/ML
MAGNESIUM SERPL-MCNC: 2 MG/DL (ref 1.6–2.3)
MCH RBC QN AUTO: 29.7 PG (ref 26.5–33)
MCHC RBC AUTO-ENTMCNC: 29.8 G/DL (ref 31.5–36.5)
MCV RBC AUTO: 100 FL (ref 78–100)
PLATELET # BLD AUTO: 402 10E9/L (ref 150–450)
POTASSIUM SERPL-SCNC: 3.8 MMOL/L (ref 3.4–5.3)
RADIOLOGIST FLAGS: ABNORMAL
RBC # BLD AUTO: 3.06 10E12/L (ref 4.4–5.9)
SODIUM SERPL-SCNC: 137 MMOL/L (ref 133–144)
WBC # BLD AUTO: 10 10E9/L (ref 4–11)

## 2018-03-31 PROCEDURE — 85027 COMPLETE CBC AUTOMATED: CPT | Performed by: PHYSICIAN ASSISTANT

## 2018-03-31 PROCEDURE — 25000132 ZZH RX MED GY IP 250 OP 250 PS 637: Performed by: SURGERY

## 2018-03-31 PROCEDURE — 25000132 ZZH RX MED GY IP 250 OP 250 PS 637: Performed by: NURSE PRACTITIONER

## 2018-03-31 PROCEDURE — 25000132 ZZH RX MED GY IP 250 OP 250 PS 637

## 2018-03-31 PROCEDURE — 25000128 H RX IP 250 OP 636: Performed by: THORACIC SURGERY (CARDIOTHORACIC VASCULAR SURGERY)

## 2018-03-31 PROCEDURE — 83735 ASSAY OF MAGNESIUM: CPT | Performed by: PHYSICIAN ASSISTANT

## 2018-03-31 PROCEDURE — 36415 COLL VENOUS BLD VENIPUNCTURE: CPT | Performed by: PHYSICIAN ASSISTANT

## 2018-03-31 PROCEDURE — 25000128 H RX IP 250 OP 636: Performed by: PHYSICIAN ASSISTANT

## 2018-03-31 PROCEDURE — 00000146 ZZHCL STATISTIC GLUCOSE BY METER IP

## 2018-03-31 PROCEDURE — 70498 CT ANGIOGRAPHY NECK: CPT

## 2018-03-31 PROCEDURE — 25000132 ZZH RX MED GY IP 250 OP 250 PS 637: Performed by: PHYSICIAN ASSISTANT

## 2018-03-31 PROCEDURE — 25000125 ZZHC RX 250: Performed by: THORACIC SURGERY (CARDIOTHORACIC VASCULAR SURGERY)

## 2018-03-31 PROCEDURE — 85610 PROTHROMBIN TIME: CPT | Performed by: PHYSICIAN ASSISTANT

## 2018-03-31 PROCEDURE — 93926 LOWER EXTREMITY STUDY: CPT | Mod: LT

## 2018-03-31 PROCEDURE — 25000132 ZZH RX MED GY IP 250 OP 250 PS 637: Performed by: THORACIC SURGERY (CARDIOTHORACIC VASCULAR SURGERY)

## 2018-03-31 PROCEDURE — 40000556 ZZH STATISTIC PERIPHERAL IV START W US GUIDANCE

## 2018-03-31 PROCEDURE — 40000740 ZZH STATISTIC STROKE CODE W/ ACCESS

## 2018-03-31 PROCEDURE — 25000132 ZZH RX MED GY IP 250 OP 250 PS 637: Performed by: INTERNAL MEDICINE

## 2018-03-31 PROCEDURE — 21400006 ZZH R&B CCU INTERMEDIATE UMMC

## 2018-03-31 PROCEDURE — 85520 HEPARIN ASSAY: CPT | Performed by: PHYSICIAN ASSISTANT

## 2018-03-31 PROCEDURE — 80048 BASIC METABOLIC PNL TOTAL CA: CPT | Performed by: PHYSICIAN ASSISTANT

## 2018-03-31 RX ORDER — IOPAMIDOL 755 MG/ML
75 INJECTION, SOLUTION INTRAVASCULAR ONCE
Status: COMPLETED | OUTPATIENT
Start: 2018-03-31 | End: 2018-03-31

## 2018-03-31 RX ORDER — WARFARIN SODIUM 1 MG/1
2 TABLET ORAL
Status: COMPLETED | OUTPATIENT
Start: 2018-03-31 | End: 2018-03-31

## 2018-03-31 RX ADMIN — MULTIPLE VITAMINS W/ MINERALS TAB 1 TABLET: TAB at 09:46

## 2018-03-31 RX ADMIN — PANTOPRAZOLE SODIUM 40 MG: 40 TABLET, DELAYED RELEASE ORAL at 09:46

## 2018-03-31 RX ADMIN — ASPIRIN 81 MG: 81 TABLET, COATED ORAL at 09:46

## 2018-03-31 RX ADMIN — WARFARIN SODIUM 2 MG: 1 TABLET ORAL at 17:53

## 2018-03-31 RX ADMIN — HEPARIN SODIUM 1800 UNITS/HR: 10000 INJECTION, SOLUTION INTRAVENOUS at 18:14

## 2018-03-31 RX ADMIN — Medication 2 G: at 15:56

## 2018-03-31 RX ADMIN — GLIPIZIDE 10 MG: 10 TABLET ORAL at 15:52

## 2018-03-31 RX ADMIN — INSULIN GLARGINE 45 UNITS: 100 INJECTION, SOLUTION SUBCUTANEOUS at 12:34

## 2018-03-31 RX ADMIN — HEPARIN SODIUM 1800 UNITS/HR: 10000 INJECTION, SOLUTION INTRAVENOUS at 03:05

## 2018-03-31 RX ADMIN — METOPROLOL TARTRATE 50 MG: 50 TABLET, FILM COATED ORAL at 20:31

## 2018-03-31 RX ADMIN — Medication 100 MG: at 09:45

## 2018-03-31 RX ADMIN — FUROSEMIDE 20 MG: 10 INJECTION, SOLUTION INTRAVENOUS at 09:47

## 2018-03-31 RX ADMIN — POTASSIUM CHLORIDE 20 MEQ: 750 TABLET, EXTENDED RELEASE ORAL at 15:52

## 2018-03-31 RX ADMIN — IOPAMIDOL 75 ML: 755 INJECTION, SOLUTION INTRAVENOUS at 08:48

## 2018-03-31 RX ADMIN — GLIPIZIDE 10 MG: 10 TABLET ORAL at 09:47

## 2018-03-31 RX ADMIN — FUROSEMIDE 20 MG: 10 INJECTION, SOLUTION INTRAVENOUS at 15:51

## 2018-03-31 RX ADMIN — METFORMIN HYDROCHLORIDE 500 MG: 500 TABLET, FILM COATED ORAL at 09:45

## 2018-03-31 RX ADMIN — FINASTERIDE 5 MG: 5 TABLET, FILM COATED ORAL at 09:46

## 2018-03-31 RX ADMIN — METOPROLOL TARTRATE 50 MG: 50 TABLET, FILM COATED ORAL at 09:45

## 2018-03-31 RX ADMIN — ATORVASTATIN CALCIUM 20 MG: 20 TABLET, FILM COATED ORAL at 20:31

## 2018-03-31 ASSESSMENT — VISUAL ACUITY
OU: BLURRED VISION
OU: BLURRED VISION
OU: OTHER (SEE COMMENT)
OU: NORMAL ACUITY

## 2018-03-31 NOTE — PROGRESS NOTES
No acute events, improving sensation on right foot  Awaiting dispo planning to rehab    B/P: 110/82, T: 97.3, P: 101, R: 18  Alert oriented no acute distress  Right leg incision with mild erythema on central portion of medial incision  Moderate serosang drainage, mild edema  Motor intact, mild decreased sensation at foot  +DP/PT signal    WBC   Date Value Ref Range Status   03/31/2018 10.0 4.0 - 11.0 10e9/L Final   ]  Hemoglobin   Date Value Ref Range Status   03/31/2018 9.1 (L) 13.3 - 17.7 g/dL Final     Creatinine   Date Value Ref Range Status   03/30/2018 0.97 0.66 - 1.25 mg/dL Final   ]      Intake/Output Summary (Last 24 hours) at 03/31/18 0733  Last data filed at 03/31/18 0633   Gross per 24 hour   Intake           2137.2 ml   Output             1325 ml   Net            812.2 ml       Assessment/Plan:  71 yo male CAD, CHF, PAF s/p CAB X3 PVI, MAZE and JUN atri clip with removal of impella in POD#6 course complicated by right leg acute limb ischemia. S/P Right groin reexploration, right common femoral and iliac artery  thrombectomy, right leg angiogram, stent placement right common iliac artery and then 4 compartment right lower leg fasciotomies 3/24/18 with Dr. Tang.  On 3/27/2018, he underwent closure of right leg fasciotomy X 2 with Dr. Tang.     Dry dressings as needed to RLE, please no longer use adaptic.  If saturated replace as needed.  RLE elevation  PT  Dispo planning to rehab - at present [facility] but cannot evaluate until Monday.  Suture removal ~1 month post procedure    Niru Robin MD  Vascular Surgery Fellow  Pager [phone]

## 2018-03-31 NOTE — PROGRESS NOTES
Writer was called into room when pt put on the call light reporting blurry vision. Upon neuro assessment, pt reports blurry vision and notable weakness to L arm. Stroke code called, team notified. VSS at this time.

## 2018-03-31 NOTE — PLAN OF CARE
Problem: Patient Care Overview  Goal: Plan of Care/Patient Progress Review  RN:  1.Pt will remain hemodynamically stable.  2.Pt will be free from infection.  Outcome: Improving    Pt a/o x 4. VSSA. Assist of 1-2. Afib. Gave 650 mg tylenol for generalized pain bedtime. Had BM x 2 (formed + soft). Voiding in urinal. Heparin 1800 u/hr. Pleasant and cooperative with cares.

## 2018-03-31 NOTE — PLAN OF CARE
Problem: Patient Care Overview  Goal: Plan of Care/Patient Progress Review  RN:  1.Pt will remain hemodynamically stable.  2.Pt will be free from infection.   OT6C: CX: Pt with stoke code called this AM, unable to check back this pm.

## 2018-03-31 NOTE — PROGRESS NOTES
"CT Surgery Progress Note    Coronary artery disease, CHF, and PAF s/p CABG x 3/bilateral pulmonary vein isolation MAZE/L atrial appendage ligation with 45 mm AtriClip/removal of impella POD #8    R leg acute limb ischemia s/p R groin reexploration, R common femoral and iliac artery thrombectomy, stent placement R common iliac artery and 4 compartment R lower leg fasciotomies POD #7    Closure of R lower leg 4 compartment fasciotomies POD #4    Subjective:  Had stroke code called this AM for blurred vision, left upper extremity weakness, self resolved, CT head without acute changes. Neuro assess. No further issues this PM.   Nursing also reported diminished pulses in Left foot, US obtained. Denies any rest pain, swelling or skin changes.   States that pain is being controlled. Denies any hallucinations. Breathing is good. Working with IS. Appetite is good. Denies any nausea. +BM, denies any popping/clicking in his breast bone, working with rehab.     Objective:  Up in chair, comfortable,  BP 99/68 (BP Location: Left arm)  Pulse 101  Temp 98.4  F (36.9  C) (Oral)  Resp 16  Ht 1.803 m (5' 11\")  Wt 116.3 kg (256 lb 6.4 oz)  SpO2 96%  BMI 35.76 kg/m2  CV - IRRR, telemetry afib 90-100s  Pulm - CTA, IS 8635-0199 ml  Abd - BS+, NT/ND  Derm - sternal incision D/I   L LE incisions D/I (EVH leg)   R LE incisions D/I, +dressings   R groin incision +prevena    BMP  Recent Labs  Lab 03/31/18  0635 03/30/18  0656 03/29/18  0744 03/28/18  0844    138 139 138   POTASSIUM 3.8 3.5 3.6 3.5   CHLORIDE 105 104 106 106   STEFFANY 8.2* 8.6 8.3* 8.1*   CO2 25 24 24 26   BUN 20 21 24 27   CR 1.04 0.97 0.90 0.93   * 159* 160* 105*     CBC  Recent Labs  Lab 03/31/18  0635 03/30/18  0656 03/29/18  0744 03/28/18  0844   WBC 10.0 13.2* 11.6* 11.1*   RBC 3.06* 3.01* 3.05* 2.97*   HGB 9.1* 9.1* 9.1* 8.8*   HCT 30.5* 29.4* 29.3* 29.0*    98 96 98   MCH 29.7 30.2 29.8 29.6   MCHC 29.8* 31.0* 31.1* 30.3*   RDW 16.5* 16.3* 15.9* " 16.1*    356 318 257     INR  Recent Labs  Lab 03/31/18  0635 03/30/18  0656 03/29/18  0744 03/28/18  0844   INR 2.29* 1.87* 1.66* 1.51*      Hepatic Panel No lab results found in last 7 days.  Glucose  Recent Labs  Lab 03/31/18  1705 03/31/18  1134 03/31/18  1003 03/31/18  0820 03/31/18  0635 03/30/18  2146 03/30/18  1735  03/30/18  0656  03/29/18  0744  03/28/18  0844  03/27/18  1011  03/26/18  0420   GLC  --   --   --   --  121*  --   --   --  159*  --  160*  --  105*  --  151*  --  126*   BGM 74 118* 142* 136*  --  148* 189*  < >  --   < >  --   < >  --   < >  --   < >  --    < > = values in this interval not displayed.      A/P:  1. S/p CABG x 3/bilateral pulmonary vein isolation MAZE/L atrial appendage ligation with 45 mm AtriClip/removal of impella POD #7  2. R leg acute limb ischemia s/p R groin reexploration, R common femoral and iliac artery thrombectomy, stent placement R common iliac artery and 4 compartment R lower leg fasciotomies POD #6. Discussed with Vascular Surgery and pt will need to be on plavix for 1 year duration after iliac stent placement. Will need to be started if/when coumadin is stopped. Will need follow up with vascular as outpatient when back at home in [place], [place].   3. Closure of R lower leg 4 compartment fasciotomies POD #3  4. DM type II - recommendations per Endo, hold metformin for 24 hours (till 4/2) given received IV contrast today.  5. PAF - had pre-op, was on coumadin prior, +coumadin, lopressor to 50 mg PO bid  6. Traumatic gallardo placement - +flomax, removed gallardo at 0600, should urinate between 1074-3759, if unable to void and bladder scanned > or = 350 ml please place perm gallardo  7. Hx of TIA, stroke code called for blurred vision and left sided weakness, resolved shortly after, CTA head unremarkable. Neuro consulted, appreciate recs, recommended MRI (non-urgent)  8. STEMI  9. Obesity  10. Asthma    11. HTN - increase lopressor to 50 mg bid, holding ace inh due to low  BP  12. Hyperlipidemia - statin  13. R groin incision - prevena will be discontinue on 4/1/18  14. Chronic left SFA-pop arterial occlusion - found on US today, denies pain, no evidence of ischemia or compartment syndrome, d/w vascular surgery 3/31, likely chronic and will need outpatient peripheral angiogram  Encouraged IS/TCDB/amb. Sternal precautions. Looking at possible discharge to [facility] on Saturday or Monday pending INR, strength, and bed placement. Continue to monitor.     Scott Sanon PA-C  Cardiothoracic Surgery  March 31, 2018 11:15 AM   p: [phone]

## 2018-03-31 NOTE — PLAN OF CARE
Problem: Patient Care Overview  Goal: Plan of Care/Patient Progress Review  RN:  1.Pt will remain hemodynamically stable.  2.Pt will be free from infection.   PT / 6C: Cancel. Pt with stoke code called this AM. Unable to check back this PM. Rescheduled per POC.

## 2018-03-31 NOTE — PHARMACY
Pharmacy Stroke Code Response  Pharmacist responded as part of the Stroke Code Team activation to patient care area 6C room 6418-02.  The Stroke Team determined that the patient was not a candidate for IV alteplase therapy and the pharmacy team was dismissed at 08:24.    Kole Sims, PharmD  PGY-2 Critical Care Pharmacy Resident

## 2018-03-31 NOTE — PLAN OF CARE
Arrival to Stroke Code: 0820    Stroke Team escalate/de-escalate interventions: moved Patient with sling to bed. Transported Patient to CT scan      ED/Bedside Nurse providing handoff: Dayna LYNCH    Time left for CT: 0830    Time Returned to ED/Unit:0855  De-escalated stroke at 0855     ED/Bedside Nurse given handoff to & Time:0940

## 2018-03-31 NOTE — CONSULTS
[facility], [place]      Neurology Stroke Code    Patient Name: Izaiah Alvarez  : 1945 MRN#: [id number]    STROKE DATA     Stroke Code:  Time called:  201814  Time patient seen:  2018 0817  Onset of symptoms:  2018 075  Last known normal (pt's baseline):  2018 075  Head CT read by Dr. Farley at:  2018 0855  Stroke Code de-escalated at 2018 0857 after discussion with Dr. Farley due to presence of contraindications for both intravenous and intra-arterial stroke treatments.      TPA treatment:  Not given due to minor / isolated / quickly resolving stroke symptoms.    National Institutes of Health Stroke Scale (at presentation)  NIHSS done at:  time patient seen      Score    Level of consciousness:  (0)   Alert, keenly responsive    LOC questions:  (0)   Answers both questions correctly    LOC commands:  (0)   Performs both tasks correctly    Best gaze:  (0)   Normal    Visual:  (0)   No visual loss    Facial palsy:  (0)   Normal symmetrical movements    Motor arm (left):  (0)   No drift    Motor arm (right):  (0)   No drift    Motor leg (left):  (0)   No drift    Motor leg (right):  (0)   No drift    Limb ataxia:  (0)   Absent    Sensory:  (0)   Normal- no sensory loss    Best language:  (0)   Normal- no aphasia    Dysarthria:  (0)   Normal    Extinction and inattention:  (0)   No abnormality        NIHSS Total Score:  0           ASSESSMENT & RECOMMENDATONS     Stroke code activated due to acute onset blurry vision and weakness.  He has had weakness for some time but felt that his left sided weakness was worse.  His blurry vision was diffuse and he had no visual field cut.  His symptoms were resolving during stroke code.  While left sided weakness could represent stroke, blurry vision is difficult to localize a vascular distribution.  He has many risk factors for stroke, including a-fib, DMII, CAD, and HTN.  Being that he is on heparin and warfarin  tPA is contraindicated.  MRI should be obtained when possible to assess for stroke and LDL and A1C to assess stroke risk.  Echo not necessary given recent echo.    Recommendations:   -LDL  -A1C  -MRI(non urgent)  -Continue anticoagulation      The patient was discussed with the Fellow, Dr. Farley.  The staff is Dr. Brand.    Lamont Billingsley MD   Pager: [phone]      Addendum:  4/1 the patient is completely stable back to his base line  NIHSS 0, with intact brain stem reflexes, Pupils Re 2-3 mm Round regular reactive, Corneal reflex is intact, Intact cough & gag reflex, Strength in both UL & LL is 5/5   Lipid profile were not collected  At this point the recommendations will be :  1- No need for MRI   2- Most likely a TIA episode  3- Continue ANticoagulation   4- continue Atorvastatin 20 mg daily   Will sign off    Plan discussed with Dr. Brand    Thanks for involving the Stroke team in the care of the patient, please if you have any questions feel free to contact the Stroke team.     The patient, The primary team & the nursing staff are updated with the plan.    Maggi Farley  Neurocritical care Fellow  Pager [phone]

## 2018-04-01 LAB
ANION GAP SERPL CALCULATED.3IONS-SCNC: 6 MMOL/L (ref 3–14)
BUN SERPL-MCNC: 17 MG/DL (ref 7–30)
CALCIUM SERPL-MCNC: 8.3 MG/DL (ref 8.5–10.1)
CHLORIDE SERPL-SCNC: 105 MMOL/L (ref 94–109)
CO2 SERPL-SCNC: 25 MMOL/L (ref 20–32)
CREAT SERPL-MCNC: 0.98 MG/DL (ref 0.66–1.25)
ERYTHROCYTE [DISTWIDTH] IN BLOOD BY AUTOMATED COUNT: 16.3 % (ref 10–15)
GFR SERPL CREATININE-BSD FRML MDRD: 75 ML/MIN/1.7M2
GLUCOSE BLDC GLUCOMTR-MCNC: 105 MG/DL (ref 70–99)
GLUCOSE BLDC GLUCOMTR-MCNC: 112 MG/DL (ref 70–99)
GLUCOSE BLDC GLUCOMTR-MCNC: 163 MG/DL (ref 70–99)
GLUCOSE BLDC GLUCOMTR-MCNC: 83 MG/DL (ref 70–99)
GLUCOSE BLDC GLUCOMTR-MCNC: 89 MG/DL (ref 70–99)
GLUCOSE SERPL-MCNC: 93 MG/DL (ref 70–99)
HCT VFR BLD AUTO: 27.7 % (ref 40–53)
HGB BLD-MCNC: 8.7 G/DL (ref 13.3–17.7)
INR PPP: 2.3 (ref 0.86–1.14)
MAGNESIUM SERPL-MCNC: 2.2 MG/DL (ref 1.6–2.3)
MCH RBC QN AUTO: 30 PG (ref 26.5–33)
MCHC RBC AUTO-ENTMCNC: 31.4 G/DL (ref 31.5–36.5)
MCV RBC AUTO: 96 FL (ref 78–100)
PLATELET # BLD AUTO: 420 10E9/L (ref 150–450)
POTASSIUM SERPL-SCNC: 3.8 MMOL/L (ref 3.4–5.3)
RBC # BLD AUTO: 2.9 10E12/L (ref 4.4–5.9)
SODIUM SERPL-SCNC: 137 MMOL/L (ref 133–144)
WBC # BLD AUTO: 8.9 10E9/L (ref 4–11)

## 2018-04-01 PROCEDURE — 25000132 ZZH RX MED GY IP 250 OP 250 PS 637: Performed by: THORACIC SURGERY (CARDIOTHORACIC VASCULAR SURGERY)

## 2018-04-01 PROCEDURE — 80048 BASIC METABOLIC PNL TOTAL CA: CPT | Performed by: PHYSICIAN ASSISTANT

## 2018-04-01 PROCEDURE — 25000128 H RX IP 250 OP 636: Performed by: PHYSICIAN ASSISTANT

## 2018-04-01 PROCEDURE — 21400006 ZZH R&B CCU INTERMEDIATE UMMC

## 2018-04-01 PROCEDURE — 25000132 ZZH RX MED GY IP 250 OP 250 PS 637: Performed by: SURGERY

## 2018-04-01 PROCEDURE — 00000146 ZZHCL STATISTIC GLUCOSE BY METER IP

## 2018-04-01 PROCEDURE — 85610 PROTHROMBIN TIME: CPT | Performed by: PHYSICIAN ASSISTANT

## 2018-04-01 PROCEDURE — 83735 ASSAY OF MAGNESIUM: CPT | Performed by: PHYSICIAN ASSISTANT

## 2018-04-01 PROCEDURE — 25000132 ZZH RX MED GY IP 250 OP 250 PS 637: Performed by: NURSE PRACTITIONER

## 2018-04-01 PROCEDURE — 25000132 ZZH RX MED GY IP 250 OP 250 PS 637: Performed by: PHYSICIAN ASSISTANT

## 2018-04-01 PROCEDURE — 36415 COLL VENOUS BLD VENIPUNCTURE: CPT | Performed by: PHYSICIAN ASSISTANT

## 2018-04-01 PROCEDURE — 25000132 ZZH RX MED GY IP 250 OP 250 PS 637

## 2018-04-01 PROCEDURE — 85027 COMPLETE CBC AUTOMATED: CPT | Performed by: PHYSICIAN ASSISTANT

## 2018-04-01 PROCEDURE — 25000132 ZZH RX MED GY IP 250 OP 250 PS 637: Performed by: INTERNAL MEDICINE

## 2018-04-01 RX ORDER — GLIPIZIDE 10 MG/1
10 TABLET ORAL
Status: DISCONTINUED | OUTPATIENT
Start: 2018-04-02 | End: 2018-04-04 | Stop reason: HOSPADM

## 2018-04-01 RX ORDER — WARFARIN SODIUM 4 MG/1
4 TABLET ORAL
Status: COMPLETED | OUTPATIENT
Start: 2018-04-01 | End: 2018-04-01

## 2018-04-01 RX ORDER — BISACODYL 10 MG
10 SUPPOSITORY, RECTAL RECTAL DAILY PRN
Status: DISCONTINUED | OUTPATIENT
Start: 2018-04-01 | End: 2018-04-01

## 2018-04-01 RX ADMIN — FUROSEMIDE 20 MG: 10 INJECTION, SOLUTION INTRAVENOUS at 16:40

## 2018-04-01 RX ADMIN — FUROSEMIDE 20 MG: 10 INJECTION, SOLUTION INTRAVENOUS at 08:25

## 2018-04-01 RX ADMIN — PANTOPRAZOLE SODIUM 40 MG: 40 TABLET, DELAYED RELEASE ORAL at 08:22

## 2018-04-01 RX ADMIN — METOPROLOL TARTRATE 50 MG: 50 TABLET, FILM COATED ORAL at 20:10

## 2018-04-01 RX ADMIN — ATORVASTATIN CALCIUM 20 MG: 20 TABLET, FILM COATED ORAL at 20:10

## 2018-04-01 RX ADMIN — FINASTERIDE 5 MG: 5 TABLET, FILM COATED ORAL at 08:22

## 2018-04-01 RX ADMIN — Medication 100 MG: at 08:22

## 2018-04-01 RX ADMIN — MULTIPLE VITAMINS W/ MINERALS TAB 1 TABLET: TAB at 08:22

## 2018-04-01 RX ADMIN — METOPROLOL TARTRATE 50 MG: 50 TABLET, FILM COATED ORAL at 08:22

## 2018-04-01 RX ADMIN — WARFARIN SODIUM 4 MG: 4 TABLET ORAL at 17:45

## 2018-04-01 RX ADMIN — GLIPIZIDE 10 MG: 10 TABLET ORAL at 08:22

## 2018-04-01 RX ADMIN — POTASSIUM CHLORIDE 20 MEQ: 750 TABLET, EXTENDED RELEASE ORAL at 10:34

## 2018-04-01 RX ADMIN — INSULIN GLARGINE 45 UNITS: 100 INJECTION, SOLUTION SUBCUTANEOUS at 13:24

## 2018-04-01 RX ADMIN — ACETAMINOPHEN 650 MG: 325 TABLET, FILM COATED ORAL at 23:59

## 2018-04-01 RX ADMIN — ASPIRIN 81 MG: 81 TABLET, COATED ORAL at 08:22

## 2018-04-01 ASSESSMENT — VISUAL ACUITY
OU: NORMAL ACUITY;GLASSES
OU: GLASSES
OU: NORMAL ACUITY
OU: GLASSES

## 2018-04-01 NOTE — PLAN OF CARE
Problem: Cardiac Surgery (Adult)  Goal: Signs and Symptoms of Listed Potential Problems Will be Absent, Minimized or Managed (Cardiac Surgery)  Signs and symptoms of listed potential problems will be absent, minimized or managed by discharge/transition of care (reference Cardiac Surgery (Adult) CPG).   Outcome: No Change    Shift: [id number]  Neuro: A/Ox4 vision improving BUE 4/5 strength and BLE 3-4/5. Other neruos intact  Cardiac: Sinus rhythm, VSS  Respiratory: Sats >95% on RA  GI/: C/o diarrhea d/t laxatives. Would like them held today. Voiding spontaneously in urinal  Diet/Appetite: Tolerating diet, denies nausea  Activity: Ax1, uses commode in room  Pain: Denies pain  Skin: Wound Vac to R groin site (possibly to be removed today) RLE faciotomoy sites, R lateral dressing changed overnight. Midline incision CDI  LDA's: PIVx2 SL, Vac in place    Plan: Will continue to monitor per POC.

## 2018-04-01 NOTE — PROGRESS NOTES
Diabetes Consult Follow Up   Date of Service: 4/1/2018    Summary:      Izaiah Alvarez is a 72 year old male with history of type 2 diabetes, hypertension, hyperlipidemia, atrial fibrillation ( on warfarin), TIA, obesity transferred from [facility] following STEMI with new onset of left heart failure ( EF 20-25%). Impella device was placed via right femoral artery at the OSH. he underwent coronary artery bypass grafting x 3 and removal of Impella on  (3/23/2018) by . Found to have critical limb ischemia with loss of motor function s/p Right groin reexploration, right common femoral and iliac artery  thrombectomy, right leg angiogram, stent placement right common iliac artery and then 4 compartment right lower leg fasciotomies 3/24/18 with Dr. Tang.  On 3/27/2018, he underwent closure of right leg fasciotomy X 2 with Dr. Tang.      Recs:     Type 2 diabetic: A1C 7.8% ( 3/22/2018), PTA on lantus 60 units, Glipizide 20 mg BID, Metformin 1000 mg bid. Patient was started back on metformin and glipizide (at half doses). Metformin was held yesterday due to stroke code --> CT head with contrast. It was negative.   Plan  -Lantus 45 units daily   -Resume Metformin 500 mg BID at discharge --> increase to 1000 mg BID at rehab  -Glipizide 10 mg daily in morning  -Hold evening glipizide 10 mg due to very tight glucoses overnight --> resume prn at rehab  -Novolog high intensity sliding scale before meals and HS  -monitor glucose before meals, HS and 0200  -Will continue to follow      Subjective (24 hr update):  Glucoses tightly controlled overnight.    Diabetes Medications:  -Lantus 45 units daily   -Glipizide 10 mg BID  -Novolog high intensity sliding scale before meals and HS      Active Diet Order      Moderate Consistent CHO Diet  Add tube feeding or TPN       Recent Labs  Lab 04/01/18  0639 04/01/18  0205 03/31/18  2141 03/31/18  2128 03/31/18  1705 03/31/18  1134 03/31/18  1003  03/31/18  0635  03/30/18  0656   "03/29/18  0744  03/28/18  0844  03/27/18  1011   GLC 93  --   --   --   --   --   --   --  121*  --  159*  --  160*  --  105*  --  151*   BGM  --  83 133* 144* 74 118* 142*  < >  --   < >  --   < >  --   < >  --   < >  --    < > = values in this interval not displayed.    Exam:  /71 (BP Location: Left arm)  Pulse 101  Temp 97.8  F (36.6  C) (Oral)  Resp 18  Ht 1.803 m (5' 11\")  Wt 116.3 kg (256 lb 6.4 oz)  SpO2 95%  BMI 35.76 kg/m2  Gen: No distress  HEENT: EOMI  Pulm: Unlabored  Neuro: Non-focal    Gavin Valenzuela MD   Endocrinology Fellow  Pager: [phone]    Patient was discussed with attending physician, Dr. Simon.  Attending addendum:  Pt was evaluated with endocrinology fellow & plan is as outlined in this note.  LEATHA SIMON MD, MPH  Endocrinologist    "

## 2018-04-01 NOTE — PLAN OF CARE
"Problem: Patient Care Overview  Goal: Plan of Care/Patient Progress Review  RN:  1.Pt will remain hemodynamically stable.  2.Pt will be free from infection.   Outcome: Improving  Neuro: A&Ox4. Resolved weakness in LUE from yesterday. Continues to c/o \"neon floaters\" in eyes, provider aware, consults ordered.  Cardiac: SR. VSS.   Respiratory: Sating 98 on RA. SAENZ.   GI/: Adequate urine output. BM X1 today, no incont episodes.   Diet/appetite: Tolerating consist cho diet, 2L FR.   Activity: Assist of 1 to chair and bathroom, GB & Walker, up to chair most of day.   Pain: Denies any pain.   Skin: Chest incision CDI dermabond, RLE dressing changed w/sutures at incisions, other BLE incisions dermabonded CDI, wound vac to R groin CDI w/possible removal tomorrow, no new deficits noted.   LDA's: PIVx2 WNL SL   K+ replaced this AM. BGs AC/HS covered w/SS insulin.   Plan: Continue with POC. Notify primary team with changes.      Problem: Cardiac Surgery (Adult)  Goal: Signs and Symptoms of Listed Potential Problems Will be Absent, Minimized or Managed (Cardiac Surgery)  Signs and symptoms of listed potential problems will be absent, minimized or managed by discharge/transition of care (reference Cardiac Surgery (Adult) CPG).   Outcome: Improving   04/01/18 1804   Cardiac Surgery   Problems Assessed (Cardiac Surgery) all   Problems Present (Cardiac Surgery) situational response         "

## 2018-04-01 NOTE — PROGRESS NOTES
Stroke code called  Yesterday for blurry vision and mild left sided weakness.  Symptoms resolved, studies otherwise negative.  Patient denies symptoms at present.    B/P: 110/70, T: 98, P: 101, R: 16  Alert oriented no acute distress  Right femoral prevena incisional wound vac intact with suction  Right leg fasciotomies with serous drainage, decreased erythema  + DP/PT signals bilaterally    WBC   Date Value Ref Range Status   04/01/2018 8.9 4.0 - 11.0 10e9/L Final   ]  Hemoglobin   Date Value Ref Range Status   04/01/2018 8.7 (L) 13.3 - 17.7 g/dL Final   ]  INR/Prothrombin Time  Creatinine   Date Value Ref Range Status   04/01/2018 0.98 0.66 - 1.25 mg/dL Final   ]      Intake/Output Summary (Last 24 hours) at 04/01/18 0746  Last data filed at 04/01/18 0005   Gross per 24 hour   Intake              250 ml   Output             1525 ml   Net            -1275 ml       Assessment/Plan:  71 yo male CAD, CHF, PAF s/p CAB X3 PVI, MAZE and JUN atri clip with removal of impella in POD#6 course complicated by right leg acute limb ischemia. S/P Right groin reexploration, right common femoral and iliac artery  thrombectomy, right leg angiogram, stent placement right common iliac artery and then 4 compartment right lower leg fasciotomies 3/24/18 with Dr. Tang.  On 3/27/2018, he underwent closure of right leg fasciotomy X 2 with Dr. Tang.     Dry dressings to RLE as needed, please replace if saturated.  RLE elevation, PT  Dispo to rehab  Suture removal ~1 month  Right prevena will plan to remove tomorrow, if loses suction today ok to remove by nursing.  Symptoms of blurry vision and left sided weakness resolved, CTA neck reviewed no significant carotid stenosis seen.    Niru Robin MD  Vascular Surgery Fellow  Pager [phone]

## 2018-04-01 NOTE — PROGRESS NOTES
"CT Surgery Progress Note    Coronary artery disease, CHF, and PAF s/p CABG x 3/bilateral pulmonary vein isolation MAZE/L atrial appendage ligation with 45 mm AtriClip/removal of impella POD #9    R leg acute limb ischemia s/p R groin reexploration, R common femoral and iliac artery thrombectomy, stent placement R common iliac artery and 4 compartment R lower leg fasciotomies POD #8    Closure of R lower leg 4 compartment fasciotomies POD #5    Subjective:  Another episode of blurred vision, self resolved after 30 minutes, no other neurological symptoms. Describes having this in the past with \"neon floaters\". Denies any vision loss or curtain like vision loss over visual fields. Will consult ophtho  States that pain is being controlled. Denies any hallucinations. Breathing is good. Working with IS. Appetite is good. Denies any nausea. +BM, denies any popping/clicking in his breast bone, working with rehab.     Objective:  Up in chair, comfortable,  /71 (BP Location: Left arm)  Pulse 101  Temp 97.8  F (36.6  C) (Oral)  Resp 18  Ht 1.803 m (5' 11\")  Wt 116.3 kg (256 lb 6.4 oz)  SpO2 95%  BMI 35.76 kg/m2  CV - IRRR, telemetry afib 90-100s  Pulm - CTA, IS 5184-0113 ml  Abd - BS+, NT/ND  Derm - sternal incision D/I   L LE incisions D/I (EVH leg)   R LE incisions D/I, +dressings   R groin incision +prevena  Neuro - CN II-XII intact, 5/5 strength in all extremities, normal sensation.     BMP    Recent Labs  Lab 04/01/18  0639 03/31/18  0635 03/30/18  0656 03/29/18  0744    137 138 139   POTASSIUM 3.8 3.8 3.5 3.6   CHLORIDE 105 105 104 106   STEFFANY 8.3* 8.2* 8.6 8.3*   CO2 25 25 24 24   BUN 17 20 21 24   CR 0.98 1.04 0.97 0.90   GLC 93 121* 159* 160*     CBC    Recent Labs  Lab 04/01/18  0639 03/31/18  0635 03/30/18  0656 03/29/18  0744   WBC 8.9 10.0 13.2* 11.6*   RBC 2.90* 3.06* 3.01* 3.05*   HGB 8.7* 9.1* 9.1* 9.1*   HCT 27.7* 30.5* 29.4* 29.3*   MCV 96 100 98 96   MCH 30.0 29.7 30.2 29.8   MCHC 31.4* " 29.8* 31.0* 31.1*   RDW 16.3* 16.5* 16.3* 15.9*    402 356 318     INR    Recent Labs  Lab 04/01/18  0639 03/31/18  0635 03/30/18  0656 03/29/18  0744   INR 2.30* 2.29* 1.87* 1.66*      Hepatic Panel No lab results found in last 7 days.  Glucose  Recent Labs  Lab 04/01/18  0639 04/01/18  0205 03/31/18  2141 03/31/18  2128 03/31/18  1705 03/31/18  1134 03/31/18  1003  03/31/18  0635  03/30/18  0656  03/29/18  0744  03/28/18  0844  03/27/18  1011   GLC 93  --   --   --   --   --   --   --  121*  --  159*  --  160*  --  105*  --  151*   BGM  --  83 133* 144* 74 118* 142*  < >  --   < >  --   < >  --   < >  --   < >  --    < > = values in this interval not displayed.      A/P:  1. S/p CABG x 3/bilateral pulmonary vein isolation MAZE/L atrial appendage ligation with 45 mm AtriClip/removal of impella POD #9  2. R leg acute limb ischemia s/p R groin reexploration, R common femoral and iliac artery thrombectomy, stent placement R common iliac artery and 4 compartment R lower leg fasciotomies POD #6. Discussed with Vascular Surgery and pt will need to be on plavix for 1 year duration after iliac stent placement. Will need to be started if/when coumadin is stopped. Will need follow up with vascular as outpatient when back at home in [place], [place].   3. Closure of R lower leg 4 compartment fasciotomies POD #3  4. DM type II - recommendations per Endo, hold metformin for 48 hours (till 4/2) given received IV contrast today.  5. PAF - had pre-op, was on coumadin prior, +coumadin, lopressor to 50 mg PO bid  6. Traumatic gallardo placement - +flomax, removed gallardo at 0600, should urinate between 2092-9847, if unable to void and bladder scanned > or = 350 ml please place perm gallardo  7. Hx of TIA, stroke code called for blurred vision and left sided weakness, resolved shortly after, CTA head unremarkable. Neuro consulted, appreciate recs, recommended MRI (non-urgent), will obtain on Monday 4/2. Ophtho consult for floaters.   8.  STEMI  9. Obesity  10. Asthma    11. HTN - increase lopressor to 50 mg bid, holding ace inh due to low BP  12. Hyperlipidemia - statin  13. R groin incision - prevena will be discontinue on 4/2 per vascular  14. Chronic left SFA-pop arterial occlusion - found on US 3/31 denies pain, no evidence of ischemia or compartment syndrome, d/w vascular surgery 3/31, likely chronic and will need outpatient peripheral angiogram  Encouraged IS/TCDB/amb. Sternal precautions. Looking at possible discharge to [place] rehab on Monday pending MRI, strength, and bed placement. Continue to monitor.     Scott Sanon PA-C  Cardiothoracic Surgery  April 1, 2018 8:31 AM   p: [phone]

## 2018-04-02 ENCOUNTER — APPOINTMENT (OUTPATIENT)
Dept: PHYSICAL THERAPY | Facility: CLINIC | Age: 73
DRG: 228 | End: 2018-04-02
Attending: THORACIC SURGERY (CARDIOTHORACIC VASCULAR SURGERY)
Payer: COMMERCIAL

## 2018-04-02 LAB
ANION GAP SERPL CALCULATED.3IONS-SCNC: 7 MMOL/L (ref 3–14)
BUN SERPL-MCNC: 16 MG/DL (ref 7–30)
CALCIUM SERPL-MCNC: 8.4 MG/DL (ref 8.5–10.1)
CHLORIDE SERPL-SCNC: 105 MMOL/L (ref 94–109)
CO2 SERPL-SCNC: 24 MMOL/L (ref 20–32)
CREAT SERPL-MCNC: 1 MG/DL (ref 0.66–1.25)
ERYTHROCYTE [DISTWIDTH] IN BLOOD BY AUTOMATED COUNT: 16.4 % (ref 10–15)
GFR SERPL CREATININE-BSD FRML MDRD: 74 ML/MIN/1.7M2
GLUCOSE BLDC GLUCOMTR-MCNC: 130 MG/DL (ref 70–99)
GLUCOSE BLDC GLUCOMTR-MCNC: 135 MG/DL (ref 70–99)
GLUCOSE BLDC GLUCOMTR-MCNC: 147 MG/DL (ref 70–99)
GLUCOSE BLDC GLUCOMTR-MCNC: 98 MG/DL (ref 70–99)
GLUCOSE SERPL-MCNC: 133 MG/DL (ref 70–99)
HCT VFR BLD AUTO: 29.6 % (ref 40–53)
HGB BLD-MCNC: 8.9 G/DL (ref 13.3–17.7)
INR PPP: 2.27 (ref 0.86–1.14)
MAGNESIUM SERPL-MCNC: 2 MG/DL (ref 1.6–2.3)
MCH RBC QN AUTO: 29.5 PG (ref 26.5–33)
MCHC RBC AUTO-ENTMCNC: 30.1 G/DL (ref 31.5–36.5)
MCV RBC AUTO: 98 FL (ref 78–100)
PLATELET # BLD AUTO: 430 10E9/L (ref 150–450)
POTASSIUM SERPL-SCNC: 4.1 MMOL/L (ref 3.4–5.3)
RBC # BLD AUTO: 3.02 10E12/L (ref 4.4–5.9)
SODIUM SERPL-SCNC: 136 MMOL/L (ref 133–144)
WBC # BLD AUTO: 7.9 10E9/L (ref 4–11)

## 2018-04-02 PROCEDURE — 25000125 ZZHC RX 250: Performed by: THORACIC SURGERY (CARDIOTHORACIC VASCULAR SURGERY)

## 2018-04-02 PROCEDURE — 25000132 ZZH RX MED GY IP 250 OP 250 PS 637: Performed by: INTERNAL MEDICINE

## 2018-04-02 PROCEDURE — 85610 PROTHROMBIN TIME: CPT | Performed by: PHYSICIAN ASSISTANT

## 2018-04-02 PROCEDURE — 25000132 ZZH RX MED GY IP 250 OP 250 PS 637: Performed by: SURGERY

## 2018-04-02 PROCEDURE — 25000132 ZZH RX MED GY IP 250 OP 250 PS 637

## 2018-04-02 PROCEDURE — 21400006 ZZH R&B CCU INTERMEDIATE UMMC

## 2018-04-02 PROCEDURE — 97530 THERAPEUTIC ACTIVITIES: CPT | Mod: GP

## 2018-04-02 PROCEDURE — 25000132 ZZH RX MED GY IP 250 OP 250 PS 637: Performed by: PHYSICIAN ASSISTANT

## 2018-04-02 PROCEDURE — 25000132 ZZH RX MED GY IP 250 OP 250 PS 637: Performed by: THORACIC SURGERY (CARDIOTHORACIC VASCULAR SURGERY)

## 2018-04-02 PROCEDURE — 83735 ASSAY OF MAGNESIUM: CPT | Performed by: PHYSICIAN ASSISTANT

## 2018-04-02 PROCEDURE — 36415 COLL VENOUS BLD VENIPUNCTURE: CPT | Performed by: PHYSICIAN ASSISTANT

## 2018-04-02 PROCEDURE — 40000193 ZZH STATISTIC PT WARD VISIT

## 2018-04-02 PROCEDURE — 25000128 H RX IP 250 OP 636: Performed by: PHYSICIAN ASSISTANT

## 2018-04-02 PROCEDURE — 00000146 ZZHCL STATISTIC GLUCOSE BY METER IP

## 2018-04-02 PROCEDURE — 80048 BASIC METABOLIC PNL TOTAL CA: CPT | Performed by: PHYSICIAN ASSISTANT

## 2018-04-02 PROCEDURE — 85027 COMPLETE CBC AUTOMATED: CPT | Performed by: PHYSICIAN ASSISTANT

## 2018-04-02 PROCEDURE — 97116 GAIT TRAINING THERAPY: CPT | Mod: GP

## 2018-04-02 RX ORDER — WARFARIN SODIUM 4 MG/1
4 TABLET ORAL
Status: COMPLETED | OUTPATIENT
Start: 2018-04-02 | End: 2018-04-02

## 2018-04-02 RX ADMIN — FUROSEMIDE 20 MG: 10 INJECTION, SOLUTION INTRAVENOUS at 15:41

## 2018-04-02 RX ADMIN — WARFARIN SODIUM 4 MG: 4 TABLET ORAL at 18:07

## 2018-04-02 RX ADMIN — MULTIPLE VITAMINS W/ MINERALS TAB 1 TABLET: TAB at 08:06

## 2018-04-02 RX ADMIN — ASPIRIN 81 MG: 81 TABLET, COATED ORAL at 08:06

## 2018-04-02 RX ADMIN — PANTOPRAZOLE SODIUM 40 MG: 40 TABLET, DELAYED RELEASE ORAL at 08:07

## 2018-04-02 RX ADMIN — CEPHALEXIN 500 MG: 250 CAPSULE ORAL at 15:41

## 2018-04-02 RX ADMIN — SENNOSIDES AND DOCUSATE SODIUM 2 TABLET: 8.6; 5 TABLET ORAL at 22:20

## 2018-04-02 RX ADMIN — CEPHALEXIN 500 MG: 250 CAPSULE ORAL at 11:49

## 2018-04-02 RX ADMIN — GLIPIZIDE 10 MG: 10 TABLET ORAL at 08:07

## 2018-04-02 RX ADMIN — Medication 2 G: at 18:02

## 2018-04-02 RX ADMIN — FINASTERIDE 5 MG: 5 TABLET, FILM COATED ORAL at 08:07

## 2018-04-02 RX ADMIN — METOPROLOL TARTRATE 50 MG: 50 TABLET, FILM COATED ORAL at 08:07

## 2018-04-02 RX ADMIN — POLYETHYLENE GLYCOL 3350 17 G: 17 POWDER, FOR SOLUTION ORAL at 08:24

## 2018-04-02 RX ADMIN — INSULIN GLARGINE 45 UNITS: 100 INJECTION, SOLUTION SUBCUTANEOUS at 15:42

## 2018-04-02 RX ADMIN — METOPROLOL TARTRATE 50 MG: 50 TABLET, FILM COATED ORAL at 20:30

## 2018-04-02 RX ADMIN — FUROSEMIDE 20 MG: 10 INJECTION, SOLUTION INTRAVENOUS at 08:08

## 2018-04-02 RX ADMIN — ATORVASTATIN CALCIUM 20 MG: 20 TABLET, FILM COATED ORAL at 20:30

## 2018-04-02 RX ADMIN — CEPHALEXIN 500 MG: 250 CAPSULE ORAL at 22:20

## 2018-04-02 RX ADMIN — Medication 100 MG: at 08:07

## 2018-04-02 ASSESSMENT — PAIN DESCRIPTION - DESCRIPTORS: DESCRIPTORS: SORE

## 2018-04-02 ASSESSMENT — VISUAL ACUITY
OU: GLASSES
OU: GLASSES

## 2018-04-02 NOTE — PROGRESS NOTES
Diabetes Consult Daily  Progress Note          Assessment/Plan:                           Izaiah Alvarez is a 72 year old male with history of type 2 diabetes, hypertension, hyperlipidemia, atrial fibrillation ( on warfarin), TIA, obesity transferred from [facility] following STEMI with new onset of left heart failure ( EF 20-25%). Impella device was placed via right femoral artery at the OSH. he underwent coronary artery bypass grafting x 3 and removal of Impella on  (3/23/2018) by .  Found to have critical limb ischemia with loss of motor function s/p Right groin reexploration, right common femoral and iliac artery  thrombectomy, right leg angiogram, stent placement right common iliac artery and then 4 compartment right lower leg fasciotomies 3/24/18 with Dr. Tang.  On 3/27/2018, he underwent closure of right leg fasciotomy X 2 with Dr. Tang.         Type 2 diabetic: A1C 7.8% ( 3/22/2018), PTA on lantus 60 units, Glipizide 20 mg bid, Metformin 1000 mg bid  Plan  - lantus 45 units daily ( given at 1330)  - glipizide 10 mg daily with breakfast  - d/c Novolog high intensity sliding scale before meals and HS  -added Medium intensity sliding scale before meals and HS ( starting with dinner)  -monitor glucose before meals, HS and 0200  -may resume Metformin 500 mg twice daily at discharge --> increase to 1000 mg twice daily at rehab  Will continue to follow      Plan discussed with patient,      Possible discharge to TCU in [place]                           Interval History:   The last 24 hours progress and nursing notes reviewed.    Blood sugars are in good control, but tightly controlled.  Metformin was placed on hold 2/2 imaging studies and supper glipizide was stopped.  glucose at ~ 0200, 130 and fasting 133  Pre-meal 147 after taking glipizide 10 mg with breakfast.        Recent Labs  Lab 04/02/18  0143 04/01/18  2157 04/01/18  1639 04/01/18  1323 04/01/18  0937 04/01/18  0639  "04/01/18  0205  03/31/18  0635  03/30/18  0656  03/29/18  0744  03/28/18  0844  03/27/18  1011   GLC  --   --   --   --   --  93  --   --  121*  --  159*  --  160*  --  105*  --  151*   * 163* 105* 89 112*  --  83  < >  --   < >  --   < >  --   < >  --   < >  --    < > = values in this interval not displayed.            Review of Systems:   See interval hx          Medications:       Active Diet Order      Moderate Consistent CHO Diet     Physical Exam:  Gen: Alert, resting in bed, in NAD   HEENT: , mucous membranes are moist  Resp: Unlabored  Ext: + lower extremity edema, moving all extremities   Neuro:oriented x3, communicating clearly  /61 (BP Location: Left arm)  Pulse 64  Temp 97.9  F (36.6  C) (Oral)  Resp 18  Ht 1.803 m (5' 11\")  Wt 115.6 kg (254 lb 13.6 oz)  SpO2 94%  BMI 35.54 kg/m2           Data:     Lab Results   Component Value Date    A1C 7.8 03/22/2018              CBC RESULTS:   Recent Labs   Lab Test  04/01/18   0639   WBC  8.9   RBC  2.90*   HGB  8.7*   HCT  27.7*   MCV  96   MCH  30.0   MCHC  31.4*   RDW  16.3*   PLT  420     Recent Labs   Lab Test  04/01/18   0639  03/31/18   0635   NA  137  137   POTASSIUM  3.8  3.8   CHLORIDE  105  105   CO2  25  25   ANIONGAP  6  7   GLC  93  121*   BUN  17  20   CR  0.98  1.04   STEFFANY  8.3*  8.2*     Liver Function Studies -   Recent Labs   Lab Test  03/22/18   0645   PROTTOTAL  7.0   ALBUMIN  2.8*   BILITOTAL  3.6*   ALKPHOS  55   AST  264*   ALT  69     Lab Results   Component Value Date    INR 2.30 04/01/2018    INR 2.29 03/31/2018    INR 1.87 03/30/2018    INR 1.66 03/29/2018    INR 1.51 03/28/2018    INR 1.34 03/27/2018    INR 1.44 03/26/2018    INR 1.47 03/25/2018    INR 1.53 03/24/2018    INR 1.31 03/24/2018    INR 1.28 03/23/2018    INR 1.51 03/23/2018         Candido Piedra -[phone]  Diabetes Management job code [id number]            "

## 2018-04-02 NOTE — PLAN OF CARE
Problem: Patient Care Overview  Goal: Plan of Care/Patient Progress Review  RN:  1.Pt will remain hemodynamically stable.  2.Pt will be free from infection.   Discharge Planner PT   Patient plan for discharge: ARU  Current status: Pt is progressing well with therapies, sit <> stands with CGA-Joon depending on height of surface. Ambulates at best 130 feet with FWW, remains limited by shortness of breath and generalized fatigue  Barriers to return to prior living situation: Multiple stairs at home, weakness, activity tolerance  Recommendations for discharge: ARU   Rationale for recommendations: Pt would benefit from continued skilled rehab for progression of activity tolerance and strength for improved functional mobility       Entered by: Penelope Shea 04/02/2018 12:40 PM

## 2018-04-02 NOTE — PROGRESS NOTES
Social Work Services Progress Note     Hospital Day: 12  Date of Initial Social Work Evaluation:  3/22/18  Collaborated with:  [facility] - Karina (?) coverage worker for [place]     Data: Pt is a 72 year old male being followed by [facility] for placement to TCU at the [facility] in [facility].     Intervention:  SW called to follow up with the [facility] case management team to assist with [facility] placement.  Per  she is unclear of the status of the referral to the CLC.  Per her notes the case is in screening at this time.  Referral continues to be pended with [place].     - Referrals in Process:    [facility]) - reviewing over the weekend and will check in on Monday.   PH: [phone]  F: [phone]    [facility] in [place], [place] - referral sent  PH: [phone]  F: [phone]     Assessment: Did not meet with pt today .     Plan:    Anticipated Disposition: Facility:  [facility] with the [facility] as able    Barriers to d/c plan: Medical stability, bed availability, VA admissions review    Follow Up: SW to follow up with  tomorrow for progress on referral.    MONROE Zheng, APSW  6C Unit   Phone: [phone]  Pager: [phone]  Unit: [phone]    ___________________________________________________________________________________________________________________________________________________    Referrals Discontinued:  [facility] - declined for program - medical review team felt pt was too complex for cares at their facility.    Community Case Management/Community Services in place:   Pt is [facility] connected for [facility] Care at the [facility].    PCP: Dr. Aishwarya Mayer  : [place] PH:  [phone]

## 2018-04-02 NOTE — CONSULTS
OPHTHALMOLOGY CONSULT NOTE  04/02/18    Patient: Izaiah Alvarez      HISTORY OF PRESENTING ILLNESS:     Patient complains of central blur since two days ago, OU. There are two episodes of blur lasting 2-4 minutes per episode (stroke code at that juncture). Patient noticed neon lights along with blur lasting several minutes. There are no headaches after.       Pertinent History:    Type 2 diabetes - insulin dependent.     Transferred from [facility] due to new onset of left hear failure. HA1C: 7.8    TIA and left sided weakness (neuro consulted)    Bypass graft artery coronary (03/23/18)        Review of systems were otherwise negative except for that which has been stated above.      OCULAR/MEDICAL/SURGICAL HISTORIES:     Past Ocular History:  Cataract, OU    History reviewed. No pertinent past medical history.    Past Surgical History:   Procedure Laterality Date     BYPASS GRAFT ARTERY CORONARY N/A 3/23/2018    Procedure: BYPASS GRAFT ARTERY CORONARY;  Coronary Artery Bypass Graft x 3, MAZE Procedure and Left Atrial Appendage Ligation, Median Sternotomy, on Pump Oxygenation, Left Leg Endoscopic Saphaneous Vein [place], and Right Femoral Artery Cut Down Removal of Impella Device;  Surgeon: Eric Marks MD;  Location: [facility] OR     BYPASS GRAFT FEMOROPOPLITEAL Right 3/24/2018    Procedure: BYPASS GRAFT FEMOROPOPLITEAL;;  Surgeon: Margarette Tang MD;  Location: [facility] OR     FASCIOTOMY LOWER EXTREMITY Right 3/27/2018    Procedure: FASCIOTOMY LOWER EXTREMITY;  Right Leg Fasciotomy Closure x2;  Surgeon: Margarette Tang MD;  Location: [facility] OR     MAZE PROCEDURE N/A 3/23/2018    Procedure: MAZE PROCEDURE;;  Surgeon: Eric Marks MD;  Location: [facility] OR     THROMBECTOMY LOWER EXTREMITY Right 3/24/2018    Procedure: THROMBECTOMY LOWER EXTREMITY;  Right Groin RE-Exploration, Right Common Iliac and Right Common Femoral Artery  Thromboembolectomy, Right Common Iliac Stent placement, Prevena application, Right lower right  "extremity fasciotomy, and right leg angiogram.;  Surgeon: Margarette Tang MD;  Location: [facility] OR       EXAMINATION:     Visual Acuity (assessed with near card): Right Eye 20/25 ; Left Eye 20/20   Pupils: Equal and reactive to light and accomodation with no afferent pupillary defect.    Intraocular Pressure: RE  11 ; LE 12  mmHg by tonopen (<5% error).   Motility: RE Full; LE Full  Confrontational Visual Field: RE Full; LE Full     External/Slit Lamp Exam   RIGHT EYE   Lids/Lashes: No Abnormality   Conj/Sclera: White and Quiet   Cornea:  Clear   Ant Chamber:  Deep and Quiet   Iris: Round and Reactive   Lens: Clear  LEFT   Lids/lashes: No Abnormality   Conj/Sclera: White and Quiet   Cornea:  Clear   Ant Chamber:  Deep and Quiet   Iris: Round and Reactive   Lens:Clear    Dilated Fundus Exam  Eyes Dilated? yes   Time: 12:40 PM With Phenylephrine 2.5% and Mydriacyl 1%    (Normally, dilation with the above lasts about 4-6 hours)  RIGHT:   Anterior Vitreous: Clear   Media: Clear   Optic Nerve: Retinal elevation inferiorly attached to disc.    Cup to Disc Ratio: 0.2/0.2   Macula: Flat and No Pigment Abnormality   Vessels: Normal Caliber and Distribution   Retinal Periphery: No Holes or Tears Identified  LEFT:   Anterior Vitreous: Clear   Media: Clear   Optic Nerve: Normal Contours and Size   Cup to Disc Ratio: 0.2/0.2   Macula: Flat and No Pigment Abnormality   Vessels: Normal Caliber and Distribution   Retinal Periphery: No Holes or Tears Identified         ASSESSMENT/PLAN:     1. Retinal elevation inferiorly attached to optic disc, right eye.     Patient complains of central blur along with \"neon lights\" lasting between 2-4 minutes; three episodes since two days ago.     Perhaps a pigmentary epithelial detachment?     Though the lesion looks white which is unusual for PED.     Recommends follow-up tomorrow in [facility] with Dr. Booth at 2:00PM - spoke with nurse to arrange transport.     Appointment with Dr. Booth at 2:00PM "   [facility], [address]th Floor, [address] [place] [id number]  [phone]        It is our pleasure to participate in this patient's care and treatment. Please contact us with any further questions or concerns.    Discussed with Dr. Liseth Coleman, OD  Ophthalmology

## 2018-04-02 NOTE — PLAN OF CARE
"Problem: Patient Care Overview  Goal: Plan of Care/Patient Progress Review  RN:  1.Pt will remain hemodynamically stable.  2.Pt will be free from infection.   Outcome: Improving  /63  Pulse 86  Temp 97.8  F (36.6  C) (Oral)  Resp 18  Ht 1.803 m (5' 11\")  Wt 115.6 kg (254 lb 13.6 oz)  SpO2 96%  BMI 35.54 kg/m2  Shift: 7994-9298  Neuro: A/Ox4 BLE strength 4/5, BLE 3/5. No other neuro deficits noted   Cardiac: Sinus rhythm, VSS  Respiratory: Sats >95% on RA  GI/: Would like nursing to continue to hold bowel meds. Voiding in bedside urinal  Diet/Appetite: Tolerating diet, denies nausea  Activity: Ax1 GB and walker  Pain: Tylenol given overnight x 1. Otherwise denies pain  Skin: Wound Vac to R groin site (possibly to be removed today) RLE faciotomoy sites x2 dressings marked. Midline incision CDI  LDA's: PIVx2 SL, Vac in place     Plan: Will continue to monitor per POC.       "

## 2018-04-02 NOTE — PROGRESS NOTES
"VASCULAR SURGERY PROGRESS NOTE    Subjective:  Pt found sitting in chair at bedside. Pt denies pain in his RLE. Pt able to walk, though not able to walk for long periods yet. Pt eating a regular diet.     Objective:  Intake/Output Summary (Last 24 hours) at 04/02/18 0814  Last data filed at 04/02/18 0645   Gross per 24 hour   Intake              600 ml   Output             1400 ml   Net             -800 ml     Labs:  ROUTINE IP LABS (Last four results)  BMP  Recent Labs  Lab 04/01/18  0639 03/31/18  0635 03/30/18  0656 03/29/18  0744    137 138 139   POTASSIUM 3.8 3.8 3.5 3.6   CHLORIDE 105 105 104 106   STEFFANY 8.3* 8.2* 8.6 8.3*   CO2 25 25 24 24   BUN 17 20 21 24   CR 0.98 1.04 0.97 0.90   GLC 93 121* 159* 160*     CBC  Recent Labs  Lab 04/01/18  0639 03/31/18  0635 03/30/18  0656 03/29/18  0744   WBC 8.9 10.0 13.2* 11.6*   RBC 2.90* 3.06* 3.01* 3.05*   HGB 8.7* 9.1* 9.1* 9.1*   HCT 27.7* 30.5* 29.4* 29.3*   MCV 96 100 98 96   MCH 30.0 29.7 30.2 29.8   MCHC 31.4* 29.8* 31.0* 31.1*   RDW 16.3* 16.5* 16.3* 15.9*    402 356 318     INR  Recent Labs  Lab 04/01/18  0639 03/31/18  0635 03/30/18  0656 03/29/18  0744   INR 2.30* 2.29* 1.87* 1.66*     PHYSICAL EXAM:  /61 (BP Location: Left arm)  Pulse 64  Temp 97.9  F (36.6  C) (Oral)  Resp 18  Ht 1.803 m (5' 11\")  Wt 115.6 kg (254 lb 13.6 oz)  SpO2 94%  BMI 35.54 kg/m2  General: The patient is alert and oriented. Appropriate. No acute disctress  Psych: pleasant affect, answers questions appropriately  Skin: Color appropriate for race, warm, dry.  Respiratory: The patient does not require supplemental oxygen. Breathing unlabored  Extremities: R groin wound vac in place to suction. Wound vac removed and incision healing well. Incision CDI, closed with staples without erythema, edema, or drainage. RLE DP and PT pulses palpable,  +2 edema noted. Medial fasciotomy closure site has more edema, more erythema along the incision. Lateral incision site has " some minor edema and slight erythema at incision site.         ASSESSMENT:  S/P Right groin reexploration, right common femoral and iliac artery  thrombectomy, right leg angiogram, stent placement right common iliac artery and then 4 compartment right lower leg fasciotomies 3/24/18 with Dr. Tang. S/P Fasciotomy closure 3/27/18      PLAN:  Dry dressings to RLE as needed, please replace if saturated.  Keflex started for erythema of medial fasciotomy incision  RLE elevation, PT/OT  Dispo to rehab when placement found  Warfarin for 6 months + life-long baby Aspirin recommended from vascular for anticoagulation   Wound check in ~1 week with groin staple removal (pt wants to F/U in [place], does NOT want to have to return to the [place] area for follow-up)  Suture removal ~1 month  Right groin Prevena wound vac removed today      Rosey Collier PA-C   Division of Vascular Surgery   [facility]   Pager: [phone]

## 2018-04-02 NOTE — PROGRESS NOTES
CVTS Daily Note  4/2/2018  Attending: Eric Marks, *    S:   No overnight events. Opthalmology saw pt this AM. No MRI per Neuro (likely TIA).     Pt seen at bedside resting comfortably.    Does complain of needing a bowel movement, otherwise no acute complaints.      Denies F/C/Sweats.  No CP, SOB, or calf pain.    Tolerating diet.  + BM but not lately.  + Flatus. No nausea or bloating.   Ambulated well without assistance.    Pain level tolerable. Plan as per Neuro section below.     O:   Vitals:    04/01/18 2008 04/02/18 0147 04/02/18 0408 04/02/18 0800   BP:  101/63  107/61   BP Location: Left arm   Left arm   Pulse: 86   64   Resp: 18   18   Temp: 98.1  F (36.7  C) 97.8  F (36.6  C)  97.9  F (36.6  C)   TempSrc: Oral Oral  Oral   SpO2: 98% 96%  94%   Weight:   115.6 kg (254 lb 13.6 oz)    Height:         Vitals:    03/30/18 0300 03/31/18 0230 04/02/18 0408   Weight: 116.6 kg (257 lb) 116.3 kg (256 lb 6.4 oz) 115.6 kg (254 lb 13.6 oz)       Intake/Output Summary (Last 24 hours) at 04/02/18 1337  Last data filed at 04/02/18 0645   Gross per 24 hour   Intake              240 ml   Output             1200 ml   Net             -960 ml       MAPs: 67 - 89  Gen: AAO x 3, pleasant, NAD  CV: RRR, S1S2 normal, no murmurs, rubs, or gallops.   Pulm: CTA, no rhonchi or wheezes  Abd: soft, non-tender, no guarding  Ext: trace peripheral edema, 1 + pitting  Incision: clean, dry, intact, no erythema  Chest Tube sites: dressings clean and dry    Labs:  [facility]    Recent Labs  Lab 04/02/18  0755 04/01/18  0639 03/31/18  0635 03/30/18  0656    137 137 138   POTASSIUM 4.1 3.8 3.8 3.5   CHLORIDE 105 105 105 104   STEFFANY 8.4* 8.3* 8.2* 8.6   CO2 24 25 25 24   BUN 16 17 20 21   CR 1.00 0.98 1.04 0.97   * 93 121* 159*     CBC    Recent Labs  Lab 04/02/18  0755 04/01/18  0639 03/31/18  0635 03/30/18  0656   WBC 7.9 8.9 10.0 13.2*   RBC 3.02* 2.90* 3.06* 3.01*   HGB 8.9* 8.7* 9.1* 9.1*   HCT 29.6* 27.7* 30.5* 29.4*   MCV 98  96 100 98   MCH 29.5 30.0 29.7 30.2   MCHC 30.1* 31.4* 29.8* 31.0*   RDW 16.4* 16.3* 16.5* 16.3*    420 402 356     INR    Recent Labs  Lab 04/02/18  0755 04/01/18  0639 03/31/18  0635 03/30/18  0656   INR 2.27* 2.30* 2.29* 1.87*      Hepatic Panel   Lab Results   Component Value Date     03/22/2018     Lab Results   Component Value Date    ALT 69 03/22/2018     Lab Results   Component Value Date    ALBUMIN 2.8 03/22/2018     GLUCOSE:     Recent Labs  Lab 04/02/18  1157 04/02/18  0755 04/02/18  0143 04/01/18  2157 04/01/18  1639 04/01/18  1323 04/01/18  0937 04/01/18  0639  03/31/18  0635  03/30/18  0656  03/29/18  0744  03/28/18  0844   GLC  --  133*  --   --   --   --   --  93  --  121*  --  159*  --  160*  --  105*   *  --  130* 163* 105* 89 112*  --   < >  --   < >  --   < >  --   < >  --    < > = values in this interval not displayed.      Imaging:  reviewed recent imaging      1.  A/P:   Izaiah Alvarez is a 72 year old male who is status post CABG x 3/bilateral pulmonary vein isolation MAZE/L atrial appendage ligation with 45 mm AtriClip/removal of impella.     2. R leg acute limb ischemia s/p R groin re-exploration, R common femoral and iliac artery thrombectomy, stent placement R common iliac artery and 4 compartment R lower leg fasciotomies POD #6. Discussed with Vascular Surgery and pt will need to be on plavix for 1 year duration after iliac stent placement. Will need to be started if/when coumadin is stopped. Will need follow up with vascular as outpatient when back at home in [place], [place].   3. Closure of R lower leg 4 compartment fasciotomies POD #3  4. DM type II - recommendations per Endo, hold metformin for 48 hours (till 4/2) given received IV contrast today.  5. PAF - had pre-op, was on coumadin prior, +coumadin, lopressor to 50 mg PO bid  6. Traumatic gallardo placement - +flomax, removed gallardo at 0600, should urinate between 1143-7026, if unable to void and bladder scanned > or = 350  ml please place perm gallardo  7. Hx of TIA, stroke code called for blurred vision and left sided weakness, resolved shortly after, CTA head unremarkable. Neuro consulted, appreciate recs, no MRI needed (likely TIA). Ophtho consult for floaters.   8. STEMI  9. Obesity  10. Asthma    11. HTN - increase lopressor to 50 mg bid, holding ace inh due to low BP  12. Hyperlipidemia - statin  13. R groin incision - prevena will be discontinue on 4/2 per vascular  14. Chronic left SFA-pop arterial occlusion - found on US 3/31 denies pain, no evidence of ischemia or compartment syndrome, d/w vascular surgery 3/31, likely chronic and will need outpatient peripheral angiogram    Encouraged IS/TCDB/amb. Sternal precautions. Looking at possible discharge to [facility] on Wed pending MRI, strength, and bed placement. Continue to monitor.       Discussed with CVTS Fellow   Staff surgeons have been informed of changes through both  verbal and written communication.      Alfonzo Mckeon PA-C  Cardiothoracic Surgery  Pager [phone]    1:37 PM   April 2, 2018

## 2018-04-02 NOTE — PLAN OF CARE
"Problem: Patient Care Overview  Goal: Plan of Care/Patient Progress Review  RN:  1.Pt will remain hemodynamically stable.  2.Pt will be free from infection.    04/01/18 7056   OTHER   Plan Of Care Reviewed With patient   Plan of Care Review   Progress improving   /66 (BP Location: Left arm)  Pulse 86  Temp 98.1  F (36.7  C) (Oral)  Resp 18  Ht 1.803 m (5' 11\")  Wt 116.3 kg (256 lb 6.4 oz)  SpO2 98%  BMI 35.76 kg/m2  HR 60s, SR.  Pt denies pain.  Up with assist x1 and gait belt and walker to BR x2.  Passing gas, no BM.  Splinting with pillow when sitting to standing.  Wearing disposable brief in case of bowel incontinence.  No episodes this shift.  Aqua K pad for comfort.  RLE dressing CDI.  BG at bedtime WNL no insulin coverage needed.  Cont with POC.      "

## 2018-04-03 ENCOUNTER — APPOINTMENT (OUTPATIENT)
Dept: CARDIOLOGY | Facility: CLINIC | Age: 73
DRG: 228 | End: 2018-04-03
Attending: PHYSICIAN ASSISTANT
Payer: COMMERCIAL

## 2018-04-03 ENCOUNTER — APPOINTMENT (OUTPATIENT)
Dept: PHYSICAL THERAPY | Facility: CLINIC | Age: 73
DRG: 228 | End: 2018-04-03
Attending: THORACIC SURGERY (CARDIOTHORACIC VASCULAR SURGERY)
Payer: COMMERCIAL

## 2018-04-03 ENCOUNTER — OFFICE VISIT (OUTPATIENT)
Dept: OPHTHALMOLOGY | Facility: CLINIC | Age: 73
End: 2018-04-03
Attending: OPHTHALMOLOGY
Payer: MEDICARE

## 2018-04-03 DIAGNOSIS — H35.81 COTTON WOOL SPOTS: Primary | ICD-10-CM

## 2018-04-03 DIAGNOSIS — H25.13 AGE-RELATED NUCLEAR CATARACT OF BOTH EYES: ICD-10-CM

## 2018-04-03 LAB
ANION GAP SERPL CALCULATED.3IONS-SCNC: 11 MMOL/L (ref 3–14)
BUN SERPL-MCNC: 14 MG/DL (ref 7–30)
CALCIUM SERPL-MCNC: 8.6 MG/DL (ref 8.5–10.1)
CHLORIDE SERPL-SCNC: 103 MMOL/L (ref 94–109)
CO2 SERPL-SCNC: 23 MMOL/L (ref 20–32)
CREAT SERPL-MCNC: 1.03 MG/DL (ref 0.66–1.25)
ERYTHROCYTE [DISTWIDTH] IN BLOOD BY AUTOMATED COUNT: 16.5 % (ref 10–15)
GFR SERPL CREATININE-BSD FRML MDRD: 71 ML/MIN/1.7M2
GLUCOSE BLDC GLUCOMTR-MCNC: 118 MG/DL (ref 70–99)
GLUCOSE BLDC GLUCOMTR-MCNC: 125 MG/DL (ref 70–99)
GLUCOSE BLDC GLUCOMTR-MCNC: 138 MG/DL (ref 70–99)
GLUCOSE BLDC GLUCOMTR-MCNC: 143 MG/DL (ref 70–99)
GLUCOSE BLDC GLUCOMTR-MCNC: 78 MG/DL (ref 70–99)
GLUCOSE BLDC GLUCOMTR-MCNC: 94 MG/DL (ref 70–99)
GLUCOSE SERPL-MCNC: 141 MG/DL (ref 70–99)
HCT VFR BLD AUTO: 32.1 % (ref 40–53)
HGB BLD-MCNC: 9.8 G/DL (ref 13.3–17.7)
INR PPP: 2.17 (ref 0.86–1.14)
MAGNESIUM SERPL-MCNC: 2.2 MG/DL (ref 1.6–2.3)
MCH RBC QN AUTO: 29.7 PG (ref 26.5–33)
MCHC RBC AUTO-ENTMCNC: 30.5 G/DL (ref 31.5–36.5)
MCV RBC AUTO: 97 FL (ref 78–100)
PLATELET # BLD AUTO: 472 10E9/L (ref 150–450)
POTASSIUM SERPL-SCNC: 4.1 MMOL/L (ref 3.4–5.3)
RBC # BLD AUTO: 3.3 10E12/L (ref 4.4–5.9)
SODIUM SERPL-SCNC: 137 MMOL/L (ref 133–144)
WBC # BLD AUTO: 6.8 10E9/L (ref 4–11)

## 2018-04-03 PROCEDURE — 25000132 ZZH RX MED GY IP 250 OP 250 PS 637: Performed by: THORACIC SURGERY (CARDIOTHORACIC VASCULAR SURGERY)

## 2018-04-03 PROCEDURE — 93321 DOPPLER ECHO F-UP/LMTD STD: CPT | Mod: 26 | Performed by: INTERNAL MEDICINE

## 2018-04-03 PROCEDURE — 82962 GLUCOSE BLOOD TEST: CPT | Mod: 91

## 2018-04-03 PROCEDURE — G0463 HOSPITAL OUTPT CLINIC VISIT: HCPCS | Mod: ZF

## 2018-04-03 PROCEDURE — 25000132 ZZH RX MED GY IP 250 OP 250 PS 637: Performed by: PHYSICIAN ASSISTANT

## 2018-04-03 PROCEDURE — 25000132 ZZH RX MED GY IP 250 OP 250 PS 637: Performed by: SURGERY

## 2018-04-03 PROCEDURE — 85610 PROTHROMBIN TIME: CPT | Performed by: PHYSICIAN ASSISTANT

## 2018-04-03 PROCEDURE — 85027 COMPLETE CBC AUTOMATED: CPT | Performed by: PHYSICIAN ASSISTANT

## 2018-04-03 PROCEDURE — 25500064 ZZH RX 255 OP 636: Performed by: THORACIC SURGERY (CARDIOTHORACIC VASCULAR SURGERY)

## 2018-04-03 PROCEDURE — 97530 THERAPEUTIC ACTIVITIES: CPT | Mod: GP

## 2018-04-03 PROCEDURE — 21400006 ZZH R&B CCU INTERMEDIATE UMMC

## 2018-04-03 PROCEDURE — 93325 DOPPLER ECHO COLOR FLOW MAPG: CPT

## 2018-04-03 PROCEDURE — 80048 BASIC METABOLIC PNL TOTAL CA: CPT | Performed by: PHYSICIAN ASSISTANT

## 2018-04-03 PROCEDURE — 25000132 ZZH RX MED GY IP 250 OP 250 PS 637: Performed by: INTERNAL MEDICINE

## 2018-04-03 PROCEDURE — 25000128 H RX IP 250 OP 636: Performed by: PHYSICIAN ASSISTANT

## 2018-04-03 PROCEDURE — 36415 COLL VENOUS BLD VENIPUNCTURE: CPT | Performed by: PHYSICIAN ASSISTANT

## 2018-04-03 PROCEDURE — 97116 GAIT TRAINING THERAPY: CPT | Mod: GP

## 2018-04-03 PROCEDURE — 00000146 ZZHCL STATISTIC GLUCOSE BY METER IP

## 2018-04-03 PROCEDURE — 93308 TTE F-UP OR LMTD: CPT | Mod: 26 | Performed by: INTERNAL MEDICINE

## 2018-04-03 PROCEDURE — 40000193 ZZH STATISTIC PT WARD VISIT

## 2018-04-03 PROCEDURE — G0463 HOSPITAL OUTPT CLINIC VISIT: HCPCS

## 2018-04-03 PROCEDURE — 83735 ASSAY OF MAGNESIUM: CPT | Performed by: PHYSICIAN ASSISTANT

## 2018-04-03 PROCEDURE — 25000132 ZZH RX MED GY IP 250 OP 250 PS 637

## 2018-04-03 PROCEDURE — 93325 DOPPLER ECHO COLOR FLOW MAPG: CPT | Mod: 26 | Performed by: INTERNAL MEDICINE

## 2018-04-03 RX ORDER — ATORVASTATIN CALCIUM 20 MG/1
20 TABLET, FILM COATED ORAL DAILY
Qty: 30 TABLET | DISCHARGE
Start: 2018-04-03

## 2018-04-03 RX ORDER — BISACODYL 10 MG
10 SUPPOSITORY, RECTAL RECTAL DAILY PRN
Status: DISCONTINUED | OUTPATIENT
Start: 2018-04-03 | End: 2018-04-04 | Stop reason: HOSPADM

## 2018-04-03 RX ORDER — PANTOPRAZOLE SODIUM 40 MG/1
40 TABLET, DELAYED RELEASE ORAL EVERY MORNING
Qty: 30 TABLET | DISCHARGE
Start: 2018-04-04

## 2018-04-03 RX ORDER — FUROSEMIDE 20 MG
20 TABLET ORAL
Status: DISCONTINUED | OUTPATIENT
Start: 2018-04-03 | End: 2018-04-04 | Stop reason: HOSPADM

## 2018-04-03 RX ORDER — WARFARIN SODIUM 4 MG/1
4 TABLET ORAL
Status: COMPLETED | OUTPATIENT
Start: 2018-04-03 | End: 2018-04-03

## 2018-04-03 RX ORDER — GLIPIZIDE 10 MG/1
10 TABLET ORAL
Qty: 60 TABLET | DISCHARGE
Start: 2018-04-04

## 2018-04-03 RX ADMIN — CEPHALEXIN 500 MG: 250 CAPSULE ORAL at 23:10

## 2018-04-03 RX ADMIN — ASPIRIN 81 MG: 81 TABLET, COATED ORAL at 09:33

## 2018-04-03 RX ADMIN — POLYETHYLENE GLYCOL 3350 17 G: 17 POWDER, FOR SOLUTION ORAL at 09:34

## 2018-04-03 RX ADMIN — FUROSEMIDE 20 MG: 20 TABLET ORAL at 17:08

## 2018-04-03 RX ADMIN — CEPHALEXIN 500 MG: 250 CAPSULE ORAL at 17:08

## 2018-04-03 RX ADMIN — METOPROLOL TARTRATE 50 MG: 50 TABLET, FILM COATED ORAL at 09:33

## 2018-04-03 RX ADMIN — CEPHALEXIN 500 MG: 250 CAPSULE ORAL at 06:01

## 2018-04-03 RX ADMIN — INSULIN GLARGINE 45 UNITS: 100 INJECTION, SOLUTION SUBCUTANEOUS at 13:01

## 2018-04-03 RX ADMIN — FINASTERIDE 5 MG: 5 TABLET, FILM COATED ORAL at 09:33

## 2018-04-03 RX ADMIN — BISACODYL 10 MG: 10 SUPPOSITORY RECTAL at 20:55

## 2018-04-03 RX ADMIN — MULTIPLE VITAMINS W/ MINERALS TAB 1 TABLET: TAB at 09:33

## 2018-04-03 RX ADMIN — GLIPIZIDE 10 MG: 10 TABLET ORAL at 09:33

## 2018-04-03 RX ADMIN — HUMAN ALBUMIN MICROSPHERES AND PERFLUTREN 8 ML: 10; .22 INJECTION, SOLUTION INTRAVENOUS at 16:45

## 2018-04-03 RX ADMIN — WARFARIN SODIUM 4 MG: 4 TABLET ORAL at 17:09

## 2018-04-03 RX ADMIN — METOPROLOL TARTRATE 50 MG: 50 TABLET, FILM COATED ORAL at 20:46

## 2018-04-03 RX ADMIN — Medication 100 MG: at 09:33

## 2018-04-03 RX ADMIN — PANTOPRAZOLE SODIUM 40 MG: 40 TABLET, DELAYED RELEASE ORAL at 09:33

## 2018-04-03 RX ADMIN — ATORVASTATIN CALCIUM 20 MG: 20 TABLET, FILM COATED ORAL at 20:47

## 2018-04-03 RX ADMIN — FUROSEMIDE 20 MG: 10 INJECTION, SOLUTION INTRAVENOUS at 09:32

## 2018-04-03 ASSESSMENT — VISUAL ACUITY
METHOD: SNELLEN - LINEAR
OS_CC+: -2
OS_CC: 20/20
OD_CC+: -3
OD_CC: 20/20
CORRECTION_TYPE: GLASSES

## 2018-04-03 ASSESSMENT — CONF VISUAL FIELD
OD_NORMAL: 1
OS_NORMAL: 1

## 2018-04-03 ASSESSMENT — REFRACTION_WEARINGRX
OD_CYLINDER: +1.00
OD_ADD: +2.50
OS_SPHERE: -0.75
OS_ADD: +2.50
OD_AXIS: 165
OS_AXIS: 172
OD_SPHERE: +0.50
OS_CYLINDER: +1.25

## 2018-04-03 ASSESSMENT — EXTERNAL EXAM - LEFT EYE: OS_EXAM: NORMAL

## 2018-04-03 ASSESSMENT — EXTERNAL EXAM - RIGHT EYE: OD_EXAM: NORMAL

## 2018-04-03 ASSESSMENT — SLIT LAMP EXAM - LIDS
COMMENTS: 1+ BLEPHARITIS, MEIBOMIAN GLAND DYSFUNCTION
COMMENTS: 1+ BLEPHARITIS, MEIBOMIAN GLAND DYSFUNCTION

## 2018-04-03 ASSESSMENT — TONOMETRY
IOP_METHOD: TONOPEN
OS_IOP_MMHG: 17
OD_IOP_MMHG: 19

## 2018-04-03 ASSESSMENT — PAIN DESCRIPTION - DESCRIPTORS: DESCRIPTORS: SORE

## 2018-04-03 NOTE — PLAN OF CARE
Problem: Patient Care Overview  Goal: Plan of Care/Patient Progress Review  RN:  1.Pt will remain hemodynamically stable.  2.Pt will be free from infection.   Outcome: Improving  D: Admitted following STEMI. S/P 03/23/2018 x3 vessel CABG, left atrial ligation, impella removal. C/b RLE compartment syndrome s/p fasciotomy, 03/24/2018 with closure 03/27/2018.   I: 2L fluid restriction. Maintained POC  A: A&O x4. VSS see flowsheet. Denies pain. Voiding adequate amount. Last BM 03/31. Sleeping between cares. Incisions/dressing CDI.  P: Continue to monitor and notify CVTS with changes. Vision appt at 2pm. Possible MRI.

## 2018-04-03 NOTE — PROGRESS NOTES
Assessment & Plan      Izaiah Alvarez is a 72 year old male with the following diagnoses:   1. Cotton wool spots - Both Eyes    2. Age-related nuclear cataract of both eyes         72 year old male with Type II Diabetes mellitus transferred from [facility] due to new onset left heart failure now s/p coronary artery bypass graft 3/23/18.     Noted to have retinal lesion on consult exam yesterday. Brought to clinic for further evaluation.     Cotton wool spots both eyes, likely 2/2 transient ischemia during recent acute coronary event.  Expectant self resolution.    Stressed importance of good risk factor control per primary team.        Patient disposition:   Return in about 1 month (around 5/3/2018) for Follow Up at [facility].          Greg Larson M.D.  PGY-2, Ophthalmology    Attending Physician Attestation:  Complete documentation of historical and exam elements from today's encounter can be found in the full encounter summary report (not reduplicated in this progress note).  I personally obtained the chief complaint(s) and history of present illness.  I confirmed and edited as necessary the review of systems, past medical/surgical history, family history, social history, and examination findings as documented by others; and I examined the patient myself.  I personally reviewed the relevant tests, images, and reports as documented above.  I formulated and edited as necessary the assessment and plan and discussed the findings and management plan with the patient and family. . - Charli Rucker MD

## 2018-04-03 NOTE — PLAN OF CARE
"Problem: Patient Care Overview  Goal: Plan of Care/Patient Progress Review  RN:  1.Pt will remain hemodynamically stable.  2.Pt will be free from infection.   Discharge Planner PT   Patient plan for discharge: TCU/ARC  Current status: Amb 125' x 2 with FWW/SBA. Performed multiple sit<>stands with min A up to walker. Adheres to sternal precautions. High fall risk due to 30\" sit<>stand results (see below)  Barriers to return to prior living situation: Deconditioning, physical dependence  Recommendations for discharge: ARC but would also benefit from TCU  Rationale for recommendations: Current level of function       Entered by: Cade Edwards 04/03/2018 1:29 PM     30-Second Sit to Stand Test:  The test is conducted with a straight back chair, without armrests, with a 17-inch seat height.    Patient Score (reps): 4     The 30 Second Sit to Stand Test is considered a test of fall risk.  Data from [place] MAK, cosponsored by [place] Dept of Health:  8 or less times = High Fall Risk   9 to 12 times = Moderate Risk  13 or more times = Low Risk    The 30 Second Sit to Stand Test is also considered a test of leg strength and endurance.   Normative Data from Kevin et al,. 2001  Age                 Reps: Men        Reps: Women  60-64                14-19                       12-17                               65-69                12-18                       11-16                    70-74                12-17                       10-15              75-79                11-17                       10-15                    80-84                10-15                         9-14  85-89                 8-14                          8-13  90-94                 7-12                          4-11    Assessment (rationale for performing, application to patient s function & care plan): Assess fall risk          "

## 2018-04-03 NOTE — PLAN OF CARE
Problem: Patient Care Overview  Goal: Plan of Care/Patient Progress Review  RN:  1.Pt will remain hemodynamically stable.  2.Pt will be free from infection.   OT/CR 6C: Cancel.  Pt working with PT this AM and then gone out of room upon attempt this PM.

## 2018-04-03 NOTE — LETTER
4/3/2018       RE: Izaiah Alvarez  [address]  [address]  [facility] [address]     Dear Colleague,    Thank you for referring your patient, Izaiah Alvarez, to the EYE CLINIC at [facility]. Please see a copy of my visit note below.    Assessment & Plan      Iaziah Alvarez is a 72 year old male with the following diagnoses:   1. Cotton wool spots - Both Eyes    2. Age-related nuclear cataract of both eyes         72 year old male with Type II Diabetes mellitus transferred from [facility] due to new onset left heart failure now s/p coronary artery bypass graft 3/23/18.     Noted to have retinal lesion on consult exam yesterday. Brought to clinic for further evaluation.     Cotton wool spots both eyes, likely 2/2 transient ischemia during recent acute coronary event.  Expectant self resolution.    Stressed importance of good risk factor control per primary team.        Patient disposition:   Return in about 1 month (around 5/3/2018) for Follow Up at [facility].          Greg Larson M.D.  PGY-2, Ophthalmology    Attending Physician Attestation:  Complete documentation of historical and exam elements from today's encounter can be found in the full encounter summary report (not reduplicated in this progress note).  I personally obtained the chief complaint(s) and history of present illness.  I confirmed and edited as necessary the review of systems, past medical/surgical history, family history, social history, and examination findings as documented by others; and I examined the patient myself.  I personally reviewed the relevant tests, images, and reports as documented above.  I formulated and edited as necessary the assessment and plan and discussed the findings and management plan with the patient and family. . - Charli Rucker MD       Again, thank you for allowing me to participate in the care of your patient.      Sincerely,    Charli Rucker MD

## 2018-04-03 NOTE — NURSING NOTE
Chief Complaints and History of Present Illnesses   Patient presents with     Consult For     vision blurred w flashes BE     HPI    Affected eye(s):  Both   Symptoms:     Floaters (Comment: RE)   Flashes   No glare   No halos         Do you have eye pain now?:  No      Comments:  3X in 4 days blurred visions w colored flashes lasting less than a minute BE.  inpt for triple bypass  Blood sugar has been good X 2 weeks  A1C 7.8, 3 months ago  Caity Hall COA 2:44 PM April 3, 2018

## 2018-04-03 NOTE — PROGRESS NOTES
Diabetes Consult Daily  Progress Note          Assessment/Plan:     Izaiah Alvarez is a 72 year old male with history of type 2 diabetes, hypertension, hyperlipidemia, atrial fibrillation ( on warfarin), TIA, obesity transferred from [facility] following STEMI with new onset of left heart failure ( EF 20-25%). Impella device was placed via right femoral artery at the OSH. he underwent coronary artery bypass grafting x 3 and removal of Impella on  (3/23/2018) by .  Found to have critical limb ischemia with loss of motor function s/p Right groin reexploration, right common femoral and iliac artery  thrombectomy, right leg angiogram, stent placement right common iliac artery and then 4 compartment right lower leg fasciotomies 3/24/18 with Dr. Tang.  On 3/27/2018, he underwent closure of right leg fasciotomy X 2 with Dr. Tang.           Type 2 diabetic: A1C 7.8% ( 3/22/2018), PTA on lantus 60 units, Glipizide 20 mg bid, Metformin 1000 mg bid  Plan and Plan for discharge:  - lantus 45 units daily ( given at 1330)  - glipizide 10 mg daily with breakfast  -Medium intensity sliding scale before meals and HS   -monitor glucose before meals, HS and 0200  -may resume Metformin 500 mg twice daily at TCU--> increase after 5 days to 1000 mg twice daily at TCU if tolerating the metformin ( may need to make adjustments to the Lantus once on Metformin)        Plan discussed with patient, and primary team      Possible discharge to TCU in [place] in the next few days                             [facility] of blood sugars and diabetes plan    Inpatient Diabetes team will sign off, discussed with Alfonzo Mckeon PA-C, please contact via job code pager [phone] for questions             Interval History:   The last 24 hours progress and nursing notes reviewed.  Blood sugars are in good control on Lantus 45 units and glipizide 10 mg with breakfast.  glucose at ~ 0200, 118  "and fasting 124  glucose range   Appetite is reported as good, denies nausea and or vomiting      Recent Labs  Lab 04/03/18  1006 04/03/18  0819 04/03/18  0238 04/02/18  2140 04/02/18  1512 04/02/18  1157 04/02/18  0755 04/02/18  0143  04/01/18  0639  03/31/18  0635  03/30/18  0656  03/29/18  0744   *  --   --   --   --   --  133*  --   --  93  --  121*  --  159*  --  160*   BGM  --  125* 118* 135* 98 147*  --  130*  < >  --   < >  --   < >  --   < >  --    < > = values in this interval not displayed.            Review of Systems:   See interval hx          Medications:       Active Diet Order      Moderate Consistent CHO Diet     Physical Exam:  Gen: Alert,  NAD   HEENT:  mucous membranes are moist  Resp: Unlabored  Ext: moving all extremities   Neuro:oriented x3, communicating clearly  BP 92/45 (BP Location: Right arm)  Pulse 84  Temp 98.2  F (36.8  C) (Oral)  Resp 20  Ht 1.803 m (5' 11\")  Wt 113.4 kg (250 lb 1.6 oz)  SpO2 95%  BMI 34.88 kg/m2           Data:     Lab Results   Component Value Date    A1C 7.8 03/22/2018              CBC RESULTS:   Recent Labs   Lab Test  04/03/18   1006   WBC  6.8   RBC  3.30*   HGB  9.8*   HCT  32.1*   MCV  97   MCH  29.7   MCHC  30.5*   RDW  16.5*   PLT  472*     Recent Labs   Lab Test  04/03/18   1006  04/02/18   0755   NA  137  136   POTASSIUM  4.1  4.1   CHLORIDE  103  105   CO2  23  24   ANIONGAP  11  7   GLC  141*  133*   BUN  14  16   CR  1.03  1.00   STEFFANY  8.6  8.4*     Liver Function Studies -   Recent Labs   Lab Test  03/22/18   0645   PROTTOTAL  7.0   ALBUMIN  2.8*   BILITOTAL  3.6*   ALKPHOS  55   AST  264*   ALT  69     Lab Results   Component Value Date    INR 2.17 04/03/2018    INR 2.27 04/02/2018    INR 2.30 04/01/2018    INR 2.29 03/31/2018    INR 1.87 03/30/2018    INR 1.66 03/29/2018    INR 1.51 03/28/2018    INR 1.34 03/27/2018    INR 1.44 03/26/2018    INR 1.47 03/25/2018    INR 1.53 03/24/2018    INR 1.31 03/24/2018           Candido Piedra" [phone]  Diabetes Management job code [id number]

## 2018-04-03 NOTE — PROGRESS NOTES
VASCULAR SURGERY PROGRESS NOTE    SUBJECTIVE:  Patient sitting up in chair, denies any pain, fever, chills or night sweats.  Offers no specific complaints.    OBJECTIVE:  Vital signs:  Temp: 98  F (36.7  C) Temp src: Oral BP: 95/63   Heart Rate: 70 Resp: 20 SpO2: 95 % O2 Device: None (Room air)        Intake/Output Summary (Last 24 hours) at 04/03/18 0810  Last data filed at 04/03/18 0700   Gross per 24 hour   Intake              836 ml   Output             2300 ml   Net            -1464 ml       Vitals:    03/31/18 0230 04/02/18 0408 04/03/18 0424   Weight: 116.3 kg (256 lb 6.4 oz) 115.6 kg (254 lb 13.6 oz) 113.4 kg (250 lb 1.6 oz)       PHYSICAL EXAM:  NEURO/PSYCH: The patient is alert and oriented.  Appropriate.  Moves all extremities.    SKIN:  Warm and dry.  PULMONARY: The patient does not require supplemental oxygen.  Non-labored breathing     EXTREMITIES: right groin staples intact no erythema or drainage noted.  Right medial fasciotomy closure site slightly increased erythema and edema from yesterday, no drainage noted, sutures intact, right lateral fasciotomy closure site slight erythema and mild edema at site- stable from yesterday.  Palpable bilateral PT and DP pulses                   BMP  Recent Labs  Lab 04/02/18  0755 04/01/18  0639 03/31/18  0635 03/30/18  0656    137 137 138   POTASSIUM 4.1 3.8 3.8 3.5   CHLORIDE 105 105 105 104   CO2 24 25 25 24   BUN 16 17 20 21   CR 1.00 0.98 1.04 0.97   * 93 121* 159*   MAG 2.0 2.2 2.0 2.0     CBC  Recent Labs  Lab 04/02/18  0755 04/01/18  0639 03/31/18  0635 03/30/18  0656   WBC 7.9 8.9 10.0 13.2*   HGB 8.9* 8.7* 9.1* 9.1*    420 402 356     INR/PTT  Recent Labs  Lab 04/02/18  0755 04/01/18  0639 03/31/18  0635 03/30/18  0656   INR 2.27* 2.30* 2.29* 1.87*         ASSESSMENT:  72 year old male status post right groin reexploration, right common femoral and iliac artery thrombectomy, right leg angiogram, stent placement in right common iliac  artery and then 4 compartment right lower leg fasciotomies on 3/24/2018 with Dr. Tang.  Status post fasciotomy closure on 3/27/2018 with Dr. Tang      PLAN:  - continue Keflex  - elevate right lower extremity to help reduce edema  - keep right lower extremity fasciotomy closure sites covered with dressings and change daily and/or if drainage present  - continue PT and OT  - warfarin for 6 months + life-long baby ASA recommended from vascular for anticoagulation/anti-platelet therapy  - remove right groin staples in one week and remove right lower extremity fasciotomy closure site sutures in 3-4 weeks (okay to have this done in [place], patient prefers to have his follow up in [place], does not want to return to [facility] area)    Please do not hesitate to contact vascular surgery if any questions arise.       Jenny FOSTER, CNS  Division of Vascular Surgery  [facility]  Pager [phone]

## 2018-04-03 NOTE — DISCHARGE SUMMARY
[facility], [place]   Cardiothoracic Surgery Hospital Discharge Summary     Izaiah Alvarez MRN# [id number]   Age: 72 year old YOB: 1945     Admitting Physician:  Eric Marks MD  Discharge Physician:  REGINO Odonnell  Primary Care Physician:        System, Provider Not In     DATE OF ADMISSION: 3/21/2018     DATE OF DISCHARGE: April 4, 2018          Admission Diagnoses:   1) Coronary artery disease  2) Congestive heart failure  3) Paroxysmal atrial fibrillation  4) Diabetes mellitus  5) Hypertension  6) History of TIA  7) Obesity  8) Cardiogenic shock           Discharge Diagnoses:   1) Coronary artery disease, s/p CAB x 3 vessels 3/23/18   2) Congestive heart failure  3) Paroxysmal atrial fibrillation  4) Diabetes mellitus  5) Hypertension  6) History of TIA  7) Obesity  8) s/p R common femoral and iliac artery thrombectomy   9) s/p R lower leg 4 compartment fasciotomies   10) s/p right common iliac artery stent placement   11) Occluded distal left SFA   12) Anticoagulation for A-fib and Stent: warfarin for at least 6 months, if stopping should start Plavix for additional 6 months (R common iliac stent)      * note: patient needed medical transport for telemonitoring while in transit.     PROCEDURES PERFORMED:   Date: 3/23/2018.  Surgeon: Dr. Eric Marks and Dr Jose Lopez   1) Coronary artery bypass grafting x 3.              - Left internal mammary artery to left anterior descending artery              - Reversed saphenous vein graft to right posterior descending artery              - Reversed saphenous vein graft to obtuse marginal artery  2) Endoscopic vein harvest left lower extremity  3) Transesophageal echocardiogram  4) Bilateral pulmonary vein isolation MAZE procedure  5) Left atrial appendage ligation with 45mm AtriClip  6) Right femoral arterial cutdown  7) Removal of Impella 5.0 temporary left ventricular assist device (explant of  "extracorporeal left ventricular device)  8) Repair of femoral vessels    Date: 3/24/2018.  Surgeon: Margarette Tang   1. Right groin re-exploration, right common femoral and iliac artery thrombectomy  2. Right leg angiogram with stent placement right common iliac artery   3. 4 compartment right lower leg fasciotomies.     Date: 3/27/2018.  Surgeon: Margarette Tang   Closure right lower leg 4 compartment fasciotomies.    OPERATIVE FINDINGS:  1) Ejection fraction: 25% pre-op; 45% post-bypass  2) Left internal mammary artery 3mm and excellent flow.  3) Left greater saphenous vein 5-6mm and suitable for bypass.  4) Left anterior descending artery 2.5mm and heavy posterior disease at anastomosis.  5) Right posterior descending artery 2mm and moderate disease at anastomosis.  6) Obtuse marginal artery 2mm and free of disease at anastomosis.    INTRAOPERATIVE COMPLICATIONS:  none    PATHOLOGY RESULTS:    1. Surgical Pathology 3/24/18:  THROMBUS, RIGHT COMMON ILIAC ARTERY AND RIGHT COMMON FEMORAL ARTERY, THROMBECTOMY: Organized thrombus     CULTURE RESULTS:  none    DRAINS/TUBES PRESENT AT DISCHARGE:  None    CONSULTS:    1.  PT/OT  2.  Vascular Surgery   3.  Opthalmology  4.  Endocrinology   5.  Urology   6.  Cardiology    7.  Neurology     Patient discharged on aspirin:  Yes 81 mg  Patient discharged on beta blocker: yes    Patient discharged on ACE Inhibitor/ARB: no  Softer BPs            Discharge Disposition:   Discharged to short-term care facility            Condition on Discharge:   Discharge condition: Stable   Discharge vitals: Blood pressure 92/45, pulse 84, temperature 98.2  F (36.8  C), temperature source Oral, resp. rate 20, height 1.803 m (5' 11\"), weight 113.4 kg (250 lb 1.6 oz), SpO2 95 %.     Code status on discharge: Full Code       DAY OF DISCHARGE PHYSICAL EXAM:  Vitals:    04/03/18 0600 04/03/18 0733 04/03/18 0800 04/03/18 1142   BP: 108/73 95/63  92/45   BP Location: Left arm Left arm  Right arm   Pulse:   84 "    Resp: 20 20  20   Temp: 97.3  F (36.3  C) 98  F (36.7  C)  98.2  F (36.8  C)   TempSrc: Oral Oral  Oral   SpO2: 97% 95%     Weight:       Height:         Vitals:    03/31/18 0230 04/02/18 0408 04/03/18 0424   Weight: 116.3 kg (256 lb 6.4 oz) 115.6 kg (254 lb 13.6 oz) 113.4 kg (250 lb 1.6 oz)     MAPs: 73 - 89  Gen:  NAD, conversational  CV: RRR, S1S2 normal, no murmurs, rubs, or gallops  Pulm:  CTA, no rhonchi or wheezes  Abd: obese, soft, nondistended, NTTP  Ext: + dependent edema, + 1 pitting  Incision: Clean, dry, intact, no erythema  Chest Tube sites: Dressings clean and dry    BMP    Recent Labs  Lab 04/03/18  1006 04/02/18  0755 04/01/18  0639 03/31/18  0635    136 137 137   POTASSIUM 4.1 4.1 3.8 3.8   CHLORIDE 103 105 105 105   STEFFANY 8.6 8.4* 8.3* 8.2*   CO2 23 24 25 25   BUN 14 16 17 20   CR 1.03 1.00 0.98 1.04   * 133* 93 121*     CBC    Recent Labs  Lab 04/03/18  1006 04/02/18  0755 04/01/18  0639 03/31/18  0635   WBC 6.8 7.9 8.9 10.0   RBC 3.30* 3.02* 2.90* 3.06*   HGB 9.8* 8.9* 8.7* 9.1*   HCT 32.1* 29.6* 27.7* 30.5*   MCV 97 98 96 100   MCH 29.7 29.5 30.0 29.7   MCHC 30.5* 30.1* 31.4* 29.8*   RDW 16.5* 16.4* 16.3* 16.5*   * 430 420 402     INR    Recent Labs  Lab 04/03/18  1006 04/02/18  0755 04/01/18  0639 03/31/18  0635   INR 2.17* 2.27* 2.30* 2.29*      Hepatic Panel   Lab Results   Component Value Date     03/22/2018     Lab Results   Component Value Date    ALT 69 03/22/2018     Lab Results   Component Value Date    ALBUMIN 2.8 03/22/2018         Recent Labs  Lab 04/03/18  1006 04/03/18  0819 04/03/18  0238 04/02/18  2140 04/02/18  1512 04/02/18  1157 04/02/18  0755 04/02/18  0143  04/01/18  0639  03/31/18  0635  03/30/18  0656  03/29/18  0744   *  --   --   --   --   --  133*  --   --  93  --  121*  --  159*  --  160*   BGM  --  125* 118* 135* 98 147*  --  130*  < >  --   < >  --   < >  --   < >  --    < > = values in this interval not displayed.    BRIEF HISTORY  OF ILLNESS:  Izaiah Alvarez is a 72 year old male with PMHx of obesity, Afib on coumadin, DM2, HTN, and TIA who was transferred from [facility] on 3/[facility]/18 following MI with new onset left HF (20-25% compared to 46% previously). Acutely worsened on 3/19 with increased SOB patient presented to hospital. Now s/p Impella, started on lasix gtt for fluid, nitroglycerin gtt to decrease afterload and diltiazem gtt for afib with RVR. Arrived on dilt gtt, lasix gtt, heparin gtt, nitro gtt. BPs, vitals stable.     HOSPITAL COURSE: Izaiah Alvarez is a 72 year old male who on 3/23/2018 underwent the above-named procedures.  He tolerated the operation well and postoperatively was admitted to the CVICU.  He was extubated on POD # 2 with neuro status intact.  His ICU stay was complicated by discovery of R common femoral and iliac thrombus on POD #1, treatment included thrombectomy, stent placement, and fasciotomies and closure on POD #3.        He was stable and transferred to the post-surgical telemetry unit on POD # 3. Coumadin started for atrial fibrillation. Hep gtt was used for bridging to therapeutic INR.  He was diuresed with IV lasix and transitioned to PO lasix before discharge. Chest tubes and pacing wires were removed without incident as indicated. Endocrine team followed for good SG control.   On 3/31 a stroke code was called for blurred vision and left sided weakness, a head CT showed no intracranial hemorrhage, Neuro team felt this was likely a TIA and that a follow up head MRI was not necessary.    - Vascular surgery followed throughout his stay with recs to discharge on Keflex, warfarin (6 months) and ASA 81 mg, right groin staple removal on 4/10/18, and removal of fasciotomy sutures around 5/5/18.      Prior to discharge, his pain was controlled well, he was able to perform most ADLs and ambulate without difficulty, and had full return of bowel and bladder function.  On April 4, 2018, he was discharged to a [facility] in stable condition.    ECHOCARDIOGRAM, 4/3/2018-   Borderline reduced left ventricular function is present with traced LVEF 51%.  Hypokinesis of the mid-distal anteroseptal, apical segments including true  apex present. There is no thrombus.  Global right ventricular function and size are normal.  No significant valvular abnormalities noted.  Dilation of the inferior vena cava is present with abnormal respiratory  variation in diameter. Estimated mean right atrial pressure is 15 mmHg.  No pericardial effusion is present.    Compared to study dated 3/22/18, LVEF is improved. Previously noted Impella device is not present.    US LOWER EXTREMITY ARTERIAL DUPLEX LEFT, 3/31/2018-   Occluded distal left SFA extending through the popliteal artery with reconstitution of flow in the  PTA and ALVARO at the ankle secondary to collateral vessel flow arising from the distal left SFA  proximal to the occlusion.    Head CT scan 3/31/18-   1. Head CTA demonstrates no aneurysm or stenosis of the major intracranial arteries.   2. Neck CTA demonstrates atherosclerotic calcifications of the carotid bulbs with mild stenosis of   the right internal carotid artery origin.   3. No intracranial hemorrhage on the noncontrast head CT.      CXR 3/28/18-   Left base chest tube has been removed. No pneumothorax or  large pleural effusion. Increasing ill-defined opacities about the  lung bases with new nonvisualization of left hemidiaphragm and  increased hazy opacity right base. The cardiac silhouette and  pulmonary vasculature are unchanged and within normal limits. No acute  bony abnormality is about the thorax in this single frontal image.   IMPRESSION:  1. No left-sided pneumothorax following left-sided chest tube removal.  2. Increasing bibasilar opacities compatible with atelectasis and/or  aspiration/pneumonia. Small underlying effusions cannot be excluded.    DISCHARGE INSTRUCTIONS:  You had a full sternotomy, so avoid lifting  anything greater than ten pounds for 6 weeks after surgery and then less than 20 pounds for an additional 6 weeks.  No driving for 4 weeks after surgery or while on pain medication.     Avoid strenuous activities such as bowling, vacuuming, raking, shoveling, golf or tennis for 12 weeks after your surgery. It is okay to resume sex if you feel comfortable in doing so. You may have to try different positions with your partner.     Splint your chest incision by hugging a pillow or bringing your arms across your chest when coughing or sneezing. Avoid pushing off with your arms when getting up for the first month if you have had your sternum opened.    Shower or wash your incisions daily with soap and water (or as instructed), pat dry. Keep wound clean and dry, showers are okay after discharge, but don't let spray hit directly on incision. No baths or swimming for 1 month.  Clean wounds twice a day for 2 weeks with microklenz spray if available. Cover chest tube sites with gauze until they stop draining, then leave open. It is not abnormal for chest tube sites to drain yellowish/clear fluid for up to 2-3 weeks after surgery.   Watch for signs of infection: increased redness, tenderness, warmth or any drainage that appears infected (pus like) or is persistent.  Also a temperature > 100.5 F or chills. Call your surgeon or primary care provider's office immediately. Remove any skin glue left on incisions after 10-14 days. This will not affect your incision and can speed up healing.    Exercise is very important in your recovery. Please follow the guidelines set up for you in your cardiac rehab classes at the hospital. If outpatient cardiac rehab was ordered for you, we highly recommend you participate. If you have problems arranging your cardiac rehab, please call [phone].     Avoid sitting for prolonged periods of time, try to walk every hour during the day. If you have a leg incision, elevate your leg often when you  are not walking.    Check your weight when you get home from the hospital and continue to check it daily through your recovery for at least a month. If you notice a weight gain of 2-3 pounds in a week, notify your primary care physician, cardiologist or surgeon.    Bowel activity may be slow after surgery. If necessary, you may take an over the counter laxative such as Milk of Magnesia or Miralax. You may have stool softeners prescribed (docusate sodium, Senokot). We recommend using stool softeners while using narcotics for pain (oxycodone/percocet, hydrocodone/vicodin).      DO NOT SMOKE.  IF YOU NEED HELP QUITTING, PLEASE TALK WITH YOUR CARDIOLOGIST OR PRIMARY DOCTOR.    You are on a blood thinner, follow the instructions you were given in the hospital and DO NOT SKIP this medication. Try and take it the same time everyday. Your primary care physician or coumadin clinic will manage the dosing.     REGARDING PRESCRIPTION REFILLS.  If you need a refill on your pain medication contact us.  All other medications will be adjusted, discontinued and re-filled by your primary care physician and/or your cardiologist as they were prior to your surgery. We have given you enough for one to three month with possibly one refill.    POST-OPERATIVE CLINIC VISITS  You will now return to the care of your primary physician and your cardiologist.   - Restart metformin at 500 mg BID and follow up with Diabetic Provider at home  - Follow up with Vascular surgery team in 3-4 weeks in [place] (remove right groin staples on 4/10 and fasciotomy site sutures on 3-4 weeks)  - Follow up opthalmology as outpatient PRN     PRE-ADMISSION MEDICATIONS:    No current facility-administered medications on file prior to encounter.   No current outpatient prescriptions on file prior to encounter.     DISCHARGE MEDICATIONS:    Izaiah Alvarez   Home Medication Instructions MARIS:[id number]    Printed on:04/04/18 8550   Medication Information                       Acetaminophen (TYLENOL PO)  Take 650 mg by mouth every 6 hours as needed for mild pain             albuterol (PROAIR HFA/PROVENTIL HFA/VENTOLIN HFA) 108 (90 BASE) MCG/ACT Inhaler  Inhale 2 puffs into the lungs every 6 hours as needed for shortness of breath / dyspnea or wheezing             ASPIRIN 81 PO  Take 1 tablet by mouth daily             atorvastatin (LIPITOR) 20 MG tablet  Take 1 tablet (20 mg) by mouth daily             cephalexin (KEFLEX) 500 MG capsule  Take 1 capsule (500 mg) by mouth every 8 hours for 10 days             FINASTERIDE PO  Take 5 mg by mouth daily             fluticasone (FLOVENT DISKUS) 50 MCG/BLIST AEPB  Inhale 2 puffs into the lungs daily as needed Both nostrils for rhinitis             furosemide (LASIX) 20 MG tablet  Take 1 tablet (20 mg) by mouth 2 times daily             glipiZIDE (GLUCOTROL) 10 MG tablet  Take 1 tablet (10 mg) by mouth every morning (before breakfast)             insulin aspart (NOVOLOG PEN) 100 UNIT/ML injection  Inject 1-7 Units Subcutaneous 3 times daily (before meals) Correction Scale - MEDIUM RESISTANCE DOSING     No Correction Insulin if Pre-Meal BG <140.   For Pre-Meal  - 189 give 1 unit.    - 239 give 2 units.    - 289 give 3 units.    - 339 give 4 units.   - 399 give 5 units.   -449 give 6 units  For Pre-Meal BG > 449 give 7 units.   To be given based on pre-meal blood glucose             insulin aspart (NOVOLOG PEN) 100 UNIT/ML injection  Inject 1-5 Units Subcutaneous At Bedtime MEDIUM INSULIN RESISTANCE DOSING    Do Not give Bedtime Correction Insulin if BG less than  200.   For  - 249 give 1 units.   For  - 299 give 2 units.   For  - 349 give 3 units.   For  -399 give 4 units.   For BG greater than or equal to 400 give 5 units.  Notify provider if glucose greater than or equal to 350 mg/dL after administration of correction dose.             insulin glargine (LANTUS) 100 UNIT/ML  injection  Inject 45 Units Subcutaneous every morning             metFORMIN (GLUCOPHAGE) 500 MG tablet  Take 1 tablet (500 mg) by mouth 2 times daily (with meals)             METOPROLOL TARTRATE PO  Take 50 mg by mouth 2 times daily             Multiple Vitamins-Minerals (MULTIVITAMIN & MINERAL PO)  Take 1 tablet by mouth daily             Omega-3 Fatty Acids (FISH OIL) 500 MG CAPS  Take 500 mg by mouth daily             pantoprazole (PROTONIX) 40 MG EC tablet  Take 1 tablet (40 mg) by mouth every morning for 30 days             phenylephrine-shark liver oil-mineral oil-petrolatum (PREPARATION H) 0.25-14-74.9 % rectal ointment  Place rectally 3 times daily as needed for hemorrhoids             polyethylene glycol (MIRALAX/GLYCOLAX) Packet  17 g by Oral or Feeding Tube route daily as needed for constipation             senna-docusate (SENOKOT-S;PERICOLACE) 8.6-50 MG per tablet  2 tablets by Oral or Feeding Tube route 2 times daily as needed for constipation             warfarin (COUMADIN) 2 MG tablet  Take 5 mg PO daily until next INR check, then dose per INR goal 2-3 for 6 months.               CC:s  System, Provider Not In      [facility]   Cardiothoracic Surgery  Office phone: [phone]

## 2018-04-03 NOTE — LETTER
Date:April 4, 2018      Patient was self referred, no letter generated. Do not send.        [facility] Health Information

## 2018-04-03 NOTE — PROGRESS NOTES
CVTS Daily Note  4/3/2018  Attending: Eric Marks, *    S:   No overnight events.  Vascular following for right leg fasciotomy sites.     Pt seen at bedside resting comfortably.    Does complain of weakness, otherwise no acute complaints.      Denies F/C/Sweats.  No CP, SOB, or calf pain.    Tolerating diet, + BM but about 3 days ago, + Flatus.    Ambulated well without assistance.    Pain level tolerable. Plan as per Neuro section below.     O:   Vitals:    04/03/18 0600 04/03/18 0733 04/03/18 0800 04/03/18 1142   BP: 108/73 95/63  92/45   BP Location: Left arm Left arm  Right arm   Pulse:   84    Resp: 20 20  20   Temp: 97.3  F (36.3  C) 98  F (36.7  C)  98.2  F (36.8  C)   TempSrc: Oral Oral  Oral   SpO2: 97% 95%     Weight:       Height:         Vitals:    03/31/18 0230 04/02/18 0408 04/03/18 0424   Weight: 116.3 kg (256 lb 6.4 oz) 115.6 kg (254 lb 13.6 oz) 113.4 kg (250 lb 1.6 oz)     Intake/Output Summary (Last 24 hours) at 04/03/18 1153  Last data filed at 04/03/18 1141   Gross per 24 hour   Intake             1196 ml   Output             2900 ml   Net            -1704 ml       MAPs: 69 - 71   Gen: AAO x 3, pleasant, NAD  CV: RRR, S1S2 normal, no murmurs, rubs, or gallops.   Pulm: CTA, no rhonchi or wheezes  Abd: soft, non-tender, no guarding  Ext: + peripheral edema, 1 + pitting  Incision: clean, dry, intact, no erythema  Chest Tube sites: dressings clean and dry       Labs:  BMP    Recent Labs  Lab 04/03/18  1006 04/02/18  0755 04/01/18  0639 03/31/18  0635    136 137 137   POTASSIUM 4.1 4.1 3.8 3.8   CHLORIDE 103 105 105 105   STEFFANY 8.6 8.4* 8.3* 8.2*   CO2 23 24 25 25   BUN 14 16 17 20   CR 1.03 1.00 0.98 1.04   * 133* 93 121*     CBC    Recent Labs  Lab 04/03/18  1006 04/02/18  0755 04/01/18  0639 03/31/18  0635   WBC 6.8 7.9 8.9 10.0   RBC 3.30* 3.02* 2.90* 3.06*   HGB 9.8* 8.9* 8.7* 9.1*   HCT 32.1* 29.6* 27.7* 30.5*   MCV 97 98 96 100   MCH 29.7 29.5 30.0 29.7   MCHC 30.5* 30.1*  31.4* 29.8*   RDW 16.5* 16.4* 16.3* 16.5*   * 430 420 402     INR    Recent Labs  Lab 04/03/18  1006 04/02/18  0755 04/01/18  0639 03/31/18  0635   INR 2.17* 2.27* 2.30* 2.29*      Hepatic Panel   Lab Results   Component Value Date     03/22/2018     Lab Results   Component Value Date    ALT 69 03/22/2018     Lab Results   Component Value Date    ALBUMIN 2.8 03/22/2018     GLUCOSE:     Recent Labs  Lab 04/03/18  1006 04/03/18  0819 04/03/18  0238 04/02/18  2140 04/02/18  1512 04/02/18  1157 04/02/18  0755 04/02/18  0143  04/01/18  0639  03/31/18  0635  03/30/18  0656  03/29/18  0744   *  --   --   --   --   --  133*  --   --  93  --  121*  --  159*  --  160*   BGM  --  125* 118* 135* 98 147*  --  130*  < >  --   < >  --   < >  --   < >  --    < > = values in this interval not displayed.      Imaging:  reviewed recent imaging       1.  A/P:   Izaiah Alvarez is a 72 year old male who is status post CABG x 3/bilateral pulmonary vein isolation MAZE/L atrial appendage ligation with 45 mm AtriClip/removal of impella.      2. R leg acute limb ischemia s/p R groin re-exploration, R common femoral and iliac artery thrombectomy, stent placement R common iliac artery and 4 compartment R lower leg fasciotomies POD #6. Discussed with Vascular Surgery and pt will need to be on plavix for 1 year duration after iliac stent placement. Will need to be started if/when coumadin is stopped. Will need follow up with vascular as outpatient when back at home in [place], [place].   3. Closure of R lower leg 4 compartment fasciotomies POD #3  4. DM type II - recommendations per Endo, Lantus 45 U and 10 mg Glipizide, will restart metformin at discharge.   5. PAF - had pre-op, was on coumadin prior, +coumadin, lopressor to 50 mg PO bid  6. Traumatic gallardo placement - +flomax, removed gallardo at 0600, should urinate between 1852-2906, if unable to void and bladder scanned > or = 350 ml please place perm gallardo. Lasix 20 mg PO BID.   7.  Hx of TIA, stroke code called for blurred vision and left sided weakness, resolved shortly after, CTA head unremarkable. Neuro consulted, appreciate recs, no MRI needed (likely TIA). Ophtho consult for floaters.   8. STEMI    9. Obesity    10. Asthma    11. HTN - lopressor to 50 mg bid, holding ace inh due to low BP  12. Hyperlipidemia - statin  13. R groin incision - prevena will be discontinue on 4/2 per vascular  14. Chronic left SFA-pop arterial occlusion - found on US 3/31 denies pain, no evidence of ischemia or compartment syndrome, d/w vascular surgery 3/31, likely chronic and will need outpatient peripheral angiogram    Discharge:  - Restart metformin at 500 mg BID and follow up with Diabetic Provider at home  - Follow up with Vascular surgery team in 3-4 weeks in [place] (remove right groin staples on 4/10 and fasciotomy site sutures on 3-4 weeks)    - Follow up opthalmology as outpatient       Discussed with CVTS Fellow   Staff surgeons have been informed of changes through both  verbal and written communication.      Alfonzo Mckeon PA-C  Cardiothoracic Surgery  Pager [phone]    11:53 AM   April 3, 2018

## 2018-04-03 NOTE — PROGRESS NOTES
WO Nurse Inpatient Wound Assessment     Follow up Assessment  Reason for consultation: Evaluate and treat Penis wound     Assessment  Penis wound due to Trauma and irritation from Felton's catheter  Status: Follow up assessment, healed    Treatment Plan  Penis wound: Daily catheter care as per unit routine.   Apply lidocaine jelly as prescribed.    Orders Written  WO Nurse follow-up plan:signing off  Nursing to notify the Provider(s) and re-consult the WOC Nurse if wound(s) deteriorates or new skin concern.    Patient History  According to provider note(s):  Izaiah Alvarez is a 72M with PMH of paroxysmal atrial fibrillation on coumadin, CHF, obesity, DM type II, HTN who who presented to [facility]SHx with STEMI with coronary angiogram showing triple vessel disease (95% LM, 75% LAD, 90% OM,  RCA). ECHO showed EF around 20-25% and Impella device was placed via R femoral artery at the  OSH. The patient underwent a CABG for with R CFA cutdown yesterday for removal of Impella percutaneous ventricular assist device on 3. Extubated on POD#1. Underwent take back after pain of RLE and found to have thrombus that was removed. Left intubated after procedure. Extubated 3/25 and now will need fasciotomy sites closed by vascular surgery.     Objective Data  Containment of urine/stool: Continent of bowel and Indwelling catheter    Active Diet Order    Active Diet Order      Moderate Consistent CHO Diet    Output:   I/O last 3 completed shifts:  In: 236 [P.O.:236]  Out: 2300 [Urine:2300]    Risk Assessment:    Donn Donn Score  Av.7  Min: 12  Max: 20                            Labs:     Recent Labs  Lab 18  1006 18  0755   HGB  --  8.9*   INR 2.17* 2.27*   WBC  --  7.9         No lab results found in last 7 days.    Physical Exam  Skin assessment:   Focused skin inspection: Penis    Wound Location:  Penis  Date of last photo- Refuse to have pic taken  Wound History: Per pt and family, he had traumatic experience  during gallardo's insertion on Monday. It took four trial for placement, it has been sore since then. Now gallardo's has been removed  Measurements (length x width x depth, in cm) healed   Wound Base:  mucosal  Tunneling N/A  Undermining N/A  Palpation of the wound bed: normal   Periwound skin: mucosal  Color: pink  Temperature: normal   Drainage:, scant  Description of drainage: serous  Odor: mild  Pain: very sensitive during cleansing    Interventions  Current support surface: Standard  Atmos Air mattress    Current off-loading measures: Pillows under calves  Visual inspection of wound(s) completed  Wound Care: Area cleansed and assessed.    Supplies: none  Education provided today: importance of keeping it clean and dry    Discussed plan of care with Patient      Chrystal Alford RN

## 2018-04-03 NOTE — MR AVS SNAPSHOT
After Visit Summary   4/3/2018    Izaiah Alvarez    MRN: [id number]           Patient Information     Date Of Birth          1945        Visit Information        Provider Department      4/3/2018 2:00 PM Charli [facility]        Today's Diagnoses     Cotton wool spots - Both Eyes    -  1    Age-related nuclear cataract of both eyes           Follow-ups after your visit        Follow-up notes from your care team     Return in about 1 month (around 5/3/2018) for Follow Up at [facility].      Who to contact     Please call your clinic at [phone] to:    Ask questions about your health    Make or cancel appointments    Discuss your medicines    Learn about your test results    Speak to your doctor            Additional Information About Your Visit        MyChart Information     Openbuckst is an electronic gateway that provides easy, online access to your medical records. With Myngle, you can request a clinic appointment, read your test results, renew a prescription or communicate with your care team.     To sign up for Open[employer] visit the website at [email or web address]   You will be asked to enter the access code listed below, as well as some personal information. Please follow the directions to create your username and password.     Your access code is: [email or web address]  Expires: 2018  6:31 AM     Your access code will  in 90 days. If you need help or a new code, please contact your [facility] or call [phone] for assistance.        Care EveryWhere ID     This is your Care EveryWhere ID. This could be used by other organizations to access your [place] medical records  [id number]         Blood Pressure from Last 3 Encounters:   18 92/45    Weight from Last 3 Encounters:   18 113.4 kg (250 lb 1.6 oz)              Today, you had the following     No orders found for display         Today's Medication Changes      Notice     This  visit is during an admission. Changes to the med list made in this visit will be reflected in the After Visit Summary of the admission.             Primary Care Provider Fax #    Provider Not In System [phone]                Equal Access to Services     ROBERTA WOLF : Hadii aad ku hadhuseyin Smith, wasamanthada luhailee, nae kamary kayda nico, gray smith veronicageno gross laAndreamadison salvador. So [facility] [phone].    ATENCIÓN: Si habla español, tiene a vasquez disposición servicios gratuitos de asistencia lingüística. Llame al [phone].    We comply with applicable federal civil rights laws and [place] laws. We do not discriminate on the basis of race, color, national origin, age, disability, sex, sexual orientation, or gender identity.            Thank you!     Thank you for choosing [facility]  for your care. Our goal is always to provide you with excellent care. Hearing back from our patients is one way we can continue to improve our services. Please take a few minutes to complete the written survey that you may receive in the mail after your visit with us. Thank you!             Your Updated Medication List - Protect others around you: Learn how to safely use, store and throw away your medicines at www.disposemymeds.org.      Notice     This visit is during an admission. Changes to the med list made in this visit will be reflected in the After Visit Summary of the admission.

## 2018-04-04 VITALS
SYSTOLIC BLOOD PRESSURE: 113 MMHG | RESPIRATION RATE: 20 BRPM | TEMPERATURE: 97.5 F | DIASTOLIC BLOOD PRESSURE: 63 MMHG | HEIGHT: 71 IN | OXYGEN SATURATION: 97 % | WEIGHT: 249.4 LBS | BODY MASS INDEX: 34.92 KG/M2 | HEART RATE: 84 BPM

## 2018-04-04 LAB
ANION GAP SERPL CALCULATED.3IONS-SCNC: 9 MMOL/L (ref 3–14)
BUN SERPL-MCNC: 14 MG/DL (ref 7–30)
CALCIUM SERPL-MCNC: 8.4 MG/DL (ref 8.5–10.1)
CHLORIDE SERPL-SCNC: 104 MMOL/L (ref 94–109)
CO2 SERPL-SCNC: 24 MMOL/L (ref 20–32)
CREAT SERPL-MCNC: 1.06 MG/DL (ref 0.66–1.25)
ERYTHROCYTE [DISTWIDTH] IN BLOOD BY AUTOMATED COUNT: 16.8 % (ref 10–15)
GFR SERPL CREATININE-BSD FRML MDRD: 69 ML/MIN/1.7M2
GLUCOSE BLDC GLUCOMTR-MCNC: 117 MG/DL (ref 70–99)
GLUCOSE BLDC GLUCOMTR-MCNC: 126 MG/DL (ref 70–99)
GLUCOSE SERPL-MCNC: 116 MG/DL (ref 70–99)
HCT VFR BLD AUTO: 32.2 % (ref 40–53)
HGB BLD-MCNC: 9.4 G/DL (ref 13.3–17.7)
INR PPP: 2.36 (ref 0.86–1.14)
MAGNESIUM SERPL-MCNC: 2.1 MG/DL (ref 1.6–2.3)
MCH RBC QN AUTO: 28.7 PG (ref 26.5–33)
MCHC RBC AUTO-ENTMCNC: 29.2 G/DL (ref 31.5–36.5)
MCV RBC AUTO: 99 FL (ref 78–100)
PLATELET # BLD AUTO: 439 10E9/L (ref 150–450)
POTASSIUM SERPL-SCNC: 4 MMOL/L (ref 3.4–5.3)
RBC # BLD AUTO: 3.27 10E12/L (ref 4.4–5.9)
SODIUM SERPL-SCNC: 137 MMOL/L (ref 133–144)
WBC # BLD AUTO: 6.4 10E9/L (ref 4–11)

## 2018-04-04 PROCEDURE — 80048 BASIC METABOLIC PNL TOTAL CA: CPT | Performed by: PHYSICIAN ASSISTANT

## 2018-04-04 PROCEDURE — 25000132 ZZH RX MED GY IP 250 OP 250 PS 637: Performed by: PHYSICIAN ASSISTANT

## 2018-04-04 PROCEDURE — 00000146 ZZHCL STATISTIC GLUCOSE BY METER IP

## 2018-04-04 PROCEDURE — 25000132 ZZH RX MED GY IP 250 OP 250 PS 637: Performed by: INTERNAL MEDICINE

## 2018-04-04 PROCEDURE — 25000132 ZZH RX MED GY IP 250 OP 250 PS 637: Performed by: THORACIC SURGERY (CARDIOTHORACIC VASCULAR SURGERY)

## 2018-04-04 PROCEDURE — 25000132 ZZH RX MED GY IP 250 OP 250 PS 637

## 2018-04-04 PROCEDURE — 83735 ASSAY OF MAGNESIUM: CPT | Performed by: PHYSICIAN ASSISTANT

## 2018-04-04 PROCEDURE — 25000132 ZZH RX MED GY IP 250 OP 250 PS 637: Performed by: SURGERY

## 2018-04-04 PROCEDURE — 85610 PROTHROMBIN TIME: CPT | Performed by: PHYSICIAN ASSISTANT

## 2018-04-04 PROCEDURE — 85027 COMPLETE CBC AUTOMATED: CPT | Performed by: PHYSICIAN ASSISTANT

## 2018-04-04 PROCEDURE — 36415 COLL VENOUS BLD VENIPUNCTURE: CPT | Performed by: PHYSICIAN ASSISTANT

## 2018-04-04 RX ORDER — WARFARIN SODIUM 2 MG/1
TABLET ORAL
DISCHARGE
Start: 2018-04-04

## 2018-04-04 RX ORDER — FUROSEMIDE 20 MG
20 TABLET ORAL
Qty: 60 TABLET | DISCHARGE
Start: 2018-04-04

## 2018-04-04 RX ORDER — AMOXICILLIN 250 MG
2 CAPSULE ORAL 2 TIMES DAILY PRN
Qty: 100 TABLET | DISCHARGE
Start: 2018-04-04

## 2018-04-04 RX ORDER — CEPHALEXIN 500 MG/1
500 CAPSULE ORAL EVERY 8 HOURS
Refills: 0 | DISCHARGE
Start: 2018-04-04 | End: 2018-04-14

## 2018-04-04 RX ORDER — POLYETHYLENE GLYCOL 3350 17 G/17G
17 POWDER, FOR SOLUTION ORAL DAILY PRN
Qty: 7 PACKET | DISCHARGE
Start: 2018-04-04

## 2018-04-04 RX ADMIN — MULTIPLE VITAMINS W/ MINERALS TAB 1 TABLET: TAB at 06:37

## 2018-04-04 RX ADMIN — Medication 100 MG: at 06:36

## 2018-04-04 RX ADMIN — METOPROLOL TARTRATE 50 MG: 50 TABLET, FILM COATED ORAL at 06:37

## 2018-04-04 RX ADMIN — FUROSEMIDE 20 MG: 20 TABLET ORAL at 06:37

## 2018-04-04 RX ADMIN — CEPHALEXIN 500 MG: 250 CAPSULE ORAL at 06:12

## 2018-04-04 RX ADMIN — GLIPIZIDE 10 MG: 10 TABLET ORAL at 06:36

## 2018-04-04 RX ADMIN — PANTOPRAZOLE SODIUM 40 MG: 40 TABLET, DELAYED RELEASE ORAL at 06:37

## 2018-04-04 RX ADMIN — POLYETHYLENE GLYCOL 3350 17 G: 17 POWDER, FOR SOLUTION ORAL at 06:37

## 2018-04-04 RX ADMIN — FINASTERIDE 5 MG: 5 TABLET, FILM COATED ORAL at 06:37

## 2018-04-04 RX ADMIN — ASPIRIN 81 MG: 81 TABLET, COATED ORAL at 06:38

## 2018-04-04 NOTE — PLAN OF CARE
Problem: Patient Care Overview  Goal: Plan of Care/Patient Progress Review  RN:  1.Pt will remain hemodynamically stable.  2.Pt will be free from infection.     Occupational Therapy Discharge Summary    Reason for therapy discharge:    Discharged to transitional care facility.    Progress towards therapy goal(s). See goals on Care Plan in [facility] electronic health record for goal details.  Goals partially met.  Barriers to achieving goals:   discharge from facility.    Therapy recommendation(s):    Continued therapy is recommended.  Rationale/Recommendations:  Recommend discharge to inpatient rehab once medically appropriate to progress activity tolerance, safety and independence with self cares.

## 2018-04-04 NOTE — PROGRESS NOTES
DISCHARGE   Discharged to: Rehab  Via: Rig  Accompanied by: [facility]  Discharge Instructions: diet, activity, medications, follow up appointments, when to call the MD, and what to watchout for (i.e. s/s of infection, increasing SOB, palpitations, chest pain,)  Prescriptions: To be filled by rehab pharmacy; medication list reviewed & sent with pt  Follow Up Appointments: arranged; information given  Belongings: All sent with pt  IV: out  Telemetry: off  Pt exhibits understanding of above discharge instructions; all questions answered.  Discharge Paperwork: faxed    Rehab unit called and advised of ride time change.
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
## 2018-04-04 NOTE — PLAN OF CARE
Problem: Patient Care Overview  Goal: Plan of Care/Patient Progress Review  RN:  1.Pt will remain hemodynamically stable.  2.Pt will be free from infection.   Outcome: Improving  D: Pt admitted from OSH w/ STEMI s/p CABG x3, MAZE, JUN ligation and impella removal 3/23.  C/b RLE compartment syndrome s/p fasciotomy 3/24 w/ closure 3/27.  I: 2 L fluid restrict.  PRN suppository.  PRN hemorrhoid cream.  A: Pt denies pain.  VSS, see flowsheet.  SR.  SBA w/ walker.  Sleeping b/w cares.  Voiding in urinal.  BG wnl.  INR 2.27.  Drsgs/incisions c/d/i.  P: Continue to monitor and notify MDs as necessary of pertinent pt condition changes.  D/c to rehab in [place] today, ride at 0845.
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
## 2018-04-04 NOTE — PROGRESS NOTES
"VASCULAR SURGERY PROGRESS NOTE    Subjective:  Pt found sitting in chair. Pt reports RLE is about the same: no pain, moving ok, no numbness or lack of sensation.     Objective:  Intake/Output Summary (Last 24 hours) at 04/04/18 0849  Last data filed at 04/04/18 0600   Gross per 24 hour   Intake              990 ml   Output             1475 ml   Net             -485 ml     Labs:  ROUTINE IP LABS (Last four results)  BMP  Recent Labs  Lab 04/04/18  0724 04/03/18  1006 04/02/18  0755 04/01/18  0639    137 136 137   POTASSIUM 4.0 4.1 4.1 3.8   CHLORIDE 104 103 105 105   STEFFANY 8.4* 8.6 8.4* 8.3*   CO2 24 23 24 25   BUN 14 14 16 17   CR 1.06 1.03 1.00 0.98   * 141* 133* 93     CBC  Recent Labs  Lab 04/04/18  0724 04/03/18  1006 04/02/18  0755 04/01/18  0639   WBC 6.4 6.8 7.9 8.9   RBC 3.27* 3.30* 3.02* 2.90*   HGB 9.4* 9.8* 8.9* 8.7*   HCT 32.2* 32.1* 29.6* 27.7*   MCV 99 97 98 96   MCH 28.7 29.7 29.5 30.0   MCHC 29.2* 30.5* 30.1* 31.4*   RDW 16.8* 16.5* 16.4* 16.3*    472* 430 420     INR  Recent Labs  Lab 04/04/18  0724 04/03/18  1006 04/02/18  0755 04/01/18  0639   INR 2.36* 2.17* 2.27* 2.30*     PHYSICAL EXAM:  /63 (BP Location: Left arm)  Pulse 84  Temp 97.5  F (36.4  C) (Oral)  Resp 20  Ht 1.803 m (5' 11\")  Wt 113.1 kg (249 lb 6.4 oz)  SpO2 97%  BMI 34.78 kg/m2  NEURO/PSYCH: The patient is alert and oriented.  Appropriate.  Moves all extremities.    SKIN:  Warm and dry.  PULMONARY: The patient does not require supplemental oxygen.  Non-labored breathing     EXTREMITIES:  Right medial fasciotomy closure site slightly increased erythema and edema from yesterday, no active drainage noted though some on the dressing, sutures intact, right lateral fasciotomy closure site with slight erythema and mild edema at site- stable from yesterday.  Palpable bilateral PT and DP pulses       ASSESSMENT:  72 year old male status post right groin reexploration, right common femoral and iliac artery " thrombectomy, right leg angiogram, stent placement in right common iliac artery and then 4 compartment right lower leg fasciotomies on 3/24/2018 with Dr. Tang.  Status post fasciotomy closure on 3/27/2018 with Dr. Tang      PLAN:  - Continue Keflex  - elevate right lower extremity to help reduce edema  - keep right lower extremity fasciotomy closure sites covered with dressings and change daily and/or if drainage present  - continue PT and OT  - warfarin for 6 months + life-long baby ASA recommended from vascular for anticoagulation/anti-platelet therapy  - remove right groin staples in one week and check fasciotomy site. Plan to remove right lower extremity fasciotomy closure site sutures in 3-4 weeks (okay to have this done in [place], patient prefers to have his follow up in [place], does not want to return to [facility] area)     Please do not hesitate to contact vascular surgery if any questions arise.     Rosey Collier PA-C   Division of Vascular Surgery   [facility]   Pager: [phone]

## 2018-04-04 NOTE — SUMMARY OF CARE
-Pt was scheduled for dialysis and about to transfer to the procedure when his heart rate was noted to be fluxtuating between 160 to 180 BPM.  -This heart rate was reported to the primary physicians group with a reminder that the Pt's ICD fires at 180 BPM.  -The primary physicians group had the Pt's ICD limit raised to 220 BPM.  -Primary physicians group gave instructions to have the Pt continue to dialysis.

## 2018-04-05 ENCOUNTER — CARE COORDINATION (OUTPATIENT)
Dept: CARE COORDINATION | Facility: CLINIC | Age: 73
End: 2018-04-05

## 2018-04-05 NOTE — PLAN OF CARE
Problem: Patient Care Overview  Goal: Plan of Care/Patient Progress Review  RN:  1.Pt will remain hemodynamically stable.  2.Pt will be free from infection.    Physical Therapy Discharge Summary    Reason for therapy discharge:    Discharged to transitional care facility.    Progress towards therapy goal(s). See goals on Care Plan in [facility] electronic health record for goal details.  Goals not met.  Barriers to achieving goals:   discharge from facility.    Therapy recommendation(s):    Continued therapy is recommended.  Rationale/Recommendations:  to progress strength, balance, endurance, and mobility.

## 2018-04-06 ENCOUNTER — CARE COORDINATION (OUTPATIENT)
Dept: CARDIOLOGY | Facility: CLINIC | Age: 73
End: 2018-04-06

## 2018-04-06 NOTE — PROGRESS NOTES
Patient discharged to [facility] in [facility], no call placed.

## 2019-02-06 NOTE — IP AVS SNAPSHOT
"    UNIT [address] [facility]: [phone]                                              INTERAGENCY TRANSFER FORM - PHYSICIAN ORDERS   3/21/2018                    Hospital Admission Date: 3/21/2018  MONSE FAROOQ   : 1945  Sex: Male        Attending Provider: Eric Marks MD     Allergies:  No Known Allergies    Infection:  None   Service:  CARDIOTHORAC    Ht:  1.803 m (5' 11\")   Wt:  113.1 kg (249 lb 6.4 oz)   Admission Wt:  114.2 kg (251 lb 12.3 oz)    BMI:  34.78 kg/m 2   BSA:  2.38 m 2            Patient PCP Information     Provider PCP Type    Provider Not In System General      ED Clinical Impression     Diagnosis Description Comment Added By Time Added    Type 2 diabetes mellitus with hyperosmolarity without coma, with long-term current use of insulin (H) [E11.00, Z79.4] Type 2 diabetes mellitus with hyperosmolarity without coma, with long-term current use of insulin (H) [E11.00, Z79.4]  Alfonzo Mckeon PA 4/3/2018  5:23 PM    S/P CABG (coronary artery bypass graft) [Z95.1] S/P CABG (coronary artery bypass graft) [Z95.1]  Alfonzo Mckeon PA 4/3/2018  5:23 PM    External hemorrhoids [K64.4] External hemorrhoids [K64.4]  Alfonzo Mckeon PA 2018  7:39 AM    Paroxysmal atrial fibrillation (H) [I48.0] Paroxysmal atrial fibrillation (H) [I48.0]  Alfonzo Mckeon PA 2018  7:53 AM    H/O fasciotomy [Z98.890] H/O fasciotomy [Z98.890]  Alfonzo Mckeon PA 2018  7:57 AM      Hospital Problems as of 2018              Priority Class Noted POA    Heart failure (H) Medium  3/21/2018 Yes    Coronary artery disease Medium  3/23/2018 Yes    STEMI (ST elevation myocardial infarction) (H) Medium  3/24/2018 Yes      Non-Hospital Problems as of 2018     None      Code Status History     Date Active Date Inactive Code Status Order ID Comments User Context    4/3/2018  5:28 PM  Full Code [id number]  Alfonzo Mckeon PA Outpatient    3/24/2018  5:06 " MAURICE/AYESHA  Referred by: Jessa Herrera PM 4/3/2018  5:28 PM Full Code [id number]  Norris Valdez MD Inpatient    3/23/2018 11:35 PM 3/24/2018  5:06 PM Full Code [id number]  Juan M Irvin MD Inpatient    3/22/2018  4:07 AM 3/23/2018 11:35 PM Full Code [id number]  Olayinka Orellana MD Inpatient         Medication Review      START taking        Dose / Directions Comments    cephALEXin 500 MG capsule   Commonly known as:  KEFLEX   Indication:  Preventative Medication Therapy Used Around Surgery   Used for:  H/O fasciotomy        Dose:  500 mg   Take 1 capsule (500 mg) by mouth every 8 hours for 10 days   Refills:  0        furosemide 20 MG tablet   Commonly known as:  LASIX   Used for:  S/P CABG (coronary artery bypass graft)        Dose:  20 mg   Take 1 tablet (20 mg) by mouth 2 times daily   Quantity:  60 tablet   Refills:  0        * insulin aspart 100 UNIT/ML injection   Commonly known as:  NovoLOG PEN   Used for:  S/P CABG (coronary artery bypass graft)        Dose:  1-7 Units   Inject 1-7 Units Subcutaneous 3 times daily (before meals) Correction Scale - MEDIUM RESISTANCE DOSING    No Correction Insulin if Pre-Meal BG <140.  For Pre-Meal  - 189 give 1 unit.   - 239 give 2 units.   - 289 give 3 units.   - 339 give 4 units.  - 399 give 5 units.  -449 give 6 units For Pre-Meal BG > 449 give 7 units.  To be given based on pre-meal blood glucose   Refills:  0        * insulin aspart 100 UNIT/ML injection   Commonly known as:  NovoLOG PEN   Used for:  S/P CABG (coronary artery bypass graft)        Dose:  1-5 Units   Inject 1-5 Units Subcutaneous At Bedtime MEDIUM INSULIN RESISTANCE DOSING   Do Not give Bedtime Correction Insulin if BG less than  200.  For  - 249 give 1 units.  For  - 299 give 2 units.  For  - 349 give 3 units.  For  -399 give 4 units.  For BG greater than or equal to 400 give 5 units. Notify provider if glucose greater than or equal to 350 mg/dL after administration of  correction dose.   Refills:  0        insulin glargine 100 UNIT/ML injection   Commonly known as:  LANTUS   Used for:  Type 2 diabetes mellitus with hyperosmolarity without coma, with long-term current use of insulin (H)   Replaces:  insulin glargine 100 UNIT/ML injection        Dose:  45 Units   Inject 45 Units Subcutaneous every morning   Refills:  0        pantoprazole 40 MG EC tablet   Commonly known as:  PROTONIX   Used for:  S/P CABG (coronary artery bypass graft)        Dose:  40 mg   Take 1 tablet (40 mg) by mouth every morning for 30 days   Quantity:  30 tablet   Refills:  0        phenylephrine-shark liver oil-mineral oil-petrolatum 0.25-14-74.9 % rectal ointment   Commonly known as:  PREPARATION H   Used for:  External hemorrhoids        Place rectally 3 times daily as needed for hemorrhoids   Refills:  0        polyethylene glycol Packet   Commonly known as:  MIRALAX/GLYCOLAX   Used for:  S/P CABG (coronary artery bypass graft)        Dose:  17 g   17 g by Oral or Feeding Tube route daily as needed for constipation   Quantity:  7 packet   Refills:  0        senna-docusate 8.6-50 MG per tablet   Commonly known as:  SENOKOT-S;PERICOLACE   Used for:  S/P CABG (coronary artery bypass graft)        Dose:  2 tablet   2 tablets by Oral or Feeding Tube route 2 times daily as needed for constipation   Quantity:  100 tablet   Refills:  0        * Notice:  This list has 2 medication(s) that are the same as other medications prescribed for you. Read the directions carefully, and ask your doctor or other care provider to review them with you.      CONTINUE these medications which may have CHANGED, or have new prescriptions. If we are uncertain of the size of tablets/capsules you have at home, strength may be listed as something that might have changed.        Dose / Directions Comments    atorvastatin 20 MG tablet   Commonly known as:  LIPITOR   This may have changed:    - medication strength  - how much to take    Used for:  S/P CABG (coronary artery bypass graft)        Dose:  20 mg   Take 1 tablet (20 mg) by mouth daily   Quantity:  30 tablet   Refills:  0        glipiZIDE 10 MG tablet   Commonly known as:  GLUCOTROL   This may have changed:    - medication strength  - how much to take  - when to take this   Used for:  Type 2 diabetes mellitus with hyperosmolarity without coma, with long-term current use of insulin (H)        Dose:  10 mg   Take 1 tablet (10 mg) by mouth every morning (before breakfast)   Quantity:  60 tablet   Refills:  0        metFORMIN 500 MG tablet   Commonly known as:  GLUCOPHAGE   This may have changed:  how much to take   Used for:  Type 2 diabetes mellitus with hyperosmolarity without coma, with long-term current use of insulin (H)        Dose:  500 mg   Take 1 tablet (500 mg) by mouth 2 times daily (with meals)   Quantity:  60 tablet   Refills:  0        warfarin 2 MG tablet   Commonly known as:  COUMADIN   This may have changed:    - medication strength  - how much to take  - how to take this  - additional instructions   Used for:  Paroxysmal atrial fibrillation (H)        Take 5 mg PO daily until next INR check, then dose per INR goal 2-3 for 6 months.   Refills:  0          CONTINUE these medications which have NOT CHANGED        Dose / Directions Comments    albuterol 108 (90 BASE) MCG/ACT Inhaler   Commonly known as:  PROAIR HFA/PROVENTIL HFA/VENTOLIN HFA        Dose:  2 puff   Inhale 2 puffs into the lungs every 6 hours as needed for shortness of breath / dyspnea or wheezing   Refills:  0        ASPIRIN 81 PO        Dose:  1 tablet   Take 1 tablet by mouth daily   Refills:  0        FINASTERIDE PO        Dose:  5 mg   Take 5 mg by mouth daily   Refills:  0        Fish Oil 500 MG Caps        Dose:  500 mg   Take 500 mg by mouth daily   Refills:  0        fluticasone 50 MCG/BLIST Aepb   Commonly known as:  FLOVENT DISKUS        Dose:  2 puff   Inhale 2 puffs into the lungs daily as needed  Both nostrils for rhinitis   Refills:  0        METOPROLOL TARTRATE PO        Dose:  50 mg   Take 50 mg by mouth 2 times daily   Refills:  0        MULTIVITAMIN & MINERAL PO        Dose:  1 tablet   Take 1 tablet by mouth daily   Refills:  0        TYLENOL PO        Dose:  650 mg   Take 650 mg by mouth every 6 hours as needed for mild pain   Refills:  0          STOP taking     CHLORTHALIDONE PO           insulin glargine 100 UNIT/ML injection   Commonly known as:  LANTUS   Replaced by:  insulin glargine 100 UNIT/ML injection           LISINOPRIL PO                     Further instructions from your care team       [facility]      AFTER YOU GO HOME FROM YOUR HEART SURGERY    You had a full sternotomy, so avoid lifting anything greater than ten pounds for 6 weeks after surgery and then less than 20 pounds for an additional 6 weeks.  No driving for 4 weeks after surgery or while on pain medication.     Avoid strenuous activities such as bowling, vacuuming, raking, shoveling, golf or tennis for 12 weeks after your surgery. It is okay to resume sex if you feel comfortable in doing so. You may have to try different positions with your partner.     Splint your chest incision by hugging a pillow or bringing your arms across your chest when coughing or sneezing. Avoid pushing off with your arms when getting up for the first month if you have had your sternum opened.    Shower or wash your incisions daily with soap and water (or as instructed), pat dry. Keep wound clean and dry, showers are okay after discharge, but don't let spray hit directly on incision. No baths or swimming for 1 month.  Clean wounds twice a day for 2 weeks with microklenz spray if available. Cover chest tube sites with gauze until they stop draining, then leave open. It is not abnormal for chest tube sites to drain yellowish/clear fluid for up to 2-3 weeks after surgery.   Watch for signs of infection: increased redness,  tenderness, warmth or any drainage that appears infected (pus like) or is persistent.  Also a temperature > 100.5 F or chills. Call your surgeon or primary care provider's office immediately. Remove any skin glue left on incisions after 10-14 days. This will not affect your incision and can speed up healing.    Exercise is very important in your recovery. Please follow the guidelines set up for you in your cardiac rehab classes at the hospital. If outpatient cardiac rehab was ordered for you, we highly recommend you participate. If you have problems arranging your cardiac rehab, please call [phone].     Avoid sitting for prolonged periods of time, try to walk every hour during the day. If you have a leg incision, elevate your leg often when you are not walking.    Check your weight when you get home from the hospital and continue to check it daily through your recovery for at least a month. If you notice a weight gain of 2-3 pounds in a week, notify your primary care physician, cardiologist or surgeon.    Bowel activity may be slow after surgery. If necessary, you may take an over the counter laxative such as Milk of Magnesia or Miralax. You may have stool softeners prescribed (docusate sodium, Senokot). We recommend using stool softeners while using narcotics for pain (oxycodone/percocet, hydrocodone/vicodin).      DO NOT SMOKE.  IF YOU NEED HELP QUITTING, PLEASE TALK WITH YOUR CARDIOLOGIST OR PRIMARY DOCTOR.    You are on a blood thinner, follow the instructions you were given in the hospital and DO NOT SKIP this medication. Try and take it the same time everyday. Your primary care physician or coumadin clinic will manage the dosing.     REGARDING PRESCRIPTION REFILLS.  If you need a refill on your pain medication contact us.  All other medications will be adjusted, discontinued and re-filled by your primary care physician and/or your cardiologist as they were prior to your surgery. We have given you enough  for one to three month with possibly one refill.    POST-OPERATIVE CLINIC VISITS  You will now return to the care of your primary physician and your cardiologist.   - Restart metformin at 500 mg BID and follow up with Diabetic Provider at home  - Follow up with Vascular surgery team in 3-4 weeks in [place] (remove right groin staples on 4/10 and fasciotomy site sutures on 3-4 weeks)  - Follow up opthalmology as outpatient PRN   If there is a need to return to see CT Surgery please call our  at [phone].    SURGICAL QUESTIONS  Please call Sherry Sky with any surgical recovery and medication questions, her phone number is listed below.  She can assist you with your needs and contact other surgery care team members as indicated.    On weekends or after hours, please call [phone] and ask the  to   page the Cardiothoracic Surgery fellow on call.      Thank you,    Your Cardiothoracic Surgery Team  Sherry Sky RN Care Coordinator-  [phone]   Julee Vail PA-C    Summary of Visit     Reason for your hospital stay       You had a coronary artery bypass on 3/23/18 with Dr Lopez.             After Care     Activity - Up with nursing assistance           Advance Diet as Tolerated       Follow this diet upon discharge: Orders Placed This Encounter      Fluid restriction 2000 ML FLUID      Snacks/Supplements Adult: With Meals      Moderate Consistent CHO Diet       Cardiac sternal precautions           Daily weights       Call Provider for weight gain of more than 2 pounds per day or 5 pounds per week.       General info for SNF       Length of Stay Estimate: Short Term Care: Estimated # of Days <30  Condition at Discharge: Improving  Level of care:skilled   Rehabilitation Potential: Good  Admission H&P remains valid and up-to-date: Yes  Recent Chemotherapy: N/A  Use Nursing Home Standing Orders: Yes       Glucose monitor nursing  POCT       Before meals and at bedtime       Intake and output       Every shift       Mantoux instructions       Give two-step Mantoux (PPD) Per Facility Policy Yes       Wound care       Site:   Right Leg groin and lower leg  Instructions:    Staples in groin should be removed 4/10/18  Sutures in R leg should be removed around 5/5/18       Wound care (specify)       Site:   Sternal incision and chest tubes  Instructions:    You have dissolvable sutures under your skin that do not need to be removed.  Pat wound dressing dry after showers.  Skin glue will eventually fall off in 1-2 weeks.     Keep wound clean and dry, showers are okay after discharge, but don't let spray hit directly on incision. No baths or swimming for 1 month.  Clean wounds twice a day for 2 weeks with microklenz spray if available. Cover chest tube sites with gauze until they stop draining, then leave open.       Wound care and dressings       Please remove right groin staples in one week.  Remove right leg sutures in 3 weeks.             Procedures     Oxygen - Nasal cannula       Titrate Lpm PRN by nasal cannula to keep O2 sats 92% or greater.             Referrals     Occupational Therapy Adult Consult       Evaluate and treat as clinically indicated.    Reason:  Sternotomy, right leg fasciotomy       Physical Therapy Adult Consult       Evaluate and treat as clinically indicated.    Reason:  Sternotomy, right leg fasciotomy             Follow-Up Appointment Instructions     Future Labs/Procedures    Follow Up and recommended labs and tests     Comments:    You will now return to the care of your primary physician and your cardiologist.   - Restart metformin at 500 mg BID and follow up with Diabetic Provider at home  - Follow up with Vascular surgery team in 3-4 weeks in [place] (remove right groin staples on 4/10 and fasciotomy site sutures on 3-4 weeks)  - Follow up opthalmology as outpatient PRN      Follow-Up Appointment Instructions      Follow Up and recommended labs and tests       You will now return to the care of your primary physician and your cardiologist.   - Restart metformin at 500 mg BID and follow up with Diabetic Provider at home  - Follow up with Vascular surgery team in 3-4 weeks in [place] (remove right groin staples on 4/10 and fasciotomy site sutures on 3-4 weeks)  - Follow up opthalmology as outpatient PRN             Statement of Approval     Ordered          04/04/18 0815  I have reviewed and agree with all the recommendations and orders detailed in this document.  EFFECTIVE NOW     Approved and electronically signed by:  Alfonzo Mckeon PA

## 2019-10-09 NOTE — PROGRESS NOTES
0800 assessment RLE pulses absent with doppler. From knee down pt c/o numbness and pain. Pt unable to feel any touch from his calf down. Decreased movement CVTS notified. Heparin gtt started @ 500 units/hr. Vascular notified. RLE ultrasound completed. Pt awaiting transfer to OR. Pt's wife at bedside.   Trinh Lan  3/24/2018     Bilateral Helical Rim Advancement Flap Text: The defect edges were debeveled with a #15 blade scalpel.  Given the location of the defect and the proximity to free margins (helical rim) a bilateral helical rim advancement flap was deemed most appropriate.  Using a sterile surgical marker, the appropriate advancement flaps were drawn incorporating the defect and placing the expected incisions between the helical rim and antihelix where possible.  The area thus outlined was incised through and through with a #15 scalpel blade.  With a skin hook and iris scissors, the flaps were gently and sharply undermined and freed up.

## 2019-12-02 NOTE — PHARMACY-ANTICOAGULATION SERVICE
Clinical Pharmacy - Warfarin Dosing Consult     Pharmacy has been consulted to manage this patient s warfarin therapy.  Indication: Atrial Fibrillation  Therapy Goal: INR 2-3  Warfarin Prior to Admission: Yes  Warfarin PTA Regimen: 4 mg TuThSa, 2 mg ROW  Significant drug interactions: ASA, heparin, amiodarone  Recent documented change in oral intake/nutrition: Yes (NPO)  Dose Comments: d/w Dr Coleman, will keep consult today 3/24, but will not actually give warfarin dose    INR   Date Value Ref Range Status   03/24/2018 1.31 (H) 0.86 - 1.14 Final   03/23/2018 1.28 (H) 0.86 - 1.14 Final       No warfarin ordered today per Dr Coleman, CVTS attending.  Pharmacy will monitor Izaiah Alvarez daily and order warfarin doses to achieve specified goal.      Please contact pharmacy as soon as possible if the warfarin needs to be held for a procedure or if the warfarin goals change.      Baljinder Cotton, PharmD, BCPS      
Clinical Pharmacy - Warfarin Dosing Consult     Pharmacy has been consulted to manage this patient s warfarin therapy.  Indication: Atrial Fibrillation  Therapy Goal: INR 2-3  Warfarin Prior to Admission: Yes  Warfarin PTA Regimen: 4 mg TuThSa, 2 mg ROW  Significant drug interactions: ASA, heparin, amiodarone  Recent documented change in oral intake/nutrition: Yes (progressing diet- currently on CLD)  Dose Comments:     INR   Date Value Ref Range Status   03/27/2018 1.34 (H) 0.86 - 1.14 Final   03/26/2018 1.44 (H) 0.86 - 1.14 Final       Recommend warfarin 5 mg today.  Pharmacy will monitor Izaiah Alvarez daily and order warfarin doses to achieve specified goal.      Please contact pharmacy as soon as possible if the warfarin needs to be held for a procedure or if the warfarin goals change.      Tammy Ruffin, PD4, [employer] Pharmacy Student     
Clinical Pharmacy- Warfarin Discharge Note  This patient is currently on warfarin for the treatment of Atrial fibrillation.  INR Goal= 2-3  Expected length of therapy lifetime.           Anticoagulation Dose History     Recent Dosing and Labs Latest Ref Rng & Units 3/28/2018 3/29/2018 3/30/2018 3/31/2018 4/1/2018 4/2/2018 4/3/2018    Warfarin 1 mg - - - - 2 mg - - -    Warfarin 4 mg - - - - - 4 mg 4 mg 4 mg    Warfarin 5 mg - 5 mg 5 mg 5 mg - - - -    INR 0.86 - 1.14 1.51(H) 1.66(H) 1.87(H) 2.29(H) 2.30(H) 2.27(H) 2.17(H)          Vitamin K doses administered during the last 7 days: none  FFP administered during the last 7 days: none  Recommend discharging the patient on a warfarin regimen of 5 mg daily with a prescription for warfarin 2.5mg tablets.      The patient should have an INR checked 4/6/2018    Sue Johns, AbnerD    
no

## 2022-01-10 NOTE — ANESTHESIA CARE TRANSFER NOTE
Patient: Izaiah Alvarez    Procedure(s):  Right Groin RE-Exploration, Right Common Iliac and Right Common Femoral Artery  Thromboembolectomy, Right Common Iliac Stent placement, Prevena application, Right lower right extremity fasciotomy, and right leg angiogram. - Wound Class: I-Clean   - Wound Class: I-Clean    Diagnosis: Right leg eschemia  Diagnosis Additional Information: No value filed.    Anesthesia Type:   General     Note:  Airway :ETT  Patient transferred to:ICU  ICU Handoff: Call for PAUSE to initiate/utilize ICU HANDOFF, Identified Patient, Identified Responsible Provider, Reviewed the Pertinent Medical History, Discussed Surgical Course, Reviewed Intra-OP Anesthesia Management and Issues during Anesthesia, Set Expectations for Post Procedure Period and Allowed Opportunity for Questions and Acknowledgement of Understanding      Vitals: (Last set prior to Anesthesia Care Transfer)    CRNA VITALS  3/24/2018 1628 - 3/24/2018 1728      3/24/2018             Resp Rate (observed): 15    Resp Rate (set): 15                Electronically Signed By: Michelle Alfaro CRNA, APRN CRNA  March 24, 2018  5:34 PM   No

## 2023-10-10 ENCOUNTER — LAB REQUISITION (OUTPATIENT)
Dept: LAB | Facility: CLINIC | Age: 78
End: 2023-10-10
Payer: MEDICARE

## 2023-10-10 DIAGNOSIS — D64.9 ANEMIA, UNSPECIFIED: ICD-10-CM

## 2023-10-10 DIAGNOSIS — I38 ENDOCARDITIS, VALVE UNSPECIFIED: ICD-10-CM

## 2023-10-10 LAB
ALBUMIN SERPL BCG-MCNC: 2.9 G/DL (ref 3.5–5.2)
ALP SERPL-CCNC: 122 U/L (ref 40–129)
ALT SERPL W P-5'-P-CCNC: 9 U/L (ref 0–70)
ANION GAP SERPL CALCULATED.3IONS-SCNC: 10 MMOL/L (ref 7–15)
AST SERPL W P-5'-P-CCNC: 20 U/L (ref 0–45)
BASO+EOS+MONOS # BLD AUTO: ABNORMAL 10*3/UL
BASO+EOS+MONOS NFR BLD AUTO: ABNORMAL %
BASOPHILS # BLD AUTO: 0 10E3/UL (ref 0–0.2)
BASOPHILS NFR BLD AUTO: 1 %
BILIRUB SERPL-MCNC: 0.6 MG/DL
BUN SERPL-MCNC: 16.2 MG/DL (ref 8–23)
CALCIUM SERPL-MCNC: 8.4 MG/DL (ref 8.8–10.2)
CHLORIDE SERPL-SCNC: 104 MMOL/L (ref 98–107)
CREAT SERPL-MCNC: 1.3 MG/DL (ref 0.67–1.17)
CRP SERPL-MCNC: 12.4 MG/L
DEPRECATED HCO3 PLAS-SCNC: 25 MMOL/L (ref 22–29)
EGFRCR SERPLBLD CKD-EPI 2021: 57 ML/MIN/1.73M2
EOSINOPHIL # BLD AUTO: 0.2 10E3/UL (ref 0–0.7)
EOSINOPHIL NFR BLD AUTO: 4 %
ERYTHROCYTE [DISTWIDTH] IN BLOOD BY AUTOMATED COUNT: 19.6 % (ref 10–15)
ERYTHROCYTE [SEDIMENTATION RATE] IN BLOOD BY WESTERGREN METHOD: 66 MM/HR (ref 0–20)
GLUCOSE SERPL-MCNC: 87 MG/DL (ref 70–99)
HCT VFR BLD AUTO: 26.8 % (ref 40–53)
HGB BLD-MCNC: 8.4 G/DL (ref 13.3–17.7)
IMM GRANULOCYTES # BLD: 0 10E3/UL
IMM GRANULOCYTES NFR BLD: 0 %
LYMPHOCYTES # BLD AUTO: 1 10E3/UL (ref 0.8–5.3)
LYMPHOCYTES NFR BLD AUTO: 23 %
MCH RBC QN AUTO: 30.9 PG (ref 26.5–33)
MCHC RBC AUTO-ENTMCNC: 31.3 G/DL (ref 31.5–36.5)
MCV RBC AUTO: 99 FL (ref 78–100)
MONOCYTES # BLD AUTO: 0.3 10E3/UL (ref 0–1.3)
MONOCYTES NFR BLD AUTO: 7 %
NEUTROPHILS # BLD AUTO: 2.6 10E3/UL (ref 1.6–8.3)
NEUTROPHILS NFR BLD AUTO: 65 %
NRBC # BLD AUTO: 0 10E3/UL
NRBC BLD AUTO-RTO: 0 /100
PLATELET # BLD AUTO: 195 10E3/UL (ref 150–450)
POTASSIUM SERPL-SCNC: 3.8 MMOL/L (ref 3.4–5.3)
PROT SERPL-MCNC: 6.3 G/DL (ref 6.4–8.3)
RBC # BLD AUTO: 2.72 10E6/UL (ref 4.4–5.9)
SODIUM SERPL-SCNC: 139 MMOL/L (ref 135–145)
WBC # BLD AUTO: 4.1 10E3/UL (ref 4–11)

## 2023-10-10 PROCEDURE — 85025 COMPLETE CBC W/AUTO DIFF WBC: CPT | Mod: ORL | Performed by: NURSE PRACTITIONER

## 2023-10-10 PROCEDURE — 86140 C-REACTIVE PROTEIN: CPT | Mod: ORL | Performed by: NURSE PRACTITIONER

## 2023-10-10 PROCEDURE — 85652 RBC SED RATE AUTOMATED: CPT | Mod: ORL | Performed by: NURSE PRACTITIONER

## 2023-10-10 PROCEDURE — 80053 COMPREHEN METABOLIC PANEL: CPT | Mod: ORL | Performed by: NURSE PRACTITIONER

## 2023-10-16 ENCOUNTER — LAB REQUISITION (OUTPATIENT)
Dept: LAB | Facility: CLINIC | Age: 78
End: 2023-10-16
Payer: MEDICARE

## 2023-10-16 DIAGNOSIS — B95.2 ENTEROCOCCUS AS THE CAUSE OF DISEASES CLASSIFIED ELSEWHERE: ICD-10-CM

## 2023-10-17 LAB
ALBUMIN SERPL BCG-MCNC: 3 G/DL (ref 3.5–5.2)
ALP SERPL-CCNC: 125 U/L (ref 40–129)
ALT SERPL W P-5'-P-CCNC: 9 U/L (ref 0–70)
ANION GAP SERPL CALCULATED.3IONS-SCNC: 8 MMOL/L (ref 7–15)
AST SERPL W P-5'-P-CCNC: 19 U/L (ref 0–45)
BASO+EOS+MONOS # BLD AUTO: ABNORMAL 10*3/UL
BASO+EOS+MONOS NFR BLD AUTO: ABNORMAL %
BASOPHILS # BLD AUTO: 0 10E3/UL (ref 0–0.2)
BASOPHILS NFR BLD AUTO: 1 %
BILIRUB SERPL-MCNC: 0.6 MG/DL
BUN SERPL-MCNC: 12.2 MG/DL (ref 8–23)
CALCIUM SERPL-MCNC: 8.6 MG/DL (ref 8.8–10.2)
CHLORIDE SERPL-SCNC: 103 MMOL/L (ref 98–107)
CREAT SERPL-MCNC: 1.21 MG/DL (ref 0.67–1.17)
CRP SERPL-MCNC: 7.87 MG/L
DEPRECATED HCO3 PLAS-SCNC: 28 MMOL/L (ref 22–29)
EGFRCR SERPLBLD CKD-EPI 2021: 62 ML/MIN/1.73M2
EOSINOPHIL # BLD AUTO: 0.3 10E3/UL (ref 0–0.7)
EOSINOPHIL NFR BLD AUTO: 9 %
ERYTHROCYTE [DISTWIDTH] IN BLOOD BY AUTOMATED COUNT: 19.7 % (ref 10–15)
ERYTHROCYTE [SEDIMENTATION RATE] IN BLOOD BY WESTERGREN METHOD: 49 MM/HR (ref 0–20)
GLUCOSE SERPL-MCNC: 82 MG/DL (ref 70–99)
HCT VFR BLD AUTO: 26.5 % (ref 40–53)
HGB BLD-MCNC: 7.9 G/DL (ref 13.3–17.7)
IMM GRANULOCYTES # BLD: 0 10E3/UL
IMM GRANULOCYTES NFR BLD: 0 %
LYMPHOCYTES # BLD AUTO: 0.8 10E3/UL (ref 0.8–5.3)
LYMPHOCYTES NFR BLD AUTO: 21 %
MCH RBC QN AUTO: 31.1 PG (ref 26.5–33)
MCHC RBC AUTO-ENTMCNC: 29.8 G/DL (ref 31.5–36.5)
MCV RBC AUTO: 104 FL (ref 78–100)
MONOCYTES # BLD AUTO: 0.3 10E3/UL (ref 0–1.3)
MONOCYTES NFR BLD AUTO: 7 %
NEUTROPHILS # BLD AUTO: 2.5 10E3/UL (ref 1.6–8.3)
NEUTROPHILS NFR BLD AUTO: 62 %
NRBC # BLD AUTO: 0 10E3/UL
NRBC BLD AUTO-RTO: 0 /100
PLATELET # BLD AUTO: 121 10E3/UL (ref 150–450)
POTASSIUM SERPL-SCNC: 3.5 MMOL/L (ref 3.4–5.3)
PROT SERPL-MCNC: 6.3 G/DL (ref 6.4–8.3)
RBC # BLD AUTO: 2.54 10E6/UL (ref 4.4–5.9)
SODIUM SERPL-SCNC: 139 MMOL/L (ref 135–145)
WBC # BLD AUTO: 4 10E3/UL (ref 4–11)

## 2023-10-17 PROCEDURE — P9603 ONE-WAY ALLOW PRORATED MILES: HCPCS | Performed by: NURSE PRACTITIONER

## 2023-10-17 PROCEDURE — 85025 COMPLETE CBC W/AUTO DIFF WBC: CPT | Performed by: NURSE PRACTITIONER

## 2023-10-17 PROCEDURE — 85652 RBC SED RATE AUTOMATED: CPT | Performed by: NURSE PRACTITIONER

## 2023-10-17 PROCEDURE — 86140 C-REACTIVE PROTEIN: CPT | Performed by: NURSE PRACTITIONER

## 2023-10-17 PROCEDURE — 36415 COLL VENOUS BLD VENIPUNCTURE: CPT | Performed by: NURSE PRACTITIONER

## 2023-10-17 PROCEDURE — 80053 COMPREHEN METABOLIC PANEL: CPT | Performed by: NURSE PRACTITIONER

## 2023-10-23 ENCOUNTER — LAB REQUISITION (OUTPATIENT)
Dept: LAB | Facility: CLINIC | Age: 78
End: 2023-10-23
Payer: MEDICARE

## 2023-10-23 DIAGNOSIS — B95.2 ENTEROCOCCUS AS THE CAUSE OF DISEASES CLASSIFIED ELSEWHERE: ICD-10-CM

## 2023-10-24 LAB
ALBUMIN SERPL BCG-MCNC: 3.1 G/DL (ref 3.5–5.2)
ALP SERPL-CCNC: 122 U/L (ref 40–129)
ALT SERPL W P-5'-P-CCNC: 5 U/L (ref 0–70)
ANION GAP SERPL CALCULATED.3IONS-SCNC: 11 MMOL/L (ref 7–15)
AST SERPL W P-5'-P-CCNC: 18 U/L (ref 0–45)
BASOPHILS # BLD AUTO: 0 10E3/UL (ref 0–0.2)
BASOPHILS NFR BLD AUTO: 1 %
BILIRUB SERPL-MCNC: 0.4 MG/DL
BUN SERPL-MCNC: 11.3 MG/DL (ref 8–23)
CALCIUM SERPL-MCNC: 8.6 MG/DL (ref 8.8–10.2)
CHLORIDE SERPL-SCNC: 104 MMOL/L (ref 98–107)
CREAT SERPL-MCNC: 1.11 MG/DL (ref 0.67–1.17)
CRP SERPL-MCNC: 8.92 MG/L
DEPRECATED HCO3 PLAS-SCNC: 24 MMOL/L (ref 22–29)
EGFRCR SERPLBLD CKD-EPI 2021: 69 ML/MIN/1.73M2
EOSINOPHIL # BLD AUTO: 0.7 10E3/UL (ref 0–0.7)
EOSINOPHIL NFR BLD AUTO: 15 %
ERYTHROCYTE [DISTWIDTH] IN BLOOD BY AUTOMATED COUNT: 19.7 % (ref 10–15)
ERYTHROCYTE [SEDIMENTATION RATE] IN BLOOD BY WESTERGREN METHOD: 62 MM/HR (ref 0–20)
GLUCOSE SERPL-MCNC: 98 MG/DL (ref 70–99)
HCT VFR BLD AUTO: 28.9 % (ref 40–53)
HGB BLD-MCNC: 8.7 G/DL (ref 13.3–17.7)
IMM GRANULOCYTES # BLD: 0 10E3/UL
IMM GRANULOCYTES NFR BLD: 0 %
LYMPHOCYTES # BLD AUTO: 0.9 10E3/UL (ref 0.8–5.3)
LYMPHOCYTES NFR BLD AUTO: 21 %
MCH RBC QN AUTO: 31.3 PG (ref 26.5–33)
MCHC RBC AUTO-ENTMCNC: 30.1 G/DL (ref 31.5–36.5)
MCV RBC AUTO: 104 FL (ref 78–100)
MONOCYTES # BLD AUTO: 0.4 10E3/UL (ref 0–1.3)
MONOCYTES NFR BLD AUTO: 9 %
NEUTROPHILS # BLD AUTO: 2.4 10E3/UL (ref 1.6–8.3)
NEUTROPHILS NFR BLD AUTO: 54 %
NRBC # BLD AUTO: 0 10E3/UL
NRBC BLD AUTO-RTO: 0 /100
PLATELET # BLD AUTO: 121 10E3/UL (ref 150–450)
POTASSIUM SERPL-SCNC: 3.1 MMOL/L (ref 3.4–5.3)
PROT SERPL-MCNC: 6.6 G/DL (ref 6.4–8.3)
RBC # BLD AUTO: 2.78 10E6/UL (ref 4.4–5.9)
SODIUM SERPL-SCNC: 139 MMOL/L (ref 135–145)
WBC # BLD AUTO: 4.3 10E3/UL (ref 4–11)

## 2023-10-24 PROCEDURE — 85652 RBC SED RATE AUTOMATED: CPT | Performed by: NURSE PRACTITIONER

## 2023-10-24 PROCEDURE — 36415 COLL VENOUS BLD VENIPUNCTURE: CPT | Performed by: NURSE PRACTITIONER

## 2023-10-24 PROCEDURE — 86140 C-REACTIVE PROTEIN: CPT | Performed by: NURSE PRACTITIONER

## 2023-10-24 PROCEDURE — 80051 ELECTROLYTE PANEL: CPT | Performed by: NURSE PRACTITIONER

## 2023-10-24 PROCEDURE — 85025 COMPLETE CBC W/AUTO DIFF WBC: CPT | Performed by: NURSE PRACTITIONER

## 2023-10-24 PROCEDURE — P9603 ONE-WAY ALLOW PRORATED MILES: HCPCS | Performed by: NURSE PRACTITIONER

## 2023-10-30 ENCOUNTER — LAB REQUISITION (OUTPATIENT)
Dept: LAB | Facility: CLINIC | Age: 78
End: 2023-10-30
Payer: MEDICARE

## 2023-10-30 DIAGNOSIS — B95.2 ENTEROCOCCUS AS THE CAUSE OF DISEASES CLASSIFIED ELSEWHERE: ICD-10-CM

## 2023-10-31 LAB
ALBUMIN SERPL BCG-MCNC: 3.4 G/DL (ref 3.5–5.2)
ALP SERPL-CCNC: 125 U/L (ref 40–129)
ALT SERPL W P-5'-P-CCNC: 12 U/L (ref 0–70)
ANION GAP SERPL CALCULATED.3IONS-SCNC: 11 MMOL/L (ref 7–15)
AST SERPL W P-5'-P-CCNC: 21 U/L (ref 0–45)
BASOPHILS # BLD AUTO: 0 10E3/UL (ref 0–0.2)
BASOPHILS NFR BLD AUTO: 1 %
BILIRUB SERPL-MCNC: 0.4 MG/DL
BUN SERPL-MCNC: 13.6 MG/DL (ref 8–23)
CALCIUM SERPL-MCNC: 9 MG/DL (ref 8.8–10.2)
CHLORIDE SERPL-SCNC: 102 MMOL/L (ref 98–107)
CREAT SERPL-MCNC: 1.2 MG/DL (ref 0.67–1.17)
CRP SERPL-MCNC: 9.91 MG/L
DEPRECATED HCO3 PLAS-SCNC: 26 MMOL/L (ref 22–29)
EGFRCR SERPLBLD CKD-EPI 2021: 63 ML/MIN/1.73M2
EOSINOPHIL # BLD AUTO: 0.5 10E3/UL (ref 0–0.7)
EOSINOPHIL NFR BLD AUTO: 14 %
ERYTHROCYTE [DISTWIDTH] IN BLOOD BY AUTOMATED COUNT: 19.1 % (ref 10–15)
ERYTHROCYTE [SEDIMENTATION RATE] IN BLOOD BY WESTERGREN METHOD: 53 MM/HR (ref 0–20)
GLUCOSE SERPL-MCNC: 111 MG/DL (ref 70–99)
HCT VFR BLD AUTO: 30.3 % (ref 40–53)
HGB BLD-MCNC: 9.1 G/DL (ref 13.3–17.7)
IMM GRANULOCYTES # BLD: 0 10E3/UL
IMM GRANULOCYTES NFR BLD: 0 %
LYMPHOCYTES # BLD AUTO: 0.9 10E3/UL (ref 0.8–5.3)
LYMPHOCYTES NFR BLD AUTO: 25 %
MCH RBC QN AUTO: 31.1 PG (ref 26.5–33)
MCHC RBC AUTO-ENTMCNC: 30 G/DL (ref 31.5–36.5)
MCV RBC AUTO: 103 FL (ref 78–100)
MONOCYTES # BLD AUTO: 0.3 10E3/UL (ref 0–1.3)
MONOCYTES NFR BLD AUTO: 10 %
NEUTROPHILS # BLD AUTO: 1.8 10E3/UL (ref 1.6–8.3)
NEUTROPHILS NFR BLD AUTO: 50 %
NRBC # BLD AUTO: 0 10E3/UL
NRBC BLD AUTO-RTO: 0 /100
PLATELET # BLD AUTO: 152 10E3/UL (ref 150–450)
POTASSIUM SERPL-SCNC: 3.5 MMOL/L (ref 3.4–5.3)
PROT SERPL-MCNC: 7 G/DL (ref 6.4–8.3)
RBC # BLD AUTO: 2.93 10E6/UL (ref 4.4–5.9)
SODIUM SERPL-SCNC: 139 MMOL/L (ref 135–145)
WBC # BLD AUTO: 3.6 10E3/UL (ref 4–11)

## 2023-10-31 PROCEDURE — 36415 COLL VENOUS BLD VENIPUNCTURE: CPT | Performed by: NURSE PRACTITIONER

## 2023-10-31 PROCEDURE — 86140 C-REACTIVE PROTEIN: CPT | Performed by: NURSE PRACTITIONER

## 2023-10-31 PROCEDURE — 85025 COMPLETE CBC W/AUTO DIFF WBC: CPT | Mod: ORL | Performed by: NURSE PRACTITIONER

## 2023-10-31 PROCEDURE — 80053 COMPREHEN METABOLIC PANEL: CPT | Mod: ORL | Performed by: NURSE PRACTITIONER

## 2023-10-31 PROCEDURE — P9603 ONE-WAY ALLOW PRORATED MILES: HCPCS | Performed by: NURSE PRACTITIONER

## 2023-10-31 PROCEDURE — 85652 RBC SED RATE AUTOMATED: CPT | Performed by: NURSE PRACTITIONER

## (undated) DEVICE — DRAIN CHEST TUBE RIGHT ANGLED 32FR 8132

## (undated) DEVICE — SU PLEDGET SOFT TFE 13MMX7MMX1.5MM D7044

## (undated) DEVICE — SU RETRACT-O-TAPE 1041

## (undated) DEVICE — PUNCH AORTIC 4.0MM LONG AP-540

## (undated) DEVICE — SOL NACL 0.9% 10ML VIAL 0409-4888-02

## (undated) DEVICE — SUCTION MANIFOLD DORNOCH ULTRA CART UL-CL500

## (undated) DEVICE — ESU GROUND PAD ADULT REM W/15' CORD E7507DB

## (undated) DEVICE — SU PROLENE 6-0 C-1DA 4X24" M8726

## (undated) DEVICE — SU SILK 3-0 TIE 12X30" A304H

## (undated) DEVICE — BLADE KNIFE BEAVER MICROSHARP GREEN 377515

## (undated) DEVICE — DRSG PRIMAPORE 03 1/8X6" 66000318

## (undated) DEVICE — LINEN TOWEL PACK X5 5464

## (undated) DEVICE — SOL NACL 0.9% IRRIG 1000ML BOTTLE 2F7124

## (undated) DEVICE — TUBING INSUFFLATION W/FILTER CPC TO LUER 620-030-301

## (undated) DEVICE — WAND SUCTION LP SOFT 15.2CM SU-22702

## (undated) DEVICE — Device

## (undated) DEVICE — ESU PENCIL W/ROCKER SWITCH BLADE HOLSTER E2350HDB

## (undated) DEVICE — SU VICRYL 0 CTX 36" J370H

## (undated) DEVICE — COVER NEOPROBE SOFTFLEX 5X96" W/BANDS 20-PC596

## (undated) DEVICE — SU ETHIBOND 3-0 BBDA 36" X588H

## (undated) DEVICE — SPONGE SURGIFOAM 100 1974

## (undated) DEVICE — CATH TRAY FOLEY SURESTEP 16FR W/URINE MTR STATLK LF A303416A

## (undated) DEVICE — BNDG ELASTIC 4"X5YDS STERILE 6611-4S

## (undated) DEVICE — SU ETHILON 3-0 PS-1 18" 1663H

## (undated) DEVICE — DRAPE ISOLATION BAG 1003

## (undated) DEVICE — DRSG DRAIN 4X4" 7086

## (undated) DEVICE — ANTIFOG SOLUTION W/FOAM PAD 31142527

## (undated) DEVICE — SU VICRYL 2-0 CT-1 27" UND J259H

## (undated) DEVICE — SU SILK 0 TIE 6X30" A306H

## (undated) DEVICE — DECANTER BAG 2002S

## (undated) DEVICE — ESU ELEC BLADE 6" COATED E1450-6

## (undated) DEVICE — SPECIMEN CONTAINER 5OZ STERILE 2600SA

## (undated) DEVICE — SU PROLENE 5-0 RB-2DA 30" 8710H

## (undated) DEVICE — DRSG TELFA 3X8" 1238

## (undated) DEVICE — WIPES FOLEY CARE SURESTEP PROVON DFC100

## (undated) DEVICE — BLADE CLIPPER SGL USE 9680

## (undated) DEVICE — CUP AND LID 2PK 2OZ STERILE  SSK9006A

## (undated) DEVICE — GUIDEWIRE AMPLATZ .035X180CM STIFF G27034

## (undated) DEVICE — PREP CHLORAPREP CLEAR 3ML 260400

## (undated) DEVICE — PREP TECHNI-CARE CHLOROXYLENOL 3% 4OZ BOTTLE C222-4ZWO

## (undated) DEVICE — COVER EASY EQUIP BAG W/BAND LATEX FREE EZ-28

## (undated) DEVICE — BNDG ELASTIC 6"X5YDS STERILE 6611-6S

## (undated) DEVICE — CATH FOGARTY EMBOLECTOMY 5FR 80CM LATEX 120805FP

## (undated) DEVICE — GLOVE PROTEXIS W/NEU-THERA 7.0  2D73TE70

## (undated) DEVICE — NDL 30GA 0.5" 305106

## (undated) DEVICE — SPONGE LAP 18X18" X8435

## (undated) DEVICE — SU VICRYL 3-0 SH 27" J316H

## (undated) DEVICE — PROTECTOR ARM ONE-STEP TRENDELENBURG 40418

## (undated) DEVICE — SU STEEL MYO/WIRE II STERNOTOMY 8 BE-1 3X14" 048-217

## (undated) DEVICE — DRSG PRIMAPORE 02X3" 7133

## (undated) DEVICE — SUTURE BOOTS 051003PBX

## (undated) DEVICE — KIT ENDO VASOVIEW HEMOPRO 2 VH-4000

## (undated) DEVICE — GUIDEWIRE AMPLATZ SUPER STIFF 0.035"X180CM M001465250

## (undated) DEVICE — DRSG TEGADERM 2 3/8X2 3/4" 1624W

## (undated) DEVICE — SU ETHIBOND 0 CT-1 CR 8X18" CX21D

## (undated) DEVICE — SU PROLENE 4-0 SHDA 36" 8521H

## (undated) DEVICE — SYR 01ML 27GA 0.5" NDL TBC 309623

## (undated) DEVICE — BLOWER/MISTER CLEARVIEW 22150

## (undated) DEVICE — CONNECTOR DRAIN CHEST Y EXTENSION SET 19909

## (undated) DEVICE — ADHESIVE SWIFTSET 0.8ML OCTYL SS6

## (undated) DEVICE — ESU ELEC BLADE 2.75" COATED/INSULATED E1455

## (undated) DEVICE — SU PROLENE 4-0 RB-1DA 36" 8557H

## (undated) DEVICE — TAPE MEDIPORE 4"X2YD 2864

## (undated) DEVICE — CATH ANGION PIGTAIL ACCU-VU 5FRX70CM SIZING 13709801

## (undated) DEVICE — PACK ADULT HEART UMMC PV15CG92D

## (undated) DEVICE — SU STEEL 6 CCS 4X18" M654G

## (undated) DEVICE — SU ETHIBOND 2-0 SHDA 30" X563H

## (undated) DEVICE — GEL ULTRASOUND AQUASONIC 20GM 01-01

## (undated) DEVICE — BLADE SAW STERNAL 20X30MM KM-32

## (undated) DEVICE — NDL ANGIOCATH 18GA 1 3/4" 4054

## (undated) DEVICE — SYR 03ML LL W/O NDL 309657

## (undated) DEVICE — SU PROLENE 6-0 C-1DA 30" 8706H

## (undated) DEVICE — SU ETHIBOND 0 TIE 6X30" X306H

## (undated) DEVICE — SU VICRYL 3-0 SH 27" UND J416H

## (undated) DEVICE — HANDPIECE ABLATION OPEN LONG JAW LT CURVE  OLL2

## (undated) DEVICE — TIES BANDING T50R

## (undated) DEVICE — DRAPE SHEET REV FOLD 3/4 9349

## (undated) DEVICE — DRSG KERLIX 4 1/2"X4YDS ROLL 6715

## (undated) DEVICE — ESU PENCIL W/COATED BLADE E2450H

## (undated) DEVICE — DRAPE CV SPLIT II 147X106" 9158

## (undated) DEVICE — INSERT FOGARTY 33MM TRACTION HYDRAJAW HYDRA33

## (undated) DEVICE — SU VICRYL 4-0 PS-2 18" UND J496H

## (undated) DEVICE — SOL WATER IRRIG 1000ML BOTTLE 2F7114

## (undated) DEVICE — DRAPE SHEET MED 44X70" 9355

## (undated) DEVICE — DEVICE CATH STABILIZATION STATLOCK FOLEY 2-WAY FOL0102

## (undated) DEVICE — SU SILK 4-0 TIE 12X30" A303H

## (undated) DEVICE — SU PROLENE 5-0 RB-2DA 18" 8713H

## (undated) DEVICE — SU DERMABOND ADVANCED .7ML DNX12

## (undated) DEVICE — BLADE KNIFE BEAVER MINI STR BEAVER6900

## (undated) DEVICE — SOL NACL 0.9% INJ 1000ML BAG 07983-09

## (undated) DEVICE — SU PROLENE 6-0 BV-1 DA 24" 8805H

## (undated) DEVICE — SU PROLENE 7-0 BV-1DA 4X24" M8702

## (undated) DEVICE — SU PROLENE 6-0 RB-2DA 18" 8714H

## (undated) DEVICE — GLOVE PROTEXIS BLUE W/NEU-THERA 7.5  2D73EB75

## (undated) DEVICE — VESSEL LOOPS RED MINI 31145710

## (undated) DEVICE — SPONGE RAY-TEC 4X8" 7318

## (undated) DEVICE — DRAPE SLUSH/WARMER 66X44" ORS-320

## (undated) DEVICE — SU VICRYL 3-0 FS-1 27" J442H

## (undated) DEVICE — CLIP HORIZON MED BLUE 002200

## (undated) DEVICE — DRAPE U SPLIT 74X120" 29440

## (undated) DEVICE — DRSG VASELINE 6X36" 8884416600

## (undated) DEVICE — ADH LIQUID MASTISOL TOPICAL VIAL 2-3ML 0523-48

## (undated) DEVICE — PAD CHUX UNDERPAD 23X24" 7136

## (undated) DEVICE — GLIDEWIRE TERUMO .035X180 ANG STIFF GS3508

## (undated) DEVICE — ESU GROUND PAD ADULT W/CORD E7507

## (undated) DEVICE — DRAPE SLEEVE 599

## (undated) DEVICE — LINEN TOWEL PACK X6 WHITE 5487

## (undated) DEVICE — DRAPE IOBAN INCISE 23X17" 6650EZ

## (undated) DEVICE — SU SILK 2-0 TIE 12X30" A305H

## (undated) DEVICE — SUCTION CATH AIRLIFE TRI-FLO W/CONTROL PORT 14FR  T60C

## (undated) DEVICE — DRSG ABDOMINAL 07 1/2X8" 7197D

## (undated) DEVICE — CATH NAVICROSS SUPPORT 12MMX90CM ANG NC35901

## (undated) DEVICE — SU PROLENE 5-0 RB-1DA 36"  8556H

## (undated) DEVICE — TUNNELER SHEATH SCANLAN BLUE

## (undated) DEVICE — DRSG MEDIPORE 3 1/2X13 3/4" 3573

## (undated) DEVICE — KIT MICRO-INTRODUCER STIFFEN 4FR 7274V

## (undated) DEVICE — DRAIN CHEST TUBE RIGHT ANGLED 28FR 8128

## (undated) DEVICE — PREP CHLORAPREP 26ML TINTED ORANGE  260815

## (undated) DEVICE — SU PLEDGET SOFT TFE 3/8"X3/26"X1/16" PCP40

## (undated) DEVICE — SU PROLENE 7-0 BV-1DA 18" 8701H

## (undated) DEVICE — SOL ADH LIQUID BENZOIN SWAB 0.6ML C1544

## (undated) DEVICE — LEAD PACER MYOCARDIAL BIPOLAR TEMPORARY 53CM 6495F

## (undated) DEVICE — CANNULA VESSEL DLP  30001

## (undated) DEVICE — STPL SKIN 35W 059037

## (undated) DEVICE — GLOVE PROTEXIS POWDER FREE SMT 7.5  2D72PT75X

## (undated) DEVICE — CLIP SPRING FOGARTY SOFTJAW CSOFT6

## (undated) DEVICE — INTRO TERUMO 7FRX25CM W/MARKER RSB703

## (undated) DEVICE — DRAPE C-ARM W/STRAPS 42X72" 07-CA104

## (undated) DEVICE — SUCTION DRY CHEST DRAIN OASIS 3600-100

## (undated) DEVICE — INFLATION DEVICE ENCORE  26 PRESSURE GAUGE M001151050

## (undated) DEVICE — CLIP HORIZON LG ORANGE 004200

## (undated) DEVICE — INTRO SHEATH BRITE TIP 6FRX11CM 401-611M

## (undated) DEVICE — SU VICRYL 0 CT-1 36" J346H

## (undated) DEVICE — SU PROLENE 7-0 BV-1DA 30" 8703H

## (undated) DEVICE — PREP CHLORHEXIDINE 4% 4OZ (HIBICLENS) 57504

## (undated) DEVICE — DRAIN CHEST TUBE 32FR STR 8032

## (undated) DEVICE — TUBING IV 72"

## (undated) DEVICE — LIGHT HANDLE X1 31140133

## (undated) DEVICE — SU VICRYL 2-0 SH 27" UND J417H

## (undated) DEVICE — SUCTION TIP YANKAUER STR K87

## (undated) DEVICE — CATH FOGARTY EMBOLECTOMY 4FR 80CM LATEX 120804FP

## (undated) DEVICE — SU PROLENE 8-0 BV130-5DA 24" 8732H

## (undated) DEVICE — PREP SKIN SCRUB TRAY 4461A

## (undated) DEVICE — CLIP HORIZON SM RED WIDE SLOT 001201

## (undated) DEVICE — DEFIB PRO-PADZ LVP LQD GEL ADULT 8900-2105-01

## (undated) DEVICE — LINEN TOWEL PACK X30 5481

## (undated) DEVICE — SU PROLENE 3-0 SHDA 36" 8522H

## (undated) DEVICE — VESSEL LOOPS YELLOW MAXI 31145694

## (undated) RX ORDER — HEPARIN SODIUM 1000 [USP'U]/ML
INJECTION, SOLUTION INTRAVENOUS; SUBCUTANEOUS
Status: DISPENSED
Start: 2018-03-23

## (undated) RX ORDER — ESMOLOL HYDROCHLORIDE 10 MG/ML
INJECTION INTRAVENOUS
Status: DISPENSED
Start: 2018-03-24

## (undated) RX ORDER — FENTANYL CITRATE 50 UG/ML
INJECTION, SOLUTION INTRAMUSCULAR; INTRAVENOUS
Status: DISPENSED
Start: 2018-03-24

## (undated) RX ORDER — PAPAVERINE HYDROCHLORIDE 30 MG/ML
INJECTION INTRAMUSCULAR; INTRAVENOUS
Status: DISPENSED
Start: 2018-03-23

## (undated) RX ORDER — ETOMIDATE 2 MG/ML
INJECTION INTRAVENOUS
Status: DISPENSED
Start: 2018-03-24

## (undated) RX ORDER — CEFAZOLIN SODIUM 1 G/3ML
INJECTION, POWDER, FOR SOLUTION INTRAMUSCULAR; INTRAVENOUS
Status: DISPENSED
Start: 2018-03-23

## (undated) RX ORDER — PROPOFOL 10 MG/ML
INJECTION, EMULSION INTRAVENOUS
Status: DISPENSED
Start: 2018-03-23

## (undated) RX ORDER — METOPROLOL TARTRATE 25 MG/1
TABLET, FILM COATED ORAL
Status: DISPENSED
Start: 2018-03-27

## (undated) RX ORDER — CEFAZOLIN SODIUM 1 G/3ML
INJECTION, POWDER, FOR SOLUTION INTRAMUSCULAR; INTRAVENOUS
Status: DISPENSED
Start: 2018-03-24

## (undated) RX ORDER — ONDANSETRON 2 MG/ML
INJECTION INTRAMUSCULAR; INTRAVENOUS
Status: DISPENSED
Start: 2018-03-24

## (undated) RX ORDER — PROPOFOL 10 MG/ML
INJECTION, EMULSION INTRAVENOUS
Status: DISPENSED
Start: 2018-03-27

## (undated) RX ORDER — PHENYLEPHRINE HCL IN 0.9% NACL 1 MG/10 ML
SYRINGE (ML) INTRAVENOUS
Status: DISPENSED
Start: 2018-03-24

## (undated) RX ORDER — CEFAZOLIN SODIUM 2 G/100ML
INJECTION, SOLUTION INTRAVENOUS
Status: DISPENSED
Start: 2018-03-27

## (undated) RX ORDER — EPHEDRINE SULFATE 50 MG/ML
INJECTION, SOLUTION INTRAMUSCULAR; INTRAVENOUS; SUBCUTANEOUS
Status: DISPENSED
Start: 2018-03-23

## (undated) RX ORDER — IOPAMIDOL 755 MG/ML
INJECTION, SOLUTION INTRAVASCULAR
Status: DISPENSED
Start: 2018-03-24

## (undated) RX ORDER — METOPROLOL TARTRATE 1 MG/ML
INJECTION, SOLUTION INTRAVENOUS
Status: DISPENSED
Start: 2018-03-24

## (undated) RX ORDER — FENTANYL CITRATE 50 UG/ML
INJECTION, SOLUTION INTRAMUSCULAR; INTRAVENOUS
Status: DISPENSED
Start: 2018-03-23

## (undated) RX ORDER — PROTAMINE SULFATE 10 MG/ML
INJECTION, SOLUTION INTRAVENOUS
Status: DISPENSED
Start: 2018-03-23

## (undated) RX ORDER — FENTANYL CITRATE 50 UG/ML
INJECTION, SOLUTION INTRAMUSCULAR; INTRAVENOUS
Status: DISPENSED
Start: 2018-03-27

## (undated) RX ORDER — LIDOCAINE HYDROCHLORIDE 10 MG/ML
INJECTION, SOLUTION EPIDURAL; INFILTRATION; INTRACAUDAL; PERINEURAL
Status: DISPENSED
Start: 2018-03-27

## (undated) RX ORDER — PHENYLEPHRINE HCL IN 0.9% NACL 1 MG/10 ML
SYRINGE (ML) INTRAVENOUS
Status: DISPENSED
Start: 2018-03-23

## (undated) RX ORDER — ALBUMIN, HUMAN INJ 5% 5 %
SOLUTION INTRAVENOUS
Status: DISPENSED
Start: 2018-03-23

## (undated) RX ORDER — PROPOFOL 10 MG/ML
INJECTION, EMULSION INTRAVENOUS
Status: DISPENSED
Start: 2018-03-24

## (undated) RX ORDER — CALCIUM CHLORIDE 100 MG/ML
INJECTION INTRAVENOUS; INTRAVENTRICULAR
Status: DISPENSED
Start: 2018-03-24

## (undated) RX ORDER — LIDOCAINE HYDROCHLORIDE 20 MG/ML
INJECTION, SOLUTION EPIDURAL; INFILTRATION; INTRACAUDAL; PERINEURAL
Status: DISPENSED
Start: 2018-03-24

## (undated) RX ORDER — GLYCOPYRROLATE 0.2 MG/ML
INJECTION, SOLUTION INTRAMUSCULAR; INTRAVENOUS
Status: DISPENSED
Start: 2018-03-23